# Patient Record
Sex: FEMALE | Race: WHITE | NOT HISPANIC OR LATINO | Employment: OTHER | URBAN - METROPOLITAN AREA
[De-identification: names, ages, dates, MRNs, and addresses within clinical notes are randomized per-mention and may not be internally consistent; named-entity substitution may affect disease eponyms.]

---

## 2017-02-03 PROBLEM — L65.9 HAIR LOSS: Status: ACTIVE | Noted: 2017-02-03

## 2017-02-05 PROBLEM — J32.0 CHRONIC MAXILLARY SINUSITIS: Status: ACTIVE | Noted: 2017-02-05

## 2017-05-19 ENCOUNTER — HOSPITAL ENCOUNTER (OUTPATIENT)
Dept: RADIOLOGY | Facility: HOSPITAL | Age: 77
Discharge: HOME | End: 2017-05-19
Attending: INTERNAL MEDICINE
Payer: MEDICARE

## 2017-05-19 ENCOUNTER — OFFICE VISIT (OUTPATIENT)
Dept: RHEUMATOLOGY | Facility: CLINIC | Age: 77
End: 2017-05-19
Payer: MEDICARE

## 2017-05-19 VITALS
TEMPERATURE: 98 F | HEIGHT: 62 IN | HEART RATE: 72 BPM | SYSTOLIC BLOOD PRESSURE: 123 MMHG | BODY MASS INDEX: 25.88 KG/M2 | DIASTOLIC BLOOD PRESSURE: 77 MMHG | RESPIRATION RATE: 18 BRPM | WEIGHT: 140.63 LBS

## 2017-05-19 DIAGNOSIS — R79.82 ELEVATED C-REACTIVE PROTEIN (CRP): ICD-10-CM

## 2017-05-19 DIAGNOSIS — M25.50 POLYARTHRALGIA: Primary | ICD-10-CM

## 2017-05-19 DIAGNOSIS — M79.10 MYALGIA: ICD-10-CM

## 2017-05-19 DIAGNOSIS — M25.50 POLYARTHRALGIA: ICD-10-CM

## 2017-05-19 DIAGNOSIS — R70.0 ELEVATED SED RATE: ICD-10-CM

## 2017-05-19 PROCEDURE — 73130 X-RAY EXAM OF HAND: CPT | Mod: 26,50,, | Performed by: RADIOLOGY

## 2017-05-19 PROCEDURE — 72200 X-RAY EXAM SI JOINTS: CPT | Mod: TC

## 2017-05-19 PROCEDURE — 73630 X-RAY EXAM OF FOOT: CPT | Mod: 50,TC

## 2017-05-19 PROCEDURE — 72200 X-RAY EXAM SI JOINTS: CPT | Mod: 26,,, | Performed by: RADIOLOGY

## 2017-05-19 PROCEDURE — 73130 X-RAY EXAM OF HAND: CPT | Mod: 50,TC

## 2017-05-19 PROCEDURE — 73630 X-RAY EXAM OF FOOT: CPT | Mod: 26,50,, | Performed by: RADIOLOGY

## 2017-05-19 PROCEDURE — 99999 PR PBB SHADOW E&M-EST. PATIENT-LVL III: CPT | Mod: PBBFAC,,, | Performed by: INTERNAL MEDICINE

## 2017-05-19 PROCEDURE — 99204 OFFICE O/P NEW MOD 45 MIN: CPT | Mod: S$PBB,,, | Performed by: INTERNAL MEDICINE

## 2017-05-19 ASSESSMENT — ROUTINE ASSESSMENT OF PATIENT INDEX DATA (RAPID3)
FATIGUE SCORE: 0
PATIENT GLOBAL ASSESSMENT SCORE: 3.5
PSYCHOLOGICAL DISTRESS SCORE: 0
TOTAL RAPID3 SCORE: 4.5
MDHAQ FUNCTION SCORE: 0
AM STIFFNESS SCORE: 0, NO
PAIN SCORE: 10

## 2017-05-19 NOTE — PROGRESS NOTES
"Subjective:       Patient ID: Amarilis Decker is a 76 y.o. female.    Chief Complaint: Polyarthralgia   HPI76 yo female with Hypothyroidism is seen in consultation for Fibromyalgia. Follows with Dr López for Psoriasis.MTX caused throat choking, Apremilast caused coughing and leg pain . Humira is being considered fir concern of Psoriatic arthritis. Has DDD- follow with Dr Chavez  She c/o pain in hands and feet. Pain is aching type, intermittent, worse with activity, no joint effusion. No morning stiffness.   +Psoriasis     Review of Systems   Constitutional: Positive for fatigue. Negative for fever.   HENT: Negative for ear discharge and ear pain.    Eyes: Negative for pain and redness.   Respiratory: Negative for cough and shortness of breath.    Cardiovascular: Negative for chest pain and palpitations.   Gastrointestinal: Negative for abdominal distention and abdominal pain.   Genitourinary: Negative for genital sores and hematuria.   Musculoskeletal: Positive for myalgias.   Neurological: Negative for tremors and seizures.   Psychiatric/Behavioral: Negative for agitation and hallucinations.         Objective:   /77 (BP Location: Right arm, Patient Position: Sitting)  Pulse 72  Temp 98.4 °F (36.9 °C)  Resp 18  Ht 5' 2" (1.575 m)  Wt 63.8 kg (140 lb 10.5 oz)  BMI 25.73 kg/m2     Physical Exam   Nursing note and vitals reviewed.  Constitutional: She is oriented to person, place, and time and well-developed, well-nourished, and in no distress.   HENT:   Head: Normocephalic and atraumatic.   Eyes: Conjunctivae and EOM are normal. Pupils are equal, round, and reactive to light.   Neck: Neck supple. No tracheal deviation present. No thyromegaly present.   Cardiovascular: Normal rate and regular rhythm.  Exam reveals no friction rub.    Pulmonary/Chest: Effort normal and breath sounds normal.   Abdominal: Soft. Bowel sounds are normal. She exhibits no mass.       Right Side Rheumatological Exam     Muscle " Strength (0-5 scale):  Neck Flexion:  5  Neck Extension: 5  Deltoid:  5  Biceps: 5/5   Triceps:  5  : 5/5   Iliopsoas: 5  Quadriceps:  5   Distal Lower Extremity: 5    Left Side Rheumatological Exam     Muscle Strength (0-5 scale):  Neck Flexion:  5  Neck Extension: 5  Deltoid:  5  Biceps: 5/5   Triceps:  5  :  5/5   Iliopsoas: 5  Quadriceps:  5   Distal Lower Extremity: 5      Neurological: She is alert and oriented to person, place, and time. She exhibits normal muscle tone.   Skin: Skin is warm and dry. Rash noted.     + Scaly rash on neck consistent with psoriasis   Psychiatric: Memory and affect normal.   Musculoskeletal: She exhibits no edema.   Neck with decreased range of motion in all directions   Bilateral shoulders with intact ROM   Elbows without any swelling or tenderness  Diffused Hcitra's and Heberden's nodes  Bilateral hips with external rotation 25° and  internal rotation 15°   Bilateral knee crepitus  Both ankles and feet nontender, without synovitis                   Lab Results   Component Value Date    WBC 4.77 01/02/2017    HGB 13.2 01/02/2017    HCT 41.9 01/02/2017    MCV 94 01/02/2017     01/02/2017     CMP  Sodium   Date Value Ref Range Status   05/04/2017 141 136 - 145 mmol/L Final     Potassium   Date Value Ref Range Status   05/04/2017 4.3 3.5 - 5.1 mmol/L Final     Chloride   Date Value Ref Range Status   05/04/2017 101 95 - 110 mmol/L Final     CO2   Date Value Ref Range Status   05/04/2017 31 22 - 31 mmol/L Final     Glucose   Date Value Ref Range Status   05/04/2017 107 70 - 110 mg/dL Final     Comment:     The ADA recommends the following guidelines for fasting glucose:  Normal:       less than 100 mg/dL  Prediabetes:  100 mg/dL to 125 mg/dL  Diabetes:     126 mg/dL or higher       BUN, Bld   Date Value Ref Range Status   05/04/2017 14 7 - 18 mg/dL Final     Creatinine   Date Value Ref Range Status   05/04/2017 0.80 0.50 - 1.40 mg/dL Final     Calcium   Date Value  Ref Range Status   05/04/2017 9.3 8.4 - 10.2 mg/dL Final     Total Protein   Date Value Ref Range Status   09/06/2016 7.0 6.0 - 8.4 g/dL Final     Albumin   Date Value Ref Range Status   09/06/2016 4.2 3.5 - 5.2 g/dL Final     Total Bilirubin   Date Value Ref Range Status   09/06/2016 0.6 0.1 - 1.0 mg/dL Final     Comment:     For infants and newborns, interpretation of results should be based  on gestational age, weight and in agreement with clinical  observations.  Premature Infant recommended reference ranges:  Up to 24 hours.............<8.0 mg/dL  Up to 48 hours............<12.0 mg/dL  3-5 days..................<15.0 mg/dL  6-29 days.................<15.0 mg/dL       Alkaline Phosphatase   Date Value Ref Range Status   09/06/2016 64 38 - 145 U/L Final     AST ([place])   Date Value Ref Range Status   04/15/2016 27 14 - 36 U/L Final     AST   Date Value Ref Range Status   09/06/2016 23 14 - 36 U/L Final     ALT   Date Value Ref Range Status   09/06/2016 27 10 - 44 U/L Final     Anion Gap   Date Value Ref Range Status   05/04/2017 9 8 - 16 mmol/L Final     eGFR if    Date Value Ref Range Status   05/04/2017 >60 >60 mL/min/1.73 m^2 Final     eGFR if non    Date Value Ref Range Status   05/04/2017 >60 >60 mL/min/1.73 m^2 Final     Comment:     Calculation used to obtain the estimated glomerular filtration  rate (eGFR) is the CKD-EPI equation. Since race is unknown   in our information system, the eGFR values for   -American and Non--American patients are given   for each creatinine result.       Lab Results   Component Value Date    SEDRATE 22 (H) 12/09/2016     Lab Results   Component Value Date    CRP 1.50 (H) 12/09/2016   CPK 57     Assessment:   Myalgia/left pain-?Sec to DDD- no weakness on exam, normal CPK and aldolase-doubt myositis- check EMG-  Polyarthralgia -?  Psoriatic arthritis -check x-ray of hands feet and SI joints   Current psoriasis  Elevated ESR  and CRP -we will repeat tests   Chronic DDD   Plan:   Check ESR and CRP  Check x-ray hands feet and SI joints  Check EMG test  Follow-up with Dr Chavez for chronic DDD   Patient educated about psoriatic arthritis in length.  Answered all her questions.  At this time, she is not interested in taking any medications for psoriatic arthritis including Humira.  Counseled about the disease prognosis and consequences of untreated disease.  She voiced complete understanding and refuses to take any further medications.  We will respect patients decision.  Advised to call back if any changes concerns or questions   Return to clinic when necessary

## 2017-07-10 ENCOUNTER — OFFICE VISIT (OUTPATIENT)
Dept: FAMILY MEDICINE | Facility: CLINIC | Age: 77
End: 2017-07-10
Payer: MEDICARE

## 2017-07-10 VITALS
HEART RATE: 60 BPM | RESPIRATION RATE: 12 BRPM | HEIGHT: 63 IN | BODY MASS INDEX: 23.61 KG/M2 | DIASTOLIC BLOOD PRESSURE: 60 MMHG | WEIGHT: 133.25 LBS | TEMPERATURE: 98 F | SYSTOLIC BLOOD PRESSURE: 108 MMHG

## 2017-07-10 DIAGNOSIS — Z12.31 ENCOUNTER FOR SCREENING MAMMOGRAM FOR MALIGNANT NEOPLASM OF BREAST: ICD-10-CM

## 2017-07-10 DIAGNOSIS — L40.9 PSORIASIS: ICD-10-CM

## 2017-07-10 DIAGNOSIS — R20.8 OTHER DISTURBANCES OF SKIN SENSATION: ICD-10-CM

## 2017-07-10 DIAGNOSIS — M79.604 PAIN IN BOTH LOWER EXTREMITIES: Primary | ICD-10-CM

## 2017-07-10 DIAGNOSIS — Z12.39 BREAST CANCER SCREENING: ICD-10-CM

## 2017-07-10 DIAGNOSIS — E03.9 HYPOTHYROIDISM (ACQUIRED): Chronic | ICD-10-CM

## 2017-07-10 DIAGNOSIS — Z79.899 ENCOUNTER FOR LONG-TERM CURRENT USE OF MEDICATION: ICD-10-CM

## 2017-07-10 DIAGNOSIS — R79.9 ABNORMAL FINDING OF BLOOD CHEMISTRY: ICD-10-CM

## 2017-07-10 DIAGNOSIS — M89.9 DISORDER OF BONE: ICD-10-CM

## 2017-07-10 DIAGNOSIS — E55.9 VITAMIN D DEFICIENCY: ICD-10-CM

## 2017-07-10 DIAGNOSIS — M79.605 PAIN IN BOTH LOWER EXTREMITIES: Primary | ICD-10-CM

## 2017-07-10 PROCEDURE — 1159F MED LIST DOCD IN RCRD: CPT | Mod: S$GLB,,, | Performed by: NURSE PRACTITIONER

## 2017-07-10 PROCEDURE — 99204 OFFICE O/P NEW MOD 45 MIN: CPT | Mod: S$GLB,,, | Performed by: NURSE PRACTITIONER

## 2017-07-10 PROCEDURE — 1126F AMNT PAIN NOTED NONE PRSNT: CPT | Mod: S$GLB,,, | Performed by: NURSE PRACTITIONER

## 2017-07-10 RX ORDER — LEVOTHYROXINE SODIUM 50 UG/1
TABLET ORAL
Qty: 34 TABLET | Refills: 11 | Status: SHIPPED | OUTPATIENT
Start: 2017-07-10 | End: 2017-07-25 | Stop reason: SDUPTHER

## 2017-07-10 NOTE — PROGRESS NOTES
Subjective:       Patient ID: Amarilis Decker is a 76 y.o. female.    Chief Complaint: Establish Care    The patient is here to establish care.  She tells me that her main complaint today is pain in both of her lower extremities.  She tells me that the pain is from her feet to her mid calf area.  She has had multiple epidural steroid injections with Dr. Yen with no relief.  She tells me she is at her with then because the pain is absolutely terrible.  Sometimes she wakes up and is almost paralyzed with pain for a few minutes.  She was a very active person plan competitive tennis until just a few years ago.  She is still very active despite not playing tennis secondary to pain.  She does also have some slight skin sensation disturbance to the lower extremities.  She never reports that they changed colors such as blue or white.    She has the following active problems as well.  1.  Psoriasis-she is followed by Dr. López in dermatology.  She does feel like her psoriasis is under control at this time with topical medication such as betamethasone, clobetasol and desonide.  2.  Vitamin D deficiency-she has had a deficiency of this and does take vitamin D replacement daily.  3.  Hypothyroidism-stable on Synthroid.  She states she was on Lilly Thyroid for many years with absolutely no problem.  She was then switched to Synthroid.  She does states she did not feel like the medication was working well so she switched to name brand which was too expensive now she is now on a regimen of tube tablets on Sunday and 1 tablet daily every other day of the 50 µg tablets.      Review of Systems   Constitutional: Negative for activity change and appetite change.   HENT: Negative for congestion, postnasal drip, rhinorrhea and sinus pressure.    Eyes: Negative for pain and redness.   Respiratory: Negative for choking and chest tightness.    Gastrointestinal: Negative for abdominal distention, abdominal pain, blood in stool,  "constipation, diarrhea, nausea and vomiting.   Endocrine: Negative for polydipsia and polyphagia.   Genitourinary: Negative for dysuria and hematuria.   Musculoskeletal: Positive for arthralgias and back pain. Negative for myalgias.        Severe lower ext pain   Skin: Negative for color change and rash.   Neurological: Negative for dizziness and headaches.   Psychiatric/Behavioral: Negative for agitation and behavioral problems.       Objective:       Vitals:    07/10/17 1503   BP: 108/60   Pulse: 60   Resp: 12   Temp: 98.2 °F (36.8 °C)   TempSrc: Oral   Weight: 60.5 kg (133 lb 4.3 oz)   Height: 5' 3" (1.6 m)   PainSc: 0-No pain       Physical Exam   Constitutional: She is oriented to person, place, and time. She appears well-developed and well-nourished. No distress.   HENT:   Head: Normocephalic and atraumatic.   Right Ear: Hearing, tympanic membrane, external ear and ear canal normal.   Left Ear: Hearing, tympanic membrane, external ear and ear canal normal.   Nose: Nose normal.   Mouth/Throat: Uvula is midline, oropharynx is clear and moist and mucous membranes are normal.   Eyes: Conjunctivae and EOM are normal. Pupils are equal, round, and reactive to light. Right eye exhibits no discharge. Left eye exhibits no discharge.   Neck: Trachea normal and normal range of motion. Neck supple. No JVD present. Carotid bruit is not present. No thyromegaly present.   Cardiovascular: Normal rate and regular rhythm.  Exam reveals no gallop and no friction rub.    No murmur heard.  Lower extremities without any swelling.  Dorsalis pedis and posterior tibial pulses are diminished at 1+ bilaterally.  Skin is warm to touch.   Pulmonary/Chest: Effort normal and breath sounds normal. No respiratory distress. She has no wheezes. She has no rales. She exhibits no tenderness.   Abdominal: Soft. Bowel sounds are normal. She exhibits no distension and no mass. There is no tenderness. There is no rebound and no guarding. "   Musculoskeletal: Normal range of motion.   Neurological: She is alert and oriented to person, place, and time. Coordination normal.   Skin: Skin is warm and dry. She is not diaphoretic.   Psychiatric: She has a normal mood and affect. Her behavior is normal. Judgment and thought content normal.       Assessment:       1. Pain in both lower extremities    2. Hypothyroidism (acquired)    3. Encounter for long-term current use of medication    4. Vitamin D deficiency    5. Psoriasis    6. Abnormal finding of blood chemistry     7. Breast cancer screening    8. Encounter for screening mammogram for malignant neoplasm of breast     9. Disorder of bone     10. Other disturbances of skin sensation         Plan:       Amarilis was seen today for establish care.    Diagnoses and all orders for this visit:    Pain in both lower extremities  -     US Lower Extremity Veins Bilateral Insufficiency; Future  -     US Lower Extrem Arteries Bilat with STAN (xpd); Future  -     CBC auto differential; Future  -     Comprehensive metabolic panel; Future  -     Iron and TIBC; Future  -     Ferritin; Future  -     Magnesium; Future  -     Vitamin B12; Future    Hypothyroidism (acquired)  -     levothyroxine (SYNTHROID) 50 MCG tablet; 2 tabs po on Sunday, 1 tab po all other days.  -     TSH; Future    Encounter for long-term current use of medication  -     Lipid panel; Future    Vitamin D deficiency  -     Vitamin D; Future    Psoriasis  -     Iron and TIBC; Future  -     Ferritin; Future    Abnormal finding of blood chemistry   -     Iron and TIBC; Future  -     Ferritin; Future  -     DXA Bone Density Spine And Hip; Future    Breast cancer screening  -     Mammo Digital Screening Bilat with CAD; Future    Encounter for screening mammogram for malignant neoplasm of breast   -     Mammo Digital Screening Bilat with CAD; Future    Disorder of bone   -     DXA Bone Density Spine And Hip; Future    Other disturbances of skin sensation   -      Vitamin B12; Future

## 2017-07-11 ENCOUNTER — TELEPHONE (OUTPATIENT)
Dept: FAMILY MEDICINE | Facility: CLINIC | Age: 77
End: 2017-07-11

## 2017-07-11 NOTE — TELEPHONE ENCOUNTER
----- Message from Jeanie Covington sent at 7/10/2017  4:36 PM CDT -----  Contact: self  Needs to schedule Ultrasound of veins and arteries in legs, and [facility] doesn't do STAN. Not sure where to go, I was unable to get anyone in radiology on the line at [facility] to check with them.  Please call back at

## 2017-07-13 ENCOUNTER — HOSPITAL ENCOUNTER (OUTPATIENT)
Dept: RADIOLOGY | Facility: HOSPITAL | Age: 77
Discharge: HOME | End: 2017-07-13
Attending: NURSE PRACTITIONER
Payer: MEDICARE

## 2017-07-13 DIAGNOSIS — M79.604 PAIN IN BOTH LOWER EXTREMITIES: ICD-10-CM

## 2017-07-13 DIAGNOSIS — M79.605 PAIN IN BOTH LOWER EXTREMITIES: ICD-10-CM

## 2017-07-13 PROCEDURE — 93925 LOWER EXTREMITY STUDY: CPT | Mod: TC,PO

## 2017-07-13 PROCEDURE — 93970 EXTREMITY STUDY: CPT | Mod: 26,,, | Performed by: RADIOLOGY

## 2017-07-13 PROCEDURE — 93970 EXTREMITY STUDY: CPT | Mod: TC,PO

## 2017-07-13 PROCEDURE — 93922 UPR/L XTREMITY ART 2 LEVELS: CPT | Mod: 26,,, | Performed by: RADIOLOGY

## 2017-07-13 PROCEDURE — 93925 LOWER EXTREMITY STUDY: CPT | Mod: 26,,, | Performed by: RADIOLOGY

## 2017-07-15 ENCOUNTER — PATIENT MESSAGE (OUTPATIENT)
Dept: FAMILY MEDICINE | Facility: CLINIC | Age: 77
End: 2017-07-15

## 2017-07-17 ENCOUNTER — TELEPHONE (OUTPATIENT)
Dept: FAMILY MEDICINE | Facility: CLINIC | Age: 77
End: 2017-07-17

## 2017-07-17 NOTE — TELEPHONE ENCOUNTER
Ultrasound of the lower extremities does show venous reflux.  Please book with Dr. Demarco in cardiovascular surgery.  I will discuss this with her in detail at her upcoming appointment.  The ultrasound of the arteries was fine.    Her labs were unremarkable except for one slightly elevated liver enzyme and which we can talk about in clinic.  The rest of her labs were all normal.    The DEXA scan does show osteopenia with a high fracture score.  I should recommend medication at this time.  This would be something like Boniva.  If she is okay with me I will order this, if not we will review this at our upcoming appointment.

## 2017-07-20 NOTE — TELEPHONE ENCOUNTER
----- Message from Ezequiel Chance sent at 7/19/2017  4:36 PM CDT -----  Contact: self   Placed call to pod, patient miss call from your office please call back at [phone]

## 2017-07-25 ENCOUNTER — OFFICE VISIT (OUTPATIENT)
Dept: FAMILY MEDICINE | Facility: CLINIC | Age: 77
End: 2017-07-25
Payer: MEDICARE

## 2017-07-25 ENCOUNTER — TELEPHONE (OUTPATIENT)
Dept: FAMILY MEDICINE | Facility: CLINIC | Age: 77
End: 2017-07-25

## 2017-07-25 VITALS
BODY MASS INDEX: 23.48 KG/M2 | SYSTOLIC BLOOD PRESSURE: 110 MMHG | OXYGEN SATURATION: 97 % | HEART RATE: 68 BPM | HEIGHT: 63 IN | TEMPERATURE: 98 F | WEIGHT: 132.5 LBS | DIASTOLIC BLOOD PRESSURE: 60 MMHG

## 2017-07-25 DIAGNOSIS — E03.9 HYPOTHYROIDISM (ACQUIRED): Chronic | ICD-10-CM

## 2017-07-25 DIAGNOSIS — I87.2 VENOUS INSUFFICIENCY: Primary | ICD-10-CM

## 2017-07-25 PROCEDURE — 99214 OFFICE O/P EST MOD 30 MIN: CPT | Mod: S$GLB,,, | Performed by: NURSE PRACTITIONER

## 2017-07-25 PROCEDURE — 1159F MED LIST DOCD IN RCRD: CPT | Mod: S$GLB,,, | Performed by: NURSE PRACTITIONER

## 2017-07-25 PROCEDURE — 1126F AMNT PAIN NOTED NONE PRSNT: CPT | Mod: S$GLB,,, | Performed by: NURSE PRACTITIONER

## 2017-07-25 RX ORDER — LEVOTHYROXINE SODIUM 50 UG/1
TABLET ORAL
Qty: 34 TABLET | Refills: 11 | Status: SHIPPED | OUTPATIENT
Start: 2017-07-25 | End: 2021-02-01

## 2017-07-25 NOTE — TELEPHONE ENCOUNTER
Pt in clinic to day needing to schedule an appt with Dr Macario Hernandez per Brittany Bruno NP. Attempted to schedule and also call the office . Pt cannot schedule on a Wed or the first Tuesday of the month. Please review and call pt to schedule. Thank you. CLC

## 2017-07-25 NOTE — TELEPHONE ENCOUNTER
Spoke to patient regarding message below, patient declined to schedule appt/discuss results and medication as she is scheduled for an office visit 7/25/17. Patient opted to discuss results at this time.

## 2017-07-28 NOTE — PROGRESS NOTES
"Subjective:       Patient ID: Amarilis Decker is a 76 y.o. female.    Chief Complaint: Results    The patient is here for a follow-up visit to review blood work as well as her most recent venous ultrasound.  She is still having very sharp pain in the lower extremities/feet bilaterally that lasts just a few minutes and then improves.  She states the pain is excruciating and stops her in her tracks but then goes away.  She denies any discoloration of her lower extremities.  We did a venous ultrasound 7/13/17 that shows a hemodynamically significant venous reflux within the left greater saphenous vein at the level of the mid thigh.  There was some incidentally observed venous reflux within the left and right common femoral vein.  No DVT.  Arterial ultrasound was essentially normal.  I did print out all of her blood work and go over this in the office.  The patient was displeased that I did not run a T3 or T4 she states her previous doctor "always" checked this routinely.      Review of Systems   Constitutional: Negative for activity change and appetite change.   HENT: Negative for congestion, postnasal drip, rhinorrhea and sinus pressure.    Eyes: Negative for pain and redness.   Respiratory: Negative for choking and chest tightness.    Gastrointestinal: Negative for abdominal distention, abdominal pain, blood in stool, constipation, diarrhea, nausea and vomiting.   Endocrine: Negative for polydipsia and polyphagia.   Genitourinary: Negative for dysuria and hematuria.   Musculoskeletal: Negative for arthralgias and myalgias.        Leg/feet pain   Skin: Negative for color change and rash.   Neurological: Negative for dizziness and headaches.   Psychiatric/Behavioral: Negative for agitation and behavioral problems.       Objective:      Physical Exam   Constitutional: She is oriented to person, place, and time. She appears well-developed and well-nourished.   HENT:   Head: Normocephalic and atraumatic.   Right Ear: " External ear normal.   Left Ear: External ear normal.   Nose: Nose normal.   Mouth/Throat: Oropharynx is clear and moist.   Eyes: Conjunctivae are normal. Right eye exhibits no discharge. Left eye exhibits no discharge. No scleral icterus.   Neck: Normal range of motion. Neck supple. No tracheal deviation present.   Cardiovascular: Normal rate, regular rhythm and normal heart sounds.  Exam reveals no friction rub.    No murmur heard.  Pulmonary/Chest: Effort normal and breath sounds normal. No stridor. No respiratory distress. She has no wheezes. She has no rales. She exhibits no tenderness.   Musculoskeletal: Normal range of motion.   Lymphadenopathy:     She has no cervical adenopathy.   Neurological: She is alert and oriented to person, place, and time.   Skin: Skin is warm and dry.   Psychiatric: She has a normal mood and affect.       Assessment:       1. Venous insufficiency    2. Hypothyroidism (acquired)        Plan:       Amarilis was seen today for results.    Diagnoses and all orders for this visit:    Venous insufficiency  -     Ambulatory consult to Cardiovascular Surgery    Hypothyroidism (acquired)  -     levothyroxine (SYNTHROID) 50 MCG tablet; 2 tabs po on Sunday, 1 tab po all other days.    Greater than 50% of the patient's 40 minute clinic visit was spent on reviewing labs as well as venous and arterial ultrasound.  The labs were printed and reviewed in detail.

## 2017-10-09 ENCOUNTER — OFFICE VISIT (OUTPATIENT)
Dept: PODIATRY | Facility: CLINIC | Age: 77
End: 2017-10-09
Payer: MEDICARE

## 2017-10-09 VITALS — WEIGHT: 129.63 LBS | BODY MASS INDEX: 22.97 KG/M2 | HEIGHT: 63 IN

## 2017-10-09 DIAGNOSIS — M79.671 FOOT PAIN, BILATERAL: ICD-10-CM

## 2017-10-09 DIAGNOSIS — B35.3 TINEA PEDIS OF BOTH FEET: Primary | ICD-10-CM

## 2017-10-09 DIAGNOSIS — L40.9 PSORIASIS: Chronic | ICD-10-CM

## 2017-10-09 DIAGNOSIS — M79.672 FOOT PAIN, BILATERAL: ICD-10-CM

## 2017-10-09 PROCEDURE — 99999 PR PBB SHADOW E&M-EST. PATIENT-LVL II: CPT | Mod: PBBFAC,,, | Performed by: PODIATRIST

## 2017-10-09 PROCEDURE — 99203 OFFICE O/P NEW LOW 30 MIN: CPT | Mod: S$PBB,,, | Performed by: PODIATRIST

## 2017-10-09 PROCEDURE — 99212 OFFICE O/P EST SF 10 MIN: CPT | Mod: PBBFAC,PO | Performed by: PODIATRIST

## 2017-10-09 RX ORDER — CICLOPIROX OLAMINE 7.7 MG/G
CREAM TOPICAL 2 TIMES DAILY
Qty: 1 TUBE | Refills: 1 | Status: SHIPPED | OUTPATIENT
Start: 2017-10-09 | End: 2017-10-12 | Stop reason: SDUPTHER

## 2017-10-09 RX ORDER — PRENATAL VIT 91/IRON/FOLIC/DHA 28-975-200
COMBINATION PACKAGE (EA) ORAL 2 TIMES DAILY
COMMUNITY
End: 2021-02-01 | Stop reason: CLARIF

## 2017-10-09 NOTE — PROGRESS NOTES
Subjective:      Patient ID: Amarilis Decker is a 76 y.o. female.    Chief Complaint: Foot Pain (right worse than left  ); Foot Problem (dryness and cracking of skin); and Other Misc (PCP:  Dr Bruno  7/25/17)    Amarilis is a 76 y.o. female who presents to the podiatry clinic  with complaint of  bilateral foot pain secondary to dry cracking feet. Onset of the symptoms was several months ago. Precipitating event: none known.  Patient has a known history of psoriasis on her scalp and thinks the rash on the bottom of her feet may also be psoriasis. She has tried her clobetasol cream on the feet but did not feel that it helped, may have even gotten worse. She has also tried over the counter terbinafine which she does not think helped. She has areas of cracking to the heel and some of the toes which hurts with weight bearing and ambulation. She also noted itching to the bottoms of her feet. No other pedal complaints at this time.        Review of Systems   Constitution: Negative for chills and fever.   Cardiovascular: Negative for claudication and leg swelling.   Respiratory: Negative for shortness of breath.    Skin: Positive for itching and rash. Negative for nail changes.   Musculoskeletal: Negative for muscle cramps, muscle weakness and myalgias.   Gastrointestinal: Negative for nausea and vomiting.   Neurological: Negative for focal weakness, loss of balance, numbness and paresthesias.           Objective:      Physical Exam   Constitutional: She is oriented to person, place, and time. She appears well-developed and well-nourished. No distress.   Cardiovascular:   Pulses:       Dorsalis pedis pulses are 2+ on the right side, and 2+ on the left side.        Posterior tibial pulses are 2+ on the right side, and 2+ on the left side.   < 3 sec capillary refill time to toes 1-5 bilateral. Toes and feet are warm to touch proximally with normal distal cooling b/l. There is some hair growth on the feet and toes b/l. There  is no edema b/l. No spider veins or varicosities present b/l.      Musculoskeletal:   Equinus noted b/l ankles with < 10 deg DF noted. MMT 5/5 in DF/PF/Inv/Ev resistance with no reproduction of pain in any direction. Passive range of motion of ankle and pedal joints is painless b/l.     Neurological: She is alert and oriented to person, place, and time. She has normal strength. She displays no atrophy and no tremor. No sensory deficit. She exhibits normal muscle tone.   Negative tinel sign bilateral.   Skin: Skin is warm and dry. Rash noted. No abrasion, no bruising, no burn, no ecchymosis, no laceration, no lesion and no petechiae noted. She is not diaphoretic. No cyanosis or erythema. No pallor. Nails show no clubbing.   Skin temperature, texture and turgor within normal limits.    Dry scale with superficial flakes over an erythematous base in a moccasin pattern  without ulceration, drainage, pus, tracking, fluctuance, malodor, or cardinal signs infection. There is fissuring noted to the bilateral heels and the plantar toes left 3 and 5 at the MTPJ. Fissuring is partial thickness without erythema or drainage.   Psychiatric: She has a normal mood and affect. Her behavior is normal.             Assessment:       Encounter Diagnoses   Name Primary?    Tinea pedis of both feet Yes    Psoriasis     Foot pain, bilateral          Plan:       Amarilis was seen today for foot pain, foot problem and other misc.    Diagnoses and all orders for this visit:    Tinea pedis of both feet    Psoriasis    Foot pain, bilateral    Other orders  -     ciclopirox (LOPROX) 0.77 % Crea; Apply topically 2 (two) times daily.      I counseled the patient on her conditions, their implications and medical management.    Discussed etiology and treatment for tinea pedis. There may be some aspect of the psoriasis as well, will treat as tinea with ciclopirox BID for the next two weeks. Discussed the importance of using the antifungal daily.      Return in 2 weeks for follow up care, if no improvement consider biopsy to confirm psoriasis.    Ritchie Caraballo DPM

## 2017-10-12 RX ORDER — CICLOPIROX OLAMINE 7.7 MG/G
CREAM TOPICAL 2 TIMES DAILY
Qty: 90 G | Refills: 2 | Status: SHIPPED | OUTPATIENT
Start: 2017-10-12 | End: 2021-02-01

## 2017-10-23 ENCOUNTER — OFFICE VISIT (OUTPATIENT)
Dept: PODIATRY | Facility: CLINIC | Age: 77
End: 2017-10-23
Payer: MEDICARE

## 2017-10-23 VITALS — WEIGHT: 129.19 LBS | BODY MASS INDEX: 22.89 KG/M2 | HEIGHT: 63 IN

## 2017-10-23 DIAGNOSIS — B35.3 TINEA PEDIS OF BOTH FEET: Primary | ICD-10-CM

## 2017-10-23 DIAGNOSIS — L40.9 PSORIASIS: ICD-10-CM

## 2017-10-23 PROCEDURE — 99999 PR PBB SHADOW E&M-EST. PATIENT-LVL II: CPT | Mod: PBBFAC,,, | Performed by: PODIATRIST

## 2017-10-23 PROCEDURE — 99212 OFFICE O/P EST SF 10 MIN: CPT | Mod: S$PBB,,, | Performed by: PODIATRIST

## 2017-10-23 PROCEDURE — 99212 OFFICE O/P EST SF 10 MIN: CPT | Mod: PBBFAC,PO | Performed by: PODIATRIST

## 2017-10-23 NOTE — PROGRESS NOTES
Subjective:      Patient ID: Amarilis Decker is a 76 y.o. female.    Chief Complaint: Follow-up (2 week recheck Tinea pedis both feet) and [facility] (PCP Dr. Fuentes 3/10/2017)    Amarilis is a 76 y.o. female who presents to the podiatry clinic  with complaint of  bilateral foot pain secondary to dry cracking feet. Onset of the symptoms was several months ago. Precipitating event: none known.  Patient has a known history of psoriasis on her scalp and thinks the rash on the bottom of her feet may also be psoriasis. She has tried her clobetasol cream on the feet but did not feel that it helped, may have even gotten worse. She has also tried over the counter terbinafine which she does not think helped. She has areas of cracking to the heel and some of the toes which hurts with weight bearing and ambulation. She also noted itching to the bottoms of her feet. No other pedal complaints at this time.    10/23/17: 2 week follow up bilateral tinea pedis, she feels there has been significant improvement, the cracked painful areas have healed and she feels the redness is improving. Using the ciclopirox BID. No new concerns at the present time.      Review of Systems   Constitution: Negative for chills and fever.   Cardiovascular: Negative for claudication and leg swelling.   Respiratory: Negative for shortness of breath.    Skin: Positive for itching and rash. Negative for nail changes.   Musculoskeletal: Negative for muscle cramps, muscle weakness and myalgias.   Gastrointestinal: Negative for nausea and vomiting.   Neurological: Negative for focal weakness, loss of balance, numbness and paresthesias.           Objective:      Physical Exam   Constitutional: She is oriented to person, place, and time. She appears well-developed and well-nourished. No distress.   Cardiovascular:   Pulses:       Dorsalis pedis pulses are 2+ on the right side, and 2+ on the left side.        Posterior tibial pulses are 2+ on the right side,  and 2+ on the left side.   < 3 sec capillary refill time to toes 1-5 bilateral. Toes and feet are warm to touch proximally with normal distal cooling b/l. There is some hair growth on the feet and toes b/l. There is no edema b/l. No spider veins or varicosities present b/l.      Musculoskeletal:   Equinus noted b/l ankles with < 10 deg DF noted. MMT 5/5 in DF/PF/Inv/Ev resistance with no reproduction of pain in any direction. Passive range of motion of ankle and pedal joints is painless b/l.     Neurological: She is alert and oriented to person, place, and time. She has normal strength. She displays no atrophy and no tremor. No sensory deficit. She exhibits normal muscle tone.   Negative tinel sign bilateral.   Skin: Skin is warm and dry. Rash noted. No abrasion, no bruising, no burn, no ecchymosis, no laceration, no lesion and no petechiae noted. She is not diaphoretic. No cyanosis or erythema. No pallor. Nails show no clubbing.   Skin temperature, texture and turgor within normal limits.    Dry scale with superficial flakes over an erythematous base in a moccasin pattern improving, no ulceration, drainage, pus, tracking, fluctuance, malodor, or cardinal signs infection. There previous noted fissuring to the bilateral heels and the plantar toes left 3 and 5 at the MTPJ has improved greatly with only small fissuring still present to the heels and the plantar MTPJ's fully healed.   Psychiatric: She has a normal mood and affect. Her behavior is normal.             Assessment:       Encounter Diagnoses   Name Primary?    Tinea pedis of both feet Yes    Psoriasis          Plan:       Amarilis was seen today for follow-up and other misc.    Diagnoses and all orders for this visit:    Tinea pedis of both feet    Psoriasis      I counseled the patient on her conditions, their implications and medical management.    Discussed etiology and treatment for tinea pedis. There may be some aspect of the psoriasis as well, will  continue to treat as tinea with ciclopirox BID for the next month as there is significant improvement. Discussed the importance of using the antifungal daily. There may still also be some changes secondary to the psoriasis that can be addressed once the tinea is under control.    Return in 1 month for follow up care, discussed possible PO terbinafine or fluconazole treatment as option however her last ALT was slightly elevated so I would prefer to treat topically for the time being.    Ritchie Caraballo DPM

## 2017-11-08 ENCOUNTER — OFFICE VISIT (OUTPATIENT)
Dept: PODIATRY | Facility: CLINIC | Age: 77
End: 2017-11-08
Payer: MEDICARE

## 2017-11-08 VITALS — HEIGHT: 63 IN

## 2017-11-08 DIAGNOSIS — M79.672 FOOT PAIN, BILATERAL: ICD-10-CM

## 2017-11-08 DIAGNOSIS — L40.9 PSORIASIS: ICD-10-CM

## 2017-11-08 DIAGNOSIS — B35.3 TINEA PEDIS OF BOTH FEET: Primary | ICD-10-CM

## 2017-11-08 DIAGNOSIS — M79.671 FOOT PAIN, BILATERAL: ICD-10-CM

## 2017-11-08 PROCEDURE — 99999 PR PBB SHADOW E&M-EST. PATIENT-LVL I: CPT | Mod: PBBFAC,,, | Performed by: PODIATRIST

## 2017-11-08 PROCEDURE — 99213 OFFICE O/P EST LOW 20 MIN: CPT | Mod: S$PBB,,, | Performed by: PODIATRIST

## 2017-11-08 PROCEDURE — 99211 OFF/OP EST MAY X REQ PHY/QHP: CPT | Mod: PBBFAC,PO | Performed by: PODIATRIST

## 2017-11-08 RX ORDER — AMOXICILLIN AND CLAVULANATE POTASSIUM 875; 125 MG/1; MG/1
TABLET, FILM COATED ORAL
COMMUNITY
Start: 2017-10-10 | End: 2021-02-01 | Stop reason: ALTCHOICE

## 2017-11-08 RX ORDER — ESTRADIOL 0.1 MG/G
CREAM VAGINAL
COMMUNITY
Start: 2017-10-24 | End: 2021-02-01

## 2017-11-08 RX ORDER — CLOTRIMAZOLE AND BETAMETHASONE DIPROPIONATE 10; .64 MG/G; MG/G
CREAM TOPICAL 2 TIMES DAILY
Qty: 45 G | Refills: 2 | Status: SHIPPED | OUTPATIENT
Start: 2017-11-08 | End: 2021-02-01

## 2017-11-08 RX ORDER — LEVOTHYROXINE, LIOTHYRONINE 19; 4.5 UG/1; UG/1
30 TABLET ORAL DAILY
Refills: 0 | COMMUNITY
Start: 2017-10-26 | End: 2021-02-01 | Stop reason: CLARIF

## 2017-11-08 RX ORDER — CLOBETASOL PROPIONATE 0.5 MG/G
OINTMENT TOPICAL
COMMUNITY
Start: 2017-10-10 | End: 2021-02-01

## 2017-11-08 RX ORDER — LEVOTHYROXINE, LIOTHYRONINE 9.5; 2.25 UG/1; UG/1
3 TABLET ORAL DAILY
Refills: 0 | COMMUNITY
Start: 2017-10-26 | End: 2021-02-01 | Stop reason: SDUPTHER

## 2017-11-08 RX ORDER — BETAMETHASONE DIPROPIONATE 0.5 MG/G
1 CREAM TOPICAL 2 TIMES DAILY
COMMUNITY
Start: 2017-10-03 | End: 2021-02-01

## 2017-11-08 RX ORDER — LEVOTHYROXINE SODIUM 25 UG/1
TABLET ORAL
COMMUNITY
Start: 2017-10-29 | End: 2021-02-01 | Stop reason: CLARIF

## 2017-11-08 RX ORDER — LEVOTHYROXINE SODIUM 50 UG/1
50 TABLET ORAL DAILY
COMMUNITY
Start: 2016-09-12 | End: 2021-02-01 | Stop reason: SDUPTHER

## 2017-11-08 RX ORDER — CHOLECALCIFEROL (VITAMIN D3) 25 MCG
TABLET ORAL
COMMUNITY
End: 2021-02-01 | Stop reason: CLARIF

## 2017-11-08 RX ORDER — FLUCONAZOLE 150 MG/1
150 TABLET ORAL
Qty: 2 TABLET | Refills: 0 | Status: SHIPPED | OUTPATIENT
Start: 2017-11-08 | End: 2017-11-16

## 2017-11-08 RX ORDER — VANCOMYCIN HYDROCHLORIDE 1 G/20ML
INJECTION, POWDER, LYOPHILIZED, FOR SOLUTION INTRAVENOUS
COMMUNITY
Start: 2017-10-17 | End: 2021-02-01

## 2017-11-08 RX ORDER — ACITRETIN 10 MG/1
CAPSULE ORAL
COMMUNITY
Start: 2017-10-06 | End: 2021-02-01 | Stop reason: CLARIF

## 2017-11-08 NOTE — PROGRESS NOTES
Subjective:      Patient ID: Amarilis Decker is a 76 y.o. female.    Chief Complaint: Foot Pain (c/o foot pain both feet stating that feet are getting worse) and Other Misc (PCP Dr. Fuentes ( CARLOS rBuno NP 07/25/2017))    Amarilis is a 76 y.o. female who presents to the podiatry clinic  with complaint of  bilateral foot pain secondary to dry cracking feet. Onset of the symptoms was several months ago. Precipitating event: none known.  Patient has a known history of psoriasis on her scalp and thinks the rash on the bottom of her feet may also be psoriasis. She has tried her clobetasol cream on the feet but did not feel that it helped, may have even gotten worse. She has also tried over the counter terbinafine which she does not think helped. She has areas of cracking to the heel and some of the toes which hurts with weight bearing and ambulation. She also noted itching to the bottoms of her feet. No other pedal complaints at this time.    10/23/17: 2 week follow up bilateral tinea pedis, she feels there has been significant improvement, the cracked painful areas have healed and she feels the redness is improving. Using the ciclopirox BID. No new concerns at the present time.    11/8/17: Patient returns with noted increase in pain and redness to plantar feet bilateral despite use of the ciclopirox daily. She wants to know what other treatment options there are.      Review of Systems   Constitution: Negative for chills and fever.   Cardiovascular: Negative for claudication and leg swelling.   Respiratory: Negative for shortness of breath.    Skin: Positive for itching and rash. Negative for nail changes.   Musculoskeletal: Negative for muscle cramps, muscle weakness and myalgias.   Gastrointestinal: Negative for nausea and vomiting.   Neurological: Negative for focal weakness, loss of balance, numbness and paresthesias.           Objective:      Physical Exam   Constitutional: She is oriented to person, place, and  time. She appears well-developed and well-nourished. No distress.   Cardiovascular:   Pulses:       Dorsalis pedis pulses are 2+ on the right side, and 2+ on the left side.        Posterior tibial pulses are 2+ on the right side, and 2+ on the left side.   < 3 sec capillary refill time to toes 1-5 bilateral. Toes and feet are warm to touch proximally with normal distal cooling b/l. There is some hair growth on the feet and toes b/l. There is no edema b/l. No spider veins or varicosities present b/l.      Musculoskeletal:   Equinus noted b/l ankles with < 10 deg DF noted. MMT 5/5 in DF/PF/Inv/Ev resistance with no reproduction of pain in any direction. Passive range of motion of ankle and pedal joints is painless b/l.     Neurological: She is alert and oriented to person, place, and time. She has normal strength. She displays no atrophy and no tremor. No sensory deficit. She exhibits normal muscle tone.   Negative tinel sign bilateral.   Skin: Skin is warm and dry. Rash noted. No abrasion, no bruising, no burn, no ecchymosis, no laceration, no lesion and no petechiae noted. She is not diaphoretic. No cyanosis or erythema. No pallor. Nails show no clubbing.   Skin temperature, texture and turgor within normal limits.    Dry scale with superficial flakes over an erythematous base in a moccasin pattern improving, no ulceration, drainage, pus, tracking, fluctuance, malodor, or cardinal signs infection. There previous noted fissuring to the bilateral heels and the plantar toes left 3 and 5 at the MTPJ has improved greatly with only small fissuring still present to the heels and the plantar MTPJ's fully healed.   Psychiatric: She has a normal mood and affect. Her behavior is normal.             Assessment:       Encounter Diagnoses   Name Primary?    Tinea pedis of both feet Yes    Psoriasis     Foot pain, bilateral          Plan:       Amarilis was seen today for foot pain and other misc.    Diagnoses and all orders  for this visit:    Tinea pedis of both feet    Psoriasis    Foot pain, bilateral    Other orders  -     clotrimazole-betamethasone 1-0.05% (LOTRISONE) cream; Apply topically 2 (two) times daily.  -     fluconazole (DIFLUCAN) 150 MG Tab; Take 1 tablet (150 mg total) by mouth every 7 days.      I counseled the patient on her conditions, their implications and medical management.    Discussed etiology and treatment for tinea pedis. Fluconazole 150 mg once weekly for 2 weeks for PO treatment of tinea. She will also use the lotrisone cream with the betamethasone to help with the itching and psoriasis.     Return in 2 weeks for follow up.    Ritchie Caraballo DPM

## 2017-11-29 ENCOUNTER — OFFICE VISIT (OUTPATIENT)
Dept: PODIATRY | Facility: CLINIC | Age: 77
End: 2017-11-29
Payer: MEDICARE

## 2017-11-29 VITALS — WEIGHT: 129.19 LBS | BODY MASS INDEX: 22.89 KG/M2 | HEIGHT: 63 IN

## 2017-11-29 DIAGNOSIS — B35.3 TINEA PEDIS OF BOTH FEET: ICD-10-CM

## 2017-11-29 DIAGNOSIS — L40.9 PSORIASIS: Primary | ICD-10-CM

## 2017-11-29 PROCEDURE — 99212 OFFICE O/P EST SF 10 MIN: CPT | Mod: PBBFAC,PO | Performed by: PODIATRIST

## 2017-11-29 PROCEDURE — 99212 OFFICE O/P EST SF 10 MIN: CPT | Mod: S$PBB,,, | Performed by: PODIATRIST

## 2017-11-29 PROCEDURE — 99999 PR PBB SHADOW E&M-EST. PATIENT-LVL II: CPT | Mod: PBBFAC,,, | Performed by: PODIATRIST

## 2017-11-29 NOTE — PROGRESS NOTES
Subjective:      Patient ID: Amarilis Decker is a 77 y.o. female.    Chief Complaint: Follow-up (2 week f/u for foot pain both feet (Tinea Pedis)) and Other Misc (Dr. Fuentes 7/25/2017 ( CARLOS Bruno NP))    Amarilis is a 77 y.o. female who presents to the podiatry clinic  with complaint of  bilateral foot pain secondary to dry cracking feet. Onset of the symptoms was several months ago. Precipitating event: none known.  Patient has a known history of psoriasis on her scalp and thinks the rash on the bottom of her feet may also be psoriasis. She has tried her clobetasol cream on the feet but did not feel that it helped, may have even gotten worse. She has also tried over the counter terbinafine which she does not think helped. She has areas of cracking to the heel and some of the toes which hurts with weight bearing and ambulation. She also noted itching to the bottoms of her feet. No other pedal complaints at this time.    10/23/17: 2 week follow up bilateral tinea pedis, she feels there has been significant improvement, the cracked painful areas have healed and she feels the redness is improving. Using the ciclopirox BID. No new concerns at the present time.    11/8/17: Patient returns with noted increase in pain and redness to plantar feet bilateral despite use of the ciclopirox daily. She wants to know what other treatment options there are.    11/29/17: Patient returns for follow up bilateral foot pain with associated rash and cracks to her feet, Finished the fluconazole course, also using Lotrisone daily. She relates significant improvement, no pain noted to the feet, no more cracking. She can walk without pain. There is still some dryness and peeling to the plantar feet associated with her psoriasis.      Review of Systems   Constitution: Negative for chills and fever.   Cardiovascular: Negative for claudication and leg swelling.   Respiratory: Negative for shortness of breath.    Skin: Positive for itching and  rash. Negative for nail changes.   Musculoskeletal: Negative for muscle cramps, muscle weakness and myalgias.   Gastrointestinal: Negative for nausea and vomiting.   Neurological: Negative for focal weakness, loss of balance, numbness and paresthesias.           Objective:      Physical Exam   Constitutional: She is oriented to person, place, and time. She appears well-developed and well-nourished. No distress.   Cardiovascular:   Pulses:       Dorsalis pedis pulses are 2+ on the right side, and 2+ on the left side.        Posterior tibial pulses are 2+ on the right side, and 2+ on the left side.   < 3 sec capillary refill time to toes 1-5 bilateral. Toes and feet are warm to touch proximally with normal distal cooling b/l. There is some hair growth on the feet and toes b/l. There is no edema b/l. No spider veins or varicosities present b/l.      Musculoskeletal:   Equinus noted b/l ankles with < 10 deg DF noted. MMT 5/5 in DF/PF/Inv/Ev resistance with no reproduction of pain in any direction. Passive range of motion of ankle and pedal joints is painless b/l.     Neurological: She is alert and oriented to person, place, and time. She has normal strength. She displays no atrophy and no tremor. No sensory deficit. She exhibits normal muscle tone.   Negative tinel sign bilateral.   Skin: Skin is warm and dry. Rash noted. No abrasion, no bruising, no burn, no ecchymosis, no laceration, no lesion and no petechiae noted. She is not diaphoretic. No cyanosis or erythema. No pallor. Nails show no clubbing.   Skin temperature, texture and turgor within normal limits.    No longer noted fissuring, there is some erythema persistent with hyperkeratosis and flaking of skin, much improved since last visit   Psychiatric: She has a normal mood and affect. Her behavior is normal.             Assessment:       Encounter Diagnoses   Name Primary?    Psoriasis Yes    Tinea pedis of both feet          Plan:       Amarilis was seen  today for follow-up and other misc.    Diagnoses and all orders for this visit:    Psoriasis    Tinea pedis of both feet      I counseled the patient on her conditions, their implications and medical management.    The tinea aspect of her foot problems seems to have resolved, I believe she still has problems secondary to her psoriasis. She can continue to use the lotrisone as needed. The cracking and the pain has resolved.  The psoriasis is being followed by her dermatologist, continue treatment with them.    Return PRN if the pain returns.    Ritchie Caraballo DPM

## 2020-12-27 ENCOUNTER — NURSE TRIAGE (OUTPATIENT)
Dept: ADMINISTRATIVE | Facility: CLINIC | Age: 80
End: 2020-12-27

## 2020-12-27 NOTE — TELEPHONE ENCOUNTER
Spoke with Lou (child) and patient.  Patient states she tested positive today for covid-19.  Current symptoms are muscle pain, weakness, cough, joint pain, diarrhea, fatigue, and, runny nose.  Patient states her face looks gray in color per daughter.  Advised patient to call [id number] to seek immediate medical attention.  Patient states she is not calling EMS-[phone] and Lou declined to call as well after being advised x   Lou states patient's oxygen was fine this morning and reports she wants a message sent to inquire about infusion for covid-19 patient's.     Reason for Disposition   Bluish (or gray) lips or face now    Additional Information   Negative: SEVERE difficulty breathing (e.g., struggling for each breath, speaks in single words)    Protocols used: CORONAVIRUS (COVID-19) DIAGNOSED OR [id number]

## 2021-02-01 ENCOUNTER — LAB VISIT (OUTPATIENT)
Dept: LAB | Facility: HOSPITAL | Age: 81
End: 2021-02-01
Attending: INTERNAL MEDICINE
Payer: MEDICARE

## 2021-02-01 ENCOUNTER — OFFICE VISIT (OUTPATIENT)
Dept: FAMILY MEDICINE | Facility: CLINIC | Age: 81
End: 2021-02-01
Payer: MEDICARE

## 2021-02-01 VITALS
BODY MASS INDEX: 24.54 KG/M2 | HEIGHT: 62 IN | HEART RATE: 70 BPM | SYSTOLIC BLOOD PRESSURE: 118 MMHG | TEMPERATURE: 97 F | DIASTOLIC BLOOD PRESSURE: 70 MMHG | WEIGHT: 133.38 LBS

## 2021-02-01 DIAGNOSIS — R79.89 ABNORMAL CBC: ICD-10-CM

## 2021-02-01 DIAGNOSIS — E78.5 DYSLIPIDEMIA: ICD-10-CM

## 2021-02-01 DIAGNOSIS — E03.9 HYPOTHYROIDISM (ACQUIRED): ICD-10-CM

## 2021-02-01 DIAGNOSIS — E55.9 VITAMIN D DEFICIENCY: ICD-10-CM

## 2021-02-01 DIAGNOSIS — Z00.00 MEDICARE ANNUAL WELLNESS VISIT, SUBSEQUENT: ICD-10-CM

## 2021-02-01 DIAGNOSIS — J30.89 ENVIRONMENTAL AND SEASONAL ALLERGIES: ICD-10-CM

## 2021-02-01 DIAGNOSIS — Z86.16 HISTORY OF COVID-19: Primary | ICD-10-CM

## 2021-02-01 DIAGNOSIS — M83.9 VITAMIN D DEFICIENT OSTEOMALACIA: ICD-10-CM

## 2021-02-01 LAB
BASOPHILS # BLD AUTO: 0.03 K/UL (ref 0–0.2)
BASOPHILS NFR BLD: 0.6 % (ref 0–1.9)
DIFFERENTIAL METHOD: ABNORMAL
EOSINOPHIL # BLD AUTO: 0.6 K/UL (ref 0–0.5)
EOSINOPHIL NFR BLD: 12.2 % (ref 0–8)
ERYTHROCYTE [DISTWIDTH] IN BLOOD BY AUTOMATED COUNT: 13.3 % (ref 11.5–14.5)
HCT VFR BLD AUTO: 42 % (ref 37–48.5)
HGB BLD-MCNC: 12.8 G/DL (ref 12–16)
IMM GRANULOCYTES # BLD AUTO: 0.01 K/UL (ref 0–0.04)
IMM GRANULOCYTES NFR BLD AUTO: 0.2 % (ref 0–0.5)
LYMPHOCYTES # BLD AUTO: 1.3 K/UL (ref 1–4.8)
LYMPHOCYTES NFR BLD: 24.5 % (ref 18–48)
MCH RBC QN AUTO: 29.9 PG (ref 27–31)
MCHC RBC AUTO-ENTMCNC: 30.5 G/DL (ref 32–36)
MCV RBC AUTO: 98 FL (ref 82–98)
MONOCYTES # BLD AUTO: 0.5 K/UL (ref 0.3–1)
MONOCYTES NFR BLD: 9.8 % (ref 4–15)
NEUTROPHILS # BLD AUTO: 2.8 K/UL (ref 1.8–7.7)
NEUTROPHILS NFR BLD: 52.7 % (ref 38–73)
NRBC BLD-RTO: 0 /100 WBC
PLATELET # BLD AUTO: 229 K/UL (ref 150–350)
PMV BLD AUTO: 10 FL (ref 9.2–12.9)
RBC # BLD AUTO: 4.28 M/UL (ref 4–5.4)
WBC # BLD AUTO: 5.23 K/UL (ref 3.9–12.7)

## 2021-02-01 PROCEDURE — 99214 OFFICE O/P EST MOD 30 MIN: CPT | Mod: S$PBB,,, | Performed by: INTERNAL MEDICINE

## 2021-02-01 PROCEDURE — 99213 OFFICE O/P EST LOW 20 MIN: CPT | Mod: PBBFAC,PN | Performed by: INTERNAL MEDICINE

## 2021-02-01 PROCEDURE — 82306 VITAMIN D 25 HYDROXY: CPT

## 2021-02-01 PROCEDURE — 99999 PR PBB SHADOW E&M-EST. PATIENT-LVL III: CPT | Mod: PBBFAC,,, | Performed by: INTERNAL MEDICINE

## 2021-02-01 PROCEDURE — 84439 ASSAY OF FREE THYROXINE: CPT

## 2021-02-01 PROCEDURE — 84481 FREE ASSAY (FT-3): CPT

## 2021-02-01 PROCEDURE — 84443 ASSAY THYROID STIM HORMONE: CPT

## 2021-02-01 PROCEDURE — 99214 PR OFFICE/OUTPT VISIT, EST, LEVL IV, 30-39 MIN: ICD-10-PCS | Mod: S$PBB,,, | Performed by: INTERNAL MEDICINE

## 2021-02-01 PROCEDURE — 80061 LIPID PANEL: CPT

## 2021-02-01 PROCEDURE — 85025 COMPLETE CBC W/AUTO DIFF WBC: CPT

## 2021-02-01 PROCEDURE — 36415 COLL VENOUS BLD VENIPUNCTURE: CPT | Mod: PN

## 2021-02-01 PROCEDURE — 80053 COMPREHEN METABOLIC PANEL: CPT

## 2021-02-01 PROCEDURE — 99999 PR PBB SHADOW E&M-EST. PATIENT-LVL III: ICD-10-PCS | Mod: PBBFAC,,, | Performed by: INTERNAL MEDICINE

## 2021-02-01 RX ORDER — TRAZODONE HYDROCHLORIDE 50 MG/1
50-100 TABLET ORAL NIGHTLY
Qty: 180 TABLET | Refills: 2 | Status: SHIPPED | OUTPATIENT
Start: 2021-02-01 | End: 2021-10-31

## 2021-02-01 RX ORDER — CHOLECALCIFEROL (VITAMIN D3) 50 MCG
2000 TABLET ORAL DAILY
COMMUNITY

## 2021-02-01 RX ORDER — FLUTICASONE PROPIONATE 50 MCG
1 SPRAY, SUSPENSION (ML) NASAL DAILY
Qty: 16 G | Refills: 6 | Status: SHIPPED | OUTPATIENT
Start: 2021-02-01 | End: 2022-12-08 | Stop reason: SDUPTHER

## 2021-02-01 RX ORDER — LEVOTHYROXINE SODIUM 50 UG/1
50 TABLET ORAL DAILY
Qty: 90 TABLET | Refills: 3 | Status: SHIPPED | OUTPATIENT
Start: 2021-02-01 | End: 2021-05-21 | Stop reason: SDUPTHER

## 2021-02-01 RX ORDER — LEVOCETIRIZINE DIHYDROCHLORIDE 5 MG/1
5 TABLET, FILM COATED ORAL DAILY PRN
COMMUNITY
End: 2022-11-04

## 2021-02-01 RX ORDER — ESCITALOPRAM OXALATE 10 MG/1
10 TABLET ORAL DAILY
Qty: 90 TABLET | Refills: 2 | Status: SHIPPED | OUTPATIENT
Start: 2021-02-01 | End: 2021-03-18

## 2021-02-01 RX ORDER — BETAMETHASONE DIPROPIONATE 0.5 MG/G
CREAM TOPICAL 2 TIMES DAILY
Qty: 45 G | Refills: 6 | Status: SHIPPED | OUTPATIENT
Start: 2021-02-01 | End: 2022-10-03 | Stop reason: SDUPTHER

## 2021-02-01 RX ORDER — LANOLIN ALCOHOL/MO/W.PET/CERES
400 CREAM (GRAM) TOPICAL DAILY
COMMUNITY
End: 2021-08-24

## 2021-02-01 RX ORDER — TRAZODONE HYDROCHLORIDE 50 MG/1
1-2 TABLET ORAL NIGHTLY
COMMUNITY
End: 2021-02-01 | Stop reason: SDUPTHER

## 2021-02-01 RX ORDER — ESCITALOPRAM OXALATE 5 MG/1
5 TABLET ORAL DAILY
COMMUNITY
End: 2021-04-15

## 2021-02-01 RX ORDER — LEVOTHYROXINE, LIOTHYRONINE 9.5; 2.25 UG/1; UG/1
45 TABLET ORAL DAILY
Qty: 270 TABLET | Refills: 2 | Status: SHIPPED | OUTPATIENT
Start: 2021-02-01 | End: 2021-05-21

## 2021-02-02 LAB
25(OH)D3+25(OH)D2 SERPL-MCNC: 86 NG/ML (ref 30–96)
ALBUMIN SERPL BCP-MCNC: 3.9 G/DL (ref 3.5–5.2)
ALP SERPL-CCNC: 54 U/L (ref 55–135)
ALT SERPL W/O P-5'-P-CCNC: 17 U/L (ref 10–44)
ANION GAP SERPL CALC-SCNC: 13 MMOL/L (ref 8–16)
AST SERPL-CCNC: 22 U/L (ref 10–40)
BILIRUB SERPL-MCNC: 0.5 MG/DL (ref 0.1–1)
BUN SERPL-MCNC: 22 MG/DL (ref 8–23)
CALCIUM SERPL-MCNC: 9.4 MG/DL (ref 8.7–10.5)
CHLORIDE SERPL-SCNC: 103 MMOL/L (ref 95–110)
CHOLEST SERPL-MCNC: 172 MG/DL (ref 120–199)
CHOLEST/HDLC SERPL: 2.4 {RATIO} (ref 2–5)
CO2 SERPL-SCNC: 25 MMOL/L (ref 23–29)
CREAT SERPL-MCNC: 0.8 MG/DL (ref 0.5–1.4)
EST. GFR  (AFRICAN AMERICAN): >60 ML/MIN/1.73 M^2
EST. GFR  (NON AFRICAN AMERICAN): >60 ML/MIN/1.73 M^2
GLUCOSE SERPL-MCNC: 63 MG/DL (ref 70–110)
HDLC SERPL-MCNC: 73 MG/DL (ref 40–75)
HDLC SERPL: 42.4 % (ref 20–50)
LDLC SERPL CALC-MCNC: 85.4 MG/DL (ref 63–159)
NONHDLC SERPL-MCNC: 99 MG/DL
POTASSIUM SERPL-SCNC: 4.2 MMOL/L (ref 3.5–5.1)
PROT SERPL-MCNC: 6.9 G/DL (ref 6–8.4)
SODIUM SERPL-SCNC: 141 MMOL/L (ref 136–145)
T3FREE SERPL-MCNC: 2.8 PG/ML (ref 2.3–4.2)
T4 FREE SERPL-MCNC: 1.15 NG/DL (ref 0.71–1.51)
TRIGL SERPL-MCNC: 68 MG/DL (ref 30–150)
TSH SERPL DL<=0.005 MIU/L-ACNC: 0.72 UIU/ML (ref 0.4–4)

## 2021-03-18 ENCOUNTER — OFFICE VISIT (OUTPATIENT)
Dept: FAMILY MEDICINE | Facility: CLINIC | Age: 81
End: 2021-03-18
Payer: MEDICARE

## 2021-03-18 VITALS
SYSTOLIC BLOOD PRESSURE: 118 MMHG | DIASTOLIC BLOOD PRESSURE: 68 MMHG | HEIGHT: 62 IN | WEIGHT: 129.19 LBS | BODY MASS INDEX: 23.77 KG/M2

## 2021-03-18 DIAGNOSIS — F32.A ANXIETY AND DEPRESSION: ICD-10-CM

## 2021-03-18 DIAGNOSIS — R14.0 BLOATED ABDOMEN: ICD-10-CM

## 2021-03-18 DIAGNOSIS — E03.9 HYPOTHYROIDISM (ACQUIRED): ICD-10-CM

## 2021-03-18 DIAGNOSIS — F41.9 ANXIETY AND DEPRESSION: ICD-10-CM

## 2021-03-18 DIAGNOSIS — K92.89 GAS BLOAT SYNDROME: ICD-10-CM

## 2021-03-18 DIAGNOSIS — Z86.39 HISTORY OF THYROID NODULE: Primary | ICD-10-CM

## 2021-03-18 PROCEDURE — 99214 PR OFFICE/OUTPT VISIT, EST, LEVL IV, 30-39 MIN: ICD-10-PCS | Mod: S$PBB,,, | Performed by: INTERNAL MEDICINE

## 2021-03-18 PROCEDURE — 99214 OFFICE O/P EST MOD 30 MIN: CPT | Mod: S$PBB,,, | Performed by: INTERNAL MEDICINE

## 2021-03-18 PROCEDURE — 99214 OFFICE O/P EST MOD 30 MIN: CPT | Mod: PBBFAC,PN | Performed by: INTERNAL MEDICINE

## 2021-03-18 PROCEDURE — 99999 PR PBB SHADOW E&M-EST. PATIENT-LVL IV: ICD-10-PCS | Mod: PBBFAC,,, | Performed by: INTERNAL MEDICINE

## 2021-03-18 PROCEDURE — 99999 PR PBB SHADOW E&M-EST. PATIENT-LVL IV: CPT | Mod: PBBFAC,,, | Performed by: INTERNAL MEDICINE

## 2021-03-18 RX ORDER — ESCITALOPRAM OXALATE 5 MG/1
5 TABLET ORAL DAILY
Qty: 90 TABLET | Refills: 1 | Status: SHIPPED | OUTPATIENT
Start: 2021-03-18 | End: 2021-08-24 | Stop reason: DRUGHIGH

## 2021-03-25 ENCOUNTER — TELEPHONE (OUTPATIENT)
Dept: FAMILY MEDICINE | Facility: CLINIC | Age: 81
End: 2021-03-25

## 2021-03-25 ENCOUNTER — PATIENT MESSAGE (OUTPATIENT)
Dept: FAMILY MEDICINE | Facility: CLINIC | Age: 81
End: 2021-03-25

## 2021-03-25 DIAGNOSIS — R14.0 ABDOMINAL BLOATING: Primary | ICD-10-CM

## 2021-03-25 DIAGNOSIS — R19.5 ABNORMAL STOOLS: ICD-10-CM

## 2021-03-26 ENCOUNTER — LAB VISIT (OUTPATIENT)
Dept: LAB | Facility: HOSPITAL | Age: 81
End: 2021-03-26
Attending: INTERNAL MEDICINE
Payer: MEDICARE

## 2021-03-26 DIAGNOSIS — R14.0 ABDOMINAL BLOATING: ICD-10-CM

## 2021-03-26 DIAGNOSIS — R19.5 ABNORMAL STOOLS: ICD-10-CM

## 2021-03-26 PROCEDURE — 86008 ALLG SPEC IGE RECOMB EA: CPT | Performed by: INTERNAL MEDICINE

## 2021-03-26 PROCEDURE — 83516 IMMUNOASSAY NONANTIBODY: CPT | Mod: 59 | Performed by: INTERNAL MEDICINE

## 2021-03-26 PROCEDURE — 36415 COLL VENOUS BLD VENIPUNCTURE: CPT | Mod: PN | Performed by: INTERNAL MEDICINE

## 2021-03-29 LAB
A-LACTALB IGE QN: <0.1 KU/L
B-LACTOGLOB IGE QN: <0.1 KU/L
CASEIN IGE QN: <0.1 KU/L
COW MILK IGE QN: <0.1 KU/L
DEPRECATED MISC ALLERGEN IGE RAST QL: NORMAL
GLIADIN PEPTIDE IGA SER-ACNC: 5 UNITS
GLIADIN PEPTIDE IGG SER-ACNC: 2 UNITS
IGA SERPL-MCNC: 228 MG/DL (ref 70–400)
TTG IGA SER-ACNC: 6 UNITS
TTG IGG SER-ACNC: 2 UNITS

## 2021-03-30 ENCOUNTER — PATIENT MESSAGE (OUTPATIENT)
Dept: FAMILY MEDICINE | Facility: CLINIC | Age: 81
End: 2021-03-30

## 2021-04-05 ENCOUNTER — IMMUNIZATION (OUTPATIENT)
Dept: FAMILY MEDICINE | Facility: CLINIC | Age: 81
End: 2021-04-05
Payer: MEDICARE

## 2021-04-05 DIAGNOSIS — Z23 NEED FOR VACCINATION: Primary | ICD-10-CM

## 2021-04-05 PROCEDURE — 91303 COVID-19,VECTOR-NR,RS-AD26,PF,0.5 ML DOSE VACCINE (JANSSEN): CPT | Mod: PBBFAC,PO

## 2021-04-05 PROCEDURE — 0031A COVID-19,VECTOR-NR,RS-AD26,PF,0.5 ML DOSE VACCINE (JANSSEN): CPT | Mod: PBBFAC | Performed by: FAMILY MEDICINE

## 2021-04-15 ENCOUNTER — OFFICE VISIT (OUTPATIENT)
Dept: FAMILY MEDICINE | Facility: CLINIC | Age: 81
End: 2021-04-15
Payer: MEDICARE

## 2021-04-15 VITALS
WEIGHT: 130.63 LBS | DIASTOLIC BLOOD PRESSURE: 64 MMHG | BODY MASS INDEX: 24.04 KG/M2 | HEIGHT: 62 IN | SYSTOLIC BLOOD PRESSURE: 118 MMHG

## 2021-04-15 DIAGNOSIS — L40.9 PSORIASIS: ICD-10-CM

## 2021-04-15 DIAGNOSIS — R19.7 DIARRHEA, UNSPECIFIED TYPE: Primary | ICD-10-CM

## 2021-04-15 DIAGNOSIS — M79.605 DIFFUSE PAIN IN LEFT LOWER EXTREMITY: ICD-10-CM

## 2021-04-15 DIAGNOSIS — R14.0 BLOATED ABDOMEN: ICD-10-CM

## 2021-04-15 DIAGNOSIS — J32.9 CHRONIC SINUSITIS, UNSPECIFIED LOCATION: ICD-10-CM

## 2021-04-15 PROCEDURE — 99214 PR OFFICE/OUTPT VISIT, EST, LEVL IV, 30-39 MIN: ICD-10-PCS | Mod: S$PBB,,, | Performed by: INTERNAL MEDICINE

## 2021-04-15 PROCEDURE — 99213 OFFICE O/P EST LOW 20 MIN: CPT | Mod: PBBFAC,PN | Performed by: INTERNAL MEDICINE

## 2021-04-15 PROCEDURE — 99214 OFFICE O/P EST MOD 30 MIN: CPT | Mod: S$PBB,,, | Performed by: INTERNAL MEDICINE

## 2021-04-15 PROCEDURE — 99999 PR PBB SHADOW E&M-EST. PATIENT-LVL III: ICD-10-PCS | Mod: PBBFAC,,, | Performed by: INTERNAL MEDICINE

## 2021-04-15 PROCEDURE — 99999 PR PBB SHADOW E&M-EST. PATIENT-LVL III: CPT | Mod: PBBFAC,,, | Performed by: INTERNAL MEDICINE

## 2021-04-15 RX ORDER — DIPHENOXYLATE HYDROCHLORIDE AND ATROPINE SULFATE 2.5; .025 MG/1; MG/1
1 TABLET ORAL 4 TIMES DAILY PRN
Qty: 30 TABLET | Refills: 0 | Status: SHIPPED | OUTPATIENT
Start: 2021-04-15 | End: 2021-04-25

## 2021-04-15 RX ORDER — CEFUROXIME AXETIL 500 MG/1
500 TABLET ORAL 2 TIMES DAILY
Qty: 14 TABLET | Refills: 0 | Status: SHIPPED | OUTPATIENT
Start: 2021-04-15 | End: 2021-07-14

## 2021-05-18 ENCOUNTER — PATIENT MESSAGE (OUTPATIENT)
Dept: FAMILY MEDICINE | Facility: CLINIC | Age: 81
End: 2021-05-18

## 2021-05-19 ENCOUNTER — PATIENT MESSAGE (OUTPATIENT)
Dept: FAMILY MEDICINE | Facility: CLINIC | Age: 81
End: 2021-05-19

## 2021-05-19 DIAGNOSIS — E03.9 HYPOTHYROIDISM (ACQUIRED): Primary | ICD-10-CM

## 2021-05-21 RX ORDER — LIOTHYRONINE SODIUM 5 UG/1
5 TABLET ORAL DAILY
Qty: 30 TABLET | Refills: 3 | Status: SHIPPED | OUTPATIENT
Start: 2021-05-21 | End: 2021-08-24

## 2021-05-21 RX ORDER — LEVOTHYROXINE SODIUM 75 UG/1
75 TABLET ORAL DAILY
Qty: 30 TABLET | Refills: 3 | Status: SHIPPED | OUTPATIENT
Start: 2021-05-21 | End: 2021-08-05 | Stop reason: SDUPTHER

## 2021-06-21 ENCOUNTER — PATIENT MESSAGE (OUTPATIENT)
Dept: FAMILY MEDICINE | Facility: CLINIC | Age: 81
End: 2021-06-21

## 2021-06-22 ENCOUNTER — LAB VISIT (OUTPATIENT)
Dept: LAB | Facility: HOSPITAL | Age: 81
End: 2021-06-22
Attending: INTERNAL MEDICINE
Payer: MEDICARE

## 2021-06-22 DIAGNOSIS — E03.9 HYPOTHYROIDISM (ACQUIRED): ICD-10-CM

## 2021-06-22 PROCEDURE — 84439 ASSAY OF FREE THYROXINE: CPT | Performed by: INTERNAL MEDICINE

## 2021-06-22 PROCEDURE — 36415 COLL VENOUS BLD VENIPUNCTURE: CPT | Mod: PN | Performed by: INTERNAL MEDICINE

## 2021-06-22 PROCEDURE — 84481 FREE ASSAY (FT-3): CPT | Performed by: INTERNAL MEDICINE

## 2021-06-22 PROCEDURE — 84443 ASSAY THYROID STIM HORMONE: CPT | Performed by: INTERNAL MEDICINE

## 2021-06-23 LAB
T3FREE SERPL-MCNC: 3.1 PG/ML (ref 2.3–4.2)
T4 FREE SERPL-MCNC: 1.1 NG/DL (ref 0.71–1.51)
TSH SERPL DL<=0.005 MIU/L-ACNC: 0.35 UIU/ML (ref 0.4–4)

## 2021-07-14 ENCOUNTER — OFFICE VISIT (OUTPATIENT)
Dept: FAMILY MEDICINE | Facility: CLINIC | Age: 81
End: 2021-07-14
Payer: MEDICARE

## 2021-07-14 VITALS
SYSTOLIC BLOOD PRESSURE: 116 MMHG | BODY MASS INDEX: 23.75 KG/M2 | HEART RATE: 63 BPM | HEIGHT: 62 IN | TEMPERATURE: 98 F | DIASTOLIC BLOOD PRESSURE: 66 MMHG | WEIGHT: 129.06 LBS | OXYGEN SATURATION: 97 %

## 2021-07-14 DIAGNOSIS — J02.9 PHARYNGITIS, UNSPECIFIED ETIOLOGY: ICD-10-CM

## 2021-07-14 DIAGNOSIS — R49.0 HOARSENESS: ICD-10-CM

## 2021-07-14 DIAGNOSIS — E04.2 MULTIPLE THYROID NODULES: Primary | ICD-10-CM

## 2021-07-14 PROCEDURE — 99214 PR OFFICE/OUTPT VISIT, EST, LEVL IV, 30-39 MIN: ICD-10-PCS | Mod: S$PBB,,, | Performed by: FAMILY MEDICINE

## 2021-07-14 PROCEDURE — 99214 OFFICE O/P EST MOD 30 MIN: CPT | Mod: S$PBB,,, | Performed by: FAMILY MEDICINE

## 2021-07-14 PROCEDURE — 99215 OFFICE O/P EST HI 40 MIN: CPT | Mod: PBBFAC,PN | Performed by: FAMILY MEDICINE

## 2021-07-14 PROCEDURE — 99999 PR PBB SHADOW E&M-EST. PATIENT-LVL V: ICD-10-PCS | Mod: PBBFAC,,, | Performed by: FAMILY MEDICINE

## 2021-07-14 PROCEDURE — 99999 PR PBB SHADOW E&M-EST. PATIENT-LVL V: CPT | Mod: PBBFAC,,, | Performed by: FAMILY MEDICINE

## 2021-07-14 RX ORDER — DESOXIMETASONE 2.5 MG/G
CREAM TOPICAL 2 TIMES DAILY
COMMUNITY
Start: 2021-04-26 | End: 2021-08-24 | Stop reason: DRUGHIGH

## 2021-07-14 RX ORDER — DESOXIMETASONE 2.5 MG/G
CREAM TOPICAL
COMMUNITY
End: 2021-08-24 | Stop reason: DRUGHIGH

## 2021-07-14 RX ORDER — DESOXIMETASONE 0.5 MG/G
CREAM TOPICAL
COMMUNITY
End: 2021-08-24 | Stop reason: SDUPTHER

## 2021-07-14 RX ORDER — KETOCONAZOLE 20 MG/ML
SHAMPOO, SUSPENSION TOPICAL
COMMUNITY
Start: 2021-02-24 | End: 2023-01-12 | Stop reason: SDUPTHER

## 2021-07-14 RX ORDER — CLOBETASOL PROPIONATE 0.5 MG/G
1 OINTMENT TOPICAL DAILY
COMMUNITY
Start: 2021-04-28

## 2021-07-14 RX ORDER — PANTOPRAZOLE SODIUM 40 MG/1
40 TABLET, DELAYED RELEASE ORAL DAILY
Qty: 30 TABLET | Refills: 11 | Status: SHIPPED | OUTPATIENT
Start: 2021-07-14 | End: 2021-10-07

## 2021-07-14 RX ORDER — FLUOCINONIDE TOPICAL SOLUTION USP, 0.05% 0.5 MG/ML
SOLUTION TOPICAL
COMMUNITY
Start: 2021-02-09 | End: 2021-09-14

## 2021-08-15 ENCOUNTER — PATIENT OUTREACH (OUTPATIENT)
Dept: ADMINISTRATIVE | Facility: OTHER | Age: 81
End: 2021-08-15

## 2021-08-17 ENCOUNTER — OFFICE VISIT (OUTPATIENT)
Dept: OTOLARYNGOLOGY | Facility: CLINIC | Age: 81
End: 2021-08-17
Payer: MEDICARE

## 2021-08-17 VITALS — BODY MASS INDEX: 23.95 KG/M2 | WEIGHT: 130.94 LBS

## 2021-08-17 DIAGNOSIS — R49.0 HOARSENESS: ICD-10-CM

## 2021-08-17 DIAGNOSIS — J32.0 CHRONIC MAXILLARY SINUSITIS: ICD-10-CM

## 2021-08-17 PROCEDURE — 99203 OFFICE O/P NEW LOW 30 MIN: CPT | Mod: 25,S$PBB,, | Performed by: OTOLARYNGOLOGY

## 2021-08-17 PROCEDURE — 31231 PR NASAL ENDOSCOPY, DX: ICD-10-PCS | Mod: S$PBB,,, | Performed by: OTOLARYNGOLOGY

## 2021-08-17 PROCEDURE — 99999 PR PBB SHADOW E&M-EST. PATIENT-LVL IV: CPT | Mod: PBBFAC,,, | Performed by: OTOLARYNGOLOGY

## 2021-08-17 PROCEDURE — 31231 NASAL ENDOSCOPY DX: CPT | Mod: PBBFAC,PO | Performed by: OTOLARYNGOLOGY

## 2021-08-17 PROCEDURE — 99203 PR OFFICE/OUTPT VISIT, NEW, LEVL III, 30-44 MIN: ICD-10-PCS | Mod: 25,S$PBB,, | Performed by: OTOLARYNGOLOGY

## 2021-08-17 PROCEDURE — 99214 OFFICE O/P EST MOD 30 MIN: CPT | Mod: PBBFAC,PO,25 | Performed by: OTOLARYNGOLOGY

## 2021-08-17 PROCEDURE — 31231 NASAL ENDOSCOPY DX: CPT | Mod: S$PBB,,, | Performed by: OTOLARYNGOLOGY

## 2021-08-17 PROCEDURE — 99999 PR PBB SHADOW E&M-EST. PATIENT-LVL IV: ICD-10-PCS | Mod: PBBFAC,,, | Performed by: OTOLARYNGOLOGY

## 2021-08-17 RX ORDER — OMEPRAZOLE 20 MG/1
20 CAPSULE, DELAYED RELEASE ORAL DAILY
COMMUNITY
Start: 2021-06-21 | End: 2021-09-14

## 2021-08-17 RX ORDER — POLYETHYLENE GLYCOL 3350 17 G/17G
17 POWDER, FOR SOLUTION ORAL
COMMUNITY
End: 2021-09-14

## 2021-08-17 RX ORDER — THYROID 60 MG/1
TABLET ORAL
COMMUNITY
End: 2021-08-24

## 2021-08-17 RX ORDER — OMEPRAZOLE 20 MG/1
CAPSULE, DELAYED RELEASE ORAL
COMMUNITY
End: 2021-08-17

## 2021-08-17 RX ORDER — THYROID 15 MG/1
TABLET ORAL
COMMUNITY
End: 2021-08-17

## 2021-08-17 RX ORDER — ONDANSETRON 8 MG/1
8 TABLET, ORALLY DISINTEGRATING ORAL EVERY 12 HOURS PRN
COMMUNITY
End: 2021-09-14

## 2021-08-17 RX ORDER — SODIUM FLUORIDE 0.1 MG/ML
RINSE ORAL
COMMUNITY
End: 2021-09-14

## 2021-08-24 ENCOUNTER — OFFICE VISIT (OUTPATIENT)
Dept: FAMILY MEDICINE | Facility: CLINIC | Age: 81
End: 2021-08-24
Payer: MEDICARE

## 2021-08-24 VITALS
BODY MASS INDEX: 23.9 KG/M2 | WEIGHT: 129.88 LBS | RESPIRATION RATE: 16 BRPM | SYSTOLIC BLOOD PRESSURE: 118 MMHG | HEART RATE: 70 BPM | DIASTOLIC BLOOD PRESSURE: 58 MMHG | TEMPERATURE: 98 F | OXYGEN SATURATION: 97 % | HEIGHT: 62 IN

## 2021-08-24 DIAGNOSIS — E03.9 HYPOTHYROIDISM (ACQUIRED): Primary | ICD-10-CM

## 2021-08-24 DIAGNOSIS — L40.9 PSORIASIS: ICD-10-CM

## 2021-08-24 DIAGNOSIS — E55.9 VITAMIN D DEFICIENCY: ICD-10-CM

## 2021-08-24 DIAGNOSIS — F41.9 ANXIETY AND DEPRESSION: ICD-10-CM

## 2021-08-24 DIAGNOSIS — F32.A ANXIETY AND DEPRESSION: ICD-10-CM

## 2021-08-24 DIAGNOSIS — J30.9 ALLERGIC RHINITIS, UNSPECIFIED SEASONALITY, UNSPECIFIED TRIGGER: ICD-10-CM

## 2021-08-24 DIAGNOSIS — J32.0 CHRONIC MAXILLARY SINUSITIS: ICD-10-CM

## 2021-08-24 DIAGNOSIS — G47.01 INSOMNIA DUE TO MEDICAL CONDITION: ICD-10-CM

## 2021-08-24 DIAGNOSIS — Z79.899 DRUG THERAPY: ICD-10-CM

## 2021-08-24 PROCEDURE — 99214 OFFICE O/P EST MOD 30 MIN: CPT | Mod: S$PBB,,, | Performed by: FAMILY MEDICINE

## 2021-08-24 PROCEDURE — 99214 PR OFFICE/OUTPT VISIT, EST, LEVL IV, 30-39 MIN: ICD-10-PCS | Mod: S$PBB,,, | Performed by: FAMILY MEDICINE

## 2021-08-24 PROCEDURE — 99999 PR PBB SHADOW E&M-EST. PATIENT-LVL V: ICD-10-PCS | Mod: PBBFAC,,, | Performed by: FAMILY MEDICINE

## 2021-08-24 PROCEDURE — 99999 PR PBB SHADOW E&M-EST. PATIENT-LVL V: CPT | Mod: PBBFAC,,, | Performed by: FAMILY MEDICINE

## 2021-08-24 PROCEDURE — 99215 OFFICE O/P EST HI 40 MIN: CPT | Mod: PBBFAC,PN | Performed by: FAMILY MEDICINE

## 2021-08-24 RX ORDER — DESOXIMETASONE 0.5 MG/G
CREAM TOPICAL 2 TIMES DAILY
Qty: 60 G | Status: SHIPPED | OUTPATIENT
Start: 2021-08-24 | End: 2021-08-24 | Stop reason: SDUPTHER

## 2021-08-24 RX ORDER — DESONIDE 0.5 MG/G
OINTMENT TOPICAL 2 TIMES DAILY
Qty: 60 G | Refills: 2 | Status: SHIPPED | OUTPATIENT
Start: 2021-08-24 | End: 2022-12-08 | Stop reason: SDUPTHER

## 2021-08-24 RX ORDER — ESCITALOPRAM OXALATE 5 MG/1
5 TABLET ORAL DAILY
COMMUNITY
End: 2021-08-24

## 2021-08-24 RX ORDER — LUBIPROSTONE 8 UG/1
CAPSULE ORAL
COMMUNITY
End: 2021-09-14

## 2021-08-24 RX ORDER — FLUOXETINE HYDROCHLORIDE 20 MG/1
20 CAPSULE ORAL DAILY
Qty: 30 CAPSULE | Refills: 11 | Status: SHIPPED | OUTPATIENT
Start: 2021-08-24 | End: 2021-09-14

## 2021-08-24 RX ORDER — ESCITALOPRAM OXALATE 10 MG/1
10 TABLET ORAL DAILY
COMMUNITY
End: 2021-08-24

## 2021-08-24 RX ORDER — THYROID 60 MG/1
60 TABLET ORAL
Qty: 30 TABLET | Refills: 11 | Status: SHIPPED | OUTPATIENT
Start: 2021-08-24 | End: 2021-09-22

## 2021-08-24 RX ORDER — DESOXIMETASONE 0.5 MG/G
CREAM TOPICAL 2 TIMES DAILY
Qty: 60 G | Refills: 11 | Status: SHIPPED | OUTPATIENT
Start: 2021-08-24 | End: 2022-12-08 | Stop reason: SDUPTHER

## 2021-08-30 ENCOUNTER — PATIENT MESSAGE (OUTPATIENT)
Dept: FAMILY MEDICINE | Facility: CLINIC | Age: 81
End: 2021-08-30

## 2021-09-07 ENCOUNTER — HOSPITAL ENCOUNTER (OUTPATIENT)
Dept: RADIOLOGY | Facility: HOSPITAL | Age: 81
Discharge: HOME | End: 2021-09-07
Attending: FAMILY MEDICINE
Payer: MEDICARE

## 2021-09-07 ENCOUNTER — TELEPHONE (OUTPATIENT)
Dept: FAMILY MEDICINE | Facility: CLINIC | Age: 81
End: 2021-09-07

## 2021-09-07 ENCOUNTER — OFFICE VISIT (OUTPATIENT)
Dept: FAMILY MEDICINE | Facility: CLINIC | Age: 81
End: 2021-09-07
Payer: MEDICARE

## 2021-09-07 VITALS
TEMPERATURE: 98 F | WEIGHT: 128.5 LBS | HEIGHT: 62 IN | BODY MASS INDEX: 23.65 KG/M2 | HEART RATE: 66 BPM | RESPIRATION RATE: 18 BRPM | DIASTOLIC BLOOD PRESSURE: 68 MMHG | SYSTOLIC BLOOD PRESSURE: 122 MMHG | OXYGEN SATURATION: 97 %
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
## 2021-09-07 DIAGNOSIS — R10.9 FLANK PAIN: ICD-10-CM

## 2021-09-07 DIAGNOSIS — M46.1 BILATERAL SACROILIITIS: ICD-10-CM

## 2021-09-07 DIAGNOSIS — M25.559 HIP PAIN: ICD-10-CM

## 2021-09-07 DIAGNOSIS — M25.559 HIP PAIN: Primary | ICD-10-CM

## 2021-09-07 DIAGNOSIS — J90 PLEURAL EFFUSION: ICD-10-CM

## 2021-09-07 PROCEDURE — 99999 PR PBB SHADOW E&M-EST. PATIENT-LVL V: CPT | Mod: PBBFAC,,, | Performed by: FAMILY MEDICINE

## 2021-09-07 PROCEDURE — 71100 X-RAY EXAM RIBS UNI 2 VIEWS: CPT | Mod: TC,PN,RT

## 2021-09-07 PROCEDURE — 73521 X-RAY EXAM HIPS BI 2 VIEWS: CPT | Mod: TC,PN

## 2021-09-07 PROCEDURE — 99999 PR PBB SHADOW E&M-EST. PATIENT-LVL V: ICD-10-PCS | Mod: PBBFAC,,, | Performed by: FAMILY MEDICINE

## 2021-09-07 PROCEDURE — 74019 RADEX ABDOMEN 2 VIEWS: CPT | Mod: TC,PN

## 2021-09-07 PROCEDURE — 99214 PR OFFICE/OUTPT VISIT, EST, LEVL IV, 30-39 MIN: ICD-10-PCS | Mod: S$PBB,,, | Performed by: FAMILY MEDICINE

## 2021-09-07 PROCEDURE — 74019 RADEX ABDOMEN 2 VIEWS: CPT | Mod: 26,,, | Performed by: RADIOLOGY

## 2021-09-07 PROCEDURE — 73521 XR HIPS BILATERAL 2 VIEW INCL AP PELVIS: ICD-10-PCS | Mod: 26,,, | Performed by: RADIOLOGY

## 2021-09-07 PROCEDURE — 99215 OFFICE O/P EST HI 40 MIN: CPT | Mod: PBBFAC,PN | Performed by: FAMILY MEDICINE

## 2021-09-07 PROCEDURE — 71100 XR RIBS 2 VIEW RIGHT: ICD-10-PCS | Mod: 26,RT,, | Performed by: RADIOLOGY

## 2021-09-07 PROCEDURE — 74019 XR ABDOMEN FLAT AND ERECT: ICD-10-PCS | Mod: 26,,, | Performed by: RADIOLOGY

## 2021-09-07 PROCEDURE — 73521 X-RAY EXAM HIPS BI 2 VIEWS: CPT | Mod: 26,,, | Performed by: RADIOLOGY

## 2021-09-07 PROCEDURE — 71100 X-RAY EXAM RIBS UNI 2 VIEWS: CPT | Mod: 26,RT,, | Performed by: RADIOLOGY

## 2021-09-07 PROCEDURE — 99214 OFFICE O/P EST MOD 30 MIN: CPT | Mod: S$PBB,,, | Performed by: FAMILY MEDICINE

## 2021-09-07 RX ORDER — TIZANIDINE 4 MG/1
4 TABLET ORAL EVERY 8 HOURS PRN
Qty: 20 TABLET | Refills: 0 | Status: SHIPPED | OUTPATIENT
Start: 2021-09-07 | End: 2021-09-14

## 2021-09-07 RX ORDER — HYDROCODONE BITARTRATE AND ACETAMINOPHEN 5; 325 MG/1; MG/1
1 TABLET ORAL EVERY 8 HOURS PRN
Qty: 21 TABLET | Refills: 0 | Status: SHIPPED | OUTPATIENT
Start: 2021-09-07 | End: 2021-09-14 | Stop reason: SDUPTHER

## 2021-09-09 ENCOUNTER — PATIENT MESSAGE (OUTPATIENT)
Dept: FAMILY MEDICINE | Facility: CLINIC | Age: 81
End: 2021-09-09

## 2021-09-10 ENCOUNTER — HOSPITAL ENCOUNTER (OUTPATIENT)
Dept: RADIOLOGY | Facility: HOSPITAL | Age: 81
Discharge: HOME | End: 2021-09-10
Attending: FAMILY MEDICINE
Payer: MEDICARE
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
## 2021-09-10 DIAGNOSIS — J90 PLEURAL EFFUSION: ICD-10-CM

## 2021-09-10 DIAGNOSIS — M25.559 HIP PAIN: ICD-10-CM

## 2021-09-10 DIAGNOSIS — M46.1 BILATERAL SACROILIITIS: ICD-10-CM

## 2021-09-10 PROCEDURE — 71100 X-RAY EXAM RIBS UNI 2 VIEWS: CPT | Mod: TC,FY,PO,RT

## 2021-09-10 PROCEDURE — 71100 XR RIBS 2 VIEW RIGHT: ICD-10-PCS | Mod: 26,RT,, | Performed by: RADIOLOGY

## 2021-09-10 PROCEDURE — 71100 X-RAY EXAM RIBS UNI 2 VIEWS: CPT | Mod: 26,RT,, | Performed by: RADIOLOGY

## 2021-09-10 PROCEDURE — 72192 CT PELVIS W/O DYE: CPT | Mod: TC,PO

## 2021-09-10 PROCEDURE — 72192 CT PELVIS W/O DYE: CPT | Mod: 26,,, | Performed by: RADIOLOGY

## 2021-09-10 PROCEDURE — 72192 CT PELVIS WITHOUT CONTRAST: ICD-10-PCS | Mod: 26,,, | Performed by: RADIOLOGY

## 2021-09-14 ENCOUNTER — OFFICE VISIT (OUTPATIENT)
Dept: FAMILY MEDICINE | Facility: CLINIC | Age: 81
End: 2021-09-14
Payer: MEDICARE

## 2021-09-14 VITALS
DIASTOLIC BLOOD PRESSURE: 68 MMHG | HEIGHT: 62 IN | WEIGHT: 126.13 LBS | BODY MASS INDEX: 23.21 KG/M2 | HEART RATE: 60 BPM | TEMPERATURE: 98 F | OXYGEN SATURATION: 97 % | SYSTOLIC BLOOD PRESSURE: 138 MMHG

## 2021-09-14 DIAGNOSIS — M25.559 HIP PAIN: ICD-10-CM
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
## 2021-09-14 DIAGNOSIS — L40.9 PSORIASIS: ICD-10-CM

## 2021-09-14 DIAGNOSIS — R39.9 LOWER URINARY TRACT SYMPTOMS (LUTS): Primary | ICD-10-CM

## 2021-09-14 DIAGNOSIS — R14.0 ABDOMINAL BLOATING: ICD-10-CM

## 2021-09-14 LAB
BILIRUB SERPL-MCNC: ABNORMAL MG/DL
BLOOD URINE, POC: ABNORMAL
CLARITY, POC UA: CLEAR
COLOR, POC UA: ABNORMAL
GLUCOSE UR QL STRIP: NORMAL
KETONES UR QL STRIP: ABNORMAL
LEUKOCYTE ESTERASE URINE, POC: ABNORMAL
NITRITE, POC UA: ABNORMAL
PH, POC UA: 8
PROTEIN, POC: ABNORMAL
SPECIFIC GRAVITY, POC UA: 1
UROBILINOGEN, POC UA: NORMAL

## 2021-09-14 PROCEDURE — 99215 OFFICE O/P EST HI 40 MIN: CPT | Mod: PBBFAC,PN | Performed by: FAMILY MEDICINE

## 2021-09-14 PROCEDURE — 99999 PR PBB SHADOW E&M-EST. PATIENT-LVL V: ICD-10-PCS | Mod: PBBFAC,,, | Performed by: FAMILY MEDICINE

## 2021-09-14 PROCEDURE — 99214 OFFICE O/P EST MOD 30 MIN: CPT | Mod: S$PBB,,, | Performed by: FAMILY MEDICINE

## 2021-09-14 PROCEDURE — 81002 URINALYSIS NONAUTO W/O SCOPE: CPT | Mod: PBBFAC,PN | Performed by: FAMILY MEDICINE

## 2021-09-14 PROCEDURE — 99999 PR PBB SHADOW E&M-EST. PATIENT-LVL V: CPT | Mod: PBBFAC,,, | Performed by: FAMILY MEDICINE

## 2021-09-14 PROCEDURE — 99214 PR OFFICE/OUTPT VISIT, EST, LEVL IV, 30-39 MIN: ICD-10-PCS | Mod: S$PBB,,, | Performed by: FAMILY MEDICINE

## 2021-09-14 RX ORDER — HYDROCODONE BITARTRATE AND ACETAMINOPHEN 5; 325 MG/1; MG/1
1 TABLET ORAL EVERY 8 HOURS PRN
Qty: 21 TABLET | Refills: 0 | Status: SHIPPED | OUTPATIENT
Start: 2021-09-14 | End: 2023-01-12

## 2021-09-14 RX ORDER — MELOXICAM 15 MG/1
15 TABLET ORAL DAILY
Qty: 30 TABLET | Refills: 2 | Status: SHIPPED | OUTPATIENT
Start: 2021-09-14 | End: 2021-10-07

## 2021-09-20 ENCOUNTER — HOSPITAL ENCOUNTER (OUTPATIENT)
Dept: RADIOLOGY | Facility: HOSPITAL | Age: 81
Discharge: HOME | End: 2021-09-20
Attending: FAMILY MEDICINE
Payer: MEDICARE

## 2021-09-20 DIAGNOSIS — R14.0 ABDOMINAL BLOATING: ICD-10-CM

## 2021-09-20 PROCEDURE — 74177 CT ABD & PELVIS W/CONTRAST: CPT | Mod: TC,PO

## 2021-09-20 PROCEDURE — 74177 CT ABDOMEN PELVIS WITH CONTRAST: ICD-10-PCS | Mod: 26,,, | Performed by: RADIOLOGY

## 2021-09-20 PROCEDURE — 76856 US EXAM PELVIC COMPLETE: CPT | Mod: TC,PO

## 2021-09-20 PROCEDURE — 76856 US EXAM PELVIC COMPLETE: CPT | Mod: 26,,, | Performed by: RADIOLOGY

## 2021-09-20 PROCEDURE — A9698 NON-RAD CONTRAST MATERIALNOC: HCPCS | Mod: PO | Performed by: FAMILY MEDICINE

## 2021-09-20 PROCEDURE — 74177 CT ABD & PELVIS W/CONTRAST: CPT | Mod: 26,,, | Performed by: RADIOLOGY

## 2021-09-20 PROCEDURE — 25500020 PHARM REV CODE 255: Mod: PO | Performed by: FAMILY MEDICINE

## 2021-09-20 PROCEDURE — 76856 US PELVIS COMPLETE NON OB: ICD-10-PCS | Mod: 26,,, | Performed by: RADIOLOGY

## 2021-09-20 RX ADMIN — IOHEXOL 75 ML: 350 INJECTION, SOLUTION INTRAVENOUS at 08:09

## 2021-09-20 RX ADMIN — IOHEXOL 500 ML: 9 SOLUTION ORAL at 08:09

## 2021-09-21 ENCOUNTER — TELEPHONE (OUTPATIENT)
Dept: FAMILY MEDICINE | Facility: CLINIC | Age: 81
End: 2021-09-21

## 2021-09-22 DIAGNOSIS — E03.9 HYPOTHYROIDISM (ACQUIRED): Primary | ICD-10-CM

## 2021-09-22 RX ORDER — LEVOTHYROXINE SODIUM 75 UG/1
75 TABLET ORAL
Qty: 30 TABLET | Refills: 11 | Status: SHIPPED | OUTPATIENT
Start: 2021-09-22 | End: 2022-01-25

## 2021-09-23 ENCOUNTER — HOSPITAL ENCOUNTER (OUTPATIENT)
Dept: RADIOLOGY | Facility: HOSPITAL | Age: 81
Discharge: HOME | End: 2021-09-23
Attending: FAMILY MEDICINE
Payer: MEDICARE

## 2021-09-23 DIAGNOSIS — E04.2 MULTIPLE THYROID NODULES: ICD-10-CM

## 2021-09-23 PROCEDURE — 76536 US EXAM OF HEAD AND NECK: CPT | Mod: TC,PO

## 2021-09-23 PROCEDURE — 76536 US EXAM OF HEAD AND NECK: CPT | Mod: 26,,, | Performed by: RADIOLOGY

## 2021-09-23 PROCEDURE — 76536 US SOFT TISSUE HEAD NECK THYROID: ICD-10-PCS | Mod: 26,,, | Performed by: RADIOLOGY

## 2021-10-07 ENCOUNTER — OFFICE VISIT (OUTPATIENT)
Dept: FAMILY MEDICINE | Facility: CLINIC | Age: 81
End: 2021-10-07
Payer: MEDICARE

## 2021-10-07 VITALS
WEIGHT: 126.44 LBS | HEART RATE: 61 BPM | TEMPERATURE: 98 F | DIASTOLIC BLOOD PRESSURE: 70 MMHG | HEIGHT: 62 IN | BODY MASS INDEX: 23.27 KG/M2 | SYSTOLIC BLOOD PRESSURE: 114 MMHG | OXYGEN SATURATION: 96 %

## 2021-10-07 DIAGNOSIS — E04.1 THYROID NODULE: Primary | ICD-10-CM

## 2021-10-07 DIAGNOSIS — R05.3 CHRONIC COUGH: ICD-10-CM

## 2021-10-07 DIAGNOSIS — J84.10 PULMONARY FIBROSIS: ICD-10-CM

## 2021-10-07 PROCEDURE — 99214 PR OFFICE/OUTPT VISIT, EST, LEVL IV, 30-39 MIN: ICD-10-PCS | Mod: S$PBB,,, | Performed by: FAMILY MEDICINE

## 2021-10-07 PROCEDURE — 99999 PR PBB SHADOW E&M-EST. PATIENT-LVL V: ICD-10-PCS | Mod: PBBFAC,,, | Performed by: FAMILY MEDICINE

## 2021-10-07 PROCEDURE — 99214 OFFICE O/P EST MOD 30 MIN: CPT | Mod: S$PBB,,, | Performed by: FAMILY MEDICINE

## 2021-10-07 PROCEDURE — 99999 PR PBB SHADOW E&M-EST. PATIENT-LVL V: CPT | Mod: PBBFAC,,, | Performed by: FAMILY MEDICINE

## 2021-10-07 PROCEDURE — 99215 OFFICE O/P EST HI 40 MIN: CPT | Mod: PBBFAC,PN | Performed by: FAMILY MEDICINE

## 2021-10-07 RX ORDER — ESCITALOPRAM OXALATE 5 MG/1
5 TABLET ORAL DAILY
COMMUNITY
End: 2022-05-05 | Stop reason: SDUPTHER

## 2021-10-07 RX ORDER — MELOXICAM 15 MG/1
15 TABLET ORAL DAILY
COMMUNITY
End: 2023-01-12

## 2021-10-15 ENCOUNTER — HOSPITAL ENCOUNTER (OUTPATIENT)
Dept: RADIOLOGY | Facility: HOSPITAL | Age: 81
Discharge: HOME | End: 2021-10-15
Attending: FAMILY MEDICINE
Payer: MEDICARE

## 2021-10-15 DIAGNOSIS — R05.3 CHRONIC COUGH: ICD-10-CM

## 2021-10-15 DIAGNOSIS — J84.10 PULMONARY FIBROSIS: ICD-10-CM

## 2021-10-15 PROCEDURE — 71250 CT CHEST WITHOUT CONTRAST: ICD-10-PCS | Mod: 26,,, | Performed by: RADIOLOGY

## 2021-10-15 PROCEDURE — 71250 CT THORAX DX C-: CPT | Mod: TC,PO

## 2021-10-15 PROCEDURE — 71250 CT THORAX DX C-: CPT | Mod: 26,,, | Performed by: RADIOLOGY

## 2021-10-18 ENCOUNTER — PATIENT MESSAGE (OUTPATIENT)
Dept: FAMILY MEDICINE | Facility: CLINIC | Age: 81
End: 2021-10-18

## 2021-10-18 ENCOUNTER — PATIENT MESSAGE (OUTPATIENT)
Dept: FAMILY MEDICINE | Facility: CLINIC | Age: 81
End: 2021-10-18
Payer: MEDICARE

## 2021-10-18 DIAGNOSIS — R91.8 PULMONARY NODULES: Primary | ICD-10-CM

## 2021-10-25 ENCOUNTER — PATIENT MESSAGE (OUTPATIENT)
Dept: FAMILY MEDICINE | Facility: CLINIC | Age: 81
End: 2021-10-25
Payer: MEDICARE

## 2021-10-25 DIAGNOSIS — R53.83 FATIGUE, UNSPECIFIED TYPE: Primary | ICD-10-CM

## 2021-10-25 DIAGNOSIS — Z79.899 DRUG THERAPY: ICD-10-CM

## 2021-10-26 ENCOUNTER — PATIENT MESSAGE (OUTPATIENT)
Dept: FAMILY MEDICINE | Facility: CLINIC | Age: 81
End: 2021-10-26
Payer: MEDICARE

## 2021-12-18 ENCOUNTER — PATIENT OUTREACH (OUTPATIENT)
Dept: ADMINISTRATIVE | Facility: OTHER | Age: 81
End: 2021-12-18
Payer: MEDICARE

## 2021-12-21 ENCOUNTER — OFFICE VISIT (OUTPATIENT)
Dept: OTOLARYNGOLOGY | Facility: CLINIC | Age: 81
End: 2021-12-21
Payer: MEDICARE

## 2021-12-21 VITALS — BODY MASS INDEX: 23.24 KG/M2 | WEIGHT: 126.31 LBS | HEIGHT: 62 IN

## 2021-12-21 DIAGNOSIS — J32.0 CHRONIC MAXILLARY SINUSITIS: Primary | ICD-10-CM

## 2021-12-21 DIAGNOSIS — J47.9 BRONCHIECTASIS WITHOUT COMPLICATION: ICD-10-CM

## 2021-12-21 DIAGNOSIS — Z98.890 HISTORY OF SINUS SURGERY: ICD-10-CM

## 2021-12-21 PROCEDURE — 99999 PR PBB SHADOW E&M-EST. PATIENT-LVL III: ICD-10-PCS | Mod: PBBFAC,,, | Performed by: OTOLARYNGOLOGY

## 2021-12-21 PROCEDURE — 99214 OFFICE O/P EST MOD 30 MIN: CPT | Mod: S$PBB,,, | Performed by: OTOLARYNGOLOGY

## 2021-12-21 PROCEDURE — 99214 PR OFFICE/OUTPT VISIT, EST, LEVL IV, 30-39 MIN: ICD-10-PCS | Mod: S$PBB,,, | Performed by: OTOLARYNGOLOGY

## 2021-12-21 PROCEDURE — 99213 OFFICE O/P EST LOW 20 MIN: CPT | Mod: PBBFAC,PO | Performed by: OTOLARYNGOLOGY

## 2021-12-21 PROCEDURE — 99999 PR PBB SHADOW E&M-EST. PATIENT-LVL III: CPT | Mod: PBBFAC,,, | Performed by: OTOLARYNGOLOGY

## 2021-12-27 PROBLEM — Z72.821 POOR SLEEP HYGIENE: Status: ACTIVE | Noted: 2021-12-27

## 2021-12-27 PROBLEM — R91.1 LUNG NODULE: Status: ACTIVE | Noted: 2021-12-27

## 2021-12-27 PROBLEM — J47.9 BRONCHIECTASIS WITHOUT COMPLICATION: Status: ACTIVE | Noted: 2021-12-27

## 2021-12-27 PROBLEM — R05.3 CHRONIC COUGH: Status: ACTIVE | Noted: 2021-12-27

## 2022-01-04 ENCOUNTER — OFFICE VISIT (OUTPATIENT)
Dept: FAMILY MEDICINE | Facility: CLINIC | Age: 82
End: 2022-01-04
Payer: MEDICARE

## 2022-01-04 VITALS
HEIGHT: 62 IN | WEIGHT: 129.31 LBS | SYSTOLIC BLOOD PRESSURE: 138 MMHG | OXYGEN SATURATION: 99 % | DIASTOLIC BLOOD PRESSURE: 70 MMHG | BODY MASS INDEX: 23.79 KG/M2 | HEART RATE: 65 BPM

## 2022-01-04 DIAGNOSIS — Z78.0 POSTMENOPAUSAL: ICD-10-CM

## 2022-01-04 DIAGNOSIS — J32.9 CHRONIC RECURRENT SINUSITIS: ICD-10-CM

## 2022-01-04 DIAGNOSIS — F51.01 PRIMARY INSOMNIA: ICD-10-CM

## 2022-01-04 DIAGNOSIS — R91.8 PULMONARY NODULES: Primary | ICD-10-CM

## 2022-01-04 DIAGNOSIS — B36.9 FUNGAL RASH OF TORSO: ICD-10-CM

## 2022-01-04 DIAGNOSIS — R00.2 HEART PALPITATIONS: ICD-10-CM

## 2022-01-04 PROCEDURE — 93010 EKG 12-LEAD: ICD-10-PCS | Mod: S$PBB,,, | Performed by: INTERNAL MEDICINE

## 2022-01-04 PROCEDURE — 93010 ELECTROCARDIOGRAM REPORT: CPT | Mod: S$PBB,,, | Performed by: INTERNAL MEDICINE

## 2022-01-04 PROCEDURE — 99214 PR OFFICE/OUTPT VISIT, EST, LEVL IV, 30-39 MIN: ICD-10-PCS | Mod: S$PBB,,, | Performed by: FAMILY MEDICINE

## 2022-01-04 PROCEDURE — 99214 OFFICE O/P EST MOD 30 MIN: CPT | Mod: PBBFAC,PN | Performed by: FAMILY MEDICINE

## 2022-01-04 PROCEDURE — 99999 PR PBB SHADOW E&M-EST. PATIENT-LVL IV: ICD-10-PCS | Mod: PBBFAC,,, | Performed by: FAMILY MEDICINE

## 2022-01-04 PROCEDURE — 99999 PR PBB SHADOW E&M-EST. PATIENT-LVL IV: CPT | Mod: PBBFAC,,, | Performed by: FAMILY MEDICINE

## 2022-01-04 PROCEDURE — 99214 OFFICE O/P EST MOD 30 MIN: CPT | Mod: S$PBB,,, | Performed by: FAMILY MEDICINE

## 2022-01-04 PROCEDURE — 93005 ELECTROCARDIOGRAM TRACING: CPT | Mod: PBBFAC,PN | Performed by: INTERNAL MEDICINE

## 2022-01-04 RX ORDER — MIRTAZAPINE 30 MG/1
30 TABLET, FILM COATED ORAL NIGHTLY PRN
Qty: 30 TABLET | Refills: 11 | Status: SHIPPED | OUTPATIENT
Start: 2022-01-04 | End: 2022-02-21

## 2022-01-04 RX ORDER — NYSTATIN 100000 U/G
CREAM TOPICAL 2 TIMES DAILY
Qty: 30 G | Refills: 0 | Status: SHIPPED | OUTPATIENT
Start: 2022-01-04 | End: 2023-01-12

## 2022-01-04 NOTE — PROGRESS NOTES
THIS DOCUMENT WAS MADE IN PART WITH VOICE RECOGNITION SOFTWARE.  OCCASIONALLY THIS SOFTWARE WILL MISINTERPRET WORDS OR PHRASES.    Assessment and Plan:    1. Pulmonary nodules  Monitored via pulmonology  Asymptomatic    2. Chronic recurrent sinusitis  Continue nasal saline rinses  Report to ENT with subsequent infection    3. Postmenopausal  Recheck DEXA bone scan in February  - DXA Bone Density Spine And Hip; Future    4. Primary insomnia  Failed trazodone, new medication Remeron  - mirtazapine (REMERON) 30 MG tablet; Take 1 tablet (30 mg total) by mouth nightly as needed (insomnia).  Dispense: 30 tablet; Refill: 11    5. Heart palpitations  EKG unremarkable today, refer back to Holter monitoring if recurs  - EKG 12-lead    6. Fungal rash of torso  - nystatin (MYCOSTATIN) cream; Apply topically 2 (two) times daily.  Dispense: 30 g; Refill: 0        ______________________________________________________________________  Subjective:    Chief Complaint:  Chief Complaint   Patient presents with    Follow-up     Discuss high BP        HPI:  Amarilis is a 81 y.o. year old     Elevated blood pressure readings  As high as the mid 140s, low 150s  Denies any chest discomfort or significant shortness of breath  Blood pressure well controlled today  No prior history of elevated blood pressures  Reports episode of heart palpitation adjacent to the elevated blood pressure reading as well as some hot flashes, denies fever or respiratory infection symptoms otherwise      Pulmonary nodules noted on CT scan  Seen by pulmonology on 12/27/2021, Dr. Jovel  Order chest CT, lab work, sodium chloride nebulizer  Per pulmonology-chronic cough may be due to colonization for mycobacterium avium  Also plans on following up right middle lobe nodule, low suspicion for malignancy    Chronic sinusitis  ENT-Dr. Delgado  Plan is to continue irrigations and see patient as needed  Recommended follow-up if sinusitis recurs    Truncal  rash  Location under left breast  Duration 1 day  Denies any itch            Past Medical History:  Past Medical History:   Diagnosis Date    Allergy     Asthma     GERD (gastroesophageal reflux disease)     Globus syndrome     Headache(784.0)     Hypothyroidism     Psoriasis     Rash        Past Surgical History:  Past Surgical History:   Procedure Laterality Date    ADENOIDECTOMY      CHOLECYSTECTOMY      COLONOSCOPY      DEXA      WNL    SINUS SURGERY      Dr Cordova    SINUS SURGERY  01/2017    xs 5    THYROID SURGERY      nodules removed //Dr Amato     TONSILLECTOMY      TYMPANOSTOMY TUBE PLACEMENT         Family History:  Family History   Problem Relation Age of Onset    Hypertension Mother     Obesity Father     Diabetes Sister     Obesity Sister     No Known Problems Maternal Grandmother     No Known Problems Maternal Grandfather     No Known Problems Paternal Grandmother     No Known Problems Paternal Grandfather     Breast cancer Paternal Aunt 43    Thyroid cancer Daughter 61    Ovarian cancer Neg Hx        Social History:  Social History     Socioeconomic History    Marital status:    Tobacco Use    Smoking status: Never Smoker    Smokeless tobacco: Never Used   Substance and Sexual Activity    Alcohol use: Yes     Comment: twice a month    Drug use: No    Sexual activity: Never       Medications:  Current Outpatient Medications on File Prior to Visit   Medication Sig Dispense Refill    betamethasone dipropionate 0.05 % cream Apply topically 2 (two) times daily. 45 g 6    cholecalciferol, vitamin D3, (VITAMIN D3) 50 mcg (2,000 unit) Tab Take 2,000 Units by mouth once daily.      clobetasol 0.05% (TEMOVATE) 0.05 % Oint 1 application once daily. Apply to affected area      desonide 0.05% (DESOWEN) 0.05 % Oint Apply topically 2 (two) times daily. 60 g 2    desoximetasone (TOPICORT) 0.05 % cream Apply topically 2 (two) times daily. 60 g 11    EScitalopram  oxalate (LEXAPRO) 5 MG Tab Take 5 mg by mouth once daily.      fluticasone propionate (FLONASE) 50 mcg/actuation nasal spray 1 spray (50 mcg total) by Each Nostril route once daily. 16 g 6    HYDROcodone-acetaminophen (NORCO) 5-325 mg per tablet Take 1 tablet by mouth every 8 (eight) hours as needed for Pain. 21 tablet 0    ketoconazole (NIZORAL) 2 % shampoo APPLY EXTERNALLY AT BEDTIME AS NEEDED      levocetirizine (XYZAL) 5 MG tablet Take 5 mg by mouth daily as needed for Allergies.      levothyroxine (SYNTHROID) 75 MCG tablet Take 1 tablet (75 mcg total) by mouth before breakfast. 30 tablet 11    MAGNESIUM ORAL Take by mouth once. Pt takes 400mg once daily      meloxicam (MOBIC) 15 MG tablet Take 15 mg by mouth once daily.      sodium chloride 3% 3 % nebulizer solution Take 4 mLs by nebulization once daily. 120 mL 4    traZODone (DESYREL) 50 MG tablet TAKE 1 TO 2 TABLETS(50  MG) BY MOUTH EVERY EVENING (Patient not taking: Reported on 12/27/2021) 180 tablet 2     No current facility-administered medications on file prior to visit.       Allergies:  Avelox [moxifloxacin], Bactrim [sulfamethoxazole-trimethoprim], Ciprofloxacin, Isothiazolinones, Neomycin sulfate, Apremilast, and Bacitracin    Immunizations:  Immunization History   Administered Date(s) Administered    COVID-19 Vaccine 03/31/2021    COVID-19, MRNA, LN-S, PF (MODERNA FULL 0.5 ML DOSE) 12/16/2021    COVID-19, vector-nr, [id number], PF (Johanna) 04/05/2021    Hepatitis A, Adult 08/09/2007    IPV 08/09/2007    Influenza 11/18/2011, 10/21/2013    Influenza (FLUAD) - Quadrivalent - Adjuvanted - PF *Preferred* (65+) 10/07/2021    Influenza - High Dose - PF (65 years and older) 10/14/2014, 10/08/2015, 09/27/2016    Influenza - Quadrivalent - MDCK - PF 10/10/2017    Influenza - Trivalent (ADULT) 11/18/2011    Influenza - Trivalent - PF (ADULT) 10/21/2013    Pneumococcal Conjugate - 13 Valent 04/12/2016    Pneumococcal Polysaccharide -  "23 Valent 08/01/2011    Tdap 08/09/2007    Zoster 07/11/2007       Review of Systems:  Review of Systems   All other systems reviewed and are negative.      Objective:    Vitals:  Vitals:    01/04/22 0758   Pulse: 65   SpO2: 99%   Weight: 58.7 kg (129 lb 4.8 oz)   Height: 5' 2" (1.575 m)   PainSc: 0-No pain       Physical Exam  Vitals reviewed.   Constitutional:       General: She is not in acute distress.     Appearance: She is well-developed.   HENT:      Head: Normocephalic and atraumatic.   Eyes:      Extraocular Movements: EOM normal.   Pulmonary:      Effort: Pulmonary effort is normal. No respiratory distress.   Musculoskeletal:      Cervical back: Normal range of motion.   Skin:         Psychiatric:         Mood and Affect: Mood and affect normal.         Behavior: Behavior normal.         Thought Content: Thought content normal.         Judgment: Judgment normal.             Luc Teixeira MD  Family Medicine      "

## 2022-01-13 ENCOUNTER — HOSPITAL ENCOUNTER (OUTPATIENT)
Dept: RADIOLOGY | Facility: HOSPITAL | Age: 82
Discharge: HOME | End: 2022-01-13
Attending: FAMILY MEDICINE
Payer: MEDICARE

## 2022-01-13 DIAGNOSIS — Z78.0 POSTMENOPAUSAL: ICD-10-CM

## 2022-01-13 PROCEDURE — 77080 DEXA BONE DENSITY SPINE HIP: ICD-10-PCS | Mod: 26,,, | Performed by: RADIOLOGY

## 2022-01-13 PROCEDURE — 77080 DXA BONE DENSITY AXIAL: CPT | Mod: 26,,, | Performed by: RADIOLOGY

## 2022-01-13 PROCEDURE — 77080 DXA BONE DENSITY AXIAL: CPT | Mod: TC,PO

## 2022-01-14 DIAGNOSIS — M85.80 SENILE OSTEOPENIA: Primary | ICD-10-CM

## 2022-01-14 RX ORDER — IBANDRONATE SODIUM 150 MG/1
150 TABLET, FILM COATED ORAL
Qty: 3 TABLET | Refills: 3 | Status: SHIPPED | OUTPATIENT
Start: 2022-01-14 | End: 2023-01-12

## 2022-01-25 DIAGNOSIS — E03.9 HYPOTHYROIDISM (ACQUIRED): Primary | ICD-10-CM

## 2022-01-25 RX ORDER — THYROID 60 MG/1
60 TABLET ORAL
Qty: 30 TABLET | Refills: 11 | Status: SHIPPED | OUTPATIENT
Start: 2022-01-25 | End: 2022-10-14 | Stop reason: SDUPTHER

## 2022-01-25 RX ORDER — THYROID 15 MG/1
45 TABLET ORAL
Qty: 90 TABLET | Refills: 11 | Status: SHIPPED | OUTPATIENT
Start: 2022-01-25 | End: 2022-05-13

## 2022-02-21 ENCOUNTER — OFFICE VISIT (OUTPATIENT)
Dept: FAMILY MEDICINE | Facility: CLINIC | Age: 82
End: 2022-02-21
Payer: MEDICARE

## 2022-02-21 ENCOUNTER — LAB VISIT (OUTPATIENT)
Dept: LAB | Facility: HOSPITAL | Age: 82
End: 2022-02-21
Attending: FAMILY MEDICINE
Payer: MEDICARE

## 2022-02-21 VITALS
WEIGHT: 129 LBS | OXYGEN SATURATION: 97 % | SYSTOLIC BLOOD PRESSURE: 120 MMHG | HEART RATE: 67 BPM | BODY MASS INDEX: 23.74 KG/M2 | HEIGHT: 62 IN | DIASTOLIC BLOOD PRESSURE: 66 MMHG | RESPIRATION RATE: 18 BRPM

## 2022-02-21 DIAGNOSIS — Z79.899 DRUG THERAPY: ICD-10-CM

## 2022-02-21 DIAGNOSIS — Z12.31 ENCOUNTER FOR SCREENING MAMMOGRAM FOR MALIGNANT NEOPLASM OF BREAST: ICD-10-CM

## 2022-02-21 DIAGNOSIS — E03.9 HYPOTHYROIDISM (ACQUIRED): ICD-10-CM

## 2022-02-21 DIAGNOSIS — E03.9 HYPOTHYROIDISM (ACQUIRED): Primary | ICD-10-CM

## 2022-02-21 DIAGNOSIS — F51.01 PRIMARY INSOMNIA: ICD-10-CM

## 2022-02-21 DIAGNOSIS — J47.9 BRONCHIECTASIS WITHOUT COMPLICATION: ICD-10-CM

## 2022-02-21 LAB
ALBUMIN SERPL BCP-MCNC: 3.7 G/DL (ref 3.5–5.2)
ALP SERPL-CCNC: 55 U/L (ref 55–135)
ALT SERPL W/O P-5'-P-CCNC: 18 U/L (ref 10–44)
ANION GAP SERPL CALC-SCNC: 9 MMOL/L (ref 8–16)
AST SERPL-CCNC: 21 U/L (ref 10–40)
BILIRUB SERPL-MCNC: 0.6 MG/DL (ref 0.1–1)
BUN SERPL-MCNC: 15 MG/DL (ref 8–23)
CALCIUM SERPL-MCNC: 9.3 MG/DL (ref 8.7–10.5)
CHLORIDE SERPL-SCNC: 104 MMOL/L (ref 95–110)
CO2 SERPL-SCNC: 28 MMOL/L (ref 23–29)
CREAT SERPL-MCNC: 0.8 MG/DL (ref 0.5–1.4)
EST. GFR  (AFRICAN AMERICAN): >60 ML/MIN/1.73 M^2
EST. GFR  (NON AFRICAN AMERICAN): >60 ML/MIN/1.73 M^2
GLUCOSE SERPL-MCNC: 98 MG/DL (ref 70–110)
MAGNESIUM SERPL-MCNC: 1.9 MG/DL (ref 1.6–2.6)
POTASSIUM SERPL-SCNC: 4 MMOL/L (ref 3.5–5.1)
PROT SERPL-MCNC: 6.9 G/DL (ref 6–8.4)
SODIUM SERPL-SCNC: 141 MMOL/L (ref 136–145)
T4 FREE SERPL-MCNC: 1.04 NG/DL (ref 0.71–1.51)
TSH SERPL DL<=0.005 MIU/L-ACNC: 0.07 UIU/ML (ref 0.4–4)

## 2022-02-21 PROCEDURE — 99999 PR PBB SHADOW E&M-EST. PATIENT-LVL V: ICD-10-PCS | Mod: PBBFAC,,, | Performed by: FAMILY MEDICINE

## 2022-02-21 PROCEDURE — 84443 ASSAY THYROID STIM HORMONE: CPT | Performed by: FAMILY MEDICINE

## 2022-02-21 PROCEDURE — 99214 PR OFFICE/OUTPT VISIT, EST, LEVL IV, 30-39 MIN: ICD-10-PCS | Mod: S$PBB,,, | Performed by: FAMILY MEDICINE

## 2022-02-21 PROCEDURE — 84439 ASSAY OF FREE THYROXINE: CPT | Performed by: FAMILY MEDICINE

## 2022-02-21 PROCEDURE — 99999 PR PBB SHADOW E&M-EST. PATIENT-LVL V: CPT | Mod: PBBFAC,,, | Performed by: FAMILY MEDICINE

## 2022-02-21 PROCEDURE — 99215 OFFICE O/P EST HI 40 MIN: CPT | Mod: PBBFAC,PN | Performed by: FAMILY MEDICINE

## 2022-02-21 PROCEDURE — 80053 COMPREHEN METABOLIC PANEL: CPT | Performed by: FAMILY MEDICINE

## 2022-02-21 PROCEDURE — 36415 COLL VENOUS BLD VENIPUNCTURE: CPT | Mod: PN | Performed by: FAMILY MEDICINE

## 2022-02-21 PROCEDURE — 99214 OFFICE O/P EST MOD 30 MIN: CPT | Mod: S$PBB,,, | Performed by: FAMILY MEDICINE

## 2022-02-21 PROCEDURE — 83735 ASSAY OF MAGNESIUM: CPT | Performed by: FAMILY MEDICINE

## 2022-02-21 RX ORDER — DOXEPIN HYDROCHLORIDE 10 MG/1
10 CAPSULE ORAL NIGHTLY
Qty: 30 CAPSULE | Refills: 11 | Status: SHIPPED | OUTPATIENT
Start: 2022-02-21 | End: 2022-05-05

## 2022-02-21 NOTE — PROGRESS NOTES
" THIS DOCUMENT WAS MADE IN PART WITH VOICE RECOGNITION SOFTWARE.  OCCASIONALLY THIS SOFTWARE WILL MISINTERPRET WORDS OR PHRASES.    Assessment and Plan:    Failed trazodone and Remeron for insomnia.  Will try doxepin   Schedule for mammogram  Patient wishing to have magnesium and electrolytes checked  Follow-up with pulmonology, anticipate repeat CT scan to monitor pulmonary nodules in July 2022  Symptoms much improved after starting pork thyroid    1. Hypothyroidism (acquired)  - TSH; Future  - T4, Free; Future    2. Bronchiectasis without complication    3. Primary insomnia  - doxepin (SINEQUAN) 10 MG capsule; Take 1 capsule (10 mg total) by mouth every evening.  Dispense: 30 capsule; Refill: 11    4. Drug therapy  - Magnesium; Future  - Comprehensive Metabolic Panel; Future    5. Encounter for screening mammogram for malignant neoplasm of breast  - Mammo Digital Screening Bilat; Future        ______________________________________________________________________  Subjective:    Chief Complaint:  Chief Complaint   Patient presents with    Follow-up     Thyroid tests needed        HPI:  Amarilis is a 81 y.o. year old     Hypothyroidism  Previously on levothyroxine 75 mcg, we did restart thyroid pork since her previous visit and she was having some fatigue and hair loss and believe that the levothyroxine was not effectively improving her symptoms  Reports feeling "much better"       Primary insomnia  Failed trazodone, prescribed Remeron at prior visit  Gave patient hangover symptoms next morning       Bronchiectasis, history of pulmonary nodules  Seen by pulmonology on 12/27/2021, Dr. Jovel  Order chest CT, lab work, sodium chloride nebulizer  Per pulmonology-chronic cough may be due to colonization for mycobacterium avium  Also plans on following up right middle lobe nodule, low suspicion for malignancy        Past Medical History:  Past Medical History:   Diagnosis Date    Allergy     Asthma     GERD " (gastroesophageal reflux disease)     Globus syndrome     Headache(784.0)     Hypothyroidism     Psoriasis     Rash        Past Surgical History:  Past Surgical History:   Procedure Laterality Date    ADENOIDECTOMY      CHOLECYSTECTOMY      COLONOSCOPY      DEXA      WNL    SINUS SURGERY      Dr Cordova    SINUS SURGERY  01/2017    xs 5    THYROID SURGERY      nodules removed //Dr Amato     TONSILLECTOMY      TYMPANOSTOMY TUBE PLACEMENT         Family History:  Family History   Problem Relation Age of Onset    Hypertension Mother     Obesity Father     Diabetes Sister     Obesity Sister     No Known Problems Maternal Grandmother     No Known Problems Maternal Grandfather     No Known Problems Paternal Grandmother     No Known Problems Paternal Grandfather     Breast cancer Paternal Aunt 43    Thyroid cancer Daughter 61    Ovarian cancer Neg Hx        Social History:  Social History     Socioeconomic History    Marital status:    Tobacco Use    Smoking status: Never Smoker    Smokeless tobacco: Never Used   Substance and Sexual Activity    Alcohol use: Yes     Comment: twice a month    Drug use: No    Sexual activity: Never       Medications:  Current Outpatient Medications on File Prior to Visit   Medication Sig Dispense Refill    betamethasone dipropionate 0.05 % cream Apply topically 2 (two) times daily. 45 g 6    cholecalciferol, vitamin D3, (VITAMIN D3) 50 mcg (2,000 unit) Tab Take 2,000 Units by mouth once daily.      clobetasol 0.05% (TEMOVATE) 0.05 % Oint 1 application once daily. Apply to affected area      desonide 0.05% (DESOWEN) 0.05 % Oint Apply topically 2 (two) times daily. 60 g 2    desoximetasone (TOPICORT) 0.05 % cream Apply topically 2 (two) times daily. 60 g 11    EScitalopram oxalate (LEXAPRO) 5 MG Tab Take 5 mg by mouth once daily.      fluticasone propionate (FLONASE) 50 mcg/actuation nasal spray 1 spray (50 mcg total) by Each Nostril route once  daily. 16 g 6    HYDROcodone-acetaminophen (NORCO) 5-325 mg per tablet Take 1 tablet by mouth every 8 (eight) hours as needed for Pain. 21 tablet 0    ibandronate (BONIVA) 150 mg tablet Take 1 tablet (150 mg total) by mouth every 30 days. 3 tablet 3    ketoconazole (NIZORAL) 2 % shampoo APPLY EXTERNALLY AT BEDTIME AS NEEDED      levocetirizine (XYZAL) 5 MG tablet Take 5 mg by mouth daily as needed for Allergies.      MAGNESIUM ORAL Take by mouth once. Pt takes 400mg once daily      meloxicam (MOBIC) 15 MG tablet Take 15 mg by mouth once daily.      mirtazapine (REMERON) 30 MG tablet Take 1 tablet (30 mg total) by mouth nightly as needed (insomnia). 30 tablet 11    nystatin (MYCOSTATIN) cream Apply topically 2 (two) times daily. 30 g 0    sodium chloride 3% 3 % nebulizer solution Take 4 mLs by nebulization once daily. 120 mL 4    thyroid, pork, (ARMOUR THYROID) 60 mg Tab Take 1 tablet (60 mg total) by mouth before breakfast. 30 tablet 11    thyroid, pork, (NP THYROID) 15 mg Tab Take 3 tablets (45 mg total) by mouth before breakfast. 90 tablet 11     No current facility-administered medications on file prior to visit.       Allergies:  Avelox [moxifloxacin], Bactrim [sulfamethoxazole-trimethoprim], Ciprofloxacin, Isothiazolinones, Neomycin sulfate, Apremilast, and Bacitracin    Immunizations:  Immunization History   Administered Date(s) Administered    COVID-19 Vaccine 03/31/2021    COVID-19, MRNA, LN-S, PF (MODERNA FULL 0.5 ML DOSE) 12/16/2021    COVID-19, vector-nr, rS-Ad26, PF (Johanna) 04/05/2021    Hepatitis A, Adult 08/09/2007    IPV 08/09/2007    Influenza 11/18/2011, 10/21/2013    Influenza (FLUAD) - Quadrivalent - Adjuvanted - PF *Preferred* (65+) 10/07/2021    Influenza - High Dose - PF (65 years and older) 10/14/2014, 10/08/2015, 09/27/2016    Influenza - Quadrivalent - MDCK - PF 10/10/2017    Influenza - Trivalent (ADULT) 11/18/2011    Influenza - Trivalent - PF (ADULT) 10/21/2013     "Pneumococcal Conjugate - 13 Valent 04/12/2016    Pneumococcal Polysaccharide - 23 Valent 08/01/2011    Tdap 08/09/2007    Zoster 07/11/2007       Review of Systems:  Review of Systems   All other systems reviewed and are negative.      Objective:    Vitals:  Vitals:    02/21/22 0800   BP: 120/66   Pulse: 67   Resp: 18   SpO2: 97%   Weight: 58.5 kg (128 lb 15.5 oz)   Height: 5' 2" (1.575 m)   PainSc: 0-No pain       Physical Exam  Vitals reviewed.   Constitutional:       General: She is not in acute distress.  HENT:      Head: Normocephalic and atraumatic.   Eyes:      Pupils: Pupils are equal, round, and reactive to light.   Cardiovascular:      Rate and Rhythm: Normal rate and regular rhythm.      Heart sounds: No murmur heard.    No friction rub.   Pulmonary:      Effort: Pulmonary effort is normal.      Breath sounds: Normal breath sounds.   Abdominal:      General: Bowel sounds are normal. There is no distension.      Palpations: Abdomen is soft.      Tenderness: There is no abdominal tenderness.   Musculoskeletal:      Cervical back: Neck supple.   Skin:     General: Skin is warm and dry.      Findings: No rash.   Psychiatric:         Behavior: Behavior normal.             Luc Teixeira MD  Family Medicine      "

## 2022-02-22 ENCOUNTER — PATIENT MESSAGE (OUTPATIENT)
Dept: FAMILY MEDICINE | Facility: CLINIC | Age: 82
End: 2022-02-22
Payer: MEDICARE

## 2022-02-24 ENCOUNTER — PES CALL (OUTPATIENT)
Dept: ADMINISTRATIVE | Facility: CLINIC | Age: 82
End: 2022-02-24
Payer: MEDICARE

## 2022-03-29 ENCOUNTER — HOSPITAL ENCOUNTER (OUTPATIENT)
Dept: RADIOLOGY | Facility: HOSPITAL | Age: 82
Discharge: HOME | End: 2022-03-29
Attending: FAMILY MEDICINE
Payer: MEDICARE

## 2022-03-29 DIAGNOSIS — E04.1 THYROID NODULE: ICD-10-CM

## 2022-03-29 PROCEDURE — 76536 US SOFT TISSUE HEAD NECK THYROID: ICD-10-PCS | Mod: 26,,, | Performed by: RADIOLOGY

## 2022-03-29 PROCEDURE — 76536 US EXAM OF HEAD AND NECK: CPT | Mod: 26,,, | Performed by: RADIOLOGY

## 2022-03-29 PROCEDURE — 76536 US EXAM OF HEAD AND NECK: CPT | Mod: TC,PO

## 2022-05-05 ENCOUNTER — OFFICE VISIT (OUTPATIENT)
Dept: FAMILY MEDICINE | Facility: CLINIC | Age: 82
End: 2022-05-05
Payer: MEDICARE

## 2022-05-05 VITALS
WEIGHT: 125.88 LBS | BODY MASS INDEX: 23.17 KG/M2 | HEIGHT: 62 IN | HEART RATE: 60 BPM | DIASTOLIC BLOOD PRESSURE: 70 MMHG | SYSTOLIC BLOOD PRESSURE: 112 MMHG

## 2022-05-05 DIAGNOSIS — E03.9 HYPOTHYROIDISM (ACQUIRED): Primary | ICD-10-CM

## 2022-05-05 DIAGNOSIS — J30.9 ALLERGIC RHINITIS, UNSPECIFIED SEASONALITY, UNSPECIFIED TRIGGER: ICD-10-CM

## 2022-05-05 DIAGNOSIS — F33.1 MODERATE EPISODE OF RECURRENT MAJOR DEPRESSIVE DISORDER: ICD-10-CM

## 2022-05-05 DIAGNOSIS — R53.82 CHRONIC FATIGUE: ICD-10-CM

## 2022-05-05 DIAGNOSIS — F51.01 PRIMARY INSOMNIA: ICD-10-CM

## 2022-05-05 DIAGNOSIS — L40.9 PSORIASIS: ICD-10-CM

## 2022-05-05 DIAGNOSIS — M85.80 SENILE OSTEOPENIA: ICD-10-CM

## 2022-05-05 PROCEDURE — 99999 PR PBB SHADOW E&M-EST. PATIENT-LVL IV: CPT | Mod: PBBFAC,,, | Performed by: FAMILY MEDICINE

## 2022-05-05 PROCEDURE — 99214 PR OFFICE/OUTPT VISIT, EST, LEVL IV, 30-39 MIN: ICD-10-PCS | Mod: S$PBB,,, | Performed by: FAMILY MEDICINE

## 2022-05-05 PROCEDURE — 99214 OFFICE O/P EST MOD 30 MIN: CPT | Mod: S$PBB,,, | Performed by: FAMILY MEDICINE

## 2022-05-05 PROCEDURE — 99214 OFFICE O/P EST MOD 30 MIN: CPT | Mod: PBBFAC,PN | Performed by: FAMILY MEDICINE

## 2022-05-05 PROCEDURE — 99999 PR PBB SHADOW E&M-EST. PATIENT-LVL IV: ICD-10-PCS | Mod: PBBFAC,,, | Performed by: FAMILY MEDICINE

## 2022-05-05 RX ORDER — TRAZODONE HYDROCHLORIDE 150 MG/1
150 TABLET ORAL NIGHTLY
Qty: 30 TABLET | Refills: 11 | Status: SHIPPED | OUTPATIENT
Start: 2022-05-05 | End: 2022-10-03 | Stop reason: SDUPTHER

## 2022-05-05 RX ORDER — ESCITALOPRAM OXALATE 5 MG/1
5 TABLET ORAL DAILY
Qty: 90 TABLET | Refills: 3 | Status: SHIPPED | OUTPATIENT
Start: 2022-05-05 | End: 2022-06-06

## 2022-05-05 NOTE — PROGRESS NOTES
THIS DOCUMENT WAS MADE IN PART WITH VOICE RECOGNITION SOFTWARE.  OCCASIONALLY THIS SOFTWARE WILL MISINTERPRET WORDS OR PHRASES.    Assessment and Plan:    1. Hypothyroidism (acquired)  TSH    T4, Free   2. Primary insomnia  traZODone (DESYREL) 150 MG tablet   3. Chronic fatigue     4. Senile osteopenia     5. Allergic rhinitis, unspecified seasonality, unspecified trigger     6. Psoriasis     7. Moderate episode of recurrent major depressive disorder  EScitalopram oxalate (LEXAPRO) 5 MG Tab       PLAN    Recheck thyroid studies, titrate medication as needed    New medication trazodone 150 mg, previously failed at lower doses, failed Remeron doxepin as well    Restart Lexapro 5 mg for depressed mood  Follow-up in 4 weeks for re-evaluation    Restart Boniva for osteopenia    Otherwise chronic conditions appear stable      ______________________________________________________________________  Subjective:    Chief Complaint:  Chief Complaint   Patient presents with    Follow-up     4 months---        HPI:  Amarilis is a 81 y.o. year old     Primary insomnia  Previous Rx-trazodone, Remeron  Prescribed doxepin at prior visit --> not effective  Pt restarted trazodone, reports medicine works sometimes at the 50 and 100 mg doses    Depressed mood  Stressor-fall out with daughter  Has not been taking Lexapro, request new prescription to restart  Tearful when discussing depressed mood    Hypothyroidism  Previously on levothyroxine.  We did start armor thyroid at prior visit, patient did report feeling much better on the new regimen.  She is due for repeat TSH  Current Rx-armor Thyroid 60 mg, NP Thyroid 45 mg    Bronchiectasis, history of pulmonary nodules  Seen by pulmonology on 12/27/2021, Dr. Jovel  Order chest CT, lab work, sodium chloride nebulizer  Per pulmonology-chronic cough may be due to colonization for mycobacterium avium  Also plans on following up right middle lobe nodule, low suspicion for  malignancy     Senile osteoporosis  Rx-Boniva 150 mg monthly, vitamin-D supplement  No recent fractures    Seasonal allergies  Rx-Flonase, Xyzal  Does have recurrent sinusitis, seen by ENT    Psoriasis  Rx-betamethasone, clobetasol, desonide, Topicort  Followed by dermatology          Past Medical History:  Past Medical History:   Diagnosis Date    Allergy     Asthma     GERD (gastroesophageal reflux disease)     Globus syndrome     Headache(784.0)     Hypothyroidism     Psoriasis     Rash        Past Surgical History:  Past Surgical History:   Procedure Laterality Date    ADENOIDECTOMY      CHOLECYSTECTOMY      COLONOSCOPY      DEXA      WNL    SINUS SURGERY      Dr Cordova    SINUS SURGERY  01/2017    xs 5    THYROID SURGERY      nodules removed //Dr Amato     TONSILLECTOMY      TYMPANOSTOMY TUBE PLACEMENT         Family History:  Family History   Problem Relation Age of Onset    Hypertension Mother     Obesity Father     Diabetes Sister     Obesity Sister     No Known Problems Maternal Grandmother     No Known Problems Maternal Grandfather     No Known Problems Paternal Grandmother     No Known Problems Paternal Grandfather     Breast cancer Paternal Aunt 43    Thyroid cancer Daughter 61    Ovarian cancer Neg Hx        Social History:  Social History     Socioeconomic History    Marital status:    Tobacco Use    Smoking status: Never Smoker    Smokeless tobacco: Never Used   Substance and Sexual Activity    Alcohol use: Yes     Comment: twice a month    Drug use: No    Sexual activity: Never       Medications:  Current Outpatient Medications on File Prior to Visit   Medication Sig Dispense Refill    betamethasone dipropionate 0.05 % cream Apply topically 2 (two) times daily. 45 g 6    cholecalciferol, vitamin D3, (VITAMIN D3) 50 mcg (2,000 unit) Tab Take 2,000 Units by mouth once daily.      clobetasol 0.05% (TEMOVATE) 0.05 % Oint 1 application once daily. Apply to  affected area      desonide 0.05% (DESOWEN) 0.05 % Oint Apply topically 2 (two) times daily. 60 g 2    desoximetasone (TOPICORT) 0.05 % cream Apply topically 2 (two) times daily. 60 g 11    fluticasone propionate (FLONASE) 50 mcg/actuation nasal spray 1 spray (50 mcg total) by Each Nostril route once daily. 16 g 6    HYDROcodone-acetaminophen (NORCO) 5-325 mg per tablet Take 1 tablet by mouth every 8 (eight) hours as needed for Pain. 21 tablet 0    ketoconazole (NIZORAL) 2 % shampoo APPLY EXTERNALLY AT BEDTIME AS NEEDED      levocetirizine (XYZAL) 5 MG tablet Take 5 mg by mouth daily as needed for Allergies.      MAGNESIUM ORAL Take by mouth once. Pt takes 400mg once daily      meloxicam (MOBIC) 15 MG tablet Take 15 mg by mouth once daily.      nystatin (MYCOSTATIN) cream Apply topically 2 (two) times daily. 30 g 0    sodium chloride 3% 3 % nebulizer solution Take 4 mLs by nebulization once daily. 120 mL 4    thyroid, pork, (ARMOUR THYROID) 60 mg Tab Take 1 tablet (60 mg total) by mouth before breakfast. 30 tablet 11    thyroid, pork, (NP THYROID) 15 mg Tab Take 3 tablets (45 mg total) by mouth before breakfast. 90 tablet 11    doxepin (SINEQUAN) 10 MG capsule Take 1 capsule (10 mg total) by mouth every evening. (Patient not taking: Reported on 5/5/2022) 30 capsule 11    EScitalopram oxalate (LEXAPRO) 5 MG Tab Take 5 mg by mouth once daily.      ibandronate (BONIVA) 150 mg tablet Take 1 tablet (150 mg total) by mouth every 30 days. (Patient not taking: Reported on 5/5/2022) 3 tablet 3     No current facility-administered medications on file prior to visit.       Allergies:  Avelox [moxifloxacin], Bactrim [sulfamethoxazole-trimethoprim], Ciprofloxacin, Isothiazolinones, Neomycin sulfate, Apremilast, and Bacitracin    Immunizations:  Immunization History   Administered Date(s) Administered    COVID-19 Vaccine 03/31/2021    COVID-19, MRNA, LN-S, PF (MODERNA FULL 0.5 ML DOSE) 12/16/2021    COVID-19,  "vector-nr, rS-Ad26, PF (Johanna) 04/05/2021    Hepatitis A, Adult 08/09/2007    IPV 08/09/2007    Influenza 11/18/2011, 10/21/2013    Influenza (FLUAD) - Quadrivalent - Adjuvanted - PF *Preferred* (65+) 10/07/2021    Influenza - High Dose - PF (65 years and older) 10/14/2014, 10/08/2015, 09/27/2016    Influenza - Quadrivalent - MDCK - PF 10/10/2017    Influenza - Trivalent (ADULT) 11/18/2011    Influenza - Trivalent - PF (ADULT) 10/21/2013    Pneumococcal Conjugate - 13 Valent 04/12/2016    Pneumococcal Polysaccharide - 23 Valent 08/01/2011    Tdap 08/09/2007, 08/01/2018    Zoster 07/11/2007       Review of Systems:  Review of Systems   All other systems reviewed and are negative.      Objective:    Vitals:  Vitals:    05/05/22 0800   BP: 112/70   Pulse: 60   Weight: 57.1 kg (125 lb 14.1 oz)   Height: 5' 2" (1.575 m)   PainSc:   3   PainLoc: Back       Physical Exam  Vitals reviewed.   Constitutional:       General: She is not in acute distress.  HENT:      Head: Normocephalic and atraumatic.   Eyes:      Pupils: Pupils are equal, round, and reactive to light.   Cardiovascular:      Rate and Rhythm: Normal rate and regular rhythm.      Heart sounds: No murmur heard.    No friction rub.   Pulmonary:      Effort: Pulmonary effort is normal.      Breath sounds: Normal breath sounds.   Abdominal:      General: Bowel sounds are normal. There is no distension.      Palpations: Abdomen is soft.      Tenderness: There is no abdominal tenderness.   Musculoskeletal:      Cervical back: Neck supple.   Skin:     General: Skin is warm and dry.      Findings: No rash.   Psychiatric:         Behavior: Behavior normal.             Luc Teixeira MD  Family Medicine      "

## 2022-05-11 ENCOUNTER — LAB VISIT (OUTPATIENT)
Dept: LAB | Facility: HOSPITAL | Age: 82
End: 2022-05-11
Attending: FAMILY MEDICINE
Payer: MEDICARE

## 2022-05-11 DIAGNOSIS — E03.9 HYPOTHYROIDISM (ACQUIRED): ICD-10-CM

## 2022-05-11 PROCEDURE — 84443 ASSAY THYROID STIM HORMONE: CPT | Performed by: FAMILY MEDICINE

## 2022-05-11 PROCEDURE — 36415 COLL VENOUS BLD VENIPUNCTURE: CPT | Mod: PN | Performed by: FAMILY MEDICINE

## 2022-05-11 PROCEDURE — 84439 ASSAY OF FREE THYROXINE: CPT | Performed by: FAMILY MEDICINE

## 2022-05-12 LAB
T4 FREE SERPL-MCNC: 0.88 NG/DL (ref 0.71–1.51)
TSH SERPL DL<=0.005 MIU/L-ACNC: 0.05 UIU/ML (ref 0.4–4)

## 2022-05-13 ENCOUNTER — PATIENT MESSAGE (OUTPATIENT)
Dept: FAMILY MEDICINE | Facility: CLINIC | Age: 82
End: 2022-05-13
Payer: MEDICARE

## 2022-05-13 RX ORDER — THYROID 15 MG/1
30 TABLET ORAL
COMMUNITY
End: 2022-09-23 | Stop reason: SDUPTHER

## 2022-05-16 ENCOUNTER — PATIENT MESSAGE (OUTPATIENT)
Dept: FAMILY MEDICINE | Facility: CLINIC | Age: 82
End: 2022-05-16
Payer: MEDICARE

## 2022-05-19 DIAGNOSIS — E03.9 HYPOTHYROIDISM (ACQUIRED): Primary | ICD-10-CM

## 2022-05-24 ENCOUNTER — HOSPITAL ENCOUNTER (OUTPATIENT)
Dept: RADIOLOGY | Facility: HOSPITAL | Age: 82
Discharge: HOME | End: 2022-05-24
Attending: PHYSICIAN ASSISTANT
Payer: MEDICARE

## 2022-05-24 DIAGNOSIS — M46.1 SACROILIITIS, NOT ELSEWHERE CLASSIFIED: ICD-10-CM

## 2022-05-24 DIAGNOSIS — M47.896 OTHER SPONDYLOSIS, LUMBAR REGION: ICD-10-CM

## 2022-05-24 DIAGNOSIS — M47.896 OTHER SPONDYLOSIS, LUMBAR REGION: Primary | ICD-10-CM

## 2022-05-24 PROCEDURE — 73522 XR HIP 3 OR 4 VIEWS BILATERAL: ICD-10-PCS | Mod: 26,,, | Performed by: RADIOLOGY

## 2022-05-24 PROCEDURE — 73522 X-RAY EXAM HIPS BI 3-4 VIEWS: CPT | Mod: TC,FY,PO

## 2022-05-24 PROCEDURE — 73522 X-RAY EXAM HIPS BI 3-4 VIEWS: CPT | Mod: 26,,, | Performed by: RADIOLOGY

## 2022-05-24 PROCEDURE — 72114 X-RAY EXAM L-S SPINE BENDING: CPT | Mod: TC,FY,PO

## 2022-05-24 PROCEDURE — 72114 X-RAY EXAM L-S SPINE BENDING: CPT | Mod: 26,,, | Performed by: RADIOLOGY

## 2022-05-24 PROCEDURE — 72148 MRI LUMBAR SPINE W/O DYE: CPT | Mod: 26,,, | Performed by: RADIOLOGY

## 2022-05-24 PROCEDURE — 72114 XR LUMBAR SPINE 5 VIEW WITH FLEX AND EXT: ICD-10-PCS | Mod: 26,,, | Performed by: RADIOLOGY

## 2022-05-24 PROCEDURE — 72148 MRI LUMBAR SPINE WITHOUT CONTRAST: ICD-10-PCS | Mod: 26,,, | Performed by: RADIOLOGY

## 2022-05-24 PROCEDURE — 72148 MRI LUMBAR SPINE W/O DYE: CPT | Mod: TC,PO

## 2022-06-02 ENCOUNTER — LAB VISIT (OUTPATIENT)
Dept: LAB | Facility: HOSPITAL | Age: 82
End: 2022-06-02
Attending: FAMILY MEDICINE
Payer: MEDICARE

## 2022-06-02 DIAGNOSIS — E03.9 HYPOTHYROIDISM (ACQUIRED): ICD-10-CM

## 2022-06-02 LAB
T4 FREE SERPL-MCNC: 0.89 NG/DL (ref 0.71–1.51)
TSH SERPL DL<=0.005 MIU/L-ACNC: 0.03 UIU/ML (ref 0.4–4)

## 2022-06-02 PROCEDURE — 84443 ASSAY THYROID STIM HORMONE: CPT | Performed by: FAMILY MEDICINE

## 2022-06-02 PROCEDURE — 36415 COLL VENOUS BLD VENIPUNCTURE: CPT | Mod: PN | Performed by: FAMILY MEDICINE

## 2022-06-02 PROCEDURE — 84439 ASSAY OF FREE THYROXINE: CPT | Performed by: FAMILY MEDICINE

## 2022-06-06 ENCOUNTER — OFFICE VISIT (OUTPATIENT)
Dept: FAMILY MEDICINE | Facility: CLINIC | Age: 82
End: 2022-06-06
Payer: MEDICARE

## 2022-06-06 ENCOUNTER — LAB VISIT (OUTPATIENT)
Dept: LAB | Facility: HOSPITAL | Age: 82
End: 2022-06-06
Attending: FAMILY MEDICINE
Payer: MEDICARE

## 2022-06-06 VITALS
WEIGHT: 126.88 LBS | HEART RATE: 64 BPM | BODY MASS INDEX: 23.35 KG/M2 | HEIGHT: 62 IN | DIASTOLIC BLOOD PRESSURE: 70 MMHG | SYSTOLIC BLOOD PRESSURE: 116 MMHG

## 2022-06-06 DIAGNOSIS — M54.2 NECK PAIN: ICD-10-CM

## 2022-06-06 DIAGNOSIS — R53.82 CHRONIC FATIGUE: ICD-10-CM

## 2022-06-06 DIAGNOSIS — Z23 IMMUNIZATION DUE: ICD-10-CM

## 2022-06-06 DIAGNOSIS — G47.10 HYPERSOMNIA: ICD-10-CM

## 2022-06-06 DIAGNOSIS — E03.9 HYPOTHYROIDISM (ACQUIRED): Primary | ICD-10-CM

## 2022-06-06 DIAGNOSIS — M54.9 BACK PAIN, UNSPECIFIED BACK LOCATION, UNSPECIFIED BACK PAIN LATERALITY, UNSPECIFIED CHRONICITY: ICD-10-CM

## 2022-06-06 LAB
CRP SERPL-MCNC: 0.8 MG/L (ref 0–8.2)
ERYTHROCYTE [SEDIMENTATION RATE] IN BLOOD BY WESTERGREN METHOD: 13 MM/HR (ref 0–36)
VIT B12 SERPL-MCNC: 341 PG/ML (ref 210–950)

## 2022-06-06 PROCEDURE — 85652 RBC SED RATE AUTOMATED: CPT | Performed by: FAMILY MEDICINE

## 2022-06-06 PROCEDURE — 36415 COLL VENOUS BLD VENIPUNCTURE: CPT | Mod: PN | Performed by: FAMILY MEDICINE

## 2022-06-06 PROCEDURE — 86140 C-REACTIVE PROTEIN: CPT | Performed by: FAMILY MEDICINE

## 2022-06-06 PROCEDURE — 82607 VITAMIN B-12: CPT | Performed by: FAMILY MEDICINE

## 2022-06-06 PROCEDURE — 99214 OFFICE O/P EST MOD 30 MIN: CPT | Mod: S$PBB,,, | Performed by: FAMILY MEDICINE

## 2022-06-06 PROCEDURE — 99999 PR PBB SHADOW E&M-EST. PATIENT-LVL IV: CPT | Mod: PBBFAC,,, | Performed by: FAMILY MEDICINE

## 2022-06-06 PROCEDURE — 96372 THER/PROPH/DIAG INJ SC/IM: CPT | Mod: PBBFAC,PN

## 2022-06-06 PROCEDURE — 99214 PR OFFICE/OUTPT VISIT, EST, LEVL IV, 30-39 MIN: ICD-10-PCS | Mod: S$PBB,,, | Performed by: FAMILY MEDICINE

## 2022-06-06 PROCEDURE — 99214 OFFICE O/P EST MOD 30 MIN: CPT | Mod: PBBFAC,PN,25 | Performed by: FAMILY MEDICINE

## 2022-06-06 PROCEDURE — 99999 PR PBB SHADOW E&M-EST. PATIENT-LVL IV: ICD-10-PCS | Mod: PBBFAC,,, | Performed by: FAMILY MEDICINE

## 2022-06-06 PROCEDURE — 86038 ANTINUCLEAR ANTIBODIES: CPT | Performed by: FAMILY MEDICINE

## 2022-06-06 PROCEDURE — 83921 ORGANIC ACID SINGLE QUANT: CPT | Performed by: FAMILY MEDICINE

## 2022-06-06 RX ORDER — CYANOCOBALAMIN 1000 UG/ML
1000 INJECTION, SOLUTION INTRAMUSCULAR; SUBCUTANEOUS
Status: COMPLETED | OUTPATIENT
Start: 2022-06-06 | End: 2022-06-06

## 2022-06-06 RX ADMIN — CYANOCOBALAMIN 1000 MCG: 1000 INJECTION, SOLUTION INTRAMUSCULAR at 08:06

## 2022-06-06 NOTE — PROGRESS NOTES
THIS DOCUMENT WAS MADE IN PART WITH VOICE RECOGNITION SOFTWARE.  OCCASIONALLY THIS SOFTWARE WILL MISINTERPRET WORDS OR PHRASES.    Assessment and Plan:    1. Hypothyroidism (acquired)     2. Immunization due     3. Chronic fatigue  HARLEY Screen w/Reflex    C-Reactive Protein    Sedimentation rate    Vitamin B12    Methylmalonic Acid, Serum    Home Sleep Studies   4. Hypersomnia  Home Sleep Studies   5. Neck pain  Ambulatory referral/consult to Physical Medicine Rehab   6. Back pain, unspecified back location, unspecified back pain laterality, unspecified chronicity  Ambulatory referral/consult to Physical Medicine Rehab       PLAN    Sleep study to investigate fatigue, check labs as above as well    Depression doing much better    Gave p.r.n. referral to Physical Medicine and rehab, patient will use if needed.  In the meantime continue Mobic    Follow-up in 1 month      Trial of B12 injection today    ______________________________________________________________________  Subjective:    Chief Complaint:  Chief Complaint   Patient presents with    Follow-up     Hypothyroidism-feeling sluggish        HPI:  Amarilis is a 81 y.o. year old     Patient due for COVID booster 1.    Recent lab work on 06/02/2022 shows TSH remains low, T4 in normal range.  We did switch patient back from levothyroxine to her [place]one Thyroid with NP Thyroid combination secondary to complaints of fatigue.  We do allow for some mild subclinical hyperthyroidism as it does give her symptom relief in does improve her energy level without any negative side effects or symptoms    Today, she still reports having some chronic fatigue.    Continues to take Lexapro 5 mg for depressed mood, trazodone for insomnia  Pt reports she is doing better, but had to stop Lexapro 2/2 diarrhea     Also reports back pain out of control. Has seen her Pain MD recently. Mostly lower back and neck pain.        Chronic problems below==============    Primary  insomnia  Rx-trazodone  Previous Rx- Remeron (ineffective), Doxepin (ineffective)     Depressed mood  Prev Rx-Lexapro 5 mg (diarrhea)   Stressor-fall out with daughter     Hypothyroidism  Current Rx-armor Thyroid 60 mg, NP Thyroid 30 mg  Previous Rx-levothyroxine (ineffective)  Previous TSH slightly low, patient does have symptom relief when TSH runs a little low, when TSH is in normal range she reports fatigue, hair loss     Bronchiectasis, history of pulmonary nodules  Seen by pulmonology on 12/27/2021, Dr. Jovel - Order chest CT, lab work, sodium chloride nebulizer  Per pulmonology-chronic cough may be due to colonization for mycobacterium avium  Also plans on following up right middle lobe nodule, low suspicion for malignancy     Senile osteoporosis  Rx-Boniva 150 mg monthly, vitamin-D supplement  No recent fractures     Seasonal allergies  Rx-Flonase, Xyzal  Does have recurrent sinusitis, seen by ENT     Psoriasis  Rx-betamethasone, clobetasol, desonide, Topicort  Followed by dermatology    Chronic Low back pain   Pain Mgt : Hubbel   Rx : Mobic 15 mg     Past Medical History:  Past Medical History:   Diagnosis Date    Allergy     Asthma     GERD (gastroesophageal reflux disease)     Globus syndrome     Headache(784.0)     Hypothyroidism     Psoriasis     Rash        Past Surgical History:  Past Surgical History:   Procedure Laterality Date    ADENOIDECTOMY      CHOLECYSTECTOMY      COLONOSCOPY      DEXA      WNL    SINUS SURGERY      Dr Cordova    SINUS SURGERY  01/2017    xs 5    THYROID SURGERY      nodules removed //Dr Amato     TONSILLECTOMY      TYMPANOSTOMY TUBE PLACEMENT         Family History:  Family History   Problem Relation Age of Onset    Hypertension Mother     Obesity Father     Diabetes Sister     Obesity Sister     No Known Problems Maternal Grandmother     No Known Problems Maternal Grandfather     No Known Problems Paternal Grandmother     No Known Problems  Paternal Grandfather     Breast cancer Paternal Aunt 43    Thyroid cancer Daughter 61    Ovarian cancer Neg Hx        Social History:  Social History     Socioeconomic History    Marital status:    Tobacco Use    Smoking status: Never Smoker    Smokeless tobacco: Never Used   Substance and Sexual Activity    Alcohol use: Yes     Comment: twice a month    Drug use: No    Sexual activity: Never       Medications:  Current Outpatient Medications on File Prior to Visit   Medication Sig Dispense Refill    betamethasone dipropionate 0.05 % cream Apply topically 2 (two) times daily. 45 g 6    cholecalciferol, vitamin D3, (VITAMIN D3) 50 mcg (2,000 unit) Tab Take 2,000 Units by mouth once daily.      clobetasol 0.05% (TEMOVATE) 0.05 % Oint 1 application once daily. Apply to affected area      desonide 0.05% (DESOWEN) 0.05 % Oint Apply topically 2 (two) times daily. 60 g 2    desoximetasone (TOPICORT) 0.05 % cream Apply topically 2 (two) times daily. 60 g 11    fluticasone propionate (FLONASE) 50 mcg/actuation nasal spray 1 spray (50 mcg total) by Each Nostril route once daily. 16 g 6    HYDROcodone-acetaminophen (NORCO) 5-325 mg per tablet Take 1 tablet by mouth every 8 (eight) hours as needed for Pain. 21 tablet 0    ketoconazole (NIZORAL) 2 % shampoo APPLY EXTERNALLY AT BEDTIME AS NEEDED      levocetirizine (XYZAL) 5 MG tablet Take 5 mg by mouth daily as needed for Allergies.      MAGNESIUM ORAL Take by mouth once. Pt takes 400mg once daily      meloxicam (MOBIC) 15 MG tablet Take 15 mg by mouth once daily.      nystatin (MYCOSTATIN) cream Apply topically 2 (two) times daily. 30 g 0    sodium chloride 3% 3 % nebulizer solution Take 4 mLs by nebulization once daily. 120 mL 4    thyroid, pork, (ARMOUR THYROID) 15 mg Tab Take 30 mg by mouth before breakfast.      thyroid, pork, (ARMOUR THYROID) 60 mg Tab Take 1 tablet (60 mg total) by mouth before breakfast. 30 tablet 11    traZODone (DESYREL)  "150 MG tablet Take 1 tablet (150 mg total) by mouth every evening. 30 tablet 11    EScitalopram oxalate (LEXAPRO) 5 MG Tab Take 1 tablet (5 mg total) by mouth once daily. (Patient not taking: Reported on 6/6/2022) 90 tablet 3    ibandronate (BONIVA) 150 mg tablet Take 1 tablet (150 mg total) by mouth every 30 days. (Patient not taking: No sig reported) 3 tablet 3     No current facility-administered medications on file prior to visit.       Allergies:  Avelox [moxifloxacin], Bactrim [sulfamethoxazole-trimethoprim], Ciprofloxacin, Isothiazolinones, Neomycin sulfate, Apremilast, and Bacitracin    Immunizations:  Immunization History   Administered Date(s) Administered    COVID-19 Vaccine 03/31/2021    COVID-19, MRNA, LN-S, PF (MODERNA FULL 0.5 ML DOSE) 12/16/2021    COVID-19, vector-nr, rS-Ad26, PF (Johanna) 04/05/2021    Hepatitis A, Adult 08/09/2007    IPV 08/09/2007    Influenza 11/18/2011, 10/21/2013    Influenza (FLUAD) - Quadrivalent - Adjuvanted - PF *Preferred* (65+) 10/07/2021    Influenza - High Dose - PF (65 years and older) 10/14/2014, 10/08/2015, 09/27/2016    Influenza - Quadrivalent - MDCK - PF 10/10/2017    Influenza - Trivalent (ADULT) 11/18/2011    Influenza - Trivalent - PF (ADULT) 10/21/2013    Pneumococcal Conjugate - 13 Valent 04/12/2016    Pneumococcal Polysaccharide - 23 Valent 08/01/2011    Tdap 08/09/2007, 08/01/2018    Zoster 07/11/2007       Review of Systems:  Review of Systems   All other systems reviewed and are negative.      Objective:    Vitals:  Vitals:    06/06/22 0736   Pulse: 64   Weight: 57.6 kg (126 lb 14 oz)   Height: 5' 2" (1.575 m)   PainSc:   2   PainLoc: Back       Physical Exam  Vitals reviewed.   Constitutional:       General: She is not in acute distress.     Appearance: She is well-developed.   HENT:      Head: Normocephalic and atraumatic.   Pulmonary:      Effort: Pulmonary effort is normal. No respiratory distress.   Musculoskeletal:      Cervical " back: Normal range of motion.   Psychiatric:         Behavior: Behavior normal.         Thought Content: Thought content normal.         Judgment: Judgment normal.             Luc Teixeira MD  Family Medicine

## 2022-06-07 ENCOUNTER — TELEPHONE (OUTPATIENT)
Dept: ENDOCRINOLOGY | Facility: CLINIC | Age: 82
End: 2022-06-07
Payer: MEDICARE

## 2022-06-07 ENCOUNTER — PATIENT MESSAGE (OUTPATIENT)
Dept: FAMILY MEDICINE | Facility: CLINIC | Age: 82
End: 2022-06-07
Payer: MEDICARE

## 2022-06-07 DIAGNOSIS — E03.9 HYPOTHYROIDISM (ACQUIRED): Primary | ICD-10-CM

## 2022-06-07 DIAGNOSIS — R53.82 CHRONIC FATIGUE: ICD-10-CM

## 2022-06-07 DIAGNOSIS — Z22.322 METHICILLIN RESISTANT STAPH AUREUS CULTURE POSITIVE: Primary | ICD-10-CM

## 2022-06-07 LAB — ANA SER QL IF: NORMAL

## 2022-06-07 NOTE — TELEPHONE ENCOUNTER
S/w pt. Scheduled 1st available new patient appt w/ Dr. Fritz. Pt verbalized understanding of date/time/location. Added pt to [place] [facility] wait lists.

## 2022-06-07 NOTE — TELEPHONE ENCOUNTER
----- Message from Chana Greene sent at 6/7/2022  4:12 PM CDT -----  Contact: patient  Type:  Sooner Appointment Request    Caller is requesting a sooner appointment.  Caller declined first available appointment listed below.  Caller will not accept being placed on the waitlist and is requesting a message be sent to doctor.    Name of Caller:  patient  When is the first available appointment?  11/2  Symptoms:  Hypothyroidism, chronic fatigue  Best Call Back Number:  [phone] (home)   Additional Information:

## 2022-06-10 ENCOUNTER — TELEPHONE (OUTPATIENT)
Dept: FAMILY MEDICINE | Facility: CLINIC | Age: 82
End: 2022-06-10
Payer: MEDICARE

## 2022-06-10 ENCOUNTER — PATIENT MESSAGE (OUTPATIENT)
Dept: OTOLARYNGOLOGY | Facility: CLINIC | Age: 82
End: 2022-06-10
Payer: MEDICARE

## 2022-06-10 ENCOUNTER — PATIENT MESSAGE (OUTPATIENT)
Dept: FAMILY MEDICINE | Facility: CLINIC | Age: 82
End: 2022-06-10
Payer: MEDICARE

## 2022-06-10 LAB — METHYLMALONATE SERPL-SCNC: 0.45 UMOL/L

## 2022-06-10 RX ORDER — MUPIROCIN 20 MG/G
OINTMENT TOPICAL 3 TIMES DAILY
Qty: 30 G | Refills: 0 | Status: SHIPPED | OUTPATIENT
Start: 2022-06-10 | End: 2022-06-20

## 2022-06-10 NOTE — TELEPHONE ENCOUNTER
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 Please advise       Can the clinic reply by [facility]: NO      What Number to Call Back if not in [facility]: [phone]

## 2022-06-13 ENCOUNTER — PATIENT MESSAGE (OUTPATIENT)
Dept: FAMILY MEDICINE | Facility: CLINIC | Age: 82
End: 2022-06-13
Payer: MEDICARE

## 2022-06-13 DIAGNOSIS — E53.8 B12 DEFICIENCY: Primary | ICD-10-CM

## 2022-06-13 RX ORDER — CYANOCOBALAMIN 1000 UG/ML
1000 INJECTION, SOLUTION INTRAMUSCULAR; SUBCUTANEOUS
Status: DISCONTINUED | OUTPATIENT
Start: 2022-06-13 | End: 2022-07-07

## 2022-06-15 ENCOUNTER — PATIENT MESSAGE (OUTPATIENT)
Dept: FAMILY MEDICINE | Facility: CLINIC | Age: 82
End: 2022-06-15
Payer: MEDICARE

## 2022-06-15 DIAGNOSIS — L03.90 CELLULITIS, UNSPECIFIED CELLULITIS SITE: Primary | ICD-10-CM

## 2022-06-23 RX ORDER — DOXYCYCLINE 100 MG/1
100 CAPSULE ORAL 2 TIMES DAILY
Qty: 10 CAPSULE | Refills: 0 | Status: SHIPPED | OUTPATIENT
Start: 2022-06-23 | End: 2022-07-07

## 2022-07-04 ENCOUNTER — PATIENT MESSAGE (OUTPATIENT)
Dept: OTOLARYNGOLOGY | Facility: CLINIC | Age: 82
End: 2022-07-04
Payer: MEDICARE

## 2022-07-06 ENCOUNTER — OFFICE VISIT (OUTPATIENT)
Dept: OTOLARYNGOLOGY | Facility: CLINIC | Age: 82
End: 2022-07-06
Payer: MEDICARE

## 2022-07-06 VITALS — WEIGHT: 127 LBS | HEIGHT: 62 IN | BODY MASS INDEX: 23.37 KG/M2

## 2022-07-06 DIAGNOSIS — J32.0 CHRONIC MAXILLARY SINUSITIS: Primary | ICD-10-CM

## 2022-07-06 PROCEDURE — 99213 OFFICE O/P EST LOW 20 MIN: CPT | Mod: PBBFAC,PO | Performed by: OTOLARYNGOLOGY

## 2022-07-06 PROCEDURE — 99213 PR OFFICE/OUTPT VISIT, EST, LEVL III, 20-29 MIN: ICD-10-PCS | Mod: 25,S$PBB,, | Performed by: OTOLARYNGOLOGY

## 2022-07-06 PROCEDURE — 87186 SC STD MICRODIL/AGAR DIL: CPT | Performed by: OTOLARYNGOLOGY

## 2022-07-06 PROCEDURE — 99999 PR PBB SHADOW E&M-EST. PATIENT-LVL III: ICD-10-PCS | Mod: PBBFAC,,, | Performed by: OTOLARYNGOLOGY

## 2022-07-06 PROCEDURE — 31231 NASAL ENDOSCOPY DX: CPT | Mod: PBBFAC,PO | Performed by: OTOLARYNGOLOGY

## 2022-07-06 PROCEDURE — 99213 OFFICE O/P EST LOW 20 MIN: CPT | Mod: 25,S$PBB,, | Performed by: OTOLARYNGOLOGY

## 2022-07-06 PROCEDURE — 31231 PR NASAL ENDOSCOPY, DX: ICD-10-PCS | Mod: S$PBB,,, | Performed by: OTOLARYNGOLOGY

## 2022-07-06 PROCEDURE — 99999 PR PBB SHADOW E&M-EST. PATIENT-LVL III: CPT | Mod: PBBFAC,,, | Performed by: OTOLARYNGOLOGY

## 2022-07-06 PROCEDURE — 87077 CULTURE AEROBIC IDENTIFY: CPT | Performed by: OTOLARYNGOLOGY

## 2022-07-06 PROCEDURE — 31231 NASAL ENDOSCOPY DX: CPT | Mod: S$PBB,,, | Performed by: OTOLARYNGOLOGY

## 2022-07-06 PROCEDURE — 87070 CULTURE OTHR SPECIMN AEROBIC: CPT | Performed by: OTOLARYNGOLOGY

## 2022-07-06 NOTE — PROGRESS NOTES
Subjective:       Patient ID: Amarilis Decker is a 81 y.o. female.    Chief Complaint: Sinus Problem (Recurring sinus infection)    Amarilis is here for follow-up of chronic sinusitis. She has concerns for a recent Staph nasal infection because she began expelling nasal crusts from the left. She has not had nasal congestion otherwise. She has been on Doxy recently. She has concerns because of the poss of Staph.   She uses regular nasal irrigations.     She has concerns because     Patient validated questionnaires (if applicable):      %       No flowsheet data found.  No flowsheet data found.  No flowsheet data found.         Review of Systems   Constitutional: Negative for activity change and appetite change.   Respiratory: Negative for difficulty breathing and wheezing   Cardiovascular: Negative for chest pain.      Objective:        Constitutional:   She is oriented to person, place, and time. Vital signs are normal. She appears well-developed and well-nourished. She appears alert. She is active.     Nose:    Nasal endoscopy indicated due to degree of symptoms.     Mouth/Throat  Lips, teeth, and gums normal.     Pulmonary/Chest:   Effort normal. No accessory muscle usage. No respiratory distress.     Psychiatric:   She has a normal mood and affect.     Neurological:   She is alert and oriented to person, place, and time.         Tests / Results:  Name: Amarilis Decker     Pre-procedure diagnosis: Chronic maxillary sinusitis [J32.0]  Post-procedure diagnosis: Same    Procedure: Bilateral nasal endoscopy  Anesthesia:  4% Lidocaine and 0.25% Phenylephrine Topical    Indication: This procedure is indicated as anterior rhinoscopy is not sufficient to account for all of the patients symptoms.     Procedure: Risks, benefits, and alternatives of the procedure were discussed with the patient, and consent was obtained to perform a nasal endoscopy.  The nasal cavity was sprayed with a topical decongestant and topical  anesthetic. After adequate anesthesia was obtained, the scope was passed into each nostril independently.  The nasal cavities (including inferior turbinates, middle turbinates, inferior meatus, middle meatus, superior meatus) nasopharynx, choana, eustachian tube, fossa of Rosenmüller, and adenoids were examined. All findings were normal with exception of description below. At the end of the examination, the scope was removed. The patient tolerated the procedure well with no complications.     LEFT: mucopus in maxillary cleaned. Cultured. Patent sinuses otherwise and no edema.  RIGHT: NA    Assessment:       1. Chronic maxillary sinusitis          Plan:         Sinus suctioned. Culture taken but no abx expected. Discussed colonization of staph and no worries about this.  May need periodic cleaning. Will message if so.

## 2022-07-07 ENCOUNTER — OFFICE VISIT (OUTPATIENT)
Dept: FAMILY MEDICINE | Facility: CLINIC | Age: 82
End: 2022-07-07
Payer: MEDICARE

## 2022-07-07 VITALS
HEIGHT: 62 IN | DIASTOLIC BLOOD PRESSURE: 76 MMHG | BODY MASS INDEX: 22.92 KG/M2 | SYSTOLIC BLOOD PRESSURE: 124 MMHG | HEART RATE: 60 BPM | RESPIRATION RATE: 17 BRPM | WEIGHT: 124.56 LBS

## 2022-07-07 DIAGNOSIS — E53.8 B12 DEFICIENCY: Primary | ICD-10-CM

## 2022-07-07 PROCEDURE — 99999 PR PBB SHADOW E&M-EST. PATIENT-LVL IV: CPT | Mod: PBBFAC,,, | Performed by: FAMILY MEDICINE

## 2022-07-07 PROCEDURE — 99214 PR OFFICE/OUTPT VISIT, EST, LEVL IV, 30-39 MIN: ICD-10-PCS | Mod: S$PBB,,, | Performed by: FAMILY MEDICINE

## 2022-07-07 PROCEDURE — 99999 PR PBB SHADOW E&M-EST. PATIENT-LVL IV: ICD-10-PCS | Mod: PBBFAC,,, | Performed by: FAMILY MEDICINE

## 2022-07-07 PROCEDURE — 99214 OFFICE O/P EST MOD 30 MIN: CPT | Mod: S$PBB,,, | Performed by: FAMILY MEDICINE

## 2022-07-07 PROCEDURE — 99214 OFFICE O/P EST MOD 30 MIN: CPT | Mod: PBBFAC,PN | Performed by: FAMILY MEDICINE

## 2022-07-07 RX ORDER — CYANOCOBALAMIN 1000 UG/ML
500 INJECTION, SOLUTION INTRAMUSCULAR; SUBCUTANEOUS
Status: SHIPPED | OUTPATIENT
Start: 2022-07-07 | End: 2022-12-22

## 2022-07-07 NOTE — PROGRESS NOTES
THIS DOCUMENT WAS MADE IN PART WITH VOICE RECOGNITION SOFTWARE.  OCCASIONALLY THIS SOFTWARE WILL MISINTERPRET WORDS OR PHRASES.    Assessment and Plan:    1. B12 deficiency  cyanocobalamin injection 500 mcg       Change B12 injections to 500 mcg every 2 weeks to avoid the headache she experiences secondary to the higher dose.  Patient having good symptom relief with B12 replacement    ______________________________________________________________________  Subjective:    Chief Complaint:  Chief Complaint   Patient presents with    Follow-up    Thyroid Problem    B12 Injection        HPI:  Amarilis is a 81 y.o. year old    Interval history    07/06/2022-ENT visit for recurrent sinusitis, MRSA concerns.  Sinuses were suctioned, culture was taken.  Patient was counseled on colonization instructed patient to return as needed for periodically sinus cleanings    Today    Reports since getting the B12 injection at prior visit her injury level did improve but did report having a headache following the injection for about 3 days.  She had similar reactions in the past when having B12 replace.      Chronic problems below==============     Primary insomnia  Rx-trazodone  Previous Rx- Remeron (ineffective), Doxepin (ineffective)     Depressed mood  Prev Rx-Lexapro 5 mg (diarrhea)   Stressor-fall out with daughter      Hypothyroidism  Current Rx-armor Thyroid 60 mg, NP Thyroid 30 mg  Previous Rx-levothyroxine (ineffective)  Previous TSH slightly low, patient does have symptom relief when TSH runs a little low, when TSH is in normal range she reports fatigue, hair loss     Bronchiectasis, history of pulmonary nodules  Seen by pulmonology on 12/27/2021, Dr. Jovle - Order chest CT, lab work, sodium chloride nebulizer  Per pulmonology-chronic cough may be due to colonization for mycobacterium avium  Also plans on following up right middle lobe nodule, low suspicion for malignancy     Senile osteoporosis  Rx-Boniva 150 mg  monthly, vitamin-D supplement  No recent fractures     Seasonal allergies  Rx-Flonase, Xyzal  Does have recurrent sinusitis, seen by ENT     Psoriasis  Rx-betamethasone, clobetasol, desonide, Topicort  Followed by dermatology    B12 deficiency  Rx-B12 injections 500 mcg every 2 weeks     Chronic Low back pain   Pain Mgt : Hubbel   Rx : Mobic 15 mg     Past Medical History:  Past Medical History:   Diagnosis Date    Allergy     Asthma     GERD (gastroesophageal reflux disease)     Globus syndrome     Headache(784.0)     Hypothyroidism     Psoriasis     Rash        Past Surgical History:  Past Surgical History:   Procedure Laterality Date    ADENOIDECTOMY      CHOLECYSTECTOMY      COLONOSCOPY      DEXA      WNL    SINUS SURGERY      Dr Cordova    SINUS SURGERY  01/2017    xs 5    THYROID SURGERY      nodules removed //Dr Amato     TONSILLECTOMY      TYMPANOSTOMY TUBE PLACEMENT         Family History:  Family History   Problem Relation Age of Onset    Hypertension Mother     Obesity Father     Diabetes Sister     Obesity Sister     No Known Problems Maternal Grandmother     No Known Problems Maternal Grandfather     No Known Problems Paternal Grandmother     No Known Problems Paternal Grandfather     Breast cancer Paternal Aunt 43    Thyroid cancer Daughter 61    Ovarian cancer Neg Hx        Social History:  Social History     Socioeconomic History    Marital status:    Tobacco Use    Smoking status: Never Smoker    Smokeless tobacco: Never Used   Substance and Sexual Activity    Alcohol use: Yes     Comment: twice a month    Drug use: No    Sexual activity: Never       Medications:  Current Outpatient Medications on File Prior to Visit   Medication Sig Dispense Refill    betamethasone dipropionate 0.05 % cream Apply topically 2 (two) times daily. 45 g 6    cholecalciferol, vitamin D3, (VITAMIN D3) 50 mcg (2,000 unit) Tab Take 2,000 Units by mouth once daily.       clobetasol 0.05% (TEMOVATE) 0.05 % Oint 1 application once daily. Apply to affected area      desonide 0.05% (DESOWEN) 0.05 % Oint Apply topically 2 (two) times daily. 60 g 2    desoximetasone (TOPICORT) 0.05 % cream Apply topically 2 (two) times daily. 60 g 11    fluticasone propionate (FLONASE) 50 mcg/actuation nasal spray 1 spray (50 mcg total) by Each Nostril route once daily. 16 g 6    HYDROcodone-acetaminophen (NORCO) 5-325 mg per tablet Take 1 tablet by mouth every 8 (eight) hours as needed for Pain. 21 tablet 0    ibandronate (BONIVA) 150 mg tablet Take 1 tablet (150 mg total) by mouth every 30 days. 3 tablet 3    ketoconazole (NIZORAL) 2 % shampoo APPLY EXTERNALLY AT BEDTIME AS NEEDED      levocetirizine (XYZAL) 5 MG tablet Take 5 mg by mouth daily as needed for Allergies.      MAGNESIUM ORAL Take by mouth once. Pt takes 400mg once daily      meloxicam (MOBIC) 15 MG tablet Take 15 mg by mouth once daily.      nystatin (MYCOSTATIN) cream Apply topically 2 (two) times daily. 30 g 0    sodium chloride 3% 3 % nebulizer solution Take 4 mLs by nebulization once daily. 120 mL 4    thyroid, pork, (ARMOUR THYROID) 15 mg Tab Take 30 mg by mouth before breakfast.      thyroid, pork, (ARMOUR THYROID) 60 mg Tab Take 1 tablet (60 mg total) by mouth before breakfast. 30 tablet 11    traZODone (DESYREL) 150 MG tablet Take 1 tablet (150 mg total) by mouth every evening. 30 tablet 11    [DISCONTINUED] doxycycline (MONODOX) 100 MG capsule Take 1 capsule (100 mg total) by mouth 2 (two) times daily. (Patient not taking: Reported on 7/7/2022) 10 capsule 0     Current Facility-Administered Medications on File Prior to Visit   Medication Dose Route Frequency Provider Last Rate Last Admin    cyanocobalamin injection 1,000 mcg  1,000 mcg Intramuscular Q30 Days Luc Teixeira MD           Allergies:  Avelox [moxifloxacin], Bactrim [sulfamethoxazole-trimethoprim], Ciprofloxacin, Isothiazolinones, Neomycin sulfate,  "Apremilast, and Bacitracin    Immunizations:  Immunization History   Administered Date(s) Administered    COVID-19 Vaccine 03/31/2021    COVID-19, MRNA, LN-S, PF (MODERNA FULL 0.5 ML DOSE) 12/16/2021    COVID-19, vector-nr, rS-Ad26, PF (Johanna) 04/05/2021    Hepatitis A, Adult 08/09/2007    IPV 08/09/2007    Influenza 11/18/2011, 10/21/2013    Influenza (FLUAD) - Quadrivalent - Adjuvanted - PF *Preferred* (65+) 10/07/2021    Influenza - High Dose - PF (65 years and older) 10/14/2014, 10/08/2015, 09/27/2016    Influenza - Quadrivalent - MDCK - PF 10/10/2017    Influenza - Trivalent (ADULT) 11/18/2011    Influenza - Trivalent - PF (ADULT) 10/21/2013    Pneumococcal Conjugate - 13 Valent 04/12/2016    Pneumococcal Polysaccharide - 23 Valent 08/01/2011    Tdap 08/09/2007, 08/01/2018    Zoster 07/11/2007       Review of Systems:  Review of Systems   All other systems reviewed and are negative.      Objective:    Vitals:  Vitals:    07/07/22 1256   BP: 124/76   Pulse: 60   Resp: 17   Weight: 56.5 kg (124 lb 9 oz)   Height: 5' 2.25" (1.581 m)   PainSc: 0-No pain       Physical Exam  Vitals reviewed.   Constitutional:       General: She is not in acute distress.  HENT:      Head: Normocephalic and atraumatic.   Eyes:      Pupils: Pupils are equal, round, and reactive to light.   Cardiovascular:      Rate and Rhythm: Normal rate and regular rhythm.      Heart sounds: No murmur heard.    No friction rub.   Pulmonary:      Effort: Pulmonary effort is normal.      Breath sounds: Normal breath sounds.   Abdominal:      General: Bowel sounds are normal. There is no distension.      Palpations: Abdomen is soft.      Tenderness: There is no abdominal tenderness.   Musculoskeletal:      Cervical back: Neck supple.   Skin:     General: Skin is warm and dry.      Findings: No rash.   Psychiatric:         Behavior: Behavior normal.             Luc Teixeira MD  Family Medicine      "

## 2022-07-09 ENCOUNTER — PATIENT MESSAGE (OUTPATIENT)
Dept: OTOLARYNGOLOGY | Facility: CLINIC | Age: 82
End: 2022-07-09
Payer: MEDICARE

## 2022-07-09 LAB — BACTERIA SPEC AEROBE CULT: ABNORMAL

## 2022-07-11 ENCOUNTER — PATIENT MESSAGE (OUTPATIENT)
Dept: OTOLARYNGOLOGY | Facility: CLINIC | Age: 82
End: 2022-07-11
Payer: MEDICARE

## 2022-07-11 DIAGNOSIS — J32.0 CHRONIC MAXILLARY SINUSITIS: Primary | ICD-10-CM

## 2022-07-11 RX ORDER — MINOCYCLINE HYDROCHLORIDE 100 MG/1
100 CAPSULE ORAL 2 TIMES DAILY
Qty: 20 CAPSULE | Refills: 0 | Status: SHIPPED | OUTPATIENT
Start: 2022-07-11 | End: 2022-07-21

## 2022-07-21 ENCOUNTER — CLINICAL SUPPORT (OUTPATIENT)
Dept: FAMILY MEDICINE | Facility: CLINIC | Age: 82
End: 2022-07-21
Payer: MEDICARE

## 2022-07-21 DIAGNOSIS — E53.8 VITAMIN B 12 DEFICIENCY: ICD-10-CM

## 2022-07-21 PROCEDURE — 96372 THER/PROPH/DIAG INJ SC/IM: CPT | Mod: PBBFAC,PN

## 2022-07-21 RX ADMIN — CYANOCOBALAMIN 500 MCG: 1000 INJECTION, SOLUTION INTRAMUSCULAR at 09:07

## 2022-07-27 ENCOUNTER — PATIENT MESSAGE (OUTPATIENT)
Dept: FAMILY MEDICINE | Facility: CLINIC | Age: 82
End: 2022-07-27
Payer: MEDICARE

## 2022-07-27 RX ORDER — ESCITALOPRAM OXALATE 5 MG/1
5 TABLET ORAL DAILY
Qty: 90 TABLET | Refills: 0 | Status: SHIPPED | OUTPATIENT
Start: 2022-07-27 | End: 2022-11-04

## 2022-07-27 NOTE — TELEPHONE ENCOUNTER
No new care gaps identified.  [facility] Embedded Care Gaps. Reference number: [id number]. 7/27/2022   2:50:33 PM CDT

## 2022-08-05 ENCOUNTER — CLINICAL SUPPORT (OUTPATIENT)
Dept: FAMILY MEDICINE | Facility: CLINIC | Age: 82
End: 2022-08-05
Payer: MEDICARE

## 2022-08-05 PROCEDURE — 96372 THER/PROPH/DIAG INJ SC/IM: CPT | Mod: PBBFAC,PN

## 2022-08-05 RX ADMIN — CYANOCOBALAMIN 500 MCG: 1000 INJECTION, SOLUTION INTRAMUSCULAR at 09:08

## 2022-08-05 NOTE — PROGRESS NOTES
2 pt identifiers used, pt presented to clinic for b12.  Verified by Sana Han LPN  Site cleansed with alcohol prep pad and 2x2 gauze.   Inj administered, band-aid applied.   Pt tolerated well.

## 2022-08-19 ENCOUNTER — CLINICAL SUPPORT (OUTPATIENT)
Dept: FAMILY MEDICINE | Facility: CLINIC | Age: 82
End: 2022-08-19
Payer: MEDICARE

## 2022-08-19 PROCEDURE — 96372 THER/PROPH/DIAG INJ SC/IM: CPT | Mod: PBBFAC,PN

## 2022-08-19 RX ADMIN — CYANOCOBALAMIN 500 MCG: 1000 INJECTION, SOLUTION INTRAMUSCULAR at 09:08

## 2022-08-23 ENCOUNTER — OFFICE VISIT (OUTPATIENT)
Dept: OTOLARYNGOLOGY | Facility: CLINIC | Age: 82
End: 2022-08-23
Payer: MEDICARE

## 2022-08-23 VITALS — HEIGHT: 63 IN | BODY MASS INDEX: 22.77 KG/M2 | WEIGHT: 128.5 LBS

## 2022-08-23 DIAGNOSIS — J47.9 BRONCHIECTASIS WITHOUT COMPLICATION: ICD-10-CM

## 2022-08-23 DIAGNOSIS — Z98.890 HISTORY OF SINUS SURGERY: ICD-10-CM

## 2022-08-23 DIAGNOSIS — J32.0 CHRONIC MAXILLARY SINUSITIS: Primary | ICD-10-CM

## 2022-08-23 PROCEDURE — 99213 PR OFFICE/OUTPT VISIT, EST, LEVL III, 20-29 MIN: ICD-10-PCS | Mod: 25,S$PBB,, | Performed by: OTOLARYNGOLOGY

## 2022-08-23 PROCEDURE — 99999 PR PBB SHADOW E&M-EST. PATIENT-LVL III: ICD-10-PCS | Mod: PBBFAC,,, | Performed by: OTOLARYNGOLOGY

## 2022-08-23 PROCEDURE — 99999 PR PBB SHADOW E&M-EST. PATIENT-LVL III: CPT | Mod: PBBFAC,,, | Performed by: OTOLARYNGOLOGY

## 2022-08-23 PROCEDURE — 31231 NASAL ENDOSCOPY DX: CPT | Mod: S$PBB,,, | Performed by: OTOLARYNGOLOGY

## 2022-08-23 PROCEDURE — 31231 PR NASAL ENDOSCOPY, DX: ICD-10-PCS | Mod: S$PBB,,, | Performed by: OTOLARYNGOLOGY

## 2022-08-23 PROCEDURE — 99213 OFFICE O/P EST LOW 20 MIN: CPT | Mod: 25,S$PBB,, | Performed by: OTOLARYNGOLOGY

## 2022-08-23 PROCEDURE — 31231 NASAL ENDOSCOPY DX: CPT | Mod: PBBFAC,PO | Performed by: OTOLARYNGOLOGY

## 2022-08-23 PROCEDURE — 99213 OFFICE O/P EST LOW 20 MIN: CPT | Mod: PBBFAC,PO | Performed by: OTOLARYNGOLOGY

## 2022-08-23 RX ORDER — BETAMETHASONE DIPROPIONATE 0.5 MG/G
CREAM TOPICAL DAILY
Qty: 45 G | Refills: 5 | Status: SHIPPED | OUTPATIENT
Start: 2022-08-23 | End: 2022-12-08 | Stop reason: SDUPTHER

## 2022-08-23 NOTE — PROGRESS NOTES
Subjective:       Patient ID: Amarilis Decker is a 81 y.o. female.    Chief Complaint: Sinus Problem (Pt thinks she has another infection / pt thinks its staph)    Amarilis is here for follow-up of chronic sinusitis.  Seen 1 mo ago for persistent sinusitis. Was on Doxy and I cleaned out sinus.  Put her on Minocycline for her concerns of Stph.  She is concerned about colonization and continued infection.     Patient validated questionnaires (if applicable):      %       No flowsheet data found.  No flowsheet data found.  No flowsheet data found.         Review of Systems   Constitutional: Negative for activity change and appetite change.   Respiratory: Negative for difficulty breathing and wheezing   Cardiovascular: Negative for chest pain.      Objective:        Constitutional:   Vital signs are normal. She appears well-developed and well-nourished.     Head:  Normocephalic and atraumatic.     Ears:  Hearing normal to normal and whispered voice; external ear normal without scars, lesions, or masses; ear canal, tympanic membrane, and middle ear normal..     Nose:  Nose normal including turbinates, nasal mucosa, sinuses and nasal septum.   Nasal endoscopy indicated due to degree of symptoms and previous sinus surgery with chronic sinusitis    Mouth/Throat  Oropharynx clear and moist without lesions or asymmetry.     Neck:  Neck normal without thyromegaly masses, asymmetry, normal tracheal structure, crepitus, and tenderness.       Tests / Results:  Name: Amarilis Decker     Pre-procedure diagnosis: Chronic maxillary sinusitis [J32.0]  Post-procedure diagnosis: Same    Procedure: Bilateral nasal endoscopy  Anesthesia:  4% Lidocaine and 0.25% Phenylephrine Topical    Indication: This procedure is indicated as anterior rhinoscopy is not sufficient to account for all of the patients symptoms.     Procedure: Risks, benefits, and alternatives of the procedure were discussed with the patient, and consent was obtained to  perform a nasal endoscopy.  The nasal cavity was sprayed with a topical decongestant and topical anesthetic. After adequate anesthesia was obtained, the scope was passed into each nostril independently.  The nasal cavities (including inferior turbinates, middle turbinates, inferior meatus, middle meatus, superior meatus) nasopharynx, choana, eustachian tube, fossa of Rosenmüller, and adenoids were examined. All findings were normal with exception of description below. At the end of the examination, the scope was removed. The patient tolerated the procedure well with no complications.     LEFT: clean and patent nasal cavity, clean maxillary and ethmoid cavity  RIGHT: clean and patent nasal cavity, clean maxillary and ethmoid cavity      Assessment:       1. Chronic maxillary sinusitis    2. Bronchiectasis without complication    3. History of sinus surgery          Plan:         Can do Mupirocin irrigations and continue betamethasone cream per pt preference.  No infection seen today.  Reassurance provided. Discussed colonization with Staph not indicative of active infection

## 2022-08-27 ENCOUNTER — NURSE TRIAGE (OUTPATIENT)
Dept: ADMINISTRATIVE | Facility: CLINIC | Age: 82
End: 2022-08-27
Payer: MEDICARE

## 2022-08-27 NOTE — TELEPHONE ENCOUNTER
Woke up this am with Left side of mouth in pain. This is the opposite side of where she had the crown done. She feels she needs abx, due to her history. Denies any S/S of dental injury to inside of cheek. No fever.   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 States Dr. Delgado said she had staff at last visit. She is using the ointment as directed but feels she needs something more. Triage done- dispo see provider in 2 weeks, however understand patient's concern and will reach out provider on call. Advised I would call patient back.     No ENT on call for [place], per , Dr. Delgado is on call for his patient's. Secure chat sent re mouth pain and request for ABX as she is a staph carrier. Per Dr. Delgado patient can send message via portal and they will address. Mouth pain is not symptom of staph.    Patient called back and advised of Dr. Delgado's advice re mouth pain not S/S of staph. Advised to message via portal and they would address. Verb understanding. Instructed to call back for any further questions or concerns or worsening S/S.   Reason for Disposition   All other mouth symptoms (Exceptions: dry mouth from not drinking enough liquids, chapped lips)    Additional Information   Negative: [1] Drooling or spitting out saliva (because can't swallow) AND [2] new onset   Negative: [1] Bleeding from mouth AND [2] won't stop after 10 minutes of direct pressure   Negative: Electrical burn of mouth   Negative: Chemical burn of mouth   Negative: [1] Difficulty breathing AND [2] not severe   Negative: Patient sounds very sick or weak to the triager   Negative: Receiving chemotherapy or radiation therapy   Negative: Dentures rub and cause pain   Negative: Painless lump in mouth   Negative: Red patch in mouth   Negative: White patch in mouth   Negative: White patches that stick to tongue or inner cheek   Negative: [1] Dry mouth AND [2] new onset AND [3] unexplained(Exceptions:  recurrent ongoing problem or dry mouth from mild dehydration)   Negative: Weak immune system (e.g., HIV positive, cancer chemo, splenectomy, organ transplant, chronic steroids)    Protocols used: Mouth Symptoms-A-AH

## 2022-09-02 ENCOUNTER — CLINICAL SUPPORT (OUTPATIENT)
Dept: FAMILY MEDICINE | Facility: CLINIC | Age: 82
End: 2022-09-02
Payer: MEDICARE

## 2022-09-02 PROCEDURE — 96372 THER/PROPH/DIAG INJ SC/IM: CPT | Mod: PBBFAC,PN

## 2022-09-02 RX ADMIN — CYANOCOBALAMIN 500 MCG: 1000 INJECTION, SOLUTION INTRAMUSCULAR at 09:09

## 2022-09-16 ENCOUNTER — CLINICAL SUPPORT (OUTPATIENT)
Dept: FAMILY MEDICINE | Facility: CLINIC | Age: 82
End: 2022-09-16
Payer: MEDICARE

## 2022-09-16 DIAGNOSIS — E53.8 VITAMIN B 12 DEFICIENCY: Primary | ICD-10-CM

## 2022-09-16 PROCEDURE — 96372 THER/PROPH/DIAG INJ SC/IM: CPT | Mod: PBBFAC,PN

## 2022-09-16 RX ADMIN — CYANOCOBALAMIN 500 MCG: 1000 INJECTION, SOLUTION INTRAMUSCULAR at 11:09

## 2022-09-23 ENCOUNTER — OFFICE VISIT (OUTPATIENT)
Dept: ENDOCRINOLOGY | Facility: CLINIC | Age: 82
End: 2022-09-23
Payer: MEDICARE

## 2022-09-23 VITALS
SYSTOLIC BLOOD PRESSURE: 118 MMHG | BODY MASS INDEX: 23.45 KG/M2 | OXYGEN SATURATION: 98 % | WEIGHT: 127.44 LBS | HEIGHT: 62 IN | HEART RATE: 68 BPM | DIASTOLIC BLOOD PRESSURE: 60 MMHG

## 2022-09-23 DIAGNOSIS — E03.9 HYPOTHYROIDISM (ACQUIRED): ICD-10-CM

## 2022-09-23 DIAGNOSIS — M85.80 OSTEOPENIA, UNSPECIFIED LOCATION: Primary | ICD-10-CM

## 2022-09-23 DIAGNOSIS — G47.00 INSOMNIA, UNSPECIFIED TYPE: ICD-10-CM

## 2022-09-23 PROCEDURE — 99215 OFFICE O/P EST HI 40 MIN: CPT | Mod: PBBFAC,PN | Performed by: INTERNAL MEDICINE

## 2022-09-23 PROCEDURE — 99999 PR PBB SHADOW E&M-EST. PATIENT-LVL V: CPT | Mod: PBBFAC,,, | Performed by: INTERNAL MEDICINE

## 2022-09-23 PROCEDURE — 99999 PR PBB SHADOW E&M-EST. PATIENT-LVL V: ICD-10-PCS | Mod: PBBFAC,,, | Performed by: INTERNAL MEDICINE

## 2022-09-23 PROCEDURE — 99204 PR OFFICE/OUTPT VISIT, NEW, LEVL IV, 45-59 MIN: ICD-10-PCS | Mod: S$PBB,,, | Performed by: INTERNAL MEDICINE

## 2022-09-23 PROCEDURE — 99204 OFFICE O/P NEW MOD 45 MIN: CPT | Mod: S$PBB,,, | Performed by: INTERNAL MEDICINE

## 2022-09-23 RX ORDER — THYROID 15 MG/1
TABLET ORAL
Qty: 30 TABLET | Refills: 6 | Status: SHIPPED | OUTPATIENT
Start: 2022-09-23 | End: 2022-10-14 | Stop reason: SDUPTHER

## 2022-09-23 NOTE — PROGRESS NOTES
CHIEF COMPLAINT: Hypothyroid  81 y.o. old being seen as a new patient.  Had a right lobectomy for thyroid nodules in 2011. No thyroid cancer. Started thyroid medication since age 25. Currently on 90 mg of NP thyroid.  Takes two 15 mg tablets and one 60 mg tablet. States has tried synthroid in the past. States became very fatigued. States stays very active. Has had some anxiety. States may be situational. No Palpitations. No tremors. Has insomnia and takes sleeping medication.       PAST MEDICAL HISTORY/PAST SURGICAL HISTORY:  Reviewed in [facility]    SOCIAL HISTORY: Reviewed in [facility]    FAMILY HISTORY:  Daughter had thyroid cancer- unsure type. No DM.     MEDICATIONS/ALLERGIES: The patient's MedCard has been updated and reviewed.            PE:    GENERAL: Well developed, well nourished.  NECK: Supple, trachea midline, no palpable thyroid nodules.  CHEST: Resp even and unlabored, CTA bilateral.  CARDIAC: RRR, S1, S2 heard, no murmurs, rubs, S3, or S4  SKIN: no acanthosis nigracans.      LABS/Radiology     Latest Reference Range & Units 02/21/22 08:46 05/11/22 16:21 06/02/22 15:51   TSH 0.400 - 4.000 uIU/mL 0.066 (L) 0.049 (L) 0.033 (L)   Free T4 0.71 - 1.51 ng/dL 1.04 0.88 0.89   (L): Data is abnormally low    US SOFT TISSUE HEAD NECK THYROID     CLINICAL HISTORY:  Nontoxic single thyroid nodule     TECHNIQUE:  Ultrasound of the thyroid and cervical lymph nodes was performed.     COMPARISON:  09/23/2021     FINDINGS:  Residual thyroid tissue is suspected in a bed of the right lobe of the thyroid gland of 13 mm x 12 mm x 7 mm, this is favored to be unchanged when measured in a similar manner on the prior, it was measured in a different manner on the prior.     The left lobe the thyroid gland measures 3.3 x 0.9 x 1.3 cm with several small nodules the largest of 5 mm.  No nodules are noted meeting criteria for fine-needle aspiration or biopsy.  No worrisome detrimental changes are noted when comparison is made with the  prior.     Impression:     1. No worrisome changes when comparison was made with 09/23/2021    ASSESSMENT/PLAN:  Hypothyroidism- currently on a total of 90 mg of desiccated thyroid hormone.  Discussed my concerns with desiccated thyroid hormone, especially as 1 gets older.  Also discussed that TSH normally rises 1 gets older.  She has tried Synthroid in the past and had difficulty tolerating and is hesitant to change.  Discussed risks including bone loss and heart arrhythmia.  For now will decrease desiccated thyroid hormone to a total of 75 mg.  Once at a dose where her TSH is normalized, we will try to switch to levothyroxine.    2. Osteopenia- states unable to take OTC calcium.  Discuss calcium in diet.  Discussed worsening of bone density with a suppressed TSH.    3. Insomnia- discuss a suppressed TSH can worsen insomnia.      FOLLOWUP  Check TSH, Ft4, T3 in 6 weeks  F/U 6 months- with TSH, Ft4, T3

## 2022-09-26 ENCOUNTER — PATIENT MESSAGE (OUTPATIENT)
Dept: FAMILY MEDICINE | Facility: CLINIC | Age: 82
End: 2022-09-26
Payer: MEDICARE

## 2022-09-27 ENCOUNTER — LAB VISIT (OUTPATIENT)
Dept: LAB | Facility: HOSPITAL | Age: 82
End: 2022-09-27
Attending: FAMILY MEDICINE
Payer: MEDICARE

## 2022-09-27 ENCOUNTER — PATIENT MESSAGE (OUTPATIENT)
Dept: FAMILY MEDICINE | Facility: CLINIC | Age: 82
End: 2022-09-27
Payer: MEDICARE

## 2022-09-27 DIAGNOSIS — R53.83 FATIGUE, UNSPECIFIED TYPE: ICD-10-CM

## 2022-09-27 DIAGNOSIS — Z79.899 DRUG THERAPY: ICD-10-CM

## 2022-09-27 LAB
T3FREE SERPL-MCNC: 4.8 PG/ML (ref 2.3–4.2)
T4 FREE SERPL-MCNC: 0.89 NG/DL (ref 0.71–1.51)
TSH SERPL DL<=0.005 MIU/L-ACNC: 0.06 UIU/ML (ref 0.4–4)
VIT B12 SERPL-MCNC: 851 PG/ML (ref 210–950)

## 2022-09-27 PROCEDURE — 84439 ASSAY OF FREE THYROXINE: CPT | Performed by: FAMILY MEDICINE

## 2022-09-27 PROCEDURE — 84443 ASSAY THYROID STIM HORMONE: CPT | Performed by: FAMILY MEDICINE

## 2022-09-27 PROCEDURE — 84481 FREE ASSAY (FT-3): CPT | Performed by: FAMILY MEDICINE

## 2022-09-27 PROCEDURE — 36415 COLL VENOUS BLD VENIPUNCTURE: CPT | Mod: PN | Performed by: FAMILY MEDICINE

## 2022-09-27 PROCEDURE — 82607 VITAMIN B-12: CPT | Performed by: FAMILY MEDICINE

## 2022-09-27 PROCEDURE — 85025 COMPLETE CBC W/AUTO DIFF WBC: CPT | Performed by: FAMILY MEDICINE

## 2022-09-28 ENCOUNTER — TELEPHONE (OUTPATIENT)
Dept: OTOLARYNGOLOGY | Facility: CLINIC | Age: 82
End: 2022-09-28
Payer: MEDICARE

## 2022-09-28 LAB
BASOPHILS # BLD AUTO: 0.04 K/UL (ref 0–0.2)
BASOPHILS NFR BLD: 0.8 % (ref 0–1.9)
DIFFERENTIAL METHOD: ABNORMAL
EOSINOPHIL # BLD AUTO: 0.6 K/UL (ref 0–0.5)
EOSINOPHIL NFR BLD: 11.4 % (ref 0–8)
ERYTHROCYTE [DISTWIDTH] IN BLOOD BY AUTOMATED COUNT: 12.7 % (ref 11.5–14.5)
HCT VFR BLD AUTO: 40.7 % (ref 37–48.5)
HGB BLD-MCNC: 12.9 G/DL (ref 12–16)
IMM GRANULOCYTES # BLD AUTO: 0.01 K/UL (ref 0–0.04)
IMM GRANULOCYTES NFR BLD AUTO: 0.2 % (ref 0–0.5)
LYMPHOCYTES # BLD AUTO: 1.2 K/UL (ref 1–4.8)
LYMPHOCYTES NFR BLD: 23.1 % (ref 18–48)
MCH RBC QN AUTO: 29.5 PG (ref 27–31)
MCHC RBC AUTO-ENTMCNC: 31.7 G/DL (ref 32–36)
MCV RBC AUTO: 93 FL (ref 82–98)
MONOCYTES # BLD AUTO: 0.5 K/UL (ref 0.3–1)
MONOCYTES NFR BLD: 9.7 % (ref 4–15)
NEUTROPHILS # BLD AUTO: 2.9 K/UL (ref 1.8–7.7)
NEUTROPHILS NFR BLD: 54.8 % (ref 38–73)
NRBC BLD-RTO: 0 /100 WBC
PLATELET # BLD AUTO: 235 K/UL (ref 150–450)
PMV BLD AUTO: 9.6 FL (ref 9.2–12.9)
RBC # BLD AUTO: 4.37 M/UL (ref 4–5.4)
WBC # BLD AUTO: 5.27 K/UL (ref 3.9–12.7)

## 2022-09-28 NOTE — TELEPHONE ENCOUNTER
----- Message from Bryan Friedman sent at 9/28/2022 12:23 PM CDT -----  Type: Needs Medical Advice  Who Called:  pt  Symptoms (please be specific):  pt said she need the nurse to call--said she have sinus infection and she having trouble with her mouth--please call and advise  Best Call Back Number: [phone] (home)     Additional Information: thank you

## 2022-09-29 ENCOUNTER — OFFICE VISIT (OUTPATIENT)
Dept: OTOLARYNGOLOGY | Facility: CLINIC | Age: 82
End: 2022-09-29
Payer: MEDICARE

## 2022-09-29 VITALS — WEIGHT: 128.75 LBS | HEIGHT: 62 IN | BODY MASS INDEX: 23.69 KG/M2

## 2022-09-29 DIAGNOSIS — J32.4 CHRONIC PANSINUSITIS: ICD-10-CM

## 2022-09-29 DIAGNOSIS — J01.01 ACUTE RECURRENT MAXILLARY SINUSITIS: Primary | ICD-10-CM

## 2022-09-29 PROCEDURE — 99214 OFFICE O/P EST MOD 30 MIN: CPT | Mod: 25,S$PBB,, | Performed by: STUDENT IN AN ORGANIZED HEALTH CARE EDUCATION/TRAINING PROGRAM

## 2022-09-29 PROCEDURE — 99213 OFFICE O/P EST LOW 20 MIN: CPT | Mod: PBBFAC,PO,25 | Performed by: STUDENT IN AN ORGANIZED HEALTH CARE EDUCATION/TRAINING PROGRAM

## 2022-09-29 PROCEDURE — 87186 SC STD MICRODIL/AGAR DIL: CPT | Performed by: STUDENT IN AN ORGANIZED HEALTH CARE EDUCATION/TRAINING PROGRAM

## 2022-09-29 PROCEDURE — 87075 CULTR BACTERIA EXCEPT BLOOD: CPT | Performed by: STUDENT IN AN ORGANIZED HEALTH CARE EDUCATION/TRAINING PROGRAM

## 2022-09-29 PROCEDURE — 87077 CULTURE AEROBIC IDENTIFY: CPT | Performed by: STUDENT IN AN ORGANIZED HEALTH CARE EDUCATION/TRAINING PROGRAM

## 2022-09-29 PROCEDURE — 31231 NASAL ENDOSCOPY DX: CPT | Mod: PBBFAC,PO | Performed by: STUDENT IN AN ORGANIZED HEALTH CARE EDUCATION/TRAINING PROGRAM

## 2022-09-29 PROCEDURE — 99999 PR PBB SHADOW E&M-EST. PATIENT-LVL III: CPT | Mod: PBBFAC,,, | Performed by: STUDENT IN AN ORGANIZED HEALTH CARE EDUCATION/TRAINING PROGRAM

## 2022-09-29 PROCEDURE — 31231 NASAL ENDOSCOPY DX: CPT | Mod: S$PBB,,, | Performed by: STUDENT IN AN ORGANIZED HEALTH CARE EDUCATION/TRAINING PROGRAM

## 2022-09-29 PROCEDURE — 99214 PR OFFICE/OUTPT VISIT, EST, LEVL IV, 30-39 MIN: ICD-10-PCS | Mod: 25,S$PBB,, | Performed by: STUDENT IN AN ORGANIZED HEALTH CARE EDUCATION/TRAINING PROGRAM

## 2022-09-29 PROCEDURE — 31231 PR NASAL ENDOSCOPY, DX: ICD-10-PCS | Mod: S$PBB,,, | Performed by: STUDENT IN AN ORGANIZED HEALTH CARE EDUCATION/TRAINING PROGRAM

## 2022-09-29 PROCEDURE — 87070 CULTURE OTHR SPECIMN AEROBIC: CPT | Performed by: STUDENT IN AN ORGANIZED HEALTH CARE EDUCATION/TRAINING PROGRAM

## 2022-09-29 PROCEDURE — 99999 PR PBB SHADOW E&M-EST. PATIENT-LVL III: ICD-10-PCS | Mod: PBBFAC,,, | Performed by: STUDENT IN AN ORGANIZED HEALTH CARE EDUCATION/TRAINING PROGRAM

## 2022-09-29 RX ORDER — AMOXICILLIN AND CLAVULANATE POTASSIUM 875; 125 MG/1; MG/1
1 TABLET, FILM COATED ORAL 2 TIMES DAILY
Qty: 14 TABLET | Refills: 0 | Status: SHIPPED | OUTPATIENT
Start: 2022-09-29 | End: 2022-10-03

## 2022-09-29 RX ORDER — PREDNISONE 10 MG/1
10 TABLET ORAL DAILY
Qty: 3 TABLET | Refills: 0 | Status: SHIPPED | OUTPATIENT
Start: 2022-09-29 | End: 2022-10-02

## 2022-09-29 NOTE — PATIENT INSTRUCTIONS
Add 1 small drop of mupirocin to your sinus rinses for 2 weeks.   Continue all other medications.   We will follow up on the culture.

## 2022-09-29 NOTE — PROGRESS NOTES
"Otolaryngology Clinic Note    Subjective:       Patient ID: Amarilis Decker is a 81 y.o. female.    Chief Complaint: Sinusitis      History of Present Illness: Amarilis Decker is a 81 y.o. female presenting with sinus concerns. She reports she has had 5 sinus surgeries, got staph infection and eye damage from one.   Has been to the dentist 5 times for oral pain. Pain moves, has been lower jaw on left. Has also had headaches, moves from cheeks to forehead. Has been similar since August 27th, which was after her visit by Dr. Delgado.   Has had a crown placed on right maxilla on August 25 but pain has been on the left.   4 days ago had black discharge from her nose. No other discharge from nose, no bad smell but poor sense of smell. No fevers, no chills.   Does sinus rinses every night, no output. Uses flonase PRN with afrin. Not often.      She saw Dr. Delgado 8/23: "Amarilis is here for follow-up of chronic sinusitis.  Seen 1 mo ago for persistent sinusitis. Was on Doxy and I cleaned out sinus.  Put her on Minocycline for her concerns"   He scoped her at that time with no purulence or polyps. I personally reviewed this scope today.   It looks like he gave her mupirocin and betamethasone spray.     Past Surgical History:   Procedure Laterality Date    ADENOIDECTOMY      CHOLECYSTECTOMY      COLONOSCOPY      DEXA      WNL    SINUS SURGERY      Dr Cordova    SINUS SURGERY  01/2017    xs 5    THYROID SURGERY      nodules removed //Dr Amato     TONSILLECTOMY      TYMPANOSTOMY TUBE PLACEMENT       Past Medical History:   Diagnosis Date    Allergy     Asthma     GERD (gastroesophageal reflux disease)     Globus syndrome     Headache(784.0)     Hypothyroidism     Psoriasis     Rash      Social Determinants of Health     Tobacco Use: Low Risk     Smoking Tobacco Use: Never    Smokeless Tobacco Use: Never    Passive Exposure: Not on file   Alcohol Use: Not on file   Financial Resource Strain: Not on file   Food " Insecurity: Not on file   Transportation Needs: Not on file   Physical Activity: Not on file   Stress: Not on file   Social Connections: Not on file   Housing Stability: Not on file   Depression PHQ-4: Low Risk     Last PHQ Score: 0     Review of patient's allergies indicates:   Allergen Reactions    Avelox [moxifloxacin] Swelling    Bactrim [sulfamethoxazole-trimethoprim] Swelling    Ciprofloxacin Swelling and Hives    Isothiazolinones Dermatitis     Strong contact dermatitis    Neomycin sulfate Dermatitis     Has contact allergy to neomycin sulfate and to Kathon CG, or isothiazolinone    Apremilast Other (See Comments)     Muscle cramps, cough    Bacitracin      Other reaction(s): unknown     Current Outpatient Medications   Medication Instructions    betamethasone dipropionate 0.05 % cream Topical (Top), 2 times daily    betamethasone dipropionate 0.05 % cream Topical (Top), Daily    cholecalciferol (vitamin D3) (VITAMIN D3) 2,000 Units, Oral, Daily    clobetasol 0.05% (TEMOVATE) 0.05 % Oint 1 application, Daily, Apply to affected area    desonide 0.05% (DESOWEN) 0.05 % Oint Topical (Top), 2 times daily    desoximetasone (TOPICORT) 0.05 % cream Topical (Top), 2 times daily    EScitalopram oxalate (LEXAPRO) 5 mg, Oral, Daily    fluticasone propionate (FLONASE) 50 mcg, Each Nostril, Daily    HYDROcodone-acetaminophen (NORCO) 5-325 mg per tablet 1 tablet, Oral, Every 8 hours PRN    ibandronate (BONIVA) 150 mg, Oral, Every 30 days    ketoconazole (NIZORAL) 2 % shampoo APPLY EXTERNALLY AT BEDTIME AS NEEDED    levocetirizine (XYZAL) 5 mg, Oral, Daily PRN    MAGNESIUM ORAL Oral, Once, Pt takes 400mg once daily    meloxicam (MOBIC) 15 mg, Oral, Daily    nystatin (MYCOSTATIN) cream Topical (Top), 2 times daily    sodium chloride 3% 3 % nebulizer solution 4 mLs, Nebulization, Daily    thyroid (pork) (ARMOUR THYROID) 60 mg, Oral, Before breakfast    thyroid, pork, (ARMOUR THYROID) 15 mg Tab 60 mg + 15 mg daily    traZODone  (DESYREL) 150 mg, Oral, Nightly         14 point ROS below  Patient answers are not available for this visit.            Objective:      There were no vitals filed for this visit.    General: NAD, well appearing  Eyes: Normal conjunctiva and lids  Face: symmetric, nerve intact  Nose: The nose is without any evidence of any deformity. The nasal mucosa is moist. The septum is midline. There is no evidence of septal hematoma. The turbinates are without abnormality.   Ears: The ears are with normal-appearing pinna. Examination of the canals is normal appearing bilaterally. There is no drainage or erythema noted. The tympanic membranes are normal appearing with pearly color, normal-appearing landmarks and normal light reflex. Hearing is grossly intact.  Mouth: No obvious abnormalities to the lips. The teeth are unremarkable. The gingivae are without any obvious evidence of infection or lesion. The oral mucosa is moist and pink. There are no obvious masses to the hard or soft palate.   Oropharynx: The uvula is midline.  The tongue is midline. The posterior pharynx is without erythema or exudate. The tonsils are normal appearing.  Salivary glands: The salivary glands are symmetric and not enlarged, no masses  Neck: No lymphadenopathy, trachea midline, thryoid not enlarged.  Psych: Normal mood and affect.   Neuro: Grossly intact  Speech: fluent    Procedure: Nasal Endoscopy  Indications: acute on chronic sinusitis  Verbal consent obtained  Anesthesia: topical lidocaine and phenylephrine spray    Procedure: Rigid 0 degree endoscope was passed into each nare to nasopharynx showing findings below.     Septum is midline. Nasal mucosa moist. All sinuses still open. Turbinates are normal size and respond to decongestant appropriately. Right maxillary sinus and sphenoethmoidal recess without purulence or polyps. Left maxillary sinus with scant green mucopurulence. and sphenoethmoidal recess without purulence or polyps. No adenoid  hypertrophy.        Assessment and Plan:       1. Acute recurrent maxillary sinusitis    2. Chronic pansinusitis        She would like to try abx now, can change if culture comes back not sensitive to Augmentin. She also wants to try steroids.   Will also add mupirocin ointment- one drop to each sinus rinse for 2 weeks.     RTC: 3 months    Plan of care was discussed in detail with the patient, who agreed with the plan as above. All questions were answered in detail.     Eboni Briggs MD  Otolaryngology;

## 2022-10-03 ENCOUNTER — PATIENT MESSAGE (OUTPATIENT)
Dept: OTOLARYNGOLOGY | Facility: CLINIC | Age: 82
End: 2022-10-03
Payer: MEDICARE

## 2022-10-03 ENCOUNTER — OFFICE VISIT (OUTPATIENT)
Dept: FAMILY MEDICINE | Facility: CLINIC | Age: 82
End: 2022-10-03
Payer: MEDICARE

## 2022-10-03 VITALS
RESPIRATION RATE: 16 BRPM | SYSTOLIC BLOOD PRESSURE: 122 MMHG | TEMPERATURE: 98 F | BODY MASS INDEX: 23.45 KG/M2 | DIASTOLIC BLOOD PRESSURE: 76 MMHG | OXYGEN SATURATION: 97 % | HEART RATE: 60 BPM | HEIGHT: 62 IN | WEIGHT: 127.44 LBS

## 2022-10-03 DIAGNOSIS — Z23 IMMUNIZATION DUE: ICD-10-CM

## 2022-10-03 DIAGNOSIS — J32.9 BACTERIAL SINUSITIS: ICD-10-CM

## 2022-10-03 DIAGNOSIS — B96.89 BACTERIAL SINUSITIS: ICD-10-CM

## 2022-10-03 DIAGNOSIS — M79.604 PAIN OF RIGHT LOWER EXTREMITY: Primary | ICD-10-CM

## 2022-10-03 DIAGNOSIS — E03.9 HYPOTHYROIDISM, UNSPECIFIED TYPE: ICD-10-CM

## 2022-10-03 DIAGNOSIS — F51.01 PRIMARY INSOMNIA: ICD-10-CM

## 2022-10-03 LAB — BACTERIA SPEC AEROBE CULT: ABNORMAL

## 2022-10-03 PROCEDURE — 99999 PR PBB SHADOW E&M-EST. PATIENT-LVL V: ICD-10-PCS | Mod: PBBFAC,,, | Performed by: FAMILY MEDICINE

## 2022-10-03 PROCEDURE — 99999 PR PBB SHADOW E&M-EST. PATIENT-LVL V: CPT | Mod: PBBFAC,,, | Performed by: FAMILY MEDICINE

## 2022-10-03 PROCEDURE — G0008 ADMIN INFLUENZA VIRUS VAC: HCPCS | Mod: PBBFAC,59,PN

## 2022-10-03 PROCEDURE — 99214 PR OFFICE/OUTPT VISIT, EST, LEVL IV, 30-39 MIN: ICD-10-PCS | Mod: S$PBB,,, | Performed by: FAMILY MEDICINE

## 2022-10-03 PROCEDURE — 96372 THER/PROPH/DIAG INJ SC/IM: CPT | Mod: PBBFAC,PN

## 2022-10-03 PROCEDURE — 99214 OFFICE O/P EST MOD 30 MIN: CPT | Mod: S$PBB,,, | Performed by: FAMILY MEDICINE

## 2022-10-03 PROCEDURE — 99215 OFFICE O/P EST HI 40 MIN: CPT | Mod: PBBFAC,PN,25 | Performed by: FAMILY MEDICINE

## 2022-10-03 RX ORDER — AMOXICILLIN AND CLAVULANATE POTASSIUM 875; 125 MG/1; MG/1
1 TABLET, FILM COATED ORAL 2 TIMES DAILY
Qty: 10 TABLET | Refills: 0 | Status: SHIPPED | OUTPATIENT
Start: 2022-10-03 | End: 2022-10-03

## 2022-10-03 RX ORDER — TRAZODONE HYDROCHLORIDE 150 MG/1
150 TABLET ORAL NIGHTLY
Qty: 90 TABLET | Refills: 3 | Status: SHIPPED | OUTPATIENT
Start: 2022-10-03 | End: 2022-12-08 | Stop reason: SDUPTHER

## 2022-10-03 RX ORDER — CLINDAMYCIN HYDROCHLORIDE 300 MG/1
300 CAPSULE ORAL 3 TIMES DAILY
Qty: 30 CAPSULE | Refills: 0 | Status: SHIPPED | OUTPATIENT
Start: 2022-10-03 | End: 2022-10-13

## 2022-10-03 RX ORDER — MUPIROCIN 20 MG/G
OINTMENT TOPICAL 3 TIMES DAILY
Qty: 30 G | Refills: 2 | Status: SHIPPED | OUTPATIENT
Start: 2022-10-03 | End: 2022-10-13

## 2022-10-03 RX ADMIN — CYANOCOBALAMIN 500 MCG: 1000 INJECTION, SOLUTION INTRAMUSCULAR at 12:10

## 2022-10-03 NOTE — PROGRESS NOTES
THIS DOCUMENT WAS MADE IN PART WITH VOICE RECOGNITION SOFTWARE.  OCCASIONALLY THIS SOFTWARE WILL MISINTERPRET WORDS OR PHRASES.    Assessment and Plan:    1. Pain of right lower extremity        2. Bacterial sinusitis  DISCONTINUED: amoxicillin-clavulanate 875-125mg (AUGMENTIN) 875-125 mg per tablet      3. Hypothyroidism, unspecified type        4. Immunization due        5. Primary insomnia  traZODone (DESYREL) 150 MG tablet          PLAN    Extend course of antibiotic     Refilled trazodone     Follow-up labs with Endocrinology for hypothyroidism    Recommended topical Voltaren gel for right lower extremity pain, suspect mild phlebitis and also osteoarthritis of hands    Flu vaccine today      ______________________________________________________________________  Subjective:    Chief Complaint:  Chief Complaint   Patient presents with    Follow-up        HPI:  Amarilis is a 81 y.o. year old     Originally scheduled appt for rt lower ext welling   A/w local swelling to lower lateral leg       Recently treated by Dr Briggs (ent) on 9/29 for Sinusitis   Treated with augmentin / steroid   Still has some residual left-sided maxillary discomfort.  Culture positive for MRSA   Denies any fever    Had thyroid med dose change   Followed by Endocrinology    Right hand pain   Mostly at PIP joints  Denies any swelling or redness    Primary insomnia  Rx-trazodone  Previous Rx- Remeron (ineffective), Doxepin (ineffective)     Depressed mood  Prev Rx-Lexapro 5 mg (diarrhea)   Stressor-fall out with daughter      Hypothyroidism  Current Rx-armor Thyroid 60 mg, NP Thyroid 15 mg  Previous Rx-levothyroxine (ineffective)  Previous TSH slightly low, patient does have symptom relief when TSH runs a little low, when TSH is in normal range she reports fatigue, hair loss     Bronchiectasis, history of pulmonary nodules  Seen by pulmonology on 12/27/2021, Dr. Jovel - Order chest CT, lab work, sodium chloride nebulizer  Per  pulmonology-chronic cough may be due to colonization for mycobacterium avium  Also plans on following up right middle lobe nodule, low suspicion for malignancy     Senile osteoporosis  Rx-Boniva 150 mg monthly, vitamin-D supplement  No recent fractures     Seasonal allergies  Rx-Flonase, Xyzal  Does have recurrent sinusitis, seen by ENT     Psoriasis  Rx-betamethasone, clobetasol, desonide, Topicort  Followed by dermatology     B12 deficiency  Rx-B12 injections 500 mcg every 2 weeks     Chronic Low back pain   Pain Mgt : Hubbel   Rx : Mobic 15 mg     Past Medical History:  Past Medical History:   Diagnosis Date    Allergy     Asthma     GERD (gastroesophageal reflux disease)     Globus syndrome     Headache(784.0)     Hypothyroidism     Psoriasis     Rash        Past Surgical History:  Past Surgical History:   Procedure Laterality Date    ADENOIDECTOMY      CHOLECYSTECTOMY      COLONOSCOPY      DEXA      WNL    SINUS SURGERY      Dr Cordova    SINUS SURGERY  01/2017    xs 5    THYROID SURGERY      nodules removed //Dr Amato     TONSILLECTOMY      TYMPANOSTOMY TUBE PLACEMENT         Family History:  Family History   Problem Relation Age of Onset    Hypertension Mother     Obesity Father     Diabetes Sister     Obesity Sister     No Known Problems Maternal Grandmother     No Known Problems Maternal Grandfather     No Known Problems Paternal Grandmother     No Known Problems Paternal Grandfather     Breast cancer Paternal Aunt 43    Thyroid cancer Daughter 61    Ovarian cancer Neg Hx        Social History:  Social History     Socioeconomic History    Marital status:    Tobacco Use    Smoking status: Never    Smokeless tobacco: Never   Substance and Sexual Activity    Alcohol use: Yes     Comment: twice a month    Drug use: No    Sexual activity: Never       Medications:  Current Outpatient Medications on File Prior to Visit   Medication Sig Dispense Refill    amoxicillin-clavulanate 875-125mg (AUGMENTIN)  875-125 mg per tablet Take 1 tablet by mouth 2 (two) times daily. for 7 days 14 tablet 0    betamethasone dipropionate 0.05 % cream Apply topically once daily. 45 g 5    cholecalciferol, vitamin D3, (VITAMIN D3) 50 mcg (2,000 unit) Tab Take 2,000 Units by mouth once daily.      clobetasol 0.05% (TEMOVATE) 0.05 % Oint 1 application once daily. Apply to affected area      desonide 0.05% (DESOWEN) 0.05 % Oint Apply topically 2 (two) times daily. 60 g 2    desoximetasone (TOPICORT) 0.05 % cream Apply topically 2 (two) times daily. 60 g 11    EScitalopram oxalate (LEXAPRO) 5 MG Tab Take 1 tablet (5 mg total) by mouth once daily. 90 tablet 0    fluticasone propionate (FLONASE) 50 mcg/actuation nasal spray 1 spray (50 mcg total) by Each Nostril route once daily. 16 g 6    HYDROcodone-acetaminophen (NORCO) 5-325 mg per tablet Take 1 tablet by mouth every 8 (eight) hours as needed for Pain. 21 tablet 0    ibandronate (BONIVA) 150 mg tablet Take 1 tablet (150 mg total) by mouth every 30 days. 3 tablet 3    ketoconazole (NIZORAL) 2 % shampoo APPLY EXTERNALLY AT BEDTIME AS NEEDED      levocetirizine (XYZAL) 5 MG tablet Take 5 mg by mouth daily as needed for Allergies.      MAGNESIUM ORAL Take by mouth once. Pt takes 400mg once daily      meloxicam (MOBIC) 15 MG tablet Take 15 mg by mouth once daily.      nystatin (MYCOSTATIN) cream Apply topically 2 (two) times daily. 30 g 0    sodium chloride 3% 3 % nebulizer solution Take 4 mLs by nebulization once daily. 120 mL 4    thyroid, pork, (ARMOUR THYROID) 15 mg Tab 60 mg + 15 mg daily 30 tablet 6    thyroid, pork, (ARMOUR THYROID) 60 mg Tab Take 1 tablet (60 mg total) by mouth before breakfast. 30 tablet 11    traZODone (DESYREL) 150 MG tablet Take 1 tablet (150 mg total) by mouth every evening. 30 tablet 11    [] predniSONE (DELTASONE) 10 MG tablet Take 1 tablet (10 mg total) by mouth once daily. for 3 days 3 tablet 0    [DISCONTINUED] betamethasone dipropionate 0.05 % cream  "Apply topically 2 (two) times daily. (Patient not taking: Reported on 10/3/2022) 45 g 6     Current Facility-Administered Medications on File Prior to Visit   Medication Dose Route Frequency Provider Last Rate Last Admin    cyanocobalamin injection 500 mcg  500 mcg Intramuscular Q14 Days Luc Teixeira MD   500 mcg at 09/16/22 1123       Allergies:  Avelox [moxifloxacin], Bactrim [sulfamethoxazole-trimethoprim], Ciprofloxacin, Isothiazolinones, Neomycin sulfate, Apremilast, and Bacitracin    Immunizations:  Immunization History   Administered Date(s) Administered    COVID-19 Vaccine 03/31/2021    COVID-19, MRNA, LN-S, PF (MODERNA FULL 0.5 ML DOSE) 12/16/2021    COVID-19, vector-nr, [id number], PF (Johanna) 04/05/2021    Hepatitis A, Adult 08/09/2007    IPV 08/09/2007    Influenza 11/18/2011, 10/21/2013    Influenza (FLUAD) - Quadrivalent - Adjuvanted - PF *Preferred* (65+) 10/07/2021    Influenza - High Dose - PF (65 years and older) 10/14/2014, 10/08/2015, 09/27/2016    Influenza - Quadrivalent - MDCK - PF 10/10/2017    Influenza - Trivalent (ADULT) 11/18/2011    Influenza - Trivalent - PF (ADULT) 10/21/2013    Pneumococcal Conjugate - 13 Valent 04/12/2016    Pneumococcal Polysaccharide - 23 Valent 08/01/2011    Tdap 08/09/2007, 08/01/2018    Zoster 07/11/2007       Review of Systems:  Review of Systems   All other systems reviewed and are negative.    Objective:    Vitals:  Vitals:    10/03/22 1128   BP: 122/76   Pulse: 60   Resp: 16   Temp: 98.2 °F (36.8 °C)   TempSrc: Oral   SpO2: 97%   Weight: 57.8 kg (127 lb 6.8 oz)   Height: 5' 2" (1.575 m)   PainSc:   2   PainLoc: Mouth       Physical Exam  Vitals reviewed.   Constitutional:       General: She is not in acute distress.  HENT:      Head: Normocephalic and atraumatic.   Eyes:      Pupils: Pupils are equal, round, and reactive to light.   Cardiovascular:      Rate and Rhythm: Normal rate and regular rhythm.      Heart sounds: No murmur heard.    No friction rub. "   Pulmonary:      Effort: Pulmonary effort is normal.      Breath sounds: Normal breath sounds.   Abdominal:      General: Bowel sounds are normal. There is no distension.      Palpations: Abdomen is soft.      Tenderness: There is no abdominal tenderness.   Musculoskeletal:      Cervical back: Neck supple.   Skin:     General: Skin is warm and dry.      Findings: No rash.   Psychiatric:         Behavior: Behavior normal.           Luc Teixeira MD  Family Medicine

## 2022-10-03 NOTE — TELEPHONE ENCOUNTER
Culture came back MRSA, need  to switch abx. Will switch to clindamycin. Continue mupirocin in sinus rinses. Refill on this given as well. Left voicemail.     Eboni Briggs MD

## 2022-10-04 LAB — BACTERIA SPEC ANAEROBE CULT: NORMAL

## 2022-10-12 ENCOUNTER — PATIENT MESSAGE (OUTPATIENT)
Dept: OTOLARYNGOLOGY | Facility: CLINIC | Age: 82
End: 2022-10-12
Payer: MEDICARE

## 2022-10-14 ENCOUNTER — PATIENT MESSAGE (OUTPATIENT)
Dept: FAMILY MEDICINE | Facility: CLINIC | Age: 82
End: 2022-10-14
Payer: MEDICARE

## 2022-10-14 ENCOUNTER — OFFICE VISIT (OUTPATIENT)
Dept: OTOLARYNGOLOGY | Facility: CLINIC | Age: 82
End: 2022-10-14
Payer: MEDICARE

## 2022-10-14 VITALS — WEIGHT: 127.44 LBS | HEIGHT: 62 IN | BODY MASS INDEX: 23.45 KG/M2

## 2022-10-14 DIAGNOSIS — R51.9 FACIAL PAIN: Primary | ICD-10-CM

## 2022-10-14 DIAGNOSIS — E03.9 HYPOTHYROIDISM (ACQUIRED): ICD-10-CM

## 2022-10-14 DIAGNOSIS — Z22.322 MRSA CARRIER: ICD-10-CM

## 2022-10-14 PROCEDURE — 99213 OFFICE O/P EST LOW 20 MIN: CPT | Mod: PBBFAC,PO | Performed by: STUDENT IN AN ORGANIZED HEALTH CARE EDUCATION/TRAINING PROGRAM

## 2022-10-14 PROCEDURE — 99213 OFFICE O/P EST LOW 20 MIN: CPT | Mod: S$PBB,,, | Performed by: STUDENT IN AN ORGANIZED HEALTH CARE EDUCATION/TRAINING PROGRAM

## 2022-10-14 PROCEDURE — 99999 PR PBB SHADOW E&M-EST. PATIENT-LVL III: CPT | Mod: PBBFAC,,, | Performed by: STUDENT IN AN ORGANIZED HEALTH CARE EDUCATION/TRAINING PROGRAM

## 2022-10-14 PROCEDURE — 99213 PR OFFICE/OUTPT VISIT, EST, LEVL III, 20-29 MIN: ICD-10-PCS | Mod: S$PBB,,, | Performed by: STUDENT IN AN ORGANIZED HEALTH CARE EDUCATION/TRAINING PROGRAM

## 2022-10-14 PROCEDURE — 99999 PR PBB SHADOW E&M-EST. PATIENT-LVL III: ICD-10-PCS | Mod: PBBFAC,,, | Performed by: STUDENT IN AN ORGANIZED HEALTH CARE EDUCATION/TRAINING PROGRAM

## 2022-10-14 RX ORDER — THYROID 60 MG/1
60 TABLET ORAL
Qty: 90 TABLET | Refills: 1 | Status: SHIPPED | OUTPATIENT
Start: 2022-10-14 | End: 2022-12-11

## 2022-10-14 RX ORDER — THYROID 15 MG/1
TABLET ORAL
Qty: 90 TABLET | Refills: 1 | Status: SHIPPED | OUTPATIENT
Start: 2022-10-14 | End: 2022-12-11

## 2022-10-14 RX ORDER — THYROID 60 MG/1
60 TABLET ORAL
Qty: 30 TABLET | Refills: 11 | Status: SHIPPED | OUTPATIENT
Start: 2022-10-14 | End: 2022-10-14 | Stop reason: SDUPTHER

## 2022-10-14 RX ORDER — THYROID 15 MG/1
TABLET ORAL
Qty: 30 TABLET | Refills: 6 | Status: SHIPPED | OUTPATIENT
Start: 2022-10-14 | End: 2022-10-14 | Stop reason: SDUPTHER

## 2022-10-14 RX ORDER — AZELASTINE 1 MG/ML
1 SPRAY, METERED NASAL 2 TIMES DAILY
Qty: 30 ML | Refills: 11 | Status: SHIPPED | OUTPATIENT
Start: 2022-10-14 | End: 2022-12-08 | Stop reason: SDUPTHER

## 2022-10-14 NOTE — PROGRESS NOTES
"Otolaryngology Clinic Note    Subjective:       Patient ID: Amarilis Decker is a 81 y.o. female.    Chief Complaint: Follow-up (Sinus )      History of Present Illness: Amarilis Decker is a 81 y.o. female presenting with sinus concerns. She reports she has had 5 sinus surgeries, got staph infection and eye damage from one.   Has been to the dentist 5 times for oral pain. Pain moves, has been lower jaw on left. Has also had headaches, moves from cheeks to forehead. Has been similar since August 27th, which was after her visit by Dr. Delgado.   Has had a crown placed on right maxilla on August 25 but pain has been on the left.   4 days ago had black discharge from her nose. No other discharge from nose, no bad smell but poor sense of smell. No fevers, no chills.   Does sinus rinses every night, no output. Uses flonase PRN with afrin. Not often.      She saw Dr. Delgado 8/23: "Amarilis is here for follow-up of chronic sinusitis.  Seen 1 mo ago for persistent sinusitis. Was on Doxy and I cleaned out sinus.  Put her on Minocycline for her concerns"   He scoped her at that time with no purulence or polyps. I personally reviewed this scope today.   It looks like he gave her mupirocin and betamethasone spray.     10/14/22: Gave her steroids and augmentin at last visit. She did not do the steroids. Culture came back sensitive to bactrim. Sent clindamycin 10/3/22. She reports she was at [place] on Friday, came back Tuesday. Pain in left cheek down to left neck for 2 days this weekend, now it is gone. Did have some neck pain in back of neck last night, has lots of tension- does have degenerative disease. Still using ointment in rinses. Never had drainage congestion. Does take zyrtec daily. She has done allergy shots in past. Uses nasal sprays PRN. Pain rotates from cheek to jaw.         Past Surgical History:   Procedure Laterality Date    ADENOIDECTOMY      CHOLECYSTECTOMY      COLONOSCOPY      DEXA      WNL    " SINUS SURGERY      Dr Cordova    SINUS SURGERY  01/2017    xs 5    THYROID SURGERY      nodules removed //Dr Amato     TONSILLECTOMY      TYMPANOSTOMY TUBE PLACEMENT       Past Medical History:   Diagnosis Date    Allergy     Asthma     GERD (gastroesophageal reflux disease)     Globus syndrome     Headache(784.0)     Hypothyroidism     Psoriasis     Rash      Social Determinants of Health     Tobacco Use: Low Risk     Smoking Tobacco Use: Never    Smokeless Tobacco Use: Never    Passive Exposure: Not on file   Alcohol Use: Not on file   Financial Resource Strain: Not on file   Food Insecurity: Not on file   Transportation Needs: Not on file   Physical Activity: Not on file   Stress: Not on file   Social Connections: Not on file   Housing Stability: Not on file   Depression PHQ-4: Low Risk     Last PHQ Score: 0     Review of patient's allergies indicates:   Allergen Reactions    Avelox [moxifloxacin] Swelling    Bactrim [sulfamethoxazole-trimethoprim] Swelling    Ciprofloxacin Swelling and Hives    Isothiazolinones Dermatitis     Strong contact dermatitis    Neomycin sulfate Dermatitis     Has contact allergy to neomycin sulfate and to Kathon CG, or isothiazolinone    Apremilast Other (See Comments)     Muscle cramps, cough    Bacitracin      Other reaction(s): unknown     Current Outpatient Medications   Medication Instructions    betamethasone dipropionate 0.05 % cream Topical (Top), Daily    cholecalciferol (vitamin D3) (VITAMIN D3) 2,000 Units, Oral, Daily    clobetasol 0.05% (TEMOVATE) 0.05 % Oint 1 application, Daily, Apply to affected area    desonide 0.05% (DESOWEN) 0.05 % Oint Topical (Top), 2 times daily    desoximetasone (TOPICORT) 0.05 % cream Topical (Top), 2 times daily    EScitalopram oxalate (LEXAPRO) 5 mg, Oral, Daily    fluticasone propionate (FLONASE) 50 mcg, Each Nostril, Daily    HYDROcodone-acetaminophen (NORCO) 5-325 mg per tablet 1 tablet, Oral, Every 8 hours PRN    ibandronate (BONIVA)  150 mg, Oral, Every 30 days    ketoconazole (NIZORAL) 2 % shampoo APPLY EXTERNALLY AT BEDTIME AS NEEDED    levocetirizine (XYZAL) 5 mg, Oral, Daily PRN    MAGNESIUM ORAL Oral, Once, Pt takes 400mg once daily    meloxicam (MOBIC) 15 mg, Oral, Daily    nystatin (MYCOSTATIN) cream Topical (Top), 2 times daily    sodium chloride 3% 3 % nebulizer solution 4 mLs, Nebulization, Daily    thyroid (pork) (ARMOUR THYROID) 60 mg, Oral, Before breakfast    thyroid, pork, (ARMOUR THYROID) 15 mg Tab 60 mg + 15 mg daily    traZODone (DESYREL) 150 mg, Oral, Nightly         ENT ROS negative except as stated above.             Objective:      There were no vitals filed for this visit.    General: NAD, well appearing  Eyes: Normal conjunctiva and lids  Face: symmetric, nerve intact  Nose: The nose is without any evidence of any deformity. The nasal mucosa is moist. The septum is midline. There is no evidence of septal hematoma. The turbinates are without abnormality.   Ears: The ears are with normal-appearing pinna. Examination of the canals is normal appearing bilaterally. There is no drainage or erythema noted. The tympanic membranes are normal appearing with pearly color, normal-appearing landmarks and normal light reflex. Hearing is grossly intact.  Mouth: No obvious abnormalities to the lips. The teeth are unremarkable. The gingivae are without any obvious evidence of infection or lesion. The oral mucosa is moist and pink. There are no obvious masses to the hard or soft palate.   Oropharynx: The uvula is midline.  The tongue is midline. The posterior pharynx is without erythema or exudate. The tonsils are normal appearing.  Salivary glands: The salivary glands are symmetric and not enlarged, no masses  Neck: No lymphadenopathy, trachea midline, thryoid not enlarged.  Psych: Normal mood and affect.   Neuro: Grossly intact  Speech: fluent       Assessment and Plan:       1. Facial pain    2. MRSA carrier            Continue  mupirocin ointment to sinus rinses- one drop to each sinus rinse   Start flonase and astelin BID    Discussed CRS, possible dental, possibly TMJ, possibly nerve pain. Can try oragel if needed.    RTC: 3 months    Plan of care was discussed in detail with the patient, who agreed with the plan as above. All questions were answered in detail.     Eboni Briggs MD  Otolaryngology;

## 2022-10-17 ENCOUNTER — CLINICAL SUPPORT (OUTPATIENT)
Dept: FAMILY MEDICINE | Facility: CLINIC | Age: 82
End: 2022-10-17
Payer: MEDICARE

## 2022-10-17 DIAGNOSIS — E53.8 VITAMIN B 12 DEFICIENCY: Primary | ICD-10-CM

## 2022-10-17 PROCEDURE — 99499 NO LOS: ICD-10-PCS | Mod: S$PBB,,, | Performed by: FAMILY MEDICINE

## 2022-10-17 PROCEDURE — 96372 THER/PROPH/DIAG INJ SC/IM: CPT | Mod: PBBFAC,PN

## 2022-10-17 PROCEDURE — 99499 UNLISTED E&M SERVICE: CPT | Mod: S$PBB,,, | Performed by: FAMILY MEDICINE

## 2022-10-18 RX ADMIN — CYANOCOBALAMIN 500 MCG: 1000 INJECTION, SOLUTION INTRAMUSCULAR at 09:10

## 2022-10-18 NOTE — PROGRESS NOTES
Pt here for cyanocabalamin injection(see MAR). Confirmed name and . Cyanocabalamin 500mcg given  glut. Pt tolerated well. Confirmed next dose and appt.

## 2022-10-24 ENCOUNTER — PATIENT MESSAGE (OUTPATIENT)
Dept: ENDOCRINOLOGY | Facility: CLINIC | Age: 82
End: 2022-10-24
Payer: MEDICARE

## 2022-10-24 DIAGNOSIS — E03.9 HYPOTHYROIDISM (ACQUIRED): Primary | ICD-10-CM

## 2022-10-24 NOTE — TELEPHONE ENCOUNTER
Would be best if only one of us is managing. At my last visit we had her on a total of 75 mg daily.     Can you confirm how much she is taking currently and how long she has been taking it    If hair loss is due to thyroid, can take 6 months or longer for hair loss to improve once thyroid normalized.

## 2022-10-25 NOTE — TELEPHONE ENCOUNTER
For now just take Gambell 60 mg + Gambell 15 mg dialy for a total of 75 mg daily    Check TSH, Ft4, T3 in 6 weeks

## 2022-10-31 ENCOUNTER — CLINICAL SUPPORT (OUTPATIENT)
Dept: FAMILY MEDICINE | Facility: CLINIC | Age: 82
End: 2022-10-31
Payer: MEDICARE

## 2022-10-31 DIAGNOSIS — E53.8 VITAMIN B 12 DEFICIENCY: Primary | ICD-10-CM

## 2022-10-31 PROCEDURE — 96372 THER/PROPH/DIAG INJ SC/IM: CPT | Mod: PBBFAC,PN

## 2022-10-31 RX ADMIN — CYANOCOBALAMIN 500 MCG: 1000 INJECTION, SOLUTION INTRAMUSCULAR at 03:10

## 2022-10-31 NOTE — PROGRESS NOTES
Confirmed pt name and . Verified medication with Kiesha KRUGER LPN. Administered Cyanocobalamin 500mcg IM per MD orders to R glut. Pt tolerated well.

## 2022-11-13 ENCOUNTER — PATIENT MESSAGE (OUTPATIENT)
Dept: ENDOCRINOLOGY | Facility: CLINIC | Age: 82
End: 2022-11-13
Payer: MEDICARE

## 2022-11-14 ENCOUNTER — PATIENT MESSAGE (OUTPATIENT)
Dept: ENDOCRINOLOGY | Facility: CLINIC | Age: 82
End: 2022-11-14
Payer: MEDICARE

## 2022-11-14 ENCOUNTER — CLINICAL SUPPORT (OUTPATIENT)
Dept: FAMILY MEDICINE | Facility: CLINIC | Age: 82
End: 2022-11-14
Payer: MEDICARE

## 2022-11-14 PROCEDURE — 96372 THER/PROPH/DIAG INJ SC/IM: CPT | Mod: PBBFAC,PN

## 2022-11-14 RX ADMIN — CYANOCOBALAMIN 500 MCG: 1000 INJECTION, SOLUTION INTRAMUSCULAR at 09:11

## 2022-11-17 RX ORDER — BETAMETHASONE DIPROPIONATE 0.5 MG/G
CREAM TOPICAL
Refills: 0 | OUTPATIENT
Start: 2022-11-17

## 2022-11-17 NOTE — TELEPHONE ENCOUNTER
Refill Decision Note   Amarilis Decker  is requesting a refill authorization.  Brief Assessment and Rationale for Refill:  Quick Discontinue     Medication Therapy Plan: Pharmacy is requesting new scripts for the following medications without required information, (sig/ frequency/qty/etc)     Medication Reconciliation Completed: No   Comments:     No Care Gaps recommended.     Note composed:12:17 PM 11/17/2022

## 2022-11-29 ENCOUNTER — CLINICAL SUPPORT (OUTPATIENT)
Dept: FAMILY MEDICINE | Facility: CLINIC | Age: 82
End: 2022-11-29
Payer: MEDICARE

## 2022-11-29 PROCEDURE — 96372 THER/PROPH/DIAG INJ SC/IM: CPT | Mod: PBBFAC,PN

## 2022-11-29 RX ADMIN — CYANOCOBALAMIN 500 MCG: 1000 INJECTION, SOLUTION INTRAMUSCULAR at 09:11

## 2022-12-06 ENCOUNTER — LAB VISIT (OUTPATIENT)
Dept: LAB | Facility: HOSPITAL | Age: 82
End: 2022-12-06
Attending: INTERNAL MEDICINE
Payer: MEDICARE

## 2022-12-06 DIAGNOSIS — E03.9 HYPOTHYROIDISM (ACQUIRED): ICD-10-CM

## 2022-12-06 LAB
T3 SERPL-MCNC: 188 NG/DL (ref 60–180)
T4 FREE SERPL-MCNC: 0.76 NG/DL (ref 0.71–1.51)
TSH SERPL DL<=0.005 MIU/L-ACNC: 0.86 UIU/ML (ref 0.4–4)

## 2022-12-06 PROCEDURE — 84443 ASSAY THYROID STIM HORMONE: CPT | Performed by: INTERNAL MEDICINE

## 2022-12-06 PROCEDURE — 84480 ASSAY TRIIODOTHYRONINE (T3): CPT | Performed by: INTERNAL MEDICINE

## 2022-12-06 PROCEDURE — 84439 ASSAY OF FREE THYROXINE: CPT | Performed by: INTERNAL MEDICINE

## 2022-12-06 PROCEDURE — 36415 COLL VENOUS BLD VENIPUNCTURE: CPT | Mod: PN | Performed by: INTERNAL MEDICINE

## 2022-12-08 ENCOUNTER — OFFICE VISIT (OUTPATIENT)
Dept: FAMILY MEDICINE | Facility: CLINIC | Age: 82
End: 2022-12-08
Payer: MEDICARE

## 2022-12-08 VITALS
DIASTOLIC BLOOD PRESSURE: 70 MMHG | OXYGEN SATURATION: 98 % | BODY MASS INDEX: 23.43 KG/M2 | WEIGHT: 128.06 LBS | HEART RATE: 65 BPM | RESPIRATION RATE: 12 BRPM | SYSTOLIC BLOOD PRESSURE: 122 MMHG

## 2022-12-08 DIAGNOSIS — F41.9 ANXIETY: ICD-10-CM

## 2022-12-08 DIAGNOSIS — F51.01 PRIMARY INSOMNIA: ICD-10-CM

## 2022-12-08 DIAGNOSIS — B96.89 BACTERIAL SINUSITIS: ICD-10-CM

## 2022-12-08 DIAGNOSIS — Z23 IMMUNIZATION DUE: Primary | ICD-10-CM

## 2022-12-08 DIAGNOSIS — L40.9 PSORIASIS: ICD-10-CM

## 2022-12-08 DIAGNOSIS — R07.89 CHEST DISCOMFORT: ICD-10-CM

## 2022-12-08 DIAGNOSIS — J32.9 BACTERIAL SINUSITIS: ICD-10-CM

## 2022-12-08 PROCEDURE — 99999 PR PBB SHADOW E&M-EST. PATIENT-LVL IV: CPT | Mod: PBBFAC,,, | Performed by: FAMILY MEDICINE

## 2022-12-08 PROCEDURE — 99214 OFFICE O/P EST MOD 30 MIN: CPT | Mod: PBBFAC,PN | Performed by: FAMILY MEDICINE

## 2022-12-08 PROCEDURE — 99214 OFFICE O/P EST MOD 30 MIN: CPT | Mod: S$PBB,,, | Performed by: FAMILY MEDICINE

## 2022-12-08 PROCEDURE — 99999 PR PBB SHADOW E&M-EST. PATIENT-LVL IV: ICD-10-PCS | Mod: PBBFAC,,, | Performed by: FAMILY MEDICINE

## 2022-12-08 PROCEDURE — 99214 PR OFFICE/OUTPT VISIT, EST, LEVL IV, 30-39 MIN: ICD-10-PCS | Mod: S$PBB,,, | Performed by: FAMILY MEDICINE

## 2022-12-08 RX ORDER — MUPIROCIN 20 MG/G
OINTMENT TOPICAL 3 TIMES DAILY
Qty: 30 G | Refills: 2 | Status: SHIPPED | OUTPATIENT
Start: 2022-12-08 | End: 2023-08-10

## 2022-12-08 RX ORDER — FLUTICASONE PROPIONATE 50 MCG
1 SPRAY, SUSPENSION (ML) NASAL DAILY
Qty: 54 G | Refills: 3 | Status: SHIPPED | OUTPATIENT
Start: 2022-12-08

## 2022-12-08 RX ORDER — AZELASTINE 1 MG/ML
1 SPRAY, METERED NASAL 2 TIMES DAILY
Qty: 90 ML | Refills: 3 | Status: SHIPPED | OUTPATIENT
Start: 2022-12-08 | End: 2023-01-12

## 2022-12-08 RX ORDER — DESOXIMETASONE 0.5 MG/G
CREAM TOPICAL 2 TIMES DAILY
Qty: 60 G | Refills: 11 | Status: SHIPPED | OUTPATIENT
Start: 2022-12-08

## 2022-12-08 RX ORDER — TRAZODONE HYDROCHLORIDE 150 MG/1
150 TABLET ORAL NIGHTLY
Qty: 90 TABLET | Refills: 3 | Status: SHIPPED | OUTPATIENT
Start: 2022-12-08 | End: 2023-01-12

## 2022-12-08 RX ORDER — DESONIDE 0.5 MG/G
OINTMENT TOPICAL 2 TIMES DAILY
Qty: 60 G | Refills: 2 | Status: SHIPPED | OUTPATIENT
Start: 2022-12-08

## 2022-12-08 RX ORDER — BETAMETHASONE DIPROPIONATE 0.5 MG/G
CREAM TOPICAL DAILY
Qty: 45 G | Refills: 5 | Status: SHIPPED | OUTPATIENT
Start: 2022-12-08 | End: 2024-01-25 | Stop reason: SDUPTHER

## 2022-12-08 RX ORDER — ESCITALOPRAM OXALATE 10 MG/1
10 TABLET ORAL DAILY
Qty: 90 TABLET | Refills: 3 | Status: SHIPPED | OUTPATIENT
Start: 2022-12-08 | End: 2023-05-12

## 2022-12-08 NOTE — PROGRESS NOTES
" THIS DOCUMENT WAS MADE IN PART WITH VOICE RECOGNITION SOFTWARE.  OCCASIONALLY THIS SOFTWARE WILL MISINTERPRET WORDS OR PHRASES.    Assessment and Plan:    1. Immunization due        2. Psoriasis  betamethasone dipropionate 0.05 % cream    desonide 0.05% (DESOWEN) 0.05 % Oint    desoximetasone (TOPICORT) 0.05 % cream      3. Bacterial sinusitis  mupirocin (BACTROBAN) 2 % ointment    azelastine (ASTELIN) 137 mcg (0.1 %) nasal spray    fluticasone propionate (FLONASE) 50 mcg/actuation nasal spray      4. Primary insomnia  traZODone (DESYREL) 150 MG tablet      5. Anxiety  EScitalopram oxalate (LEXAPRO) 10 MG tablet      6. Chest discomfort  Ambulatory referral/consult to Cardiology    Nuclear Stress - Cardiology Interpreted          PLAN    Refilled medicines as above     Patient with risk factors of advanced age with exertional chest symptoms.  We did order nuclear stress test to be done sometime in January     Follow-up in 3-4 months      ______________________________________________________________________  Subjective:    Chief Complaint:  Chief Complaint   Patient presents with    Anxiety          HPI:  Amarilis is a 82 y.o. year old       Pt concerns : anxiety   Increased lexapro to 10 mg since last visit for increased anxiety   Reports anxiety has since improved     Nightly HA  Mostly maxillary   Followed by ENT     Chest pressure   "Tightness"   Intermit.   Worse in moving around  Duration : a few week   One episode with SOB     Primary insomnia  Rx-trazodone  Previous Rx- Remeron (ineffective), Doxepin (ineffective)     Depressed mood / Anxiety   Rx-Lexapro 10 mg (diarrhea)   Stressor-fall out with daughter      Hypothyroidism  Current Rx-armor Thyroid 60 mg + 15 mg daily   Previous Rx-levothyroxine (ineffective)  Previous TSH in normal range   Endocrinology- Dr Fritz      Bronchiectasis, history of pulmonary nodules  Seen by pulmonology - Dr. Jovel   pulmonology-chronic cough may be due to colonization for " mycobacterium avium\     Senile osteoporosis  Rx-Boniva 150 mg monthly, vitamin-D supplement  No recent fractures     Seasonal allergies  Rx-Flonase, Xyzal  Does have recurrent sinusitis, seen by ENT     Psoriasis  Rx-betamethasone, clobetasol, desonide, Topicort  Followed by dermatology     B12 deficiency  Rx-B12 injections 500 mcg every 2 weeks     Chronic Low back pain   Pain Mgt : Hubbel   Rx : Mobic 15 mg        Past Medical History:  Past Medical History:   Diagnosis Date    Allergy     Asthma     GERD (gastroesophageal reflux disease)     Globus syndrome     Headache(784.0)     Hypothyroidism     Psoriasis     Rash        Past Surgical History:  Past Surgical History:   Procedure Laterality Date    ADENOIDECTOMY      CHOLECYSTECTOMY      COLONOSCOPY      DEXA      WNL    SINUS SURGERY      Dr Cordova    SINUS SURGERY  01/2017    xs 5    THYROID SURGERY      nodules removed //Dr Amato     TONSILLECTOMY      TYMPANOSTOMY TUBE PLACEMENT         Family History:  Family History   Problem Relation Age of Onset    Hypertension Mother     Obesity Father     Diabetes Sister     Obesity Sister     No Known Problems Maternal Grandmother     No Known Problems Maternal Grandfather     No Known Problems Paternal Grandmother     No Known Problems Paternal Grandfather     Breast cancer Paternal Aunt 43    Thyroid cancer Daughter 61    Ovarian cancer Neg Hx        Social History:  Social History     Socioeconomic History    Marital status:    Tobacco Use    Smoking status: Never    Smokeless tobacco: Never   Substance and Sexual Activity    Alcohol use: Yes     Comment: twice a month    Drug use: No    Sexual activity: Never       Medications:  Current Outpatient Medications on File Prior to Visit   Medication Sig Dispense Refill    cholecalciferol, vitamin D3, (VITAMIN D3) 50 mcg (2,000 unit) Tab Take 2,000 Units by mouth once daily.      clobetasol 0.05% (TEMOVATE) 0.05 % Oint 1 application once daily. Apply to  affected area      HYDROcodone-acetaminophen (NORCO) 5-325 mg per tablet Take 1 tablet by mouth every 8 (eight) hours as needed for Pain. 21 tablet 0    ibandronate (BONIVA) 150 mg tablet Take 1 tablet (150 mg total) by mouth every 30 days. 3 tablet 3    ketoconazole (NIZORAL) 2 % shampoo APPLY EXTERNALLY AT BEDTIME AS NEEDED      levocetirizine (XYZAL) 5 MG tablet Take one capsule by mouth daily 90 tablet 0    MAGNESIUM ORAL Take by mouth once. Pt takes 400mg once daily      meloxicam (MOBIC) 15 MG tablet Take 15 mg by mouth once daily.      nystatin (MYCOSTATIN) cream Apply topically 2 (two) times daily. 30 g 0    sodium chloride 3% 3 % nebulizer solution Take 4 mLs by nebulization once daily. 120 mL 4    thyroid, pork, (ARMOUR THYROID) 15 mg Tab 60 mg + 15 mg daily 90 tablet 1    thyroid, pork, (ARMOUR THYROID) 60 mg Tab Take 1 tablet (60 mg total) by mouth before breakfast. 90 tablet 1    [DISCONTINUED] azelastine (ASTELIN) 137 mcg (0.1 %) nasal spray 1 spray (137 mcg total) by Nasal route 2 (two) times daily. 30 mL 11    [DISCONTINUED] betamethasone dipropionate 0.05 % cream Apply topically once daily. 45 g 5    [DISCONTINUED] desonide 0.05% (DESOWEN) 0.05 % Oint Apply topically 2 (two) times daily. 60 g 2    [DISCONTINUED] desoximetasone (TOPICORT) 0.05 % cream Apply topically 2 (two) times daily. 60 g 11    [DISCONTINUED] EScitalopram oxalate (LEXAPRO) 5 MG Tab TAKE 1 TABLET(5 MG) BY MOUTH EVERY DAY (Patient taking differently: 10 mg once daily.) 90 tablet 3    [DISCONTINUED] fluticasone propionate (FLONASE) 50 mcg/actuation nasal spray 1 spray (50 mcg total) by Each Nostril route once daily. 16 g 6    [DISCONTINUED] traZODone (DESYREL) 150 MG tablet Take 1 tablet (150 mg total) by mouth every evening. 90 tablet 3     Current Facility-Administered Medications on File Prior to Visit   Medication Dose Route Frequency Provider Last Rate Last Admin    cyanocobalamin injection 500 mcg  500 mcg Intramuscular Q14  Days Luc Teixeira MD   500 mcg at 11/29/22 0928       Allergies:  Avelox [moxifloxacin], Bactrim [sulfamethoxazole-trimethoprim], Ciprofloxacin, Isothiazolinones, Neomycin sulfate, Apremilast, and Bacitracin    Immunizations:  Immunization History   Administered Date(s) Administered    COVID-19 Vaccine 03/31/2021    COVID-19, MRNA, LN-S, PF (MODERNA FULL 0.5 ML DOSE) 12/16/2021    COVID-19, vector-nr, [id number], PF (Johanna) 04/05/2021    Hepatitis A, Adult 08/09/2007    IPV 08/09/2007    Influenza 11/18/2011, 10/21/2013    Influenza (FLUAD) - Quadrivalent - Adjuvanted - PF *Preferred* (65+) 10/07/2021, 10/03/2022    Influenza - High Dose - PF (65 years and older) 10/14/2014, 10/08/2015, 09/27/2016    Influenza - Quadrivalent - MDCK - PF 10/10/2017    Influenza - Trivalent (ADULT) 11/18/2011    Influenza - Trivalent - PF (ADULT) 10/21/2013    Pneumococcal Conjugate - 13 Valent 04/12/2016    Pneumococcal Polysaccharide - 23 Valent 08/01/2011    Tdap 08/09/2007, 08/01/2018    Zoster 07/11/2007       Review of Systems:  Review of Systems   All other systems reviewed and are negative.    Objective:    Vitals:  Vitals:    12/08/22 1122   BP: 122/70   Pulse: 65   Resp: 12   SpO2: 98%   Weight: 58.1 kg (128 lb 1.4 oz)   PainSc: 0-No pain       Physical Exam  Vitals reviewed.   Constitutional:       General: She is not in acute distress.  HENT:      Head: Normocephalic and atraumatic.   Eyes:      Pupils: Pupils are equal, round, and reactive to light.   Cardiovascular:      Rate and Rhythm: Normal rate and regular rhythm.      Heart sounds: No murmur heard.    No friction rub.   Pulmonary:      Effort: Pulmonary effort is normal.      Breath sounds: Normal breath sounds.   Abdominal:      General: Bowel sounds are normal. There is no distension.      Palpations: Abdomen is soft.      Tenderness: There is no abdominal tenderness.   Musculoskeletal:      Cervical back: Neck supple.   Skin:     General: Skin is warm and  dry.      Findings: No rash.   Psychiatric:         Behavior: Behavior normal.           Luc Teixeira MD  Family Medicine

## 2022-12-09 ENCOUNTER — TELEPHONE (OUTPATIENT)
Dept: FAMILY MEDICINE | Facility: CLINIC | Age: 82
End: 2022-12-09
Payer: MEDICARE

## 2022-12-11 ENCOUNTER — TELEPHONE (OUTPATIENT)
Dept: ENDOCRINOLOGY | Facility: CLINIC | Age: 82
End: 2022-12-11
Payer: MEDICARE

## 2022-12-11 DIAGNOSIS — E03.9 HYPOTHYROIDISM (ACQUIRED): Primary | ICD-10-CM

## 2022-12-11 RX ORDER — THYROID 60 MG/1
60 TABLET ORAL
Qty: 30 TABLET | Refills: 11 | Status: SHIPPED | OUTPATIENT
Start: 2022-12-11 | End: 2023-04-21 | Stop reason: SDUPTHER

## 2022-12-13 ENCOUNTER — CLINICAL SUPPORT (OUTPATIENT)
Dept: FAMILY MEDICINE | Facility: CLINIC | Age: 82
End: 2022-12-13
Payer: MEDICARE

## 2022-12-13 PROCEDURE — 96372 THER/PROPH/DIAG INJ SC/IM: CPT | Mod: PBBFAC,PN

## 2022-12-13 RX ADMIN — CYANOCOBALAMIN 500 MCG: 1000 INJECTION, SOLUTION INTRAMUSCULAR at 09:12

## 2022-12-20 ENCOUNTER — PES CALL (OUTPATIENT)
Dept: ADMINISTRATIVE | Facility: CLINIC | Age: 82
End: 2022-12-20
Payer: MEDICARE

## 2022-12-27 ENCOUNTER — TELEPHONE (OUTPATIENT)
Dept: FAMILY MEDICINE | Facility: CLINIC | Age: 82
End: 2022-12-27

## 2022-12-27 ENCOUNTER — CLINICAL SUPPORT (OUTPATIENT)
Dept: FAMILY MEDICINE | Facility: CLINIC | Age: 82
End: 2022-12-27
Payer: MEDICARE

## 2022-12-27 DIAGNOSIS — E53.8 VITAMIN B 12 DEFICIENCY: Primary | ICD-10-CM

## 2022-12-27 PROCEDURE — 96372 THER/PROPH/DIAG INJ SC/IM: CPT | Mod: PBBFAC,PN

## 2022-12-27 RX ORDER — CYANOCOBALAMIN 1000 UG/ML
500 INJECTION, SOLUTION INTRAMUSCULAR; SUBCUTANEOUS ONCE
Status: COMPLETED | OUTPATIENT
Start: 2022-12-27 | End: 2022-12-27

## 2022-12-27 RX ADMIN — CYANOCOBALAMIN 500 MCG: 1000 INJECTION, SOLUTION INTRAMUSCULAR at 09:12

## 2022-12-27 NOTE — TELEPHONE ENCOUNTER
Spoke with pt. Notified her of repeat labs orders. Pt scheduled for bloodwork on 1/24/23. Linked B12 orders to lab draw. Pt verbalized understanding.

## 2022-12-27 NOTE — TELEPHONE ENCOUNTER
Pt in clinic for b12 injection. Last dose of 12 from previous order.   Please advise if pt needs labs to check level and when next injection should be- if needed.

## 2022-12-29 DIAGNOSIS — A08.4 VIRAL GASTROENTERITIS: Primary | ICD-10-CM

## 2022-12-29 RX ORDER — ONDANSETRON 8 MG/1
8 TABLET, ORALLY DISINTEGRATING ORAL EVERY 6 HOURS PRN
Qty: 30 TABLET | Refills: 0 | Status: SHIPPED | OUTPATIENT
Start: 2022-12-29 | End: 2023-01-12

## 2022-12-29 RX ORDER — PROMETHAZINE HYDROCHLORIDE 25 MG/1
25 TABLET ORAL EVERY 8 HOURS PRN
Qty: 30 TABLET | Refills: 1 | Status: SHIPPED | OUTPATIENT
Start: 2022-12-29 | End: 2023-01-12

## 2022-12-29 RX ORDER — FAMOTIDINE 40 MG/1
40 TABLET, FILM COATED ORAL 2 TIMES DAILY
Qty: 60 TABLET | Refills: 0 | Status: SHIPPED | OUTPATIENT
Start: 2022-12-29 | End: 2023-01-12

## 2023-01-03 ENCOUNTER — TELEPHONE (OUTPATIENT)
Dept: OTOLARYNGOLOGY | Facility: CLINIC | Age: 83
End: 2023-01-03
Payer: MEDICARE

## 2023-01-03 NOTE — TELEPHONE ENCOUNTER
S/w pt and she states that she had an appt previously scheduled with Dr. Briggs on 1/5/23. Someone cancelled that appt and the pt is very upset. She would like that appt back. Scheduled her an appt for 1/5/23 at 245, pt confirmed.

## 2023-01-03 NOTE — TELEPHONE ENCOUNTER
----- Message from Abilio Flannery sent at 1/3/2023  9:01 AM CST -----      Name of Who is Calling:PT          What is the request in detail:PT is requesting a call back to discuss why her appointment was canceled, she stated that she did not cancel that appointment, she also stated she has severe sinus issues and she really need to be seen so therefore she would not cancel her appointment, someone from the office must have canceled without her knowledge PT states.           Can the clinic reply by [facility]:no          What Number to Call Back if not in [phone]

## 2023-01-05 ENCOUNTER — OFFICE VISIT (OUTPATIENT)
Dept: OTOLARYNGOLOGY | Facility: CLINIC | Age: 83
End: 2023-01-05
Payer: MEDICARE

## 2023-01-05 VITALS — HEIGHT: 62 IN | WEIGHT: 129.44 LBS | BODY MASS INDEX: 23.82 KG/M2

## 2023-01-05 DIAGNOSIS — R51.9 FACIAL PAIN: Primary | ICD-10-CM

## 2023-01-05 DIAGNOSIS — Z98.890 HISTORY OF SINUS SURGERY: ICD-10-CM

## 2023-01-05 DIAGNOSIS — Z22.322 MRSA CARRIER: ICD-10-CM

## 2023-01-05 DIAGNOSIS — J32.4 CHRONIC PANSINUSITIS: ICD-10-CM

## 2023-01-05 PROCEDURE — 99214 PR OFFICE/OUTPT VISIT, EST, LEVL IV, 30-39 MIN: ICD-10-PCS | Mod: 25,S$PBB,, | Performed by: STUDENT IN AN ORGANIZED HEALTH CARE EDUCATION/TRAINING PROGRAM

## 2023-01-05 PROCEDURE — 99214 OFFICE O/P EST MOD 30 MIN: CPT | Mod: 25,S$PBB,, | Performed by: STUDENT IN AN ORGANIZED HEALTH CARE EDUCATION/TRAINING PROGRAM

## 2023-01-05 PROCEDURE — 99999 PR PBB SHADOW E&M-EST. PATIENT-LVL III: ICD-10-PCS | Mod: PBBFAC,,, | Performed by: STUDENT IN AN ORGANIZED HEALTH CARE EDUCATION/TRAINING PROGRAM

## 2023-01-05 PROCEDURE — 99213 OFFICE O/P EST LOW 20 MIN: CPT | Mod: PBBFAC,PO | Performed by: STUDENT IN AN ORGANIZED HEALTH CARE EDUCATION/TRAINING PROGRAM

## 2023-01-05 PROCEDURE — 99999 PR PBB SHADOW E&M-EST. PATIENT-LVL III: CPT | Mod: PBBFAC,,, | Performed by: STUDENT IN AN ORGANIZED HEALTH CARE EDUCATION/TRAINING PROGRAM

## 2023-01-05 RX ORDER — MONTELUKAST SODIUM 10 MG/1
10 TABLET ORAL NIGHTLY
Qty: 30 TABLET | Refills: 2 | Status: SHIPPED | OUTPATIENT
Start: 2023-01-05 | End: 2023-04-05

## 2023-01-05 RX ORDER — AZELASTINE 1 MG/ML
1 SPRAY, METERED NASAL 2 TIMES DAILY
Qty: 30 ML | Refills: 3 | Status: SHIPPED | OUTPATIENT
Start: 2023-01-05 | End: 2024-03-13

## 2023-01-05 NOTE — PROGRESS NOTES
"Otolaryngology Clinic Note    Subjective:       Patient ID: Amarilis Decker is a 82 y.o. female.    Chief Complaint: Follow-up, Sinusitis, and Headache        History of Present Illness: Amarilis Decker is a 82 y.o. female presenting with sinus concerns. She reports she has had 5 sinus surgeries, got staph infection and eye damage from one.   Has been to the dentist 5 times for oral pain. Pain moves, has been lower jaw on left. Has also had headaches, moves from cheeks to forehead. Has been similar since August 27th, which was after her visit by Dr. Delgado.   Has had a crown placed on right maxilla on August 25 but pain has been on the left.   4 days ago had black discharge from her nose. No other discharge from nose, no bad smell but poor sense of smell. No fevers, no chills.   Does sinus rinses every night, no output. Uses flonase PRN with afrin. Not often.      She saw Dr. Delgado 8/23: "Amarilis is here for follow-up of chronic sinusitis.  Seen 1 mo ago for persistent sinusitis. Was on Doxy and I cleaned out sinus.  Put her on Minocycline for her concerns"   He scoped her at that time with no purulence or polyps. I personally reviewed this scope today.   It looks like he gave her mupirocin and betamethasone spray.     10/14/22: Gave her steroids and augmentin at last visit. She did not do the steroids. Culture came back sensitive to bactrim. Sent clindamycin 10/3/22. She reports she was at [place] on Friday, came back Tuesday. Pain in left cheek down to left neck for 2 days this weekend, now it is gone. Did have some neck pain in back of neck last night, has lots of tension- does have degenerative disease. Still using ointment in rinses. Never had drainage congestion. Does take zyrtec daily. She has done allergy shots in past. Uses nasal sprays PRN. Pain rotates from cheek to jaw.     1/5/23: RTC. Reports over holidays- 2 months or so, started getting nightly headaches. Stopped mupirocin, no change. " Sometimes so bad, cannot sleep. Face, head and teeth are painful, some pressure in cheeks and forehead. Has gotten yellow stuff from bottle in last month. Never had headaches before. Happening every night, aspirin helps. Still doing rinses BID. Nerve pain in cheek and jaw is better, this is different. Taking xyzal nightly.         Past Surgical History:   Procedure Laterality Date    ADENOIDECTOMY      CHOLECYSTECTOMY      COLONOSCOPY      DEXA      WNL    SINUS SURGERY      Dr Cordova    SINUS SURGERY  01/2017    xs 5    THYROID SURGERY      nodules removed //Dr Amato     TONSILLECTOMY      TYMPANOSTOMY TUBE PLACEMENT       Past Medical History:   Diagnosis Date    Allergy     Asthma     GERD (gastroesophageal reflux disease)     Globus syndrome     Headache(784.0)     Hypothyroidism     Psoriasis     Rash      Social Determinants of Health     Tobacco Use: Low Risk     Smoking Tobacco Use: Never    Smokeless Tobacco Use: Never    Passive Exposure: Not on file   Alcohol Use: Not on file   Financial Resource Strain: Not on file   Food Insecurity: Not on file   Transportation Needs: Not on file   Physical Activity: Not on file   Stress: Not on file   Social Connections: Not on file   Housing Stability: Not on file   Depression: Low Risk     Last PHQ Score: 0     Review of patient's allergies indicates:   Allergen Reactions    Avelox [moxifloxacin] Swelling    Bactrim [sulfamethoxazole-trimethoprim] Swelling    Ciprofloxacin Swelling and Hives    Isothiazolinones Dermatitis     Strong contact dermatitis    Neomycin sulfate Dermatitis     Has contact allergy to neomycin sulfate and to Kathon CG, or isothiazolinone    Apremilast Other (See Comments)     Muscle cramps, cough    Bacitracin      Other reaction(s): unknown     Current Outpatient Medications   Medication Instructions    azelastine (ASTELIN) 137 mcg, Nasal, 2 times daily    betamethasone dipropionate 0.05 % cream Topical (Top), Daily    cholecalciferol  (vitamin D3) (VITAMIN D3) 2,000 Units, Oral, Daily    clobetasol 0.05% (TEMOVATE) 0.05 % Oint 1 application, Daily, Apply to affected area    desonide 0.05% (DESOWEN) 0.05 % Oint Topical (Top), 2 times daily    desoximetasone (TOPICORT) 0.05 % cream Topical (Top), 2 times daily    EScitalopram oxalate (LEXAPRO) 10 mg, Oral, Daily    famotidine (PEPCID) 40 mg, Oral, 2 times daily    fluticasone propionate (FLONASE) 50 mcg, Each Nostril, Daily    HYDROcodone-acetaminophen (NORCO) 5-325 mg per tablet 1 tablet, Oral, Every 8 hours PRN    ibandronate (BONIVA) 150 mg, Oral, Every 30 days    ketoconazole (NIZORAL) 2 % shampoo APPLY EXTERNALLY AT BEDTIME AS NEEDED    levocetirizine (XYZAL) 5 MG tablet Take one capsule by mouth daily    MAGNESIUM ORAL Oral, Once, Pt takes 400mg once daily    meloxicam (MOBIC) 15 mg, Oral, Daily    mupirocin (BACTROBAN) 2 % ointment Topical (Top), 3 times daily    nystatin (MYCOSTATIN) cream Topical (Top), 2 times daily    ondansetron (ZOFRAN-ODT) 8 mg, Oral, Every 6 hours PRN    promethazine (PHENERGAN) 25 mg, Oral, Every 8 hours PRN    sodium chloride 3% 3 % nebulizer solution 4 mLs, Nebulization, Daily    thyroid (pork) (ARMOUR THYROID) 60 mg, Oral, Before breakfast    traZODone (DESYREL) 150 mg, Oral, Nightly         ENT ROS negative except as stated above.             Objective:      There were no vitals filed for this visit.    General: NAD, well appearing  Eyes: Normal conjunctiva and lids  Face: symmetric, nerve intact  Nose: The nose is without any evidence of any deformity. The nasal mucosa is moist. The septum is midline. There is no evidence of septal hematoma. The turbinates are without abnormality.   Ears: The ears are with normal-appearing pinna. Examination of the canals is normal appearing bilaterally. There is no drainage or erythema noted. The tympanic membranes are normal appearing with pearly color, normal-appearing landmarks and normal light reflex. Hearing is grossly  intact.  Mouth: No obvious abnormalities to the lips. The teeth are unremarkable. The gingivae are without any obvious evidence of infection or lesion. The oral mucosa is moist and pink. There are no obvious masses to the hard or soft palate.   Oropharynx: The uvula is midline.  The tongue is midline. The posterior pharynx is without erythema or exudate. The tonsils are normal appearing.  Salivary glands: The salivary glands are symmetric and not enlarged, no masses  Neck: No lymphadenopathy, trachea midline, thryoid not enlarged.  Psych: Normal mood and affect.   Neuro: Grossly intact  Speech: fluent    Procedure: Nasal Endoscopy  Indications: chronic sinusitis   Verbal consent obtained  Anesthesia: topical lidocaine and phenylephrine spray    Procedure: Rigid 30 degree endoscope was passed into each nare to nasopharynx showing findings below.     Septum is midline. Nasal mucosa moist. Turbinates are normal size and respond to decongestant appropriately. Right and left max and ethmoids widely patent, no purulence or polyps. No adenoid hypertrophy.          Assessment and Plan:       1. Facial pain    2. MRSA carrier    3. Chronic pansinusitis    4. History of sinus surgery            Continue rinses with betamethasone. Can try to only use salt for a couple of weeks and see what is does.   Trial singulair one month. Continue xyzal.   Can refill Astelin. Sent both to pharmacy  CT sinus to check for sphenoid or frontal blockage.   Headaches may not be sinuses.     RTC: 2 months    Plan of care was discussed in detail with the patient, who agreed with the plan as above. All questions were answered in detail.     Eboni Briggs MD  Otolaryngology;

## 2023-01-06 ENCOUNTER — HOSPITAL ENCOUNTER (OUTPATIENT)
Dept: RADIOLOGY | Facility: HOSPITAL | Age: 83
Discharge: HOME | End: 2023-01-06
Attending: FAMILY MEDICINE
Payer: MEDICARE

## 2023-01-06 ENCOUNTER — CLINICAL SUPPORT (OUTPATIENT)
Dept: CARDIOLOGY | Facility: HOSPITAL | Age: 83
End: 2023-01-06
Attending: FAMILY MEDICINE
Payer: MEDICARE

## 2023-01-06 VITALS — BODY MASS INDEX: 23.55 KG/M2 | HEIGHT: 62 IN | WEIGHT: 128 LBS

## 2023-01-06 DIAGNOSIS — R07.89 CHEST DISCOMFORT: ICD-10-CM

## 2023-01-06 LAB
CV PHARM DOSE: 0.4 MG
CV STRESS BASE HR: 56 BPM
DIASTOLIC BLOOD PRESSURE: 54 MMHG
OHS CV CPX 1 MINUTE RECOVERY HEART RATE: 90 BPM
OHS CV CPX 85 PERCENT MAX PREDICTED HEART RATE MALE: 114
OHS CV CPX MAX PREDICTED HEART RATE: 134
OHS CV CPX PATIENT IS FEMALE: 1
OHS CV CPX PATIENT IS MALE: 0
OHS CV CPX PEAK DIASTOLIC BLOOD PRESSURE: 50 MMHG
OHS CV CPX PEAK HEAR RATE: 92 BPM
OHS CV CPX PEAK RATE PRESSURE PRODUCT: NORMAL
OHS CV CPX PEAK SYSTOLIC BLOOD PRESSURE: 130 MMHG
OHS CV CPX PERCENT MAX PREDICTED HEART RATE ACHIEVED: 69
OHS CV CPX RATE PRESSURE PRODUCT PRESENTING: 7112
OHS CV PHARM TIME: 854 MIN
SYSTOLIC BLOOD PRESSURE: 127 MMHG

## 2023-01-06 PROCEDURE — 93017 CV STRESS TEST TRACING ONLY: CPT | Mod: PO

## 2023-01-06 PROCEDURE — 93018 CV STRESS TEST I&R ONLY: CPT | Mod: ,,, | Performed by: INTERNAL MEDICINE

## 2023-01-06 PROCEDURE — A9502 TC99M TETROFOSMIN: HCPCS | Mod: PO

## 2023-01-06 PROCEDURE — 93016 NUCLEAR STRESS - CARDIOLOGY INTERPRETED (CUPID ONLY): ICD-10-PCS | Mod: ,,, | Performed by: INTERNAL MEDICINE

## 2023-01-06 PROCEDURE — 93016 CV STRESS TEST SUPVJ ONLY: CPT | Mod: ,,, | Performed by: INTERNAL MEDICINE

## 2023-01-06 PROCEDURE — 78452 HT MUSCLE IMAGE SPECT MULT: CPT | Mod: PO

## 2023-01-06 PROCEDURE — 78452 NUCLEAR STRESS - CARDIOLOGY INTERPRETED (CUPID ONLY): ICD-10-PCS | Mod: 26,,, | Performed by: INTERNAL MEDICINE

## 2023-01-06 PROCEDURE — 63600175 PHARM REV CODE 636 W HCPCS: Mod: PO | Performed by: FAMILY MEDICINE

## 2023-01-06 PROCEDURE — 78452 HT MUSCLE IMAGE SPECT MULT: CPT | Mod: 26,,, | Performed by: INTERNAL MEDICINE

## 2023-01-06 PROCEDURE — 93018 PR CARDIAC STRESS TST,INTERP/REPT ONLY: ICD-10-PCS | Mod: ,,, | Performed by: INTERNAL MEDICINE

## 2023-01-06 RX ORDER — REGADENOSON 0.08 MG/ML
0.4 INJECTION, SOLUTION INTRAVENOUS
Status: COMPLETED | OUTPATIENT
Start: 2023-01-06 | End: 2023-01-06

## 2023-01-06 RX ADMIN — REGADENOSON 0.4 MG: 0.08 INJECTION, SOLUTION INTRAVENOUS at 08:01

## 2023-01-10 ENCOUNTER — CLINICAL SUPPORT (OUTPATIENT)
Dept: FAMILY MEDICINE | Facility: CLINIC | Age: 83
End: 2023-01-10
Payer: MEDICARE

## 2023-01-10 RX ORDER — CYANOCOBALAMIN 1000 UG/ML
1000 INJECTION, SOLUTION INTRAMUSCULAR; SUBCUTANEOUS
Status: CANCELLED | OUTPATIENT
Start: 2023-01-10 | End: 2023-01-10

## 2023-01-10 RX ORDER — CYANOCOBALAMIN 1000 UG/ML
500 INJECTION, SOLUTION INTRAMUSCULAR; SUBCUTANEOUS
Status: CANCELLED | OUTPATIENT
Start: 2023-01-10 | End: 2023-07-09

## 2023-01-12 ENCOUNTER — HOSPITAL ENCOUNTER (OUTPATIENT)
Dept: RADIOLOGY | Facility: HOSPITAL | Age: 83
Discharge: HOME | End: 2023-01-12
Attending: STUDENT IN AN ORGANIZED HEALTH CARE EDUCATION/TRAINING PROGRAM
Payer: MEDICARE

## 2023-01-12 ENCOUNTER — OFFICE VISIT (OUTPATIENT)
Dept: FAMILY MEDICINE | Facility: CLINIC | Age: 83
End: 2023-01-12
Payer: MEDICARE

## 2023-01-12 VITALS
SYSTOLIC BLOOD PRESSURE: 102 MMHG | DIASTOLIC BLOOD PRESSURE: 62 MMHG | HEART RATE: 66 BPM | WEIGHT: 128.5 LBS | BODY MASS INDEX: 23.65 KG/M2 | HEIGHT: 62 IN | OXYGEN SATURATION: 95 %

## 2023-01-12 DIAGNOSIS — J84.10 PULMONARY FIBROSIS: ICD-10-CM

## 2023-01-12 DIAGNOSIS — M46.1 SACROILIITIS, NOT ELSEWHERE CLASSIFIED: Primary | ICD-10-CM

## 2023-01-12 DIAGNOSIS — J32.4 CHRONIC PANSINUSITIS: ICD-10-CM

## 2023-01-12 DIAGNOSIS — F33.1 MODERATE EPISODE OF RECURRENT MAJOR DEPRESSIVE DISORDER: ICD-10-CM

## 2023-01-12 DIAGNOSIS — L21.9 SEBORRHEIC DERMATITIS OF SCALP: ICD-10-CM

## 2023-01-12 DIAGNOSIS — Z79.899 DRUG THERAPY: ICD-10-CM

## 2023-01-12 DIAGNOSIS — R51.9 FACIAL PAIN: ICD-10-CM

## 2023-01-12 DIAGNOSIS — I70.0 AORTIC ATHEROSCLEROSIS: ICD-10-CM

## 2023-01-12 PROCEDURE — 99999 PR PBB SHADOW E&M-EST. PATIENT-LVL IV: CPT | Mod: PBBFAC,,, | Performed by: FAMILY MEDICINE

## 2023-01-12 PROCEDURE — 70486 CT MEDTRONIC SINUSES WITHOUT: ICD-10-PCS | Mod: 26,,, | Performed by: RADIOLOGY

## 2023-01-12 PROCEDURE — 70486 CT MAXILLOFACIAL W/O DYE: CPT | Mod: TC,PO

## 2023-01-12 PROCEDURE — 99999 PR PBB SHADOW E&M-EST. PATIENT-LVL IV: ICD-10-PCS | Mod: PBBFAC,,, | Performed by: FAMILY MEDICINE

## 2023-01-12 PROCEDURE — 99214 OFFICE O/P EST MOD 30 MIN: CPT | Mod: S$PBB,,, | Performed by: FAMILY MEDICINE

## 2023-01-12 PROCEDURE — 99214 PR OFFICE/OUTPT VISIT, EST, LEVL IV, 30-39 MIN: ICD-10-PCS | Mod: S$PBB,,, | Performed by: FAMILY MEDICINE

## 2023-01-12 PROCEDURE — 70486 CT MAXILLOFACIAL W/O DYE: CPT | Mod: 26,,, | Performed by: RADIOLOGY

## 2023-01-12 PROCEDURE — 99214 OFFICE O/P EST MOD 30 MIN: CPT | Mod: PBBFAC,PN | Performed by: FAMILY MEDICINE

## 2023-01-12 RX ORDER — KETOCONAZOLE 20 MG/ML
SHAMPOO, SUSPENSION TOPICAL
Qty: 120 ML | Refills: 3 | Status: SHIPPED | OUTPATIENT
Start: 2023-01-12

## 2023-01-12 NOTE — PROGRESS NOTES
THIS DOCUMENT WAS MADE IN PART WITH VOICE RECOGNITION SOFTWARE.  OCCASIONALLY THIS SOFTWARE WILL MISINTERPRET WORDS OR PHRASES.    Assessment and Plan:      1. Sacroiliitis, not elsewhere classified        2. Moderate episode of recurrent major depressive disorder        3. Aortic atherosclerosis        4. Pulmonary fibrosis        5. Drug therapy  Comprehensive Metabolic Panel    CBC Auto Differential    Urinalysis, Reflex to Urine Culture Urine, Clean Catch    Urinalysis Microscopic      6. Seborrheic dermatitis of scalp  ketoconazole (NIZORAL) 2 % shampoo          PLAN    Chronic conditions reviewed, appear to be well controlled     Updated medication list     Refilled ketoconazole shampoo per patient request     Check labs as above     Counseled on COVID vaccine, patient defers         ______________________________________________________________________  Subjective:    Chief Complaint:  Chief Complaint   Patient presents with    Annual Exam     Has a list of things to go over with you         HPI:  Amarilis is a 82 y.o. year old       Interval history-had negative nuclear stress test in response to atypical chest discomfort         Primary insomnia  Rx-   Previous Rx- Remeron (ineffective), Doxepin (ineffective), trazodone (hangover)      Depressed mood / Anxiety   Rx-Lexapro 10 mg   Stressor-fall out with daughter      Hypothyroidism  Current Rx-armor Thyroid 60 mg + 15 mg daily   Previous Rx-levothyroxine (ineffective)  Previous TSH in normal range   Endocrinology- Dr Fritz      Bronchiectasis, history of pulmonary nodules  Seen by pulmonology - Dr. 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\     Senile osteoporosis  Prev Rx-Boniva 150 (pt choice to quit)  Taking vitamin-D supplement  No recent fractures     Seasonal allergies / chronic sinusitis   Rx-Flonase, Xyzal, montelukast, astelin  Does have recurrent sinusitis, seen by ENT ([place])       Psoriasis  Rx-betamethasone, clobetasol, desonide, Topicort  Followed by dermatology     B12 deficiency  Rx-B12 injections 500 mcg every 2 weeks     Chronic Low back pain , sacroiliitis  Pain Mgt : Bestbel   Rx : Mobic 15 mg     ALONZO  Sleep MD : Catarina Morfin  Awaiting machine    Past Medical History:  Past Medical History:   Diagnosis Date    Allergy     Asthma     GERD (gastroesophageal reflux disease)     Globus syndrome     Headache(784.0)     Hypothyroidism     Psoriasis     Rash        Past Surgical History:  Past Surgical History:   Procedure Laterality Date    ADENOIDECTOMY      CHOLECYSTECTOMY      COLONOSCOPY      DEXA      WNL    SINUS SURGERY      Dr Cordova    SINUS SURGERY  01/2017    xs 5    THYROID SURGERY      nodules removed //Dr Amato     TONSILLECTOMY      TYMPANOSTOMY TUBE PLACEMENT         Family History:  Family History   Problem Relation Age of Onset    Hypertension Mother     Obesity Father     Diabetes Sister     Obesity Sister     No Known Problems Maternal Grandmother     No Known Problems Maternal Grandfather     No Known Problems Paternal Grandmother     No Known Problems Paternal Grandfather     Breast cancer Paternal Aunt 43    Thyroid cancer Daughter 61    Ovarian cancer Neg Hx        Social History:  Social History     Socioeconomic History    Marital status:    Tobacco Use    Smoking status: Never    Smokeless tobacco: Never   Substance and Sexual Activity    Alcohol use: Yes     Comment: twice a month    Drug use: No    Sexual activity: Never       Medications:  Current Outpatient Medications on File Prior to Visit   Medication Sig Dispense Refill    azelastine (ASTELIN) 137 mcg (0.1 %) nasal spray 1 spray (137 mcg total) by Nasal route 2 (two) times daily. 90 mL 3    azelastine (ASTELIN) 137 mcg (0.1 %) nasal spray 1 spray (137 mcg total) by Nasal route 2 (two) times daily. 30 mL 3    betamethasone dipropionate 0.05 % cream Apply topically once daily. 45 g 5     cholecalciferol, vitamin D3, (VITAMIN D3) 50 mcg (2,000 unit) Tab Take 2,000 Units by mouth once daily.      clobetasol 0.05% (TEMOVATE) 0.05 % Oint 1 application once daily. Apply to affected area      desonide 0.05% (DESOWEN) 0.05 % Oint Apply topically 2 (two) times daily. 60 g 2    desoximetasone (TOPICORT) 0.05 % cream Apply topically 2 (two) times daily. 60 g 11    EScitalopram oxalate (LEXAPRO) 10 MG tablet Take 1 tablet (10 mg total) by mouth once daily. 90 tablet 3    famotidine (PEPCID) 40 MG tablet Take 1 tablet (40 mg total) by mouth 2 (two) times daily. 60 tablet 0    fluticasone propionate (FLONASE) 50 mcg/actuation nasal spray 1 spray (50 mcg total) by Each Nostril route once daily. 54 g 3    HYDROcodone-acetaminophen (NORCO) 5-325 mg per tablet Take 1 tablet by mouth every 8 (eight) hours as needed for Pain. 21 tablet 0    ibandronate (BONIVA) 150 mg tablet Take 1 tablet (150 mg total) by mouth every 30 days. 3 tablet 3    ketoconazole (NIZORAL) 2 % shampoo APPLY EXTERNALLY AT BEDTIME AS NEEDED      levocetirizine (XYZAL) 5 MG tablet Take one capsule by mouth daily 90 tablet 0    MAGNESIUM ORAL Take by mouth once. Pt takes 400mg once daily      meloxicam (MOBIC) 15 MG tablet Take 15 mg by mouth once daily.      montelukast (SINGULAIR) 10 mg tablet Take 1 tablet (10 mg total) by mouth every evening. 30 tablet 2    mupirocin (BACTROBAN) 2 % ointment Apply topically 3 (three) times daily. 30 g 2    nystatin (MYCOSTATIN) cream Apply topically 2 (two) times daily. 30 g 0    ondansetron (ZOFRAN-ODT) 8 MG TbDL Take 1 tablet (8 mg total) by mouth every 6 (six) hours as needed (nausea). 30 tablet 0    promethazine (PHENERGAN) 25 MG tablet Take 1 tablet (25 mg total) by mouth every 8 (eight) hours as needed for Nausea. 30 tablet 1    sodium chloride 3% 3 % nebulizer solution Take 4 mLs by nebulization once daily. 120 mL 4    thyroid, pork, (ARMOUR THYROID) 60 mg Tab Take 1 tablet (60 mg total) by mouth before  "breakfast. 30 tablet 11    traZODone (DESYREL) 150 MG tablet Take 1 tablet (150 mg total) by mouth every evening. 90 tablet 3     No current facility-administered medications on file prior to visit.       Allergies:  Avelox [moxifloxacin], Bactrim [sulfamethoxazole-trimethoprim], Ciprofloxacin, Isothiazolinones, Neomycin sulfate, Apremilast, and Bacitracin    Immunizations:  Immunization History   Administered Date(s) Administered    COVID-19 Vaccine 03/31/2021    COVID-19, MRNA, LN-S, PF (MODERNA FULL 0.5 ML DOSE) 12/16/2021    COVID-19, vector-nr, rS-Ad26, PF ([facility]) 04/05/2021    Hepatitis A, Adult 08/09/2007    IPV 08/09/2007    Influenza 11/18/2011, 10/21/2013    Influenza (FLUAD) - Quadrivalent - Adjuvanted - PF *Preferred* (65+) 10/07/2021, 10/03/2022    Influenza - High Dose - PF (65 years and older) 10/14/2014, 10/08/2015, 09/27/2016    Influenza - Quadrivalent - MDCK - PF 10/10/2017    Influenza - Quadrivalent - PF *Preferred* (6 months and older) 10/29/2019, 09/25/2020    Influenza - Trivalent (ADULT) 11/18/2011    Influenza - Trivalent - PF (ADULT) 10/21/2013    Pneumococcal Conjugate - 13 Valent 04/12/2016    Pneumococcal Polysaccharide - 23 Valent 08/01/2011    Td (Adult), Unspecified Formulation 08/02/2018    Tdap 08/09/2007, 08/01/2018    Zoster 07/11/2007       Review of Systems:  Review of Systems   All other systems reviewed and are negative.    Objective:    Vitals:  Vitals:    01/12/23 0944   BP: 102/62   Pulse: 66   SpO2: 95%   Weight: 58.3 kg (128 lb 8.5 oz)   Height: 5' 2" (1.575 m)   PainSc: 0-No pain       Physical Exam  Vitals reviewed.   Constitutional:       General: She is not in acute distress.     Appearance: She is well-developed.   HENT:      Head: Normocephalic and atraumatic.   Pulmonary:      Effort: Pulmonary effort is normal. No respiratory distress.   Musculoskeletal:      Cervical back: Normal range of motion.   Psychiatric:         Behavior: Behavior normal.         " Thought Content: Thought content normal.         Judgment: Judgment normal.           Luc Teixeira MD  Family Medicine

## 2023-01-23 ENCOUNTER — TELEPHONE (OUTPATIENT)
Dept: ENDOCRINOLOGY | Facility: CLINIC | Age: 83
End: 2023-01-23
Payer: MEDICARE

## 2023-01-23 NOTE — TELEPHONE ENCOUNTER
----- Message from Paula Vazquez sent at 1/23/2023 12:31 PM CST -----  Contact: self  Type:  Appointment Request    Caller is requesting a sooner appointment.  Caller is requesting a message be sent to doctor.  Name of Caller:self  When is the first available appointment?n/a    Would the patient rather a call back or a response via [facility]? Call  Best Call Back Number:[phone] (home)       Additional Information: pt would like to be seen before 3/31 or after. Pt has to cancel but doesn't want to wait to be seen too late. Please advise and thank you.

## 2023-01-23 NOTE — TELEPHONE ENCOUNTER
When advised she would have to call back Feb 1st to r/s March appt to Aug, pt decided to keep appt for 3/31 for now  If needed will call back to r/s, but will keepp appt as is for now

## 2023-01-24 ENCOUNTER — TELEPHONE (OUTPATIENT)
Dept: FAMILY MEDICINE | Facility: CLINIC | Age: 83
End: 2023-01-24

## 2023-01-24 ENCOUNTER — CLINICAL SUPPORT (OUTPATIENT)
Dept: FAMILY MEDICINE | Facility: CLINIC | Age: 83
End: 2023-01-24
Payer: MEDICARE

## 2023-01-24 ENCOUNTER — LAB VISIT (OUTPATIENT)
Dept: LAB | Facility: HOSPITAL | Age: 83
End: 2023-01-24
Attending: INTERNAL MEDICINE
Payer: MEDICARE

## 2023-01-24 DIAGNOSIS — E53.8 VITAMIN B 12 DEFICIENCY: Primary | ICD-10-CM

## 2023-01-24 DIAGNOSIS — E03.9 HYPOTHYROIDISM (ACQUIRED): ICD-10-CM

## 2023-01-24 DIAGNOSIS — Z79.899 DRUG THERAPY: ICD-10-CM

## 2023-01-24 DIAGNOSIS — E53.8 VITAMIN B 12 DEFICIENCY: ICD-10-CM

## 2023-01-24 LAB
ALBUMIN SERPL BCP-MCNC: 3.9 G/DL (ref 3.5–5.2)
ALP SERPL-CCNC: 59 U/L (ref 55–135)
ALT SERPL W/O P-5'-P-CCNC: 17 U/L (ref 10–44)
ANION GAP SERPL CALC-SCNC: 8 MMOL/L (ref 8–16)
AST SERPL-CCNC: 20 U/L (ref 10–40)
BASOPHILS # BLD AUTO: 0.05 K/UL (ref 0–0.2)
BASOPHILS NFR BLD: 1 % (ref 0–1.9)
BILIRUB SERPL-MCNC: 0.4 MG/DL (ref 0.1–1)
BUN SERPL-MCNC: 18 MG/DL (ref 8–23)
CALCIUM SERPL-MCNC: 9.6 MG/DL (ref 8.7–10.5)
CHLORIDE SERPL-SCNC: 103 MMOL/L (ref 95–110)
CO2 SERPL-SCNC: 28 MMOL/L (ref 23–29)
CREAT SERPL-MCNC: 1 MG/DL (ref 0.5–1.4)
DIFFERENTIAL METHOD: ABNORMAL
EOSINOPHIL # BLD AUTO: 0.6 K/UL (ref 0–0.5)
EOSINOPHIL NFR BLD: 10.9 % (ref 0–8)
ERYTHROCYTE [DISTWIDTH] IN BLOOD BY AUTOMATED COUNT: 12.9 % (ref 11.5–14.5)
EST. GFR  (NO RACE VARIABLE): 56.2 ML/MIN/1.73 M^2
GLUCOSE SERPL-MCNC: 86 MG/DL (ref 70–110)
HCT VFR BLD AUTO: 42.3 % (ref 37–48.5)
HGB BLD-MCNC: 13.8 G/DL (ref 12–16)
IMM GRANULOCYTES # BLD AUTO: 0 K/UL (ref 0–0.04)
IMM GRANULOCYTES NFR BLD AUTO: 0 % (ref 0–0.5)
LYMPHOCYTES # BLD AUTO: 1.1 K/UL (ref 1–4.8)
LYMPHOCYTES NFR BLD: 20.8 % (ref 18–48)
MCH RBC QN AUTO: 30.2 PG (ref 27–31)
MCHC RBC AUTO-ENTMCNC: 32.6 G/DL (ref 32–36)
MCV RBC AUTO: 93 FL (ref 82–98)
MONOCYTES # BLD AUTO: 0.5 K/UL (ref 0.3–1)
MONOCYTES NFR BLD: 10.3 % (ref 4–15)
NEUTROPHILS # BLD AUTO: 2.9 K/UL (ref 1.8–7.7)
NEUTROPHILS NFR BLD: 57 % (ref 38–73)
NRBC BLD-RTO: 0 /100 WBC
PLATELET # BLD AUTO: 232 K/UL (ref 150–450)
PMV BLD AUTO: 9.6 FL (ref 9.2–12.9)
POTASSIUM SERPL-SCNC: 4.5 MMOL/L (ref 3.5–5.1)
PROT SERPL-MCNC: 7.2 G/DL (ref 6–8.4)
RBC # BLD AUTO: 4.57 M/UL (ref 4–5.4)
SODIUM SERPL-SCNC: 139 MMOL/L (ref 136–145)
T3 SERPL-MCNC: 139 NG/DL (ref 60–180)
T4 FREE SERPL-MCNC: 0.78 NG/DL (ref 0.71–1.51)
TSH SERPL DL<=0.005 MIU/L-ACNC: 1.09 UIU/ML (ref 0.4–4)
VIT B12 SERPL-MCNC: 739 PG/ML (ref 210–950)
WBC # BLD AUTO: 5.15 K/UL (ref 3.9–12.7)

## 2023-01-24 PROCEDURE — 36415 COLL VENOUS BLD VENIPUNCTURE: CPT | Mod: PN | Performed by: FAMILY MEDICINE

## 2023-01-24 PROCEDURE — 84439 ASSAY OF FREE THYROXINE: CPT | Performed by: INTERNAL MEDICINE

## 2023-01-24 PROCEDURE — 96372 THER/PROPH/DIAG INJ SC/IM: CPT | Mod: PBBFAC,PN

## 2023-01-24 PROCEDURE — 82607 VITAMIN B-12: CPT | Performed by: FAMILY MEDICINE

## 2023-01-24 PROCEDURE — 85025 COMPLETE CBC W/AUTO DIFF WBC: CPT | Performed by: FAMILY MEDICINE

## 2023-01-24 PROCEDURE — 80053 COMPREHEN METABOLIC PANEL: CPT | Performed by: FAMILY MEDICINE

## 2023-01-24 PROCEDURE — 99499 NO LOS: ICD-10-PCS | Mod: S$PBB,,, | Performed by: INTERNAL MEDICINE

## 2023-01-24 PROCEDURE — 99499 UNLISTED E&M SERVICE: CPT | Mod: S$PBB,,, | Performed by: INTERNAL MEDICINE

## 2023-01-24 PROCEDURE — 84443 ASSAY THYROID STIM HORMONE: CPT | Performed by: INTERNAL MEDICINE

## 2023-01-24 PROCEDURE — 84480 ASSAY TRIIODOTHYRONINE (T3): CPT | Performed by: INTERNAL MEDICINE

## 2023-01-24 RX ORDER — CYANOCOBALAMIN 1000 UG/ML
500 INJECTION, SOLUTION INTRAMUSCULAR; SUBCUTANEOUS ONCE
Status: COMPLETED | OUTPATIENT
Start: 2023-01-24 | End: 2023-01-24

## 2023-01-24 RX ADMIN — CYANOCOBALAMIN 500 MCG: 1000 INJECTION, SOLUTION INTRAMUSCULAR at 12:01

## 2023-01-24 NOTE — PROGRESS NOTES
Subjective:      Patient ID: Amarilis Decker is a 82 y.o. female    No chief complaint on file.    HPI  82 y.o. female with a PMHx as documented below presents to clinic today for the following:    Past Medical History:   Diagnosis Date    Allergy     Asthma     GERD (gastroesophageal reflux disease)     Globus syndrome     Headache(784.0)     Hypothyroidism     Psoriasis     Rash     Sacroiliitis, not elsewhere classified 1/12/2023      ROS    Objective:      There were no vitals filed for this visit.    Physical Exam   Assessment:       No diagnosis found.     Plan:       There are no diagnoses linked to this encounter.    No follow-ups on file.    Elaine Conrad MD  [facility]  Office: [phone]   Fax: [phone]  01/24/2023      Disclaimer: This note was partly generated using dictation software which may occasionally result in transcription errors.

## 2023-01-24 NOTE — PROGRESS NOTES
Pt here for cyanocabalamin injection(see MAR). Confirmed name and . Cyanocabalamin 1000mcg given R glut. Pt tolerated well. Confirmed next dose and appt.

## 2023-01-24 NOTE — TELEPHONE ENCOUNTER
Pt came in and got her B12 inj(500mcg q14d). She states she had her labs checked this morning and would like to wait until the results are in to sched next b12 inj in case Dr Teixeira wants to change the order. Please review labs tomorrow and place B12 orders. She is aware we will call her to sched/advise.

## 2023-01-25 RX ORDER — CYANOCOBALAMIN 1000 UG/ML
1000 INJECTION, SOLUTION INTRAMUSCULAR; SUBCUTANEOUS
Status: SHIPPED | OUTPATIENT
Start: 2023-01-25 | End: 2023-12-27

## 2023-02-02 ENCOUNTER — PATIENT MESSAGE (OUTPATIENT)
Dept: FAMILY MEDICINE | Facility: CLINIC | Age: 83
End: 2023-02-02
Payer: MEDICARE

## 2023-02-03 DIAGNOSIS — A08.4 VIRAL GASTROENTERITIS: ICD-10-CM

## 2023-02-03 RX ORDER — FAMOTIDINE 40 MG/1
TABLET, FILM COATED ORAL
Qty: 180 TABLET | OUTPATIENT
Start: 2023-02-03

## 2023-02-03 NOTE — TELEPHONE ENCOUNTER
No new care gaps identified.  [facility] Embedded Care Gaps. Reference number: [id number]. 2/03/2023   10:34:31 AM CST

## 2023-02-03 NOTE — TELEPHONE ENCOUNTER
Refill Decision Note   Amarilis Decker  is requesting a refill authorization.  Brief Assessment and Rationale for Refill:  Quick Discontinue     Medication Therapy Plan:  Famotidine dc'd (1/12/23); Quick dc    Medication Reconciliation Completed: No   Comments:     No Care Gaps recommended.     Note composed:1:21 PM 02/03/2023

## 2023-02-24 ENCOUNTER — CLINICAL SUPPORT (OUTPATIENT)
Dept: FAMILY MEDICINE | Facility: CLINIC | Age: 83
End: 2023-02-24
Payer: MEDICARE

## 2023-02-24 PROCEDURE — 96372 THER/PROPH/DIAG INJ SC/IM: CPT | Mod: PBBFAC,PN

## 2023-02-24 RX ADMIN — CYANOCOBALAMIN 1000 MCG: 1000 INJECTION, SOLUTION INTRAMUSCULAR at 11:02

## 2023-03-24 ENCOUNTER — LAB VISIT (OUTPATIENT)
Dept: LAB | Facility: HOSPITAL | Age: 83
End: 2023-03-24
Attending: INTERNAL MEDICINE
Payer: MEDICARE

## 2023-03-24 ENCOUNTER — CLINICAL SUPPORT (OUTPATIENT)
Dept: FAMILY MEDICINE | Facility: CLINIC | Age: 83
End: 2023-03-24
Payer: MEDICARE

## 2023-03-24 DIAGNOSIS — E03.9 HYPOTHYROIDISM (ACQUIRED): ICD-10-CM

## 2023-03-24 LAB
T4 FREE SERPL-MCNC: 0.75 NG/DL (ref 0.71–1.51)
TSH SERPL DL<=0.005 MIU/L-ACNC: 0.81 UIU/ML (ref 0.4–4)

## 2023-03-24 PROCEDURE — 36415 COLL VENOUS BLD VENIPUNCTURE: CPT | Mod: PN | Performed by: INTERNAL MEDICINE

## 2023-03-24 PROCEDURE — 84480 ASSAY TRIIODOTHYRONINE (T3): CPT | Performed by: INTERNAL MEDICINE

## 2023-03-24 PROCEDURE — 84443 ASSAY THYROID STIM HORMONE: CPT | Performed by: INTERNAL MEDICINE

## 2023-03-24 PROCEDURE — 84439 ASSAY OF FREE THYROXINE: CPT | Performed by: INTERNAL MEDICINE

## 2023-03-24 PROCEDURE — 96372 THER/PROPH/DIAG INJ SC/IM: CPT | Mod: PBBFAC,PN

## 2023-03-24 RX ADMIN — CYANOCOBALAMIN 1000 MCG: 1000 INJECTION, SOLUTION INTRAMUSCULAR at 10:03

## 2023-03-25 LAB — T3 SERPL-MCNC: 150 NG/DL (ref 60–180)

## 2023-04-21 ENCOUNTER — CLINICAL SUPPORT (OUTPATIENT)
Dept: FAMILY MEDICINE | Facility: CLINIC | Age: 83
End: 2023-04-21
Payer: MEDICARE

## 2023-04-21 ENCOUNTER — OFFICE VISIT (OUTPATIENT)
Dept: ENDOCRINOLOGY | Facility: CLINIC | Age: 83
End: 2023-04-21
Payer: MEDICARE

## 2023-04-21 VITALS
OXYGEN SATURATION: 98 % | BODY MASS INDEX: 23.24 KG/M2 | HEIGHT: 62 IN | HEART RATE: 62 BPM | SYSTOLIC BLOOD PRESSURE: 118 MMHG | DIASTOLIC BLOOD PRESSURE: 60 MMHG | WEIGHT: 126.31 LBS

## 2023-04-21 DIAGNOSIS — E53.8 VITAMIN B 12 DEFICIENCY: Primary | ICD-10-CM

## 2023-04-21 DIAGNOSIS — G47.33 OSA (OBSTRUCTIVE SLEEP APNEA): ICD-10-CM

## 2023-04-21 DIAGNOSIS — E03.9 HYPOTHYROIDISM (ACQUIRED): Primary | ICD-10-CM

## 2023-04-21 PROCEDURE — 96372 THER/PROPH/DIAG INJ SC/IM: CPT | Mod: PBBFAC,PN

## 2023-04-21 PROCEDURE — 99214 PR OFFICE/OUTPT VISIT, EST, LEVL IV, 30-39 MIN: ICD-10-PCS | Mod: S$PBB,,, | Performed by: INTERNAL MEDICINE

## 2023-04-21 PROCEDURE — 99999 PR PBB SHADOW E&M-EST. PATIENT-LVL IV: CPT | Mod: PBBFAC,,, | Performed by: INTERNAL MEDICINE

## 2023-04-21 PROCEDURE — 99214 OFFICE O/P EST MOD 30 MIN: CPT | Mod: PBBFAC,25,PN | Performed by: INTERNAL MEDICINE

## 2023-04-21 PROCEDURE — 99999 PR PBB SHADOW E&M-EST. PATIENT-LVL IV: ICD-10-PCS | Mod: PBBFAC,,, | Performed by: INTERNAL MEDICINE

## 2023-04-21 PROCEDURE — 99214 OFFICE O/P EST MOD 30 MIN: CPT | Mod: S$PBB,,, | Performed by: INTERNAL MEDICINE

## 2023-04-21 RX ORDER — THYROID 60 MG/1
60 TABLET ORAL
Qty: 90 TABLET | Refills: 3 | Status: SHIPPED | OUTPATIENT
Start: 2023-04-21 | End: 2024-01-05

## 2023-04-21 RX ADMIN — CYANOCOBALAMIN 1000 MCG: 1000 INJECTION, SOLUTION INTRAMUSCULAR at 01:04

## 2023-04-21 NOTE — PROGRESS NOTES
CHIEF COMPLAINT: Hypothyroid  82 y.o. old being seen as a  f/u.  Had a right lobectomy for thyroid nodules in 2011. No thyroid cancer. Started thyroid medication since age 25. Had difficulty tolerating synthroid. Currently on 60 mg of armour thyroid.  NNo Palpitations. No tremors. No diarrhea or constipation. Insomnia better since getting CPAP.       PAST MEDICAL HISTORY/PAST SURGICAL HISTORY:  Reviewed in [facility]    SOCIAL HISTORY: Reviewed in [facility]    FAMILY HISTORY:  Daughter had thyroid cancer- unsure type. No DM.     MEDICATIONS/ALLERGIES: The patient's MedCard has been updated and reviewed.            PE:    GENERAL: Well developed, well nourished.  NECK: Supple, trachea midline, no palpable thyroid nodules.  CHEST: Resp even and unlabored, CTA bilateral.  CARDIAC: RRR, S1, S2 heard, no murmurs, rubs, S3, or S4        LABS/Radiology     Latest Reference Range & Units 03/24/23 10:31   TSH 0.400 - 4.000 uIU/mL 0.812   T3, Total 60 - 180 ng/dL 150   Free T4 0.71 - 1.51 ng/dL 0.75           ASSESSMENT/PLAN:  Hypothyroidism- Currently on armour 60 mg daily. Had been on Lt4 in the past. Thyroid Function test WNL. No symptoms of hyper or hypothyroidism. 90 day refill sent to pharmacy.     2. ALONZO- now on CPAP/       FOLLOWUP  F/U 1 year- with TSH, Ft4, T3

## 2023-04-21 NOTE — PROGRESS NOTES
Patient presents to the clinic for Vitamin B12 injection. Tolerated vaccine well with no complaints.   Injection was administered into the right glute per patients request.      Injection was verified by Bhumika LAU LPN prior to the injection being administered

## 2023-05-12 ENCOUNTER — OFFICE VISIT (OUTPATIENT)
Dept: FAMILY MEDICINE | Facility: CLINIC | Age: 83
End: 2023-05-12
Payer: MEDICARE

## 2023-05-12 VITALS
DIASTOLIC BLOOD PRESSURE: 70 MMHG | HEIGHT: 62 IN | OXYGEN SATURATION: 98 % | SYSTOLIC BLOOD PRESSURE: 120 MMHG | BODY MASS INDEX: 24.2 KG/M2 | HEART RATE: 55 BPM | WEIGHT: 131.5 LBS

## 2023-05-12 DIAGNOSIS — F41.9 ANXIETY: Primary | ICD-10-CM

## 2023-05-12 PROCEDURE — 99214 PR OFFICE/OUTPT VISIT, EST, LEVL IV, 30-39 MIN: ICD-10-PCS | Mod: S$PBB,,, | Performed by: FAMILY MEDICINE

## 2023-05-12 PROCEDURE — 99213 OFFICE O/P EST LOW 20 MIN: CPT | Mod: PBBFAC,PN | Performed by: FAMILY MEDICINE

## 2023-05-12 PROCEDURE — 99999 PR PBB SHADOW E&M-EST. PATIENT-LVL III: CPT | Mod: PBBFAC,,, | Performed by: FAMILY MEDICINE

## 2023-05-12 PROCEDURE — 99214 OFFICE O/P EST MOD 30 MIN: CPT | Mod: S$PBB,,, | Performed by: FAMILY MEDICINE

## 2023-05-12 PROCEDURE — 99999 PR PBB SHADOW E&M-EST. PATIENT-LVL III: ICD-10-PCS | Mod: PBBFAC,,, | Performed by: FAMILY MEDICINE

## 2023-05-12 RX ORDER — BUSPIRONE HYDROCHLORIDE 5 MG/1
5 TABLET ORAL 2 TIMES DAILY
Qty: 60 TABLET | Refills: 11 | Status: SHIPPED | OUTPATIENT
Start: 2023-05-12 | End: 2023-06-02

## 2023-05-12 RX ORDER — ESCITALOPRAM OXALATE 20 MG/1
20 TABLET ORAL DAILY
Qty: 30 TABLET | Refills: 11 | Status: SHIPPED | OUTPATIENT
Start: 2023-05-12 | End: 2023-06-02

## 2023-05-12 NOTE — PROGRESS NOTES
THIS DOCUMENT WAS MADE IN PART WITH VOICE RECOGNITION SOFTWARE.  OCCASIONALLY THIS SOFTWARE WILL MISINTERPRET WORDS OR PHRASES.    Assessment and Plan:    1. Anxiety  EScitalopram oxalate (LEXAPRO) 20 MG tablet    busPIRone (BUSPAR) 5 MG Tab          PLAN    Increase Lexapro from 10-20 mg   Add buspirone 5 mg twice daily  Follow-up in 4 weeks      ______________________________________________________________________  Subjective:    Chief Complaint:  Chief Complaint   Patient presents with    Annual Exam     Check up, would like to discuss lexapro not working // room 30        HPI:  Amarilis is a 82 y.o. year old     Patient here today to complain of anxiety   Reports stressors-relationships with family members   Currently compliant with Lexapro 10 mg which initially worked but has since started to fail  Reports fixating on her problems    Primary insomnia  Rx-   Previous Rx- Remeron (ineffective), Doxepin (ineffective), trazodone (hangover)      Depressed mood / Anxiety   Rx-Lexapro 10 mg   Stressor-fall out with daughter      Hypothyroidism  Current Rx-armor Thyroid 60 mg + 15 mg daily   Previous Rx-levothyroxine (ineffective)  Previous TSH in normal range   Endocrinology- Dr Fritz      Bronchiectasis, history of pulmonary nodules  Seen by pulmonology - Dr. 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\     Senile osteoporosis  Prev Rx-Boniva 150 (pt choice to quit)  Taking vitamin-D supplement  No recent fractures     Seasonal allergies / chronic sinusitis   Rx-Flonase, Xyzal, montelukast, astelin  Does have recurrent sinusitis, seen by ENT ([place])      Psoriasis  Rx-betamethasone, clobetasol, desonide, Topicort  Followed by dermatology     B12 deficiency  Rx-B12 injections 500 mcg every 2 weeks     Chronic Low back pain , sacroiliitis  Pain Mgt : Juan F   Rx : Mobic 15 mg      ALONZO  Sleep MD : Catarina Morfin  Awaiting machine      Past Medical History:  Past Medical  History:   Diagnosis Date    Allergy     Asthma     GERD (gastroesophageal reflux disease)     Globus syndrome     Headache(784.0)     Hypothyroidism     Psoriasis     Rash     Sacroiliitis, not elsewhere classified 1/12/2023       Past Surgical History:  Past Surgical History:   Procedure Laterality Date    ADENOIDECTOMY      CHOLECYSTECTOMY      COLONOSCOPY      DEXA      WNL    SINUS SURGERY      Dr Cordova    SINUS SURGERY  01/2017    xs 5    THYROID SURGERY      nodules removed //Dr Amato     TONSILLECTOMY      TYMPANOSTOMY TUBE PLACEMENT         Family History:  Family History   Problem Relation Age of Onset    Hypertension Mother     Obesity Father     Diabetes Sister     Obesity Sister     No Known Problems Maternal Grandmother     No Known Problems Maternal Grandfather     No Known Problems Paternal Grandmother     No Known Problems Paternal Grandfather     Breast cancer Paternal Aunt 43    Thyroid cancer Daughter 61    Ovarian cancer Neg Hx        Social History:  Social History     Socioeconomic History    Marital status:    Tobacco Use    Smoking status: Never    Smokeless tobacco: Never   Substance and Sexual Activity    Alcohol use: Yes     Comment: twice a month    Drug use: No    Sexual activity: Never       Medications:  Current Outpatient Medications on File Prior to Visit   Medication Sig Dispense Refill    azelastine (ASTELIN) 137 mcg (0.1 %) nasal spray 1 spray (137 mcg total) by Nasal route 2 (two) times daily. 30 mL 3    betamethasone dipropionate 0.05 % cream Apply topically once daily. 45 g 5    cholecalciferol, vitamin D3, (VITAMIN D3) 50 mcg (2,000 unit) Tab Take 2,000 Units by mouth once daily.      clobetasol 0.05% (TEMOVATE) 0.05 % Oint 1 application once daily. Apply to affected area      desonide 0.05% (DESOWEN) 0.05 % Oint Apply topically 2 (two) times daily. 60 g 2    desoximetasone (TOPICORT) 0.05 % cream Apply topically 2 (two) times daily. 60 g 11    EScitalopram  oxalate (LEXAPRO) 10 MG tablet Take 1 tablet (10 mg total) by mouth once daily. 90 tablet 3    fluticasone propionate (FLONASE) 50 mcg/actuation nasal spray 1 spray (50 mcg total) by Each Nostril route once daily. 54 g 3    ketoconazole (NIZORAL) 2 % shampoo Apply topically twice a week. 120 mL 3    levocetirizine (XYZAL) 5 MG tablet Take one capsule by mouth daily 90 tablet 0    MAGNESIUM ORAL Take by mouth once. Pt takes 400mg once daily      mupirocin (BACTROBAN) 2 % ointment Apply topically 3 (three) times daily. 30 g 2    thyroid, pork, (ARMOUR THYROID) 60 mg Tab Take 1 tablet (60 mg total) by mouth before breakfast. 90 tablet 3     Current Facility-Administered Medications on File Prior to Visit   Medication Dose Route Frequency Provider Last Rate Last Admin    cyanocobalamin injection 1,000 mcg  1,000 mcg Intramuscular Q28 Days Luc Teixeira MD   1,000 mcg at 04/21/23 1338       Allergies:  Avelox [moxifloxacin], Bactrim [sulfamethoxazole-trimethoprim], Ciprofloxacin, Isothiazolinones, Neomycin sulfate, Apremilast, and Bacitracin    Immunizations:  Immunization History   Administered Date(s) Administered    COVID-19 Vaccine 03/31/2021    COVID-19, MRNA, LN-S, PF (MODERNA FULL 0.5 ML DOSE) 12/16/2021    COVID-19, vector-nr, rS-Ad26, PF ([facility]) 04/05/2021    Hepatitis A, Adult 08/09/2007    IPV 08/09/2007    Influenza 11/18/2011, 10/21/2013    Influenza (FLUAD) - Quadrivalent - Adjuvanted - PF *Preferred* (65+) 10/07/2021, 10/03/2022    Influenza - High Dose - PF (65 years and older) 10/14/2014, 10/08/2015, 09/27/2016    Influenza - Quadrivalent - MDCK - PF 10/10/2017    Influenza - Quadrivalent - PF *Preferred* (6 months and older) 10/29/2019, 09/25/2020    Influenza - Trivalent (ADULT) 11/18/2011    Influenza - Trivalent - PF (ADULT) 10/21/2013    Pneumococcal Conjugate - 13 Valent 04/12/2016    Pneumococcal Polysaccharide - 23 Valent 08/01/2011    Td (Adult), Unspecified Formulation 08/02/2018    Tdap  "08/09/2007, 08/01/2018    Zoster 07/11/2007       Review of Systems:  Review of Systems   All other systems reviewed and are negative.    Objective:    Vitals:  Vitals:    05/12/23 0836   BP: 120/70   Pulse: (!) 55   SpO2: 98%   Weight: 59.6 kg (131 lb 8.1 oz)   Height: 5' 2" (1.575 m)   PainSc: 0-No pain       Physical Exam  Vitals reviewed.   Constitutional:       General: She is not in acute distress.     Appearance: She is well-developed.   HENT:      Head: Normocephalic and atraumatic.   Pulmonary:      Effort: Pulmonary effort is normal. No respiratory distress.   Musculoskeletal:      Cervical back: Normal range of motion.   Psychiatric:         Behavior: Behavior normal.         Thought Content: Thought content normal.         Judgment: Judgment normal.           Luc Teixeira MD  Family Medicine      "

## 2023-06-02 ENCOUNTER — OFFICE VISIT (OUTPATIENT)
Dept: FAMILY MEDICINE | Facility: CLINIC | Age: 83
End: 2023-06-02
Payer: MEDICARE

## 2023-06-02 ENCOUNTER — PATIENT MESSAGE (OUTPATIENT)
Dept: PSYCHIATRY | Facility: CLINIC | Age: 83
End: 2023-06-02
Payer: MEDICARE

## 2023-06-02 VITALS
OXYGEN SATURATION: 98 % | BODY MASS INDEX: 24.06 KG/M2 | HEIGHT: 62 IN | HEART RATE: 59 BPM | SYSTOLIC BLOOD PRESSURE: 122 MMHG | DIASTOLIC BLOOD PRESSURE: 62 MMHG | WEIGHT: 130.75 LBS

## 2023-06-02 DIAGNOSIS — F41.1 GENERALIZED ANXIETY DISORDER: Primary | ICD-10-CM

## 2023-06-02 DIAGNOSIS — F33.9 MAJOR DEPRESSIVE DISORDER, RECURRENT EPISODE WITH ANXIOUS DISTRESS: ICD-10-CM

## 2023-06-02 DIAGNOSIS — R10.2 PELVIC PAIN: ICD-10-CM

## 2023-06-02 PROCEDURE — 99214 PR OFFICE/OUTPT VISIT, EST, LEVL IV, 30-39 MIN: ICD-10-PCS | Mod: S$PBB,,, | Performed by: FAMILY MEDICINE

## 2023-06-02 PROCEDURE — 99999 PR PBB SHADOW E&M-EST. PATIENT-LVL V: ICD-10-PCS | Mod: PBBFAC,,, | Performed by: FAMILY MEDICINE

## 2023-06-02 PROCEDURE — 99215 OFFICE O/P EST HI 40 MIN: CPT | Mod: PBBFAC,PN | Performed by: FAMILY MEDICINE

## 2023-06-02 PROCEDURE — 96372 THER/PROPH/DIAG INJ SC/IM: CPT | Mod: PBBFAC,PN

## 2023-06-02 PROCEDURE — 99999 PR PBB SHADOW E&M-EST. PATIENT-LVL V: CPT | Mod: PBBFAC,,, | Performed by: FAMILY MEDICINE

## 2023-06-02 PROCEDURE — 99214 OFFICE O/P EST MOD 30 MIN: CPT | Mod: S$PBB,,, | Performed by: FAMILY MEDICINE

## 2023-06-02 RX ORDER — ESCITALOPRAM OXALATE 10 MG/1
10 TABLET ORAL DAILY
Qty: 90 TABLET | Refills: 3 | Status: SHIPPED | OUTPATIENT
Start: 2023-06-02 | End: 2023-08-10

## 2023-06-02 RX ORDER — BUPROPION HYDROCHLORIDE 100 MG/1
100 TABLET, EXTENDED RELEASE ORAL 2 TIMES DAILY
Qty: 180 TABLET | Refills: 3 | Status: SHIPPED | OUTPATIENT
Start: 2023-06-02 | End: 2023-08-10

## 2023-06-02 RX ADMIN — CYANOCOBALAMIN 1000 MCG: 1000 INJECTION, SOLUTION INTRAMUSCULAR at 09:06

## 2023-06-02 NOTE — PROGRESS NOTES
THIS DOCUMENT WAS MADE IN PART WITH VOICE RECOGNITION SOFTWARE.  OCCASIONALLY THIS SOFTWARE WILL MISINTERPRET WORDS OR PHRASES.    Assessment and Plan:    1. Generalized anxiety disorder  Ambulatory referral/consult to Psychology    buPROPion (WELLBUTRIN SR) 100 MG TBSR 12 hr tablet    EScitalopram oxalate (LEXAPRO) 10 MG tablet      2. Major depressive disorder, recurrent episode with anxious distress  Ambulatory referral/consult to Psychology    buPROPion (WELLBUTRIN SR) 100 MG TBSR 12 hr tablet    EScitalopram oxalate (LEXAPRO) 10 MG tablet      3. Pelvic pain  Ambulatory referral/consult to Urogynecology          Lower dose of Lexapro 10 mg.  Discontinue buspirone, medication ineffective.  New medication Wellbutrin  Follow-up in 2 weeks     Refer to urogynecology for evaluate pelvic pain    ______________________________________________________________________  Subjective:    Chief Complaint:  Chief Complaint   Patient presents with    Follow-up     3 week f/u from last visit         HPI:  Amarilis is a 82 y.o. year old       Follow-up anxiety   At previous visit we did add buspirone 5 mg b.i.d., increased dose of Lexapro from 10-20 mg  Reports weight gain with 20 still having uncontrolled anxiety.  Has had more difficulties with relationship with family.  Interested in counseling.      Complains of pelvic discomfort.  Requesting referral to Urogynecology.        Primary insomnia  Rx- nothing  Previous Rx- Remeron (ineffective), Doxepin (ineffective), trazodone (hangover)      Depressed mood / Anxiety   Rx-Lexapro 20 mg  plus buspirone 5 mg b.i.d.  Stressor-fall out with daughter      Hypothyroidism  Current Rx-armor Thyroid 60 mg + 15 mg daily   Previous Rx-levothyroxine (ineffective)  Previous TSH in normal range   Endocrinology- Dr Fritz      Bronchiectasis, history of pulmonary nodules  Seen by pulmonology - Dr. 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\      Senile osteoporosis  Prev Rx-Boniva 150 (pt choice to quit)  Taking vitamin-D supplement  No recent fractures     Seasonal allergies / chronic sinusitis   Rx-Flonase, Xyzal, montelukast, astelin  Does have recurrent sinusitis, seen by ENT ([place])      Psoriasis  Rx-betamethasone, clobetasol, desonide, Topicort  Followed by dermatology     B12 deficiency  Rx-B12 injections 500 mcg every 2 weeks     Chronic Low back pain , sacroiliitis  Pain Mgt : Hubbel   Rx : Mobic 15 mg      ALONZO  Sleep MD : Catarina Morfin  Awaiting machine           Past Medical History:  Past Medical History:   Diagnosis Date    Allergy     Asthma     GERD (gastroesophageal reflux disease)     Globus syndrome     Headache(784.0)     Hypothyroidism     Psoriasis     Rash     Sacroiliitis, not elsewhere classified 1/12/2023       Past Surgical History:  Past Surgical History:   Procedure Laterality Date    ADENOIDECTOMY      CHOLECYSTECTOMY      COLONOSCOPY      DEXA      WNL    SINUS SURGERY      Dr Cordova    SINUS SURGERY  01/2017    xs 5    THYROID SURGERY      nodules removed //Dr Amato     TONSILLECTOMY      TYMPANOSTOMY TUBE PLACEMENT         Family History:  Family History   Problem Relation Age of Onset    Hypertension Mother     Obesity Father     Diabetes Sister     Obesity Sister     No Known Problems Maternal Grandmother     No Known Problems Maternal Grandfather     No Known Problems Paternal Grandmother     No Known Problems Paternal Grandfather     Breast cancer Paternal Aunt 43    Thyroid cancer Daughter 61    Ovarian cancer Neg Hx        Social History:  Social History     Socioeconomic History    Marital status:    Tobacco Use    Smoking status: Never    Smokeless tobacco: Never   Substance and Sexual Activity    Alcohol use: Yes     Comment: twice a month    Drug use: No    Sexual activity: Never       Medications:  Current Outpatient Medications on File Prior to Visit   Medication Sig Dispense Refill    azelastine  (ASTELIN) 137 mcg (0.1 %) nasal spray 1 spray (137 mcg total) by Nasal route 2 (two) times daily. 30 mL 3    betamethasone dipropionate 0.05 % cream Apply topically once daily. 45 g 5    busPIRone (BUSPAR) 5 MG Tab Take 1 tablet (5 mg total) by mouth 2 (two) times daily. 60 tablet 11    cholecalciferol, vitamin D3, (VITAMIN D3) 50 mcg (2,000 unit) Tab Take 2,000 Units by mouth once daily.      clobetasol 0.05% (TEMOVATE) 0.05 % Oint 1 application once daily. Apply to affected area      desonide 0.05% (DESOWEN) 0.05 % Oint Apply topically 2 (two) times daily. 60 g 2    desoximetasone (TOPICORT) 0.05 % cream Apply topically 2 (two) times daily. 60 g 11    EScitalopram oxalate (LEXAPRO) 20 MG tablet Take 1 tablet (20 mg total) by mouth once daily. 30 tablet 11    fluticasone propionate (FLONASE) 50 mcg/actuation nasal spray 1 spray (50 mcg total) by Each Nostril route once daily. 54 g 3    ketoconazole (NIZORAL) 2 % shampoo Apply topically twice a week. 120 mL 3    levocetirizine (XYZAL) 5 MG tablet Take one capsule by mouth daily 90 tablet 0    MAGNESIUM ORAL Take by mouth once. Pt takes 400mg once daily      mupirocin (BACTROBAN) 2 % ointment Apply topically 3 (three) times daily. 30 g 2    thyroid, pork, (ARMOUR THYROID) 60 mg Tab Take 1 tablet (60 mg total) by mouth before breakfast. 90 tablet 3     Current Facility-Administered Medications on File Prior to Visit   Medication Dose Route Frequency Provider Last Rate Last Admin    cyanocobalamin injection 1,000 mcg  1,000 mcg Intramuscular Q28 Days Luc Teixeira MD   1,000 mcg at 04/21/23 1338       Allergies:  Avelox [moxifloxacin], Bactrim [sulfamethoxazole-trimethoprim], Ciprofloxacin, Isothiazolinones, Neomycin sulfate, Apremilast, and Bacitracin    Immunizations:  Immunization History   Administered Date(s) Administered    COVID-19 Vaccine 03/31/2021    COVID-19, MRNA, LN-S, PF (MODERNA FULL 0.5 ML DOSE) 12/16/2021    COVID-19, vector-nr, rS-Ad26, PF  "(Johanna) 04/05/2021    Hepatitis A, Adult 08/09/2007    IPV 08/09/2007    Influenza 11/18/2011, 10/21/2013    Influenza (FLUAD) - Quadrivalent - Adjuvanted - PF *Preferred* (65+) 10/07/2021, 10/03/2022    Influenza - High Dose - PF (65 years and older) 10/14/2014, 10/08/2015, 09/27/2016    Influenza - Quadrivalent - MDCK - PF 10/10/2017    Influenza - Quadrivalent - PF *Preferred* (6 months and older) 10/29/2019, 09/25/2020    Influenza - Trivalent (ADULT) 11/18/2011    Influenza - Trivalent - PF (ADULT) 10/21/2013    Pneumococcal Conjugate - 13 Valent 04/12/2016    Pneumococcal Polysaccharide - 23 Valent 08/01/2011    Td (Adult), Unspecified Formulation 08/02/2018    Tdap 08/09/2007, 08/01/2018    Zoster 07/11/2007       Review of Systems:  Review of Systems   All other systems reviewed and are negative.    Objective:    Vitals:  Vitals:    06/02/23 0835   BP: 122/62   Pulse: (!) 59   SpO2: 98%   Weight: 59.3 kg (130 lb 11.7 oz)   Height: 5' 2" (1.575 m)   PainSc: 0-No pain       Physical Exam  Vitals reviewed.   Constitutional:       General: She is not in acute distress.     Appearance: She is well-developed.   HENT:      Head: Normocephalic and atraumatic.   Pulmonary:      Effort: Pulmonary effort is normal. No respiratory distress.   Musculoskeletal:      Cervical back: Normal range of motion.   Psychiatric:         Behavior: Behavior normal.         Thought Content: Thought content normal.         Judgment: Judgment normal.           Luc Teixeira MD  Family Medicine      "

## 2023-06-13 ENCOUNTER — TELEPHONE (OUTPATIENT)
Dept: OTOLARYNGOLOGY | Facility: CLINIC | Age: 83
End: 2023-06-13
Payer: MEDICARE

## 2023-06-13 NOTE — TELEPHONE ENCOUNTER
----- Message from Freddy Harrison sent at 6/13/2023 10:52 AM CDT -----  Contact: self  Type:  Sooner Appointment Request    Caller is requesting a sooner appointment.  Caller declined first available appointment listed below.  Caller will not accept being placed on the waitlist and is requesting a message be sent to doctor.    Name of Caller:  pt  When is the first available appointment?  7/11  Symptoms:  congestion  Best Call Back Number:  [phone]    Additional Information:  Thank you

## 2023-06-15 ENCOUNTER — OFFICE VISIT (OUTPATIENT)
Dept: UROGYNECOLOGY | Facility: CLINIC | Age: 83
End: 2023-06-15
Payer: MEDICARE

## 2023-06-15 VITALS
WEIGHT: 131 LBS | BODY MASS INDEX: 24.11 KG/M2 | HEIGHT: 62 IN | SYSTOLIC BLOOD PRESSURE: 123 MMHG | DIASTOLIC BLOOD PRESSURE: 69 MMHG | HEART RATE: 88 BPM

## 2023-06-15 DIAGNOSIS — N39.46 MIXED INCONTINENCE URGE AND STRESS: ICD-10-CM

## 2023-06-15 DIAGNOSIS — R10.2 PELVIC PAIN: ICD-10-CM

## 2023-06-15 DIAGNOSIS — N95.2 ATROPHIC VAGINITIS: ICD-10-CM

## 2023-06-15 DIAGNOSIS — R35.0 URINARY FREQUENCY: Primary | ICD-10-CM

## 2023-06-15 LAB
BILIRUBIN, UA POC OHS: NEGATIVE
BLOOD, UA POC OHS: NEGATIVE
CLARITY, UA POC OHS: CLEAR
COLOR, UA POC OHS: YELLOW
GLUCOSE, UA POC OHS: NEGATIVE
KETONES, UA POC OHS: NEGATIVE
LEUKOCYTES, UA POC OHS: ABNORMAL
NITRITE, UA POC OHS: NEGATIVE
PH, UA POC OHS: 7
PROTEIN, UA POC OHS: NEGATIVE
SPECIFIC GRAVITY, UA POC OHS: 1.01
UROBILINOGEN, UA POC OHS: 0.2

## 2023-06-15 PROCEDURE — 99214 OFFICE O/P EST MOD 30 MIN: CPT | Mod: S$PBB,,, | Performed by: NURSE PRACTITIONER

## 2023-06-15 PROCEDURE — 99999 PR PBB SHADOW E&M-EST. PATIENT-LVL IV: CPT | Mod: PBBFAC,,, | Performed by: NURSE PRACTITIONER

## 2023-06-15 PROCEDURE — 99214 OFFICE O/P EST MOD 30 MIN: CPT | Mod: PBBFAC,PO | Performed by: NURSE PRACTITIONER

## 2023-06-15 PROCEDURE — 87086 URINE CULTURE/COLONY COUNT: CPT | Performed by: NURSE PRACTITIONER

## 2023-06-15 PROCEDURE — 81003 URINALYSIS AUTO W/O SCOPE: CPT | Mod: PBBFAC,PO | Performed by: NURSE PRACTITIONER

## 2023-06-15 PROCEDURE — 99214 PR OFFICE/OUTPT VISIT, EST, LEVL IV, 30-39 MIN: ICD-10-PCS | Mod: S$PBB,,, | Performed by: NURSE PRACTITIONER

## 2023-06-15 PROCEDURE — 99999 PR PBB SHADOW E&M-EST. PATIENT-LVL IV: ICD-10-PCS | Mod: PBBFAC,,, | Performed by: NURSE PRACTITIONER

## 2023-06-15 NOTE — PROGRESS NOTES
Subjective:       Patient ID: Amarilis Decker is a 82 y.o. female.    Chief Complaint: Pelvic Pain      Amarilis Decker is a 82 y.o. female.  Who is new to me, presents today for consult in regards to pelvic pain.  She had the same symptoms around 40 years ago and at that time was diagnosed with fibromyalgia.  She was placed on Elavil for 2 years and this seemed to help with her pain.  She stopped taking the Elavil as she felt like she was having side effects from it.  She feels that she has been doing well until the 1st part of June.  At this time she started having these stabbing/knife-like pains in the vagina that would come and go.  She recorded some of them on a note book and they follow as below:  June 3rd in recliner June 4th in recliner June 5th June 6th, pressure June 9th pain June 10th light pain.  Patient also does make note of the fact that she started on Wellbutrin on June 5th.  She denies any vaginal discharge or bleeding.  She has retained her uterus and ovaries.  She denies any history of breast cancer.  She was not sexually active.  She has frequency every hour during the day.  She has nocturia times 1-2.  She does have some PVF at times.  She states that she was unsure if she has any leakage, but upon further questioning does feel that she has some UUI and to a lesser extent some LEI.  Her urinary symptoms are not overly bothersome to her and she does not wish to do anything about these.  She drinks decaffeinated tea water and flavored water primarily.  Patient feels strongly that her symptoms are related to the fibromyalgia.  She would like to have an exam to make sure nothing else is going on, but she was not overly anxious to have any daily medication or drastic treatment.    Review of Systems   Constitutional:  Negative for activity change, fever and unexpected weight change.   HENT:  Negative for hearing loss.    Eyes:  Negative for visual disturbance.   Respiratory:  Negative for  shortness of breath and wheezing.    Cardiovascular:  Negative for chest pain, palpitations and leg swelling.   Gastrointestinal:  Negative for abdominal pain, constipation and diarrhea.   Genitourinary:  Positive for frequency, pelvic pain and urgency. Negative for dyspareunia, dysuria, vaginal bleeding and vaginal discharge.   Musculoskeletal:  Negative for gait problem and neck pain.   Skin:  Negative for rash and wound.   Allergic/Immunologic: Negative for immunocompromised state.   Neurological:  Negative for tremors, speech difficulty and weakness.   Hematological:  Does not bruise/bleed easily.   Psychiatric/Behavioral:  Negative for agitation and confusion.      Objective:      Physical Exam  Vitals reviewed. Exam conducted with a chaperone present.   Constitutional:       General: She is not in acute distress.     Appearance: She is well-developed.   HENT:      Head: Normocephalic and atraumatic.   Neck:      Thyroid: No thyromegaly.   Pulmonary:      Effort: Pulmonary effort is normal. No respiratory distress.   Abdominal:      Palpations: Abdomen is soft.      Tenderness: There is no abdominal tenderness.      Hernia: No hernia is present.   Musculoskeletal:         General: Normal range of motion.      Cervical back: Normal range of motion.   Skin:     General: Skin is warm and dry.      Findings: No rash.   Neurological:      Mental Status: She is alert and oriented to person, place, and time.   Psychiatric:         Mood and Affect: Mood normal.         Behavior: Behavior normal.         Thought Content: Thought content normal.     Pelvic Exam:  V: No lesions. No palpable nodes.   Va:  No discharge or bleeding.  Patient denies any pain today.  Good length and support.  Vaginal tissue is very atrophic  Meatus:No caruncle or stenosis  Urethra: Non tender. No suburethral masses.  Cx/Cuff: Normal   Uterus:  Small nontender  Ad: No mass or tenderness.  Levators :Symmetrical. Normal tone. Non tender.  BL: Non  tender  RV: No hemorrhoids.      Assessment:       1. Urinary frequency    2. Mixed incontinence urge and stress    3. Pelvic pain    4. Atrophic vaginitis        Plan:       Urinary frequency patient is in agreement with sending urine for culture as noted below.  -     POCT Urinalysis(Instrument)  -     CULTURE, URINE    Mixed incontinence urge and stress patient feels that this is minor and does not wish to have any treatment at this time    Pelvic pain discomfort/pain tends to be transient.  Patient strongly feels that this is related to her fibromyalgia.  NP did discuss with patient that the atrophic vaginitis may be a contributing factor.  NP also discussed IC as a POSSIBLE contributing factor.  Patient would like to wait and see if the Wellbutrin controls her symptoms  -     Ambulatory referral/consult to Urogynecology    Atrophic vaginitis NP discussed atrophic vaginitis with patient.  NP offered Estrace cream to see if this would help with any of her pelvic pain.  Patient does not wish to begin the Estrace cream at this time.    RTC p.r.n.

## 2023-06-16 LAB
BACTERIA UR CULT: NORMAL
BACTERIA UR CULT: NORMAL

## 2023-06-20 ENCOUNTER — OFFICE VISIT (OUTPATIENT)
Dept: OTOLARYNGOLOGY | Facility: CLINIC | Age: 83
End: 2023-06-20
Payer: MEDICARE

## 2023-06-20 VITALS — BODY MASS INDEX: 24.59 KG/M2 | HEIGHT: 62 IN | WEIGHT: 133.63 LBS

## 2023-06-20 DIAGNOSIS — J32.4 CHRONIC PANSINUSITIS: ICD-10-CM

## 2023-06-20 DIAGNOSIS — Z98.890 HISTORY OF SINUS SURGERY: ICD-10-CM

## 2023-06-20 DIAGNOSIS — Z22.322 MRSA CARRIER: Primary | ICD-10-CM

## 2023-06-20 DIAGNOSIS — J01.01 ACUTE RECURRENT MAXILLARY SINUSITIS: ICD-10-CM

## 2023-06-20 PROCEDURE — 99999 PR PBB SHADOW E&M-EST. PATIENT-LVL III: CPT | Mod: PBBFAC,,, | Performed by: STUDENT IN AN ORGANIZED HEALTH CARE EDUCATION/TRAINING PROGRAM

## 2023-06-20 PROCEDURE — 99214 PR OFFICE/OUTPT VISIT, EST, LEVL IV, 30-39 MIN: ICD-10-PCS | Mod: 25,S$PBB,, | Performed by: STUDENT IN AN ORGANIZED HEALTH CARE EDUCATION/TRAINING PROGRAM

## 2023-06-20 PROCEDURE — 31231 NASAL ENDOSCOPY DX: CPT | Mod: PBBFAC,PO | Performed by: STUDENT IN AN ORGANIZED HEALTH CARE EDUCATION/TRAINING PROGRAM

## 2023-06-20 PROCEDURE — 87075 CULTR BACTERIA EXCEPT BLOOD: CPT | Performed by: STUDENT IN AN ORGANIZED HEALTH CARE EDUCATION/TRAINING PROGRAM

## 2023-06-20 PROCEDURE — 99214 OFFICE O/P EST MOD 30 MIN: CPT | Mod: 25,S$PBB,, | Performed by: STUDENT IN AN ORGANIZED HEALTH CARE EDUCATION/TRAINING PROGRAM

## 2023-06-20 PROCEDURE — 99213 OFFICE O/P EST LOW 20 MIN: CPT | Mod: PBBFAC,PO,25 | Performed by: STUDENT IN AN ORGANIZED HEALTH CARE EDUCATION/TRAINING PROGRAM

## 2023-06-20 PROCEDURE — 87070 CULTURE OTHR SPECIMN AEROBIC: CPT | Performed by: STUDENT IN AN ORGANIZED HEALTH CARE EDUCATION/TRAINING PROGRAM

## 2023-06-20 PROCEDURE — 99999 PR PBB SHADOW E&M-EST. PATIENT-LVL III: ICD-10-PCS | Mod: PBBFAC,,, | Performed by: STUDENT IN AN ORGANIZED HEALTH CARE EDUCATION/TRAINING PROGRAM

## 2023-06-20 PROCEDURE — 87076 CULTURE ANAEROBE IDENT EACH: CPT | Performed by: STUDENT IN AN ORGANIZED HEALTH CARE EDUCATION/TRAINING PROGRAM

## 2023-06-20 PROCEDURE — 31231 PR NASAL ENDOSCOPY, DX: ICD-10-PCS | Mod: S$PBB,,, | Performed by: STUDENT IN AN ORGANIZED HEALTH CARE EDUCATION/TRAINING PROGRAM

## 2023-06-20 PROCEDURE — 31231 NASAL ENDOSCOPY DX: CPT | Mod: S$PBB,,, | Performed by: STUDENT IN AN ORGANIZED HEALTH CARE EDUCATION/TRAINING PROGRAM

## 2023-06-20 RX ORDER — MUPIROCIN 20 MG/G
OINTMENT TOPICAL 2 TIMES DAILY
Qty: 30 G | Refills: 1 | Status: SHIPPED | OUTPATIENT
Start: 2023-06-20 | End: 2023-07-04

## 2023-06-20 RX ORDER — AZELASTINE 1 MG/ML
1 SPRAY, METERED NASAL 2 TIMES DAILY
Qty: 30 ML | Refills: 3 | Status: SHIPPED | OUTPATIENT
Start: 2023-06-20 | End: 2023-07-07

## 2023-06-20 RX ORDER — CLINDAMYCIN HYDROCHLORIDE 300 MG/1
300 CAPSULE ORAL 3 TIMES DAILY
Qty: 30 CAPSULE | Refills: 0 | Status: SHIPPED | OUTPATIENT
Start: 2023-06-20 | End: 2023-06-30

## 2023-06-20 NOTE — PROGRESS NOTES
"Otolaryngology Clinic Note    Subjective:       Patient ID: Amarilis Decker is a 82 y.o. female.    Chief Complaint: Sinus Problem        History of Present Illness: Amarilis Decker is a 82 y.o. female presenting with sinus concerns. She reports she has had 5 sinus surgeries, got staph infection and eye damage from one.   Has been to the dentist 5 times for oral pain. Pain moves, has been lower jaw on left. Has also had headaches, moves from cheeks to forehead. Has been similar since August 27th, which was after her visit by Dr. Delgado.   Has had a crown placed on right maxilla on August 25 but pain has been on the left.   4 days ago had black discharge from her nose. No other discharge from nose, no bad smell but poor sense of smell. No fevers, no chills.   Does sinus rinses every night, no output. Uses flonase PRN with afrin. Not often.      She saw Dr. Delgado 8/23: "Amarilis is here for follow-up of chronic sinusitis.  Seen 1 mo ago for persistent sinusitis. Was on Doxy and I cleaned out sinus.  Put her on Minocycline for her concerns"   He scoped her at that time with no purulence or polyps. I personally reviewed this scope today.   It looks like he gave her mupirocin and betamethasone spray.     10/14/22: Gave her steroids and augmentin at last visit. She did not do the steroids. Culture came back sensitive to bactrim. Sent clindamycin 10/3/22. She reports she was at [place] on Friday, came back Tuesday. Pain in left cheek down to left neck for 2 days this weekend, now it is gone. Did have some neck pain in back of neck last night, has lots of tension- does have degenerative disease. Still using ointment in rinses. Never had drainage congestion. Does take zyrtec daily. She has done allergy shots in past. Uses nasal sprays PRN. Pain rotates from cheek to jaw.     1/5/23: RTC. Reports over holidays- 2 months or so, started getting nightly headaches. Stopped mupirocin, no change. Sometimes so bad, " cannot sleep. Face, head and teeth are painful, some pressure in cheeks and forehead. Has gotten yellow stuff from bottle in last month. Never had headaches before. Happening every night, aspirin helps. Still doing rinses BID. Nerve pain in cheek and jaw is better, this is different. Taking xyzal nightly.     6/20/23: RTC. Started with nasal congestion 3 weeks ago, felt like allergies. Started with colored drainage 5 days ago or so. Getting chunks out with rinses and with blowing her nose. Worse on left. Doing rinses in AM. Has been using flonase as well with illness. Has been using astelin sometimes. No fevers. Has cheek pressure. Using steroid in rinses.         Past Surgical History:   Procedure Laterality Date    ADENOIDECTOMY      CHOLECYSTECTOMY      COLONOSCOPY      DEXA      WNL    SINUS SURGERY      Dr Cordova    SINUS SURGERY  01/2017    xs 5    THYROID SURGERY      nodules removed //Dr Amato     TONSILLECTOMY      TYMPANOSTOMY TUBE PLACEMENT       Past Medical History:   Diagnosis Date    Allergy     Asthma     GERD (gastroesophageal reflux disease)     Globus syndrome     Headache(784.0)     Hypothyroidism     Psoriasis     Rash     Sacroiliitis, not elsewhere classified 1/12/2023     Social Determinants of Health     Tobacco Use: Low Risk     Smoking Tobacco Use: Never    Smokeless Tobacco Use: Never    Passive Exposure: Not on file   Alcohol Use: Not on file   Financial Resource Strain: Not on file   Food Insecurity: Not on file   Transportation Needs: Not on file   Physical Activity: Not on file   Stress: Not on file   Social Connections: Not on file   Housing Stability: Not on file   Depression: Low Risk     Last PHQ Score: 0     Review of patient's allergies indicates:   Allergen Reactions    Avelox [moxifloxacin] Swelling    Bactrim [sulfamethoxazole-trimethoprim] Swelling    Ciprofloxacin Swelling and Hives    Isothiazolinones Dermatitis     Strong contact dermatitis    Neomycin sulfate  Dermatitis     Has contact allergy to neomycin sulfate and to Kathon CG, or isothiazolinone    Apremilast Other (See Comments)     Muscle cramps, cough    Bacitracin      Other reaction(s): unknown     Current Outpatient Medications   Medication Instructions    azelastine (ASTELIN) 137 mcg, Nasal, 2 times daily    betamethasone dipropionate 0.05 % cream Topical (Top), Daily    buPROPion (WELLBUTRIN SR) 100 mg, Oral, 2 times daily    cholecalciferol (vitamin D3) (VITAMIN D3) 2,000 Units, Oral, Daily    clobetasol 0.05% (TEMOVATE) 0.05 % Oint 1 application, Daily, Apply to affected area    desonide 0.05% (DESOWEN) 0.05 % Oint Topical (Top), 2 times daily    desoximetasone (TOPICORT) 0.05 % cream Topical (Top), 2 times daily    EScitalopram oxalate (LEXAPRO) 10 mg, Oral, Daily    fluticasone propionate (FLONASE) 50 mcg, Each Nostril, Daily    ketoconazole (NIZORAL) 2 % shampoo Topical (Top), Twice weekly    levocetirizine (XYZAL) 5 MG tablet Take one capsule by mouth daily    MAGNESIUM ORAL Oral, Once, Pt takes 400mg once daily    mupirocin (BACTROBAN) 2 % ointment Topical (Top), 3 times daily    thyroid (pork) (ARMOUR THYROID) 60 mg, Oral, Before breakfast         ENT ROS negative except as stated above.             Objective:      There were no vitals filed for this visit.    General: NAD, well appearing  Eyes: Normal conjunctiva and lids  Face: symmetric, nerve intact  Nose: The nose is without any evidence of any deformity. The nasal mucosa is moist. The septum is midline. There is no evidence of septal hematoma. The turbinates are without abnormality.   Ears: The ears are with normal-appearing pinna. Examination of the canals is normal appearing bilaterally. There is no drainage or erythema noted. The tympanic membranes are normal appearing with pearly color, normal-appearing landmarks and normal light reflex. Hearing is grossly intact.  Mouth: No obvious abnormalities to the lips. The teeth are unremarkable. The  gingivae are without any obvious evidence of infection or lesion. The oral mucosa is moist and pink. There are no obvious masses to the hard or soft palate.   Oropharynx: The uvula is midline.  The tongue is midline. The posterior pharynx is without erythema or exudate. The tonsils are normal appearing.  Salivary glands: The salivary glands are symmetric and not enlarged, no masses  Neck: No lymphadenopathy, trachea midline, thryoid not enlarged.  Psych: Normal mood and affect.   Neuro: Grossly intact  Speech: fluent    Procedure: Nasal Endoscopy  Indications: chronic sinusitis   Verbal consent obtained  Anesthesia: topical lidocaine and phenylephrine spray    Procedure: Rigid 30 degree endoscope was passed into each nare to nasopharynx showing findings below.     Septum is midline. Nasal mucosa moist. Turbinates are normal size and respond to decongestant appropriately. Right and left max and ethmoids widely patent. Thick mucopurulence coming from floor on left maxillary, sinus culture obtained, suctioned out. No adenoid hypertrophy.          Assessment and Plan:       1. MRSA carrier    2. Acute recurrent maxillary sinusitis    3. History of sinus surgery    4. Chronic pansinusitis        Acute left maxillary sinusitis today.   - clindamycin three times daily x 10 days  - mupirocin 1 tsp in rinses twice a day.   - follow culture to confirm, has been MRSA prior.       Continue rinses with betamethasone.   Continue astelin as needed.   CT sinus 1/12/2023 without significant sinus disease, all sinuses opened surgically and patent.     RTC: PRN, message with drainage and I can do phone call or virtual if needed as she knows her symptoms well and is usually correct about MRSA sinusitis.      Plan of care was discussed in detail with the patient, who agreed with the plan as above. All questions were answered in detail.     Eboni Briggs MD  Otolaryngology;

## 2023-06-20 NOTE — PATIENT INSTRUCTIONS
Acute left maxillary sinusitis today.   - clindamycin three times daily x 10 days  - mupirocin 1 tsp in rinses twice a day for 14 days       Continue rinses with betamethasone.   Continue astelin as needed.

## 2023-06-23 LAB — BACTERIA SPEC AEROBE CULT: NORMAL

## 2023-06-27 LAB — BACTERIA SPEC ANAEROBE CULT: ABNORMAL

## 2023-06-30 NOTE — PROGRESS NOTES
Pt in clinic for B12 injection. Medication administered. Pt tolerated well and left exam room.

## 2023-07-07 ENCOUNTER — OFFICE VISIT (OUTPATIENT)
Dept: FAMILY MEDICINE | Facility: CLINIC | Age: 83
End: 2023-07-07
Payer: MEDICARE

## 2023-07-07 VITALS
HEART RATE: 63 BPM | WEIGHT: 130.63 LBS | HEIGHT: 62 IN | SYSTOLIC BLOOD PRESSURE: 128 MMHG | RESPIRATION RATE: 16 BRPM | DIASTOLIC BLOOD PRESSURE: 78 MMHG | BODY MASS INDEX: 24.04 KG/M2 | OXYGEN SATURATION: 98 %

## 2023-07-07 DIAGNOSIS — F41.9 ANXIETY: ICD-10-CM

## 2023-07-07 DIAGNOSIS — G47.33 OBSTRUCTIVE SLEEP APNEA: ICD-10-CM

## 2023-07-07 DIAGNOSIS — R10.2 PELVIC PAIN: Primary | ICD-10-CM

## 2023-07-07 DIAGNOSIS — F51.5 NIGHTMARES: ICD-10-CM

## 2023-07-07 PROCEDURE — 99999 PR PBB SHADOW E&M-EST. PATIENT-LVL III: CPT | Mod: PBBFAC,,, | Performed by: FAMILY MEDICINE

## 2023-07-07 PROCEDURE — 99213 OFFICE O/P EST LOW 20 MIN: CPT | Mod: PBBFAC,25,PN | Performed by: FAMILY MEDICINE

## 2023-07-07 PROCEDURE — 96372 THER/PROPH/DIAG INJ SC/IM: CPT | Mod: PBBFAC,PN

## 2023-07-07 PROCEDURE — 99999 PR PBB SHADOW E&M-EST. PATIENT-LVL III: ICD-10-PCS | Mod: PBBFAC,,, | Performed by: FAMILY MEDICINE

## 2023-07-07 PROCEDURE — 99213 OFFICE O/P EST LOW 20 MIN: CPT | Mod: S$PBB,,, | Performed by: FAMILY MEDICINE

## 2023-07-07 PROCEDURE — 99213 PR OFFICE/OUTPT VISIT, EST, LEVL III, 20-29 MIN: ICD-10-PCS | Mod: S$PBB,,, | Performed by: FAMILY MEDICINE

## 2023-07-07 RX ADMIN — CYANOCOBALAMIN 1000 MCG: 1000 INJECTION, SOLUTION INTRAMUSCULAR at 09:07

## 2023-07-07 NOTE — PROGRESS NOTES
THIS DOCUMENT WAS MADE IN PART WITH VOICE RECOGNITION SOFTWARE.  OCCASIONALLY THIS SOFTWARE WILL MISINTERPRET WORDS OR PHRASES.    Assessment and Plan:    1. Pelvic pain        2. Anxiety        3. Obstructive sleep apnea        4. Nightmares            PLAN    Pelvic pain resolved     Anxiety stable     Obstructive sleep apnea stable with CPAP device     Counseled patient on nightmares, consider prazosin in the future    Follow-up p.r.n.      ______________________________________________________________________  Subjective:    Chief Complaint:  Chief Complaint   Patient presents with    Follow-up        HPI:  Amarilis is a 82 y.o. year old       Follow-up anxiety   At previous visit we lower dose of Lexapro, discontinued buspirone secondary to lack of affect and added new medicine Wellbutrin   D/c'ed lexapro (nightmares)     Follow-up pelvic discomfort-referred to Urogynecology, patient reports not feeling comfortable with the assessment, prefers not to follow-up.  Provider did suspect that pelvic discomfort related to fibromyalgia.  Offered Estrace cream for atrophic vaginitis   Reports wellbutrin has improved pelvic pain    Obstructive sleep apnea follow-up  Using new machine  Helps     Still having nightmares         Primary insomnia  Rx- Melatonin  Previous Rx- Remeron (ineffective), Doxepin (ineffective), trazodone (hangover)      Depressed mood / Anxiety   Rx : wellbutrin   Prev Rx-Lexapro (nightmares), buspirone (ineffective)  Stressor-fall out with daughter      Hypothyroidism  Current Rx-armor Thyroid 60 mg + 15 mg daily   Previous Rx-levothyroxine (ineffective)  Previous TSH in normal range   Endocrinology- Dr Fritz      Bronchiectasis, history of pulmonary nodules  Seen by pulmonology - Dr. 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\     Senile osteoporosis  Prev Rx-Boniva 150 (pt choice to quit)  Taking vitamin-D supplement  No recent fractures     Seasonal  allergies / chronic sinusitis   Rx-Flonase, Xyzal, montelukast, astelin  Does have recurrent sinusitis, seen by ENT ([place])      Psoriasis  Rx-betamethasone, clobetasol, desonide, Topicort  Followed by dermatology     B12 deficiency  Rx-B12 injections 500 mcg every 2 weeks     Chronic Low back pain , sacroiliitis  Pain Mgt : Bestbel   Rx : Mobic 15 mg      ALONZO  Sleep MD : Catarina Morfin  Awaiting machine    Past Medical History:  Past Medical History:   Diagnosis Date    Allergy     Asthma     GERD (gastroesophageal reflux disease)     Globus syndrome     Headache(784.0)     Hypothyroidism     Psoriasis     Rash     Sacroiliitis, not elsewhere classified 1/12/2023       Past Surgical History:  Past Surgical History:   Procedure Laterality Date    ADENOIDECTOMY      CHOLECYSTECTOMY      COLONOSCOPY      DEXA      WNL    SINUS SURGERY      Dr Cordvoa    SINUS SURGERY  01/2017    xs 5    THYROID SURGERY      nodules removed //Dr Amato     TONSILLECTOMY      TYMPANOSTOMY TUBE PLACEMENT         Family History:  Family History   Problem Relation Age of Onset    Hypertension Mother     Obesity Father     Diabetes Sister     Obesity Sister     No Known Problems Maternal Grandmother     No Known Problems Maternal Grandfather     No Known Problems Paternal Grandmother     No Known Problems Paternal Grandfather     Breast cancer Paternal Aunt 43    Thyroid cancer Daughter 61    Ovarian cancer Neg Hx        Social History:  Social History     Socioeconomic History    Marital status:    Tobacco Use    Smoking status: Never    Smokeless tobacco: Never   Substance and Sexual Activity    Alcohol use: Yes     Comment: twice a month    Drug use: No    Sexual activity: Never       Medications:  Current Outpatient Medications on File Prior to Visit   Medication Sig Dispense Refill    azelastine (ASTELIN) 137 mcg (0.1 %) nasal spray 1 spray (137 mcg total) by Nasal route 2 (two) times daily. 30 mL 3    betamethasone  dipropionate 0.05 % cream Apply topically once daily. 45 g 5    buPROPion (WELLBUTRIN SR) 100 MG TBSR 12 hr tablet Take 1 tablet (100 mg total) by mouth 2 (two) times daily. 180 tablet 3    cholecalciferol, vitamin D3, (VITAMIN D3) 50 mcg (2,000 unit) Tab Take 2,000 Units by mouth once daily.      clobetasol 0.05% (TEMOVATE) 0.05 % Oint 1 application once daily. Apply to affected area      desonide 0.05% (DESOWEN) 0.05 % Oint Apply topically 2 (two) times daily. 60 g 2    desoximetasone (TOPICORT) 0.05 % cream Apply topically 2 (two) times daily. 60 g 11    EScitalopram oxalate (LEXAPRO) 10 MG tablet Take 1 tablet (10 mg total) by mouth once daily. 90 tablet 3    fluticasone propionate (FLONASE) 50 mcg/actuation nasal spray 1 spray (50 mcg total) by Each Nostril route once daily. 54 g 3    ketoconazole (NIZORAL) 2 % shampoo Apply topically twice a week. 120 mL 3    levocetirizine (XYZAL) 5 MG tablet Take one capsule by mouth daily 90 tablet 0    MAGNESIUM ORAL Take by mouth once. Pt takes 400mg once daily      mupirocin (BACTROBAN) 2 % ointment Apply topically 3 (three) times daily. 30 g 2    thyroid, pork, (ARMOUR THYROID) 60 mg Tab Take 1 tablet (60 mg total) by mouth before breakfast. 90 tablet 3    [DISCONTINUED] azelastine (ASTELIN) 137 mcg (0.1 %) nasal spray 1 spray (137 mcg total) by Nasal route 2 (two) times daily. 30 mL 3     Current Facility-Administered Medications on File Prior to Visit   Medication Dose Route Frequency Provider Last Rate Last Admin    cyanocobalamin injection 1,000 mcg  1,000 mcg Intramuscular Q28 Days Luc Teixeira MD   1,000 mcg at 06/02/23 0943       Allergies:  Avelox [moxifloxacin], Bactrim [sulfamethoxazole-trimethoprim], Ciprofloxacin, Isothiazolinones, Neomycin sulfate, Apremilast, and Bacitracin    Immunizations:  Immunization History   Administered Date(s) Administered    COVID-19 Vaccine 03/31/2021    COVID-19, MRNA, LN-S, PF (MODERNA FULL 0.5 ML DOSE) 12/16/2021     "COVID-19, vector-nr, rS-Ad26, PF (Johanna) 04/05/2021    Hepatitis A, Adult 08/09/2007    IPV 08/09/2007    Influenza 11/18/2011, 10/21/2013    Influenza (FLUAD) - Quadrivalent - Adjuvanted - PF *Preferred* (65+) 10/07/2021, 10/03/2022    Influenza - High Dose - PF (65 years and older) 10/14/2014, 10/08/2015, 09/27/2016    Influenza - Quadrivalent - MDCK - PF 10/10/2017    Influenza - Quadrivalent - PF *Preferred* (6 months and older) 10/29/2019, 09/25/2020    Influenza - Trivalent (ADULT) 11/18/2011    Influenza - Trivalent - PF (ADULT) 10/21/2013    Pneumococcal Conjugate - 13 Valent 04/12/2016    Pneumococcal Polysaccharide - 23 Valent 08/01/2011    Td (Adult), Unspecified Formulation 08/02/2018    Tdap 08/09/2007, 08/01/2018    Zoster 07/11/2007       Review of Systems:  Review of Systems   All other systems reviewed and are negative.    Objective:    Vitals:  Vitals:    07/07/23 0815   BP: 128/78   Pulse: 63   Resp: 16   SpO2: 98%   Weight: 59.2 kg (130 lb 10 oz)   Height: 5' 2" (1.575 m)       Physical Exam  Vitals reviewed.   Constitutional:       General: She is not in acute distress.  HENT:      Head: Normocephalic and atraumatic.   Eyes:      Pupils: Pupils are equal, round, and reactive to light.   Cardiovascular:      Rate and Rhythm: Normal rate and regular rhythm.      Heart sounds: No murmur heard.    No friction rub.   Pulmonary:      Effort: Pulmonary effort is normal.      Breath sounds: Normal breath sounds.   Abdominal:      General: Bowel sounds are normal. There is no distension.      Palpations: Abdomen is soft.      Tenderness: There is no abdominal tenderness.   Musculoskeletal:      Cervical back: Neck supple.   Skin:     General: Skin is warm and dry.      Findings: No rash.   Psychiatric:         Behavior: Behavior normal.           Luc Teixeira MD  Family Medicine      "

## 2023-07-18 ENCOUNTER — TELEPHONE (OUTPATIENT)
Dept: OTOLARYNGOLOGY | Facility: CLINIC | Age: 83
End: 2023-07-18
Payer: MEDICARE

## 2023-07-18 RX ORDER — DOXYCYCLINE HYCLATE 100 MG
100 TABLET ORAL 2 TIMES DAILY
Qty: 20 TABLET | Refills: 0 | Status: SHIPPED | OUTPATIENT
Start: 2023-07-18 | End: 2023-07-28

## 2023-07-18 NOTE — TELEPHONE ENCOUNTER
I spoke with patient she is blowing out green again and feels the infection is back.  Please advise

## 2023-07-18 NOTE — TELEPHONE ENCOUNTER
----- Message from Avelina Galloway, Patient Care Assistant sent at 7/18/2023  8:42 AM CDT -----  Contact: self  Pt believes she is infected again and needs to be seen [phone]  thanks

## 2023-07-18 NOTE — TELEPHONE ENCOUNTER
I'm sending doxycyline abx to her pharmacy, continue mupirocin in rinses 2 more weeks. If not better after this round, will add her on to clinic then. Last culture did not grow staph, so may benefit from a different antibiotic.

## 2023-08-10 ENCOUNTER — OFFICE VISIT (OUTPATIENT)
Dept: FAMILY MEDICINE | Facility: CLINIC | Age: 83
End: 2023-08-10
Payer: MEDICARE

## 2023-08-10 VITALS
HEART RATE: 64 BPM | SYSTOLIC BLOOD PRESSURE: 124 MMHG | DIASTOLIC BLOOD PRESSURE: 84 MMHG | HEIGHT: 62 IN | OXYGEN SATURATION: 98 % | RESPIRATION RATE: 16 BRPM | BODY MASS INDEX: 24.18 KG/M2 | WEIGHT: 131.38 LBS

## 2023-08-10 DIAGNOSIS — E53.8 VITAMIN B 12 DEFICIENCY: Primary | ICD-10-CM

## 2023-08-10 DIAGNOSIS — F41.1 GENERALIZED ANXIETY DISORDER: ICD-10-CM

## 2023-08-10 DIAGNOSIS — K59.00 CONSTIPATION, UNSPECIFIED CONSTIPATION TYPE: ICD-10-CM

## 2023-08-10 DIAGNOSIS — R26.89 IMBALANCE: ICD-10-CM

## 2023-08-10 DIAGNOSIS — L98.9 SKIN LESION: ICD-10-CM

## 2023-08-10 PROCEDURE — 99999 PR PBB SHADOW E&M-EST. PATIENT-LVL III: ICD-10-PCS | Mod: PBBFAC,,, | Performed by: FAMILY MEDICINE

## 2023-08-10 PROCEDURE — 99999PBSHW PR PBB SHADOW TECHNICAL ONLY FILED TO HB: Mod: PBBFAC,,,

## 2023-08-10 PROCEDURE — 99999 PR PBB SHADOW E&M-EST. PATIENT-LVL III: CPT | Mod: PBBFAC,,, | Performed by: FAMILY MEDICINE

## 2023-08-10 PROCEDURE — 99999PBSHW PR PBB SHADOW TECHNICAL ONLY FILED TO HB: ICD-10-PCS | Mod: PBBFAC,,,

## 2023-08-10 PROCEDURE — 99213 OFFICE O/P EST LOW 20 MIN: CPT | Mod: PBBFAC,PN | Performed by: FAMILY MEDICINE

## 2023-08-10 PROCEDURE — 99214 PR OFFICE/OUTPT VISIT, EST, LEVL IV, 30-39 MIN: ICD-10-PCS | Mod: S$PBB,,, | Performed by: FAMILY MEDICINE

## 2023-08-10 PROCEDURE — 96372 THER/PROPH/DIAG INJ SC/IM: CPT | Mod: PBBFAC,PN

## 2023-08-10 PROCEDURE — 99214 OFFICE O/P EST MOD 30 MIN: CPT | Mod: S$PBB,,, | Performed by: FAMILY MEDICINE

## 2023-08-10 RX ORDER — CYANOCOBALAMIN 1000 UG/ML
1000 INJECTION, SOLUTION INTRAMUSCULAR; SUBCUTANEOUS
Status: CANCELLED | OUTPATIENT
Start: 2023-08-10 | End: 2023-08-10

## 2023-08-10 RX ORDER — FLUOXETINE 10 MG/1
10 CAPSULE ORAL DAILY
Qty: 30 CAPSULE | Refills: 0 | Status: SHIPPED | OUTPATIENT
Start: 2023-08-10 | End: 2023-09-08

## 2023-08-10 RX ORDER — MUPIROCIN 20 MG/G
OINTMENT TOPICAL 3 TIMES DAILY
Qty: 30 G | Refills: 11 | Status: SHIPPED | OUTPATIENT
Start: 2023-08-10 | End: 2023-08-10

## 2023-08-10 RX ADMIN — CYANOCOBALAMIN 1000 MCG: 1000 INJECTION, SOLUTION INTRAMUSCULAR at 07:08

## 2023-08-10 NOTE — PROGRESS NOTES
THIS DOCUMENT WAS MADE IN PART WITH VOICE RECOGNITION SOFTWARE.  OCCASIONALLY THIS SOFTWARE WILL MISINTERPRET WORDS OR PHRASES.    Assessment and Plan:    1. Vitamin B 12 deficiency        2. Constipation, unspecified constipation type  linaCLOtide (LINZESS) 72 mcg Cap capsule    DISCONTINUED: linaCLOtide (LINZESS) 72 mcg Cap capsule      3. Imbalance        4. Generalized anxiety disorder  FLUoxetine 10 MG capsule      5. Skin lesion  DISCONTINUED: mupirocin (BACTROBAN) 2 % ointment            New medicine fluoxetine in place of Wellbutrin for uncontrolled anxiety   Anticipate imbalance, constipation will improve after discontinuing Wellbutrin  Follow-up in 4 weeks for recheck     Recommended patient use mupirocin for skin lesion/skin wound to left lower extremity to prevent infection.      ______________________________________________________________________  Subjective:    Chief Complaint:  Chief Complaint   Patient presents with    Constipation     Complains of Constipation once starting Welbutrin     Balance Issues      Complains off loss of balance since starting Welbutrin     Anxiety     Would like to change wellbutrin due to not working -- Taking Wellburtin since 06/02/2023        HPI:  Amarilis is a 82 y.o. year old       Constipation  Patient concerned Wellbutrin causing constipation   Taking MiraLax, half cap daily    Imbalance   Strong suspicion Wellbutrin is the cause.  When we initiated Wellbutrin twice daily, had severe imbalance, improved when she cut back to once daily.    Anxiety   Rx-Wellbutrin, initiated June 2, 2023   Reports anxiety uncontrolled, interested in switching to different medication    Noted to have skin lesion/wound to left lower extremity that she sustained in the yd.  No evidence of superinfection.      Primary insomnia  Rx- Melatonin  Previous Rx- Remeron (ineffective), Doxepin (ineffective), trazodone (hangover)      Depressed mood / Anxiety   Rx : wellbutrin (constipation,  imbalance, ineffective)  Prev Rx-Lexapro (nightmares), buspirone (ineffective)  Stressor-fall out with daughter      Hypothyroidism  Current Rx-armor Thyroid 60 mg + 15 mg daily   Previous Rx-levothyroxine (ineffective)  Previous TSH in normal range   Endocrinology- Dr Firtz      Bronchiectasis, history of pulmonary nodules  Seen by pulmonology - Dr. 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\     Senile osteoporosis  Prev Rx-Boniva 150 (pt choice to quit)  Taking vitamin-D supplement  No recent fractures     Seasonal allergies / chronic sinusitis   Rx-Flonase, Xyzal, montelukast, astelin  Does have recurrent sinusitis, seen by ENT ([place])      Psoriasis  Rx-betamethasone, clobetasol, desonide, Topicort  Followed by dermatology     B12 deficiency  Rx-B12 injections 500 mcg every 2 weeks     Chronic Low back pain , sacroiliitis  Pain Mgt : Hubbel   Rx : Mobic 15 mg      ALONZO  Sleep MD : Catarina Morfin  Awaiting machine    Past Medical History:  Past Medical History:   Diagnosis Date    Allergy     Asthma     GERD (gastroesophageal reflux disease)     Globus syndrome     Headache(784.0)     Hypothyroidism     Psoriasis     Rash     Sacroiliitis, not elsewhere classified 1/12/2023       Past Surgical History:  Past Surgical History:   Procedure Laterality Date    ADENOIDECTOMY      CHOLECYSTECTOMY      COLONOSCOPY      DEXA      WNL    SINUS SURGERY      Dr Cordova    SINUS SURGERY  01/2017    xs 5    THYROID SURGERY      nodules removed //Dr Amato     TONSILLECTOMY      TYMPANOSTOMY TUBE PLACEMENT         Family History:  Family History   Problem Relation Age of Onset    Hypertension Mother     Obesity Father     Diabetes Sister     Obesity Sister     No Known Problems Maternal Grandmother     No Known Problems Maternal Grandfather     No Known Problems Paternal Grandmother     No Known Problems Paternal Grandfather     Breast cancer Paternal Aunt  43    Thyroid cancer Daughter 61    Ovarian cancer Neg Hx        Social History:  Social History     Socioeconomic History    Marital status:    Tobacco Use    Smoking status: Never    Smokeless tobacco: Never   Substance and Sexual Activity    Alcohol use: Yes     Comment: twice a month    Drug use: No    Sexual activity: Never       Medications:  Current Outpatient Medications on File Prior to Visit   Medication Sig Dispense Refill    azelastine (ASTELIN) 137 mcg (0.1 %) nasal spray 1 spray (137 mcg total) by Nasal route 2 (two) times daily. 30 mL 3    betamethasone dipropionate 0.05 % cream Apply topically once daily. 45 g 5    cholecalciferol, vitamin D3, (VITAMIN D3) 50 mcg (2,000 unit) Tab Take 2,000 Units by mouth once daily.      clobetasol 0.05% (TEMOVATE) 0.05 % Oint 1 application once daily. Apply to affected area      desonide 0.05% (DESOWEN) 0.05 % Oint Apply topically 2 (two) times daily. 60 g 2    desoximetasone (TOPICORT) 0.05 % cream Apply topically 2 (two) times daily. 60 g 11    fluticasone propionate (FLONASE) 50 mcg/actuation nasal spray 1 spray (50 mcg total) by Each Nostril route once daily. 54 g 3    ketoconazole (NIZORAL) 2 % shampoo Apply topically twice a week. 120 mL 3    levocetirizine (XYZAL) 5 MG tablet Take one capsule by mouth daily 90 tablet 0    MAGNESIUM ORAL Take by mouth once. Pt takes 400mg once daily      mupirocin (BACTROBAN) 2 % ointment Apply topically 3 (three) times daily. 30 g 2    thyroid, pork, (ARMOUR THYROID) 60 mg Tab Take 1 tablet (60 mg total) by mouth before breakfast. 90 tablet 3    [DISCONTINUED] buPROPion (WELLBUTRIN SR) 100 MG TBSR 12 hr tablet Take 1 tablet (100 mg total) by mouth 2 (two) times daily. 180 tablet 3    [DISCONTINUED] EScitalopram oxalate (LEXAPRO) 10 MG tablet Take 1 tablet (10 mg total) by mouth once daily. 90 tablet 3     Current Facility-Administered Medications on File Prior to Visit   Medication Dose Route  "Frequency Provider Last Rate Last Admin    cyanocobalamin injection 1,000 mcg  1,000 mcg Intramuscular Q28 Days Luc Teixeira MD   1,000 mcg at 07/07/23 0906       Allergies:  Avelox [moxifloxacin], Bactrim [sulfamethoxazole-trimethoprim], Ciprofloxacin, Isothiazolinones, Neomycin sulfate, Apremilast, and Bacitracin    Immunizations:  Immunization History   Administered Date(s) Administered    COVID-19 Vaccine 03/31/2021    COVID-19, MRNA, LN-S, PF (MODERNA FULL 0.5 ML DOSE) 12/16/2021    COVID-19, vector-nr, rS-Ad26, PF (Johanna) 04/05/2021    Hepatitis A, Adult 08/09/2007    IPV 08/09/2007    Influenza 11/18/2011, 10/21/2013    Influenza (FLUAD) - Quadrivalent - Adjuvanted - PF *Preferred* (65+) 10/07/2021, 10/03/2022    Influenza - High Dose - PF (65 years and older) 10/14/2014, 10/08/2015, 09/27/2016    Influenza - Quadrivalent - MDCK - PF 10/10/2017    Influenza - Quadrivalent - PF *Preferred* (6 months and older) 10/29/2019, 09/25/2020    Influenza - Trivalent (ADULT) 11/18/2011    Influenza - Trivalent - PF (ADULT) 10/21/2013    Pneumococcal Conjugate - 13 Valent 04/12/2016    Pneumococcal Polysaccharide - 23 Valent 08/01/2011    Td (Adult), Unspecified Formulation 08/02/2018    Tdap 08/09/2007, 08/01/2018    Zoster 07/11/2007       Review of Systems:  Review of Systems   All other systems reviewed and are negative.      Objective:    Vitals:  Vitals:    08/10/23 0733   BP: 124/84   Pulse: 64   Resp: 16   SpO2: 98%   Weight: 59.6 kg (131 lb 6.3 oz)   Height: 5' 2" (1.575 m)       Physical Exam  Vitals reviewed.   Constitutional:       General: She is not in acute distress.  HENT:      Head: Normocephalic and atraumatic.   Eyes:      Pupils: Pupils are equal, round, and reactive to light.   Cardiovascular:      Rate and Rhythm: Normal rate and regular rhythm.      Heart sounds: No murmur heard.     No friction rub.   Pulmonary:      Effort: Pulmonary effort is normal.      Breath sounds: " Normal breath sounds.   Abdominal:      General: Bowel sounds are normal. There is no distension.      Palpations: Abdomen is soft.      Tenderness: There is no abdominal tenderness.   Musculoskeletal:      Cervical back: Neck supple.   Skin:     General: Skin is warm and dry.      Findings: No rash.          Neurological:      Sensory: Sensation is intact.      Motor: Motor function is intact.      Coordination: Coordination is intact.      Gait: Gait is intact.      Deep Tendon Reflexes:      Reflex Scores:       Bicep reflexes are 2+ on the right side and 2+ on the left side.       Brachioradialis reflexes are 2+ on the right side and 2+ on the left side.       Patellar reflexes are 2+ on the right side and 2+ on the left side.       Achilles reflexes are 2+ on the right side and 2+ on the left side.  Psychiatric:         Behavior: Behavior normal.           Luc Teixeira MD  Family Medicine

## 2023-08-14 RX ORDER — DOXYCYCLINE 100 MG/1
100 CAPSULE ORAL 2 TIMES DAILY
Qty: 10 CAPSULE | Refills: 0 | Status: SHIPPED | OUTPATIENT
Start: 2023-08-14 | End: 2023-09-08

## 2023-08-18 ENCOUNTER — OFFICE VISIT (OUTPATIENT)
Dept: OTOLARYNGOLOGY | Facility: CLINIC | Age: 83
End: 2023-08-18
Payer: MEDICARE

## 2023-08-18 VITALS — WEIGHT: 130.94 LBS | HEIGHT: 62 IN | BODY MASS INDEX: 24.09 KG/M2

## 2023-08-18 DIAGNOSIS — J01.01 ACUTE RECURRENT MAXILLARY SINUSITIS: Primary | ICD-10-CM

## 2023-08-18 PROCEDURE — 99213 PR OFFICE/OUTPT VISIT, EST, LEVL III, 20-29 MIN: ICD-10-PCS | Mod: S$PBB,,, | Performed by: STUDENT IN AN ORGANIZED HEALTH CARE EDUCATION/TRAINING PROGRAM

## 2023-08-18 PROCEDURE — 99213 OFFICE O/P EST LOW 20 MIN: CPT | Mod: PBBFAC,PO | Performed by: STUDENT IN AN ORGANIZED HEALTH CARE EDUCATION/TRAINING PROGRAM

## 2023-08-18 PROCEDURE — 99999 PR PBB SHADOW E&M-EST. PATIENT-LVL III: CPT | Mod: PBBFAC,,, | Performed by: STUDENT IN AN ORGANIZED HEALTH CARE EDUCATION/TRAINING PROGRAM

## 2023-08-18 PROCEDURE — 99999 PR PBB SHADOW E&M-EST. PATIENT-LVL III: ICD-10-PCS | Mod: PBBFAC,,, | Performed by: STUDENT IN AN ORGANIZED HEALTH CARE EDUCATION/TRAINING PROGRAM

## 2023-08-18 PROCEDURE — 99213 OFFICE O/P EST LOW 20 MIN: CPT | Mod: S$PBB,,, | Performed by: STUDENT IN AN ORGANIZED HEALTH CARE EDUCATION/TRAINING PROGRAM

## 2023-08-18 RX ORDER — MUPIROCIN 20 MG/G
OINTMENT TOPICAL DAILY
Qty: 30 G | Refills: 6 | Status: SHIPPED | OUTPATIENT
Start: 2023-08-18 | End: 2023-09-08

## 2023-08-18 NOTE — PROGRESS NOTES
"Otolaryngology Clinic Note    Subjective:       Patient ID: Amarilis Decker is a 82 y.o. female.    Chief Complaint: Sinus Problem (Check to see if infected )        History of Present Illness: Amarilis Decker is a 82 y.o. female presenting with sinus concerns. She reports she has had 5 sinus surgeries, got staph infection and eye damage from one.   Has been to the dentist 5 times for oral pain. Pain moves, has been lower jaw on left. Has also had headaches, moves from cheeks to forehead. Has been similar since August 27th, which was after her visit by Dr. Delgado.   Has had a crown placed on right maxilla on August 25 but pain has been on the left.   4 days ago had black discharge from her nose. No other discharge from nose, no bad smell but poor sense of smell. No fevers, no chills.   Does sinus rinses every night, no output. Uses flonase PRN with afrin. Not often.      She saw Dr. Delgado 8/23: "Amarilis is here for follow-up of chronic sinusitis.  Seen 1 mo ago for persistent sinusitis. Was on Doxy and I cleaned out sinus.  Put her on Minocycline for her concerns"   He scoped her at that time with no purulence or polyps. I personally reviewed this scope today.   It looks like he gave her mupirocin and betamethasone spray.     10/14/22: Gave her steroids and augmentin at last visit. She did not do the steroids. Culture came back sensitive to bactrim. Sent clindamycin 10/3/22. She reports she was at [place] on Friday, came back Tuesday. Pain in left cheek down to left neck for 2 days this weekend, now it is gone. Did have some neck pain in back of neck last night, has lots of tension- does have degenerative disease. Still using ointment in rinses. Never had drainage congestion. Does take zyrtec daily. She has done allergy shots in past. Uses nasal sprays PRN. Pain rotates from cheek to jaw.     1/5/23: RTC. Reports over holidays- 2 months or so, started getting nightly headaches. Stopped mupirocin, no " change. Sometimes so bad, cannot sleep. Face, head and teeth are painful, some pressure in cheeks and forehead. Has gotten yellow stuff from bottle in last month. Never had headaches before. Happening every night, aspirin helps. Still doing rinses BID. Nerve pain in cheek and jaw is better, this is different. Taking xyzal nightly.     6/20/23: RTC. Started with nasal congestion 3 weeks ago, felt like allergies. Started with colored drainage 5 days ago or so. Getting chunks out with rinses and with blowing her nose. Worse on left. Doing rinses in AM. Has been using flonase as well with illness. Has been using astelin sometimes. No fevers. Has cheek pressure. Using steroid in rinses.     8/18/23: RTC. Was still sick after clinda/doxy with dark stuff in sinus rinses. Got another round of doxy for leg infection. No more sinus concerns, still doing rinses but thinks she cannot tell when she has infections. No fevers, no green snot lately. Doing steroid in rinses. Did mupirocin for a couple of weeks.         Past Surgical History:   Procedure Laterality Date    ADENOIDECTOMY      CHOLECYSTECTOMY      COLONOSCOPY      DEXA      WNL    SINUS SURGERY      Dr Cordova    SINUS SURGERY  01/2017    xs 5    THYROID SURGERY      nodules removed //Dr Amato     TONSILLECTOMY      TYMPANOSTOMY TUBE PLACEMENT       Past Medical History:   Diagnosis Date    Allergy     Asthma     GERD (gastroesophageal reflux disease)     Globus syndrome     Headache(784.0)     Hypothyroidism     Psoriasis     Rash     Sacroiliitis, not elsewhere classified 1/12/2023     Social Determinants of Health     Tobacco Use: Low Risk  (8/18/2023)    Patient History     Smoking Tobacco Use: Never     Smokeless Tobacco Use: Never     Passive Exposure: Not on file   Alcohol Use: Not on file   Financial Resource Strain: Not on file   Food Insecurity: Not on file   Transportation Needs: Not on file   Physical Activity: Not on file   Stress: Not on file   Social  Connections: Not on file   Housing Stability: Not on file   Depression: Low Risk  (1/10/2023)    Depression     Last PHQ-4: Flowsheet Data: 0     Review of patient's allergies indicates:   Allergen Reactions    Avelox [moxifloxacin] Swelling    Bactrim [sulfamethoxazole-trimethoprim] Swelling    Ciprofloxacin Swelling and Hives    Isothiazolinones Dermatitis     Strong contact dermatitis    Neomycin sulfate Dermatitis     Has contact allergy to neomycin sulfate and to Kathon CG, or isothiazolinone    Apremilast Other (See Comments)     Muscle cramps, cough    Bacitracin      Other reaction(s): unknown     Current Outpatient Medications   Medication Instructions    azelastine (ASTELIN) 137 mcg, Nasal, 2 times daily    betamethasone dipropionate 0.05 % cream Topical (Top), Daily    cholecalciferol (vitamin D3) (VITAMIN D3) 2,000 Units, Oral, Daily    clobetasol 0.05% (TEMOVATE) 0.05 % Oint 1 application , Daily, Apply to affected area    desonide 0.05% (DESOWEN) 0.05 % Oint Topical (Top), 2 times daily    desoximetasone (TOPICORT) 0.05 % cream Topical (Top), 2 times daily    doxycycline (MONODOX) 100 mg, Oral, 2 times daily    FLUoxetine 10 mg, Oral, Daily    fluticasone propionate (FLONASE) 50 mcg, Each Nostril, Daily    ketoconazole (NIZORAL) 2 % shampoo Topical (Top), Twice weekly    levocetirizine (XYZAL) 5 MG tablet Take one capsule by mouth daily    linaCLOtide (LINZESS) 72 mcg, Oral, Daily PRN    MAGNESIUM ORAL Oral, Once, Pt takes 400mg once daily    thyroid (pork) (ARMOUR THYROID) 60 mg, Oral, Before breakfast         ENT ROS negative except as stated above.             Objective:      There were no vitals filed for this visit.    General: NAD, well appearing  Eyes: Normal conjunctiva and lids  Face: symmetric, nerve intact  Nose: The nose is without any evidence of any deformity. The nasal mucosa is moist. The septum is midline. There is no evidence of septal hematoma. The turbinates are without abnormality.    Ears: The ears are with normal-appearing pinna. Examination of the canals is normal appearing bilaterally. There is no drainage or erythema noted. The tympanic membranes are normal appearing with pearly color, normal-appearing landmarks and normal light reflex. Hearing is grossly intact.  Mouth: No obvious abnormalities to the lips. The teeth are unremarkable. The gingivae are without any obvious evidence of infection or lesion. The oral mucosa is moist and pink. There are no obvious masses to the hard or soft palate.   Oropharynx: The uvula is midline.  The tongue is midline. The posterior pharynx is without erythema or exudate. The tonsils are normal appearing.  Salivary glands: The salivary glands are symmetric and not enlarged, no masses  Neck: No lymphadenopathy, trachea midline, thryoid not enlarged.  Psych: Normal mood and affect.   Neuro: Grossly intact  Speech: fluent         Assessment and Plan:       1. 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  Continue astelin as needed.     CT sinus 1/12/2023 without significant sinus disease, all sinuses opened surgically and patent.     RTC: 6 months per her request, message with drainage and I can do phone call or virtual if needed as she knows her symptoms well and is usually correct about MRSA sinusitis.      Plan of care was discussed in detail with the patient, who agreed with the plan as above. All questions were answered in detail.     Eboni Briggs MD  Otolaryngology;

## 2023-09-08 ENCOUNTER — OFFICE VISIT (OUTPATIENT)
Dept: FAMILY MEDICINE | Facility: CLINIC | Age: 83
End: 2023-09-08
Payer: MEDICARE

## 2023-09-08 VITALS
HEIGHT: 62 IN | HEART RATE: 60 BPM | OXYGEN SATURATION: 98 % | WEIGHT: 131.38 LBS | SYSTOLIC BLOOD PRESSURE: 124 MMHG | BODY MASS INDEX: 24.18 KG/M2 | DIASTOLIC BLOOD PRESSURE: 68 MMHG

## 2023-09-08 DIAGNOSIS — Z23 IMMUNIZATION DUE: ICD-10-CM

## 2023-09-08 DIAGNOSIS — F41.1 GENERALIZED ANXIETY DISORDER: Primary | ICD-10-CM

## 2023-09-08 DIAGNOSIS — R39.9 LOWER URINARY TRACT SYMPTOMS (LUTS): ICD-10-CM

## 2023-09-08 LAB
BILIRUBIN, UA POC OHS: NEGATIVE
BLOOD, UA POC OHS: ABNORMAL
CLARITY, UA POC OHS: CLEAR
COLOR, UA POC OHS: YELLOW
GLUCOSE, UA POC OHS: NEGATIVE
KETONES, UA POC OHS: NEGATIVE
LEUKOCYTES, UA POC OHS: ABNORMAL
NITRITE, UA POC OHS: NEGATIVE
PH, UA POC OHS: 6
PROTEIN, UA POC OHS: NEGATIVE
SPECIFIC GRAVITY, UA POC OHS: 1.02
UROBILINOGEN, UA POC OHS: 0.2

## 2023-09-08 PROCEDURE — 99999PBSHW PR PBB SHADOW TECHNICAL ONLY FILED TO HB: Mod: PBBFAC,,,

## 2023-09-08 PROCEDURE — 99999 PR PBB SHADOW E&M-EST. PATIENT-LVL III: CPT | Mod: PBBFAC,,, | Performed by: FAMILY MEDICINE

## 2023-09-08 PROCEDURE — 99999PBSHW PR PBB SHADOW TECHNICAL ONLY FILED TO HB: ICD-10-PCS | Mod: PBBFAC,,,

## 2023-09-08 PROCEDURE — 99214 OFFICE O/P EST MOD 30 MIN: CPT | Mod: S$PBB,,, | Performed by: FAMILY MEDICINE

## 2023-09-08 PROCEDURE — G0008 ADMIN INFLUENZA VIRUS VAC: HCPCS | Mod: PBBFAC,PN

## 2023-09-08 PROCEDURE — 99214 PR OFFICE/OUTPT VISIT, EST, LEVL IV, 30-39 MIN: ICD-10-PCS | Mod: S$PBB,,, | Performed by: FAMILY MEDICINE

## 2023-09-08 PROCEDURE — 99213 OFFICE O/P EST LOW 20 MIN: CPT | Mod: PBBFAC,PN,25 | Performed by: FAMILY MEDICINE

## 2023-09-08 PROCEDURE — 99999PBSHW POCT URINALYSIS(INSTRUMENT): Mod: PBBFAC,,,

## 2023-09-08 PROCEDURE — 99999 PR PBB SHADOW E&M-EST. PATIENT-LVL III: ICD-10-PCS | Mod: PBBFAC,,, | Performed by: FAMILY MEDICINE

## 2023-09-08 PROCEDURE — 99999PBSHW FLU VACCINE - QUADRIVALENT - ADJUVANTED: Mod: PBBFAC,,,

## 2023-09-08 PROCEDURE — 81003 URINALYSIS AUTO W/O SCOPE: CPT | Mod: PBBFAC,PN | Performed by: FAMILY MEDICINE

## 2023-09-08 RX ORDER — CITALOPRAM 10 MG/1
10 TABLET ORAL DAILY
Qty: 30 TABLET | Refills: 11 | Status: SHIPPED | OUTPATIENT
Start: 2023-09-08 | End: 2024-09-07

## 2023-09-08 RX ADMIN — CYANOCOBALAMIN 1000 MCG: 1000 INJECTION, SOLUTION INTRAMUSCULAR at 08:09

## 2023-09-08 NOTE — PROGRESS NOTES
THIS DOCUMENT WAS MADE IN PART WITH VOICE RECOGNITION SOFTWARE.  OCCASIONALLY THIS SOFTWARE WILL MISINTERPRET WORDS OR PHRASES.    Assessment and Plan:    1. Generalized anxiety disorder  citalopram (CELEXA) 10 MG tablet      2. Immunization due        3. Lower urinary tract symptoms (LUTS)  POCT Urinalysis(Instrument)          PLAN    New medication Celexa in place of fluoxetine secondary to side effect, follow-up in 4 weeks for recheck     Check urinalysis today for lower urinary tract symptoms     Counseled on RSV vaccine, COVID vaccine.  High-dose flu vaccine today           ______________________________________________________________________  Subjective:    Chief Complaint:  Chief Complaint   Patient presents with    Follow-up        HPI:  Amarilis is a 82 y.o. year old         Follow-up anxiety   Replace Wellbutrin for fluoxetine.  Patient suspected Wellbutrin contributing to imbalance issues  Anxiety improved, BUT had flare of psoriasis / skin itching   Stopped prozac about 1 week ago and itching improved      LUTS  Urgency, hesitancy   No dysuria   No fever or other systemic symptoms.            Primary insomnia  Rx- Melatonin  Previous Rx- Remeron (ineffective), Doxepin (ineffective), trazodone (hangover)      Depressed mood / Anxiety   Prev Rx-Lexapro (nightmares), buspirone (ineffective),  wellbutrin (constipation, imbalance, ineffective), Fluoxetine (itching)  Stressor-fall out with daughter      Hypothyroidism  Current Rx-armor Thyroid 60 mg + 15 mg daily   Previous Rx-levothyroxine (ineffective)  Previous TSH in normal range   Endocrinology- Dr Fritz      Bronchiectasis, history of pulmonary nodules  Seen by pulmonology - Dr. 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\     Senile osteoporosis  Prev Rx-Boniva 150 (pt choice to quit)  Taking vitamin-D supplement  No recent fractures     Seasonal allergies / chronic sinusitis   Rx-Flonase, Xyzal, montelukast,  astelin  Does have recurrent sinusitis, seen by ENT ([place])      Psoriasis  Rx-betamethasone, clobetasol, desonide, Topicort  Followed by dermatology - Jesenia Loera MD      B12 deficiency  Rx-B12 injections 500 mcg every 2 weeks     Chronic Low back pain , sacroiliitis  Pain Mgt : Bestbel   Rx : Mobic 15 mg      ALONZO  Sleep MD : Catarina Morfin  Awaiting machine         Past Medical History:  Past Medical History:   Diagnosis Date    Allergy     Asthma     GERD (gastroesophageal reflux disease)     Globus syndrome     Headache(784.0)     Hypothyroidism     Psoriasis     Rash     Sacroiliitis, not elsewhere classified 1/12/2023       Past Surgical History:  Past Surgical History:   Procedure Laterality Date    ADENOIDECTOMY      CHOLECYSTECTOMY      COLONOSCOPY      DEXA      WNL    SINUS SURGERY      Dr Cordova    SINUS SURGERY  01/2017    xs 5    THYROID SURGERY      nodules removed //Dr Amato     TONSILLECTOMY      TYMPANOSTOMY TUBE PLACEMENT         Family History:  Family History   Problem Relation Age of Onset    Hypertension Mother     Obesity Father     Diabetes Sister     Obesity Sister     No Known Problems Maternal Grandmother     No Known Problems Maternal Grandfather     No Known Problems Paternal Grandmother     No Known Problems Paternal Grandfather     Breast cancer Paternal Aunt 43    Thyroid cancer Daughter 61    Ovarian cancer Neg Hx        Social History:  Social History     Socioeconomic History    Marital status:    Tobacco Use    Smoking status: Never    Smokeless tobacco: Never   Substance and Sexual Activity    Alcohol use: Yes     Comment: twice a month    Drug use: No    Sexual activity: Never       Medications:  Current Outpatient Medications on File Prior to Visit   Medication Sig Dispense Refill    cholecalciferol, vitamin D3, (VITAMIN D3) 50 mcg (2,000 unit) Tab Take 2,000 Units by mouth once daily.      fluticasone propionate (FLONASE) 50 mcg/actuation nasal spray 1 spray (50 mcg  total) by Each Nostril route once daily. 54 g 3    MAGNESIUM ORAL Take by mouth once. Pt takes 400mg once daily      thyroid, pork, (ARMOUR THYROID) 60 mg Tab Take 1 tablet (60 mg total) by mouth before breakfast. 90 tablet 3    azelastine (ASTELIN) 137 mcg (0.1 %) nasal spray 1 spray (137 mcg total) by Nasal route 2 (two) times daily. (Patient not taking: Reported on 9/8/2023) 30 mL 3    betamethasone dipropionate 0.05 % cream Apply topically once daily. (Patient not taking: Reported on 9/8/2023) 45 g 5    clobetasol 0.05% (TEMOVATE) 0.05 % Oint 1 application once daily. Apply to affected area      desonide 0.05% (DESOWEN) 0.05 % Oint Apply topically 2 (two) times daily. (Patient not taking: Reported on 9/8/2023) 60 g 2    desoximetasone (TOPICORT) 0.05 % cream Apply topically 2 (two) times daily. (Patient not taking: Reported on 9/8/2023) 60 g 11    FLUoxetine 10 MG capsule Take 1 capsule (10 mg total) by mouth once daily. (Patient not taking: Reported on 9/8/2023) 30 capsule 0    ketoconazole (NIZORAL) 2 % shampoo Apply topically twice a week. (Patient not taking: Reported on 9/8/2023) 120 mL 3    levocetirizine (XYZAL) 5 MG tablet Take one capsule by mouth daily (Patient not taking: Reported on 9/8/2023) 90 tablet 0    linaCLOtide (LINZESS) 72 mcg Cap capsule Take 1 capsule (72 mcg total) by mouth daily as needed (constipation). (Patient not taking: Reported on 9/8/2023) 20 capsule 1    mupirocin (BACTROBAN) 2 % ointment Apply topically once daily. (Patient not taking: Reported on 9/8/2023) 30 g 6     Current Facility-Administered Medications on File Prior to Visit   Medication Dose Route Frequency Provider Last Rate Last Admin    cyanocobalamin injection 1,000 mcg  1,000 mcg Intramuscular Q28 Days Luc Teixeira MD   1,000 mcg at 08/10/23 0736       Allergies:  Avelox [moxifloxacin], Bactrim [sulfamethoxazole-trimethoprim], Ciprofloxacin, Isothiazolinones, Neomycin sulfate, Apremilast, and  "Bacitracin    Immunizations:  Immunization History   Administered Date(s) Administered    COVID-19 Vaccine 03/31/2021    COVID-19, MRNA, LN-S, PF (MODERNA FULL 0.5 ML DOSE) 12/16/2021    COVID-19, vector-nr, rS-Ad26, PF (Johanna) 04/05/2021    Hepatitis A, Adult 08/09/2007    IPV 08/09/2007    Influenza 11/18/2011, 10/21/2013    Influenza (FLUAD) - Quadrivalent - Adjuvanted - PF *Preferred* (65+) 10/07/2021, 10/03/2022    Influenza - High Dose - PF (65 years and older) 10/14/2014, 10/08/2015, 09/27/2016    Influenza - Quadrivalent - MDCK - PF 10/10/2017    Influenza - Quadrivalent - PF *Preferred* (6 months and older) 10/29/2019, 09/25/2020    Influenza - Trivalent (ADULT) 11/18/2011    Influenza - Trivalent - PF (ADULT) 10/21/2013    Pneumococcal Conjugate - 13 Valent 04/12/2016    Pneumococcal Polysaccharide - 23 Valent 08/01/2011    Td (Adult), Unspecified Formulation 08/02/2018    Tdap 08/09/2007, 08/01/2018    Zoster 07/11/2007       Review of Systems:  Review of Systems   All other systems reviewed and are negative.      Objective:    Vitals:  Vitals:    09/08/23 0723   BP: 124/68   Pulse: 60   SpO2: 98%   Weight: 59.6 kg (131 lb 6.3 oz)   Height: 5' 2" (1.575 m)   PainSc: 0-No pain       Physical Exam  Vitals reviewed.   Constitutional:       General: She is not in acute distress.  HENT:      Head: Normocephalic and atraumatic.   Eyes:      Pupils: Pupils are equal, round, and reactive to light.   Cardiovascular:      Rate and Rhythm: Normal rate and regular rhythm.      Heart sounds: No murmur heard.     No friction rub.   Pulmonary:      Effort: Pulmonary effort is normal.      Breath sounds: Normal breath sounds.   Abdominal:      General: Bowel sounds are normal. There is no distension.      Palpations: Abdomen is soft.      Tenderness: There is no abdominal tenderness.   Musculoskeletal:      Cervical back: Neck supple.   Skin:     General: Skin is warm and dry.      Findings: No rash.   Psychiatric:    "      Behavior: Behavior normal.           Luc Teixeira MD  Family Medicine

## 2023-09-20 ENCOUNTER — PATIENT MESSAGE (OUTPATIENT)
Dept: FAMILY MEDICINE | Facility: CLINIC | Age: 83
End: 2023-09-20
Payer: MEDICARE

## 2023-09-20 ENCOUNTER — PATIENT MESSAGE (OUTPATIENT)
Dept: RHEUMATOLOGY | Facility: CLINIC | Age: 83
End: 2023-09-20
Payer: MEDICARE

## 2023-09-20 ENCOUNTER — TELEPHONE (OUTPATIENT)
Dept: RHEUMATOLOGY | Facility: CLINIC | Age: 83
End: 2023-09-20
Payer: MEDICARE

## 2023-09-20 DIAGNOSIS — M25.50 POLYARTHRALGIA: Primary | ICD-10-CM

## 2023-09-20 NOTE — TELEPHONE ENCOUNTER
Sent message through the portal.  ----- Message from Bhakti Griffin sent at 9/20/2023  9:44 AM CDT -----  Regarding: Appointment request  Contact: [phone]  Good morning,    Pt reached out on Live Chat this morning requesting an appointment to see Dr. Mensah for arthritis.  Pt asked me to mention her  is a patient with Dr. Mensah.  I was unable to schedule an appoint -  Pt is requesting a call from clinical staff, in hopes to bw scheduled with an appointment.  No additional information was shared.    DX: Arthritis      Thank you,   Shelby CLAIRE  Access Navigator

## 2023-09-21 ENCOUNTER — TELEPHONE (OUTPATIENT)
Dept: RHEUMATOLOGY | Facility: CLINIC | Age: 83
End: 2023-09-21
Payer: MEDICARE

## 2023-09-21 NOTE — TELEPHONE ENCOUNTER
Spoke to the patient and made an new patient appt with Dr Mensah.  ----- Message from Karen Espinal sent at 9/21/2023  1:53 PM CDT -----  Contact: self  Type: Sooner Appointment Request        Caller is requesting a sooner appointment. Caller declined first available appointment listed below. Caller will not accept being placed on the waitlist and is requesting a message be sent to doctor.        Name of Caller: Patient   Best Call Back Number: [phone]  Additional Information: Pt has a referral read to get scheduled. Pt cat do Wednesdays. Plz call to get scheduled. Thanks

## 2023-10-09 ENCOUNTER — DOCUMENTATION ONLY (OUTPATIENT)
Dept: FAMILY MEDICINE | Facility: CLINIC | Age: 83
End: 2023-10-09

## 2023-10-09 ENCOUNTER — OFFICE VISIT (OUTPATIENT)
Dept: FAMILY MEDICINE | Facility: CLINIC | Age: 83
End: 2023-10-09
Payer: MEDICARE

## 2023-10-09 VITALS
BODY MASS INDEX: 24.11 KG/M2 | HEART RATE: 60 BPM | DIASTOLIC BLOOD PRESSURE: 64 MMHG | OXYGEN SATURATION: 97 % | SYSTOLIC BLOOD PRESSURE: 122 MMHG | WEIGHT: 131.81 LBS

## 2023-10-09 DIAGNOSIS — M25.50 POLYARTHRALGIA: ICD-10-CM

## 2023-10-09 DIAGNOSIS — K21.9 GASTROESOPHAGEAL REFLUX DISEASE, UNSPECIFIED WHETHER ESOPHAGITIS PRESENT: ICD-10-CM

## 2023-10-09 DIAGNOSIS — R41.3 MEMORY IMPAIRMENT: ICD-10-CM

## 2023-10-09 DIAGNOSIS — F41.9 ANXIETY: Primary | ICD-10-CM

## 2023-10-09 DIAGNOSIS — Z23 IMMUNIZATION DUE: ICD-10-CM

## 2023-10-09 PROCEDURE — 99214 OFFICE O/P EST MOD 30 MIN: CPT | Mod: S$PBB,,, | Performed by: FAMILY MEDICINE

## 2023-10-09 PROCEDURE — 99999 PR PBB SHADOW E&M-EST. PATIENT-LVL III: ICD-10-PCS | Mod: PBBFAC,,, | Performed by: FAMILY MEDICINE

## 2023-10-09 PROCEDURE — 99214 PR OFFICE/OUTPT VISIT, EST, LEVL IV, 30-39 MIN: ICD-10-PCS | Mod: S$PBB,,, | Performed by: FAMILY MEDICINE

## 2023-10-09 PROCEDURE — 99213 OFFICE O/P EST LOW 20 MIN: CPT | Mod: 25,PBBFAC,PN | Performed by: FAMILY MEDICINE

## 2023-10-09 PROCEDURE — 99999 PR PBB SHADOW E&M-EST. PATIENT-LVL III: CPT | Mod: PBBFAC,,, | Performed by: FAMILY MEDICINE

## 2023-10-09 PROCEDURE — 99999PBSHW PR PBB SHADOW TECHNICAL ONLY FILED TO HB: ICD-10-PCS | Mod: PBBFAC,,,

## 2023-10-09 PROCEDURE — 96372 THER/PROPH/DIAG INJ SC/IM: CPT | Mod: PBBFAC,PN

## 2023-10-09 PROCEDURE — 99999PBSHW PR PBB SHADOW TECHNICAL ONLY FILED TO HB: Mod: PBBFAC,,,

## 2023-10-09 RX ORDER — PANTOPRAZOLE SODIUM 40 MG/1
40 TABLET, DELAYED RELEASE ORAL DAILY
Qty: 30 TABLET | Refills: 11 | Status: SHIPPED | OUTPATIENT
Start: 2023-10-09 | End: 2024-10-08

## 2023-10-09 RX ORDER — HYDROCODONE BITARTRATE AND ACETAMINOPHEN 5; 325 MG/1; MG/1
1 TABLET ORAL EVERY 8 HOURS PRN
Qty: 20 TABLET | Refills: 0 | Status: SHIPPED | OUTPATIENT
Start: 2023-10-09

## 2023-10-09 RX ADMIN — CYANOCOBALAMIN 1000 MCG: 1000 INJECTION, SOLUTION INTRAMUSCULAR at 07:10

## 2023-10-09 NOTE — PROGRESS NOTES
______________________________________________________________________  Subjective:    Chief Complaint:  No chief complaint on file.       HPI:  Amarilis is a 82 y.o. year old     Follow-up anxiety/depression   New medications Celexa prescribed at prior visit  Previously on several other medicines such as Lexapro, buspirone, Wellbutrin and fluoxetine all of which cause side effects or were ineffective   Never took new med  Pt reports making life changes which has improved symptoms     Polyarthralgia   Pt concerned about possibility of psoriatic arthritis  Rarely using hydrocodone PRN     B12 shot     GERD     Memory Concerns           Follow-up immunizations   Flu vaccine at prior visit, counseled on RSV and COVID vaccine         Primary insomnia  Rx- Melatonin  Previous Rx- Remeron (ineffective), Doxepin (ineffective), trazodone (hangover)      Depressed mood / Anxiety   Prev Rx-Lexapro (nightmares), buspirone (ineffective),  wellbutrin (constipation, imbalance, ineffective), Fluoxetine (itching)  Stressor-fall out with daughter      Hypothyroidism  Current Rx-armor Thyroid 60 mg + 15 mg daily   Previous Rx-levothyroxine (ineffective)  Previous TSH in normal range   Endocrinology- Dr Fritz      Bronchiectasis, history of pulmonary nodules  Seen by pulmonology - Dr. 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\     Senile osteoporosis  Prev Rx-Boniva 150 (pt choice to quit)  Taking vitamin-D supplement  No recent fractures     Seasonal allergies / chronic sinusitis   Rx-Flonase, Xyzal, montelukast, astelin  Does have recurrent sinusitis, seen by ENT ([place])      Psoriasis  Rx-betamethasone, clobetasol, desonide, Topicort  Followed by dermatology - Jesenia Loera MD      B12 deficiency  Rx-B12 injections 500 mcg every 2 weeks     Chronic Low back pain , sacroiliitis  Pain Mgt : Juan F   Rx : Mobic 15 mg      ALONZO  Sleep MD : Catarina Morfin  Awaiting machine       Past Medical  History:  Past Medical History:   Diagnosis Date    Allergy     Asthma     GERD (gastroesophageal reflux disease)     Globus syndrome     Headache(784.0)     Hypothyroidism     Psoriasis     Rash     Sacroiliitis, not elsewhere classified 1/12/2023       Past Surgical History:  Past Surgical History:   Procedure Laterality Date    ADENOIDECTOMY      CHOLECYSTECTOMY      COLONOSCOPY      DEXA      WNL    SINUS SURGERY      Dr Cordova    SINUS SURGERY  01/2017    xs 5    THYROID SURGERY      nodules removed //Dr Amato     TONSILLECTOMY      TYMPANOSTOMY TUBE PLACEMENT         Family History:  Family History   Problem Relation Age of Onset    Hypertension Mother     Obesity Father     Diabetes Sister     Obesity Sister     No Known Problems Maternal Grandmother     No Known Problems Maternal Grandfather     No Known Problems Paternal Grandmother     No Known Problems Paternal Grandfather     Breast cancer Paternal Aunt 43    Thyroid cancer Daughter 61    Ovarian cancer Neg Hx        Social History:  Social History     Socioeconomic History    Marital status:    Tobacco Use    Smoking status: Never    Smokeless tobacco: Never   Substance and Sexual Activity    Alcohol use: Yes     Comment: twice a month    Drug use: No    Sexual activity: Never       Medications:  Current Outpatient Medications on File Prior to Visit   Medication Sig Dispense Refill    azelastine (ASTELIN) 137 mcg (0.1 %) nasal spray 1 spray (137 mcg total) by Nasal route 2 (two) times daily. (Patient not taking: Reported on 9/8/2023) 30 mL 3    betamethasone dipropionate 0.05 % cream Apply topically once daily. (Patient not taking: Reported on 9/8/2023) 45 g 5    cholecalciferol, vitamin D3, (VITAMIN D3) 50 mcg (2,000 unit) Tab Take 2,000 Units by mouth once daily.      citalopram (CELEXA) 10 MG tablet Take 1 tablet (10 mg total) by mouth once daily. 30 tablet 11    clobetasol 0.05% (TEMOVATE) 0.05 % Oint 1 application once daily. Apply to  affected area      desonide 0.05% (DESOWEN) 0.05 % Oint Apply topically 2 (two) times daily. (Patient not taking: Reported on 9/8/2023) 60 g 2    desoximetasone (TOPICORT) 0.05 % cream Apply topically 2 (two) times daily. (Patient not taking: Reported on 9/8/2023) 60 g 11    fluticasone propionate (FLONASE) 50 mcg/actuation nasal spray 1 spray (50 mcg total) by Each Nostril route once daily. 54 g 3    ketoconazole (NIZORAL) 2 % shampoo Apply topically twice a week. (Patient not taking: Reported on 9/8/2023) 120 mL 3    levocetirizine (XYZAL) 5 MG tablet Take one capsule by mouth daily (Patient not taking: Reported on 9/8/2023) 90 tablet 0    linaCLOtide (LINZESS) 72 mcg Cap capsule Take 1 capsule (72 mcg total) by mouth daily as needed (constipation). (Patient not taking: Reported on 9/8/2023) 20 capsule 1    MAGNESIUM ORAL Take by mouth once. Pt takes 400mg once daily      thyroid, pork, (ARMOUR THYROID) 60 mg Tab Take 1 tablet (60 mg total) by mouth before breakfast. 90 tablet 3     Current Facility-Administered Medications on File Prior to Visit   Medication Dose Route Frequency Provider Last Rate Last Admin    cyanocobalamin injection 1,000 mcg  1,000 mcg Intramuscular Q28 Days Luc Teixeira MD   1,000 mcg at 09/08/23 0849       Allergies:  Avelox [moxifloxacin], Bactrim [sulfamethoxazole-trimethoprim], Ciprofloxacin, Isothiazolinones, Neomycin sulfate, Apremilast, and Bacitracin    Immunizations:  Immunization History   Administered Date(s) Administered    COVID-19 Vaccine 03/31/2021    COVID-19, MRNA, LN-S, PF (MODERNA FULL 0.5 ML DOSE) 12/16/2021    COVID-19, vector-nr, rS-Ad26, PF (Johanna) 04/05/2021    Hepatitis A, Adult 08/09/2007    IPV 08/09/2007    Influenza 11/18/2011, 10/21/2013    Influenza (FLUAD) - Quadrivalent - Adjuvanted - PF *Preferred* (65+) 10/07/2021, 10/03/2022, 09/08/2023    Influenza - High Dose - PF (65 years and older) 10/14/2014, 10/08/2015, 09/27/2016    Influenza - Quadrivalent  - MDCK - PF 10/10/2017    Influenza - Quadrivalent - PF *Preferred* (6 months and older) 10/29/2019, 09/25/2020    Influenza - Trivalent (ADULT) 11/18/2011    Influenza - Trivalent - PF (ADULT) 10/21/2013    Pneumococcal Conjugate - 13 Valent 04/12/2016    Pneumococcal Polysaccharide - 23 Valent 08/01/2011    Td (Adult), Unspecified Formulation 08/02/2018    Tdap 08/09/2007, 08/01/2018    Zoster 07/11/2007       Review of Systems:  Review of Systems   All other systems reviewed and are negative.      Objective:    Vitals:  There were no vitals filed for this visit.    Physical Exam  Vitals reviewed.   Constitutional:       General: She is not in acute distress.  HENT:      Head: Normocephalic and atraumatic.   Eyes:      Pupils: Pupils are equal, round, and reactive to light.   Cardiovascular:      Rate and Rhythm: Normal rate and regular rhythm.      Heart sounds: No murmur heard.     No friction rub.   Pulmonary:      Effort: Pulmonary effort is normal.      Breath sounds: Normal breath sounds.   Abdominal:      General: Bowel sounds are normal. There is no distension.      Palpations: Abdomen is soft.      Tenderness: There is no abdominal tenderness.   Musculoskeletal:      Cervical back: Neck supple.   Skin:     General: Skin is warm and dry.      Findings: No rash.   Psychiatric:         Behavior: Behavior normal.           Luc Teixeira MD  Family Medicine

## 2023-10-09 NOTE — PROGRESS NOTES
THIS DOCUMENT WAS MADE IN PART WITH VOICE RECOGNITION SOFTWARE.  OCCASIONALLY THIS SOFTWARE WILL MISINTERPRET WORDS OR PHRASES.     Assessment and Plan:     1. Anxiety        2. Immunization due        3. Polyarthralgia  HYDROcodone-acetaminophen (NORCO) 5-325 mg per tablet      4. Gastroesophageal reflux disease, unspecified whether esophagitis present  pantoprazole (PROTONIX) 40 MG tablet      5. Memory impairment            PLAN    Proceed with adult wellness visit, suspect patient likely has pseudodementia, but offered to consider referral to neuropsychology if no improvement     New med Protonix for duration of 2 weeks.  If no improvement, low threshold for EGD     Refill vicodin, pt uses VERY sparingly   ______________________________________________________________________  Subjective:    Chief Complaint:  Chief Complaint   Patient presents with    Follow-up        HPI:  Amarilis is a 82 y.o. year old       ______________________________________________________________________  Subjective:     Chief Complaint:  Multiple issues        HPI:  Amarilis is a 82 y.o. year old      Follow-up anxiety/depression   New medications Celexa prescribed at prior visit  Previously on several other medicines such as Lexapro, buspirone, Wellbutrin and fluoxetine all of which cause side effects or were ineffective   Never took new med  Pt reports making life changes which has improved symptoms      Polyarthralgia   Pt concerned about possibility of psoriatic arthritis  Rarely using hydrocodone PRN   Has appointment with Rheumatology scheduled     B12 shot -requesting today     GERD   Flared up recently, denies any dysphagia  No current medications     Memory Concerns   Patient suspects memory may be failing, concerned about dementia diagnosis   Forgets what she was talking about sometimes in the middle of a conversation           Follow-up immunizations   Flu vaccine at prior visit, counseled on RSV and COVID vaccine             Primary insomnia  Rx- Melatonin  Previous Rx- Remeron (ineffective), Doxepin (ineffective), trazodone (hangover)      Depressed mood / Anxiety   Prev Rx-Lexapro (nightmares), buspirone (ineffective),  wellbutrin (constipation, imbalance, ineffective), Fluoxetine (itching)  Stressor-fall out with daughter      Hypothyroidism  Current Rx-armor Thyroid 60 mg + 15 mg daily   Previous Rx-levothyroxine (ineffective)  Previous TSH in normal range   Endocrinology- Dr Fritz      Bronchiectasis, history of pulmonary nodules  Seen by pulmonology - Dr. 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\     Senile osteoporosis  Prev Rx-Boniva 150 (pt choice to quit)  Taking vitamin-D supplement  No recent fractures     Seasonal allergies / chronic sinusitis   Rx-Flonase, Xyzal, montelukast, astelin  Does have recurrent sinusitis, seen by ENT ([place])      Psoriasis  Rx-betamethasone, clobetasol, desonide, Topicort  Followed by dermatology - Jesenia Loera MD      B12 deficiency  Rx-B12 injections 500 mcg every 2 weeks     Chronic Low back pain , sacroiliitis  Pain Mgt : Juan F   Rx : Mobic 15 mg      ALONZO  Sleep MD : Catarina Morfin  Awaiting machine        Past Medical History:       Past Medical History:   Diagnosis Date    Allergy      Asthma      GERD (gastroesophageal reflux disease)      Globus syndrome      Headache(784.0)      Hypothyroidism      Psoriasis      Rash      Sacroiliitis, not elsewhere classified 1/12/2023         Past Surgical History:        Past Surgical History:   Procedure Laterality Date    ADENOIDECTOMY        CHOLECYSTECTOMY        COLONOSCOPY        DEXA         WNL    SINUS SURGERY         Dr Cordova    SINUS SURGERY   01/2017     xs 5    THYROID SURGERY         nodules removed //Dr Amato     TONSILLECTOMY        TYMPANOSTOMY TUBE PLACEMENT             Family History:        Family History   Problem Relation Age of Onset    Hypertension Mother      Obesity  Father      Diabetes Sister      Obesity Sister      No Known Problems Maternal Grandmother      No Known Problems Maternal Grandfather      No Known Problems Paternal Grandmother      No Known Problems Paternal Grandfather      Breast cancer Paternal Aunt 43    Thyroid cancer Daughter 61    Ovarian cancer Neg Hx           Social History:  Social History            Socioeconomic History    Marital status:    Tobacco Use    Smoking status: Never    Smokeless tobacco: Never   Substance and Sexual Activity    Alcohol use: Yes       Comment: twice a month    Drug use: No    Sexual activity: Never         Medications:         Current Outpatient Medications on File Prior to Visit   Medication Sig Dispense Refill    azelastine (ASTELIN) 137 mcg (0.1 %) nasal spray 1 spray (137 mcg total) by Nasal route 2 (two) times daily. (Patient not taking: Reported on 9/8/2023) 30 mL 3    betamethasone dipropionate 0.05 % cream Apply topically once daily. (Patient not taking: Reported on 9/8/2023) 45 g 5    cholecalciferol, vitamin D3, (VITAMIN D3) 50 mcg (2,000 unit) Tab Take 2,000 Units by mouth once daily.        citalopram (CELEXA) 10 MG tablet Take 1 tablet (10 mg total) by mouth once daily. 30 tablet 11    clobetasol 0.05% (TEMOVATE) 0.05 % Oint 1 application once daily. Apply to affected area        desonide 0.05% (DESOWEN) 0.05 % Oint Apply topically 2 (two) times daily. (Patient not taking: Reported on 9/8/2023) 60 g 2    desoximetasone (TOPICORT) 0.05 % cream Apply topically 2 (two) times daily. (Patient not taking: Reported on 9/8/2023) 60 g 11    fluticasone propionate (FLONASE) 50 mcg/actuation nasal spray 1 spray (50 mcg total) by Each Nostril route once daily. 54 g 3    ketoconazole (NIZORAL) 2 % shampoo Apply topically twice a week. (Patient not taking: Reported on 9/8/2023) 120 mL 3    levocetirizine (XYZAL) 5 MG tablet Take one capsule by mouth daily (Patient not taking: Reported on 9/8/2023) 90 tablet 0     linaCLOtide (LINZESS) 72 mcg Cap capsule Take 1 capsule (72 mcg total) by mouth daily as needed (constipation). (Patient not taking: Reported on 9/8/2023) 20 capsule 1    MAGNESIUM ORAL Take by mouth once. Pt takes 400mg once daily        thyroid, pork, (ARMOUR THYROID) 60 mg Tab Take 1 tablet (60 mg total) by mouth before breakfast. 90 tablet 3                Current Facility-Administered Medications on File Prior to Visit   Medication Dose Route Frequency Provider Last Rate Last Admin    cyanocobalamin injection 1,000 mcg  1,000 mcg Intramuscular Q28 Days Luc Teixeira MD   1,000 mcg at 09/08/23 0849         Allergies:  Avelox [moxifloxacin], Bactrim [sulfamethoxazole-trimethoprim], Ciprofloxacin, Isothiazolinones, Neomycin sulfate, Apremilast, and Bacitracin     Immunizations:       Immunization History   Administered Date(s) Administered    COVID-19 Vaccine 03/31/2021    COVID-19, MRNA, LN-S, PF (MODERNA FULL 0.5 ML DOSE) 12/16/2021    COVID-19, vector-nr, rS-Ad26, PF (Johanna) 04/05/2021    Hepatitis A, Adult 08/09/2007    IPV 08/09/2007    Influenza 11/18/2011, 10/21/2013    Influenza (FLUAD) - Quadrivalent - Adjuvanted - PF *Preferred* (65+) 10/07/2021, 10/03/2022, 09/08/2023    Influenza - High Dose - PF (65 years and older) 10/14/2014, 10/08/2015, 09/27/2016    Influenza - Quadrivalent - MDCK - PF 10/10/2017    Influenza - Quadrivalent - PF *Preferred* (6 months and older) 10/29/2019, 09/25/2020    Influenza - Trivalent (ADULT) 11/18/2011    Influenza - Trivalent - PF (ADULT) 10/21/2013    Pneumococcal Conjugate - 13 Valent 04/12/2016    Pneumococcal Polysaccharide - 23 Valent 08/01/2011    Td (Adult), Unspecified Formulation 08/02/2018    Tdap 08/09/2007, 08/01/2018    Zoster 07/11/2007         Review of Systems:  Review of Systems   All other systems reviewed and are negative.        Objective:     Vitals:  There were no vitals filed for this visit.     Physical Exam  Vitals reviewed.    Constitutional:       General: She is not in acute distress.  HENT:      Head: Normocephalic and atraumatic.   Eyes:      Pupils: Pupils are equal, round, and reactive to light.   Cardiovascular:      Rate and Rhythm: Normal rate and regular rhythm.      Heart sounds: No murmur heard.     No friction rub.   Pulmonary:      Effort: Pulmonary effort is normal.      Breath sounds: Normal breath sounds.   Abdominal:      General: Bowel sounds are normal. There is no distension.      Palpations: Abdomen is soft.      Tenderness: There is no abdominal tenderness.   Musculoskeletal:      Cervical back: Neck supple.   Skin:     General: Skin is warm and dry.      Findings: No rash.   Psychiatric:         Behavior: Behavior normal.               Luc Teixeira MD  Family Medicine    Past Medical History:  Past Medical History:   Diagnosis Date    Allergy     Asthma     GERD (gastroesophageal reflux disease)     Globus syndrome     Headache(784.0)     Hypothyroidism     Psoriasis     Rash     Sacroiliitis, not elsewhere classified 1/12/2023       Past Surgical History:  Past Surgical History:   Procedure Laterality Date    ADENOIDECTOMY      CHOLECYSTECTOMY      COLONOSCOPY      DEXA      WNL    SINUS SURGERY      Dr Cordova    SINUS SURGERY  01/2017    xs 5    THYROID SURGERY      nodules removed //Dr Amato     TONSILLECTOMY      TYMPANOSTOMY TUBE PLACEMENT         Family History:  Family History   Problem Relation Age of Onset    Hypertension Mother     Obesity Father     Diabetes Sister     Obesity Sister     No Known Problems Maternal Grandmother     No Known Problems Maternal Grandfather     No Known Problems Paternal Grandmother     No Known Problems Paternal Grandfather     Breast cancer Paternal Aunt 43    Thyroid cancer Daughter 61    Ovarian cancer Neg Hx        Social History:  Social History     Socioeconomic History    Marital status:    Tobacco Use    Smoking status: Never    Smokeless tobacco: Never    Substance and Sexual Activity    Alcohol use: Yes     Comment: twice a month    Drug use: No    Sexual activity: Never       Medications:  Current Outpatient Medications on File Prior to Visit   Medication Sig Dispense Refill    azelastine (ASTELIN) 137 mcg (0.1 %) nasal spray 1 spray (137 mcg total) by Nasal route 2 (two) times daily. 30 mL 3    betamethasone dipropionate 0.05 % cream Apply topically once daily. 45 g 5    cholecalciferol, vitamin D3, (VITAMIN D3) 50 mcg (2,000 unit) Tab Take 2,000 Units by mouth once daily.      citalopram (CELEXA) 10 MG tablet Take 1 tablet (10 mg total) by mouth once daily. 30 tablet 11    clobetasol 0.05% (TEMOVATE) 0.05 % Oint 1 application once daily. Apply to affected area      desonide 0.05% (DESOWEN) 0.05 % Oint Apply topically 2 (two) times daily. 60 g 2    desoximetasone (TOPICORT) 0.05 % cream Apply topically 2 (two) times daily. 60 g 11    fluticasone propionate (FLONASE) 50 mcg/actuation nasal spray 1 spray (50 mcg total) by Each Nostril route once daily. 54 g 3    ketoconazole (NIZORAL) 2 % shampoo Apply topically twice a week. 120 mL 3    levocetirizine (XYZAL) 5 MG tablet Take one capsule by mouth daily 90 tablet 0    linaCLOtide (LINZESS) 72 mcg Cap capsule Take 1 capsule (72 mcg total) by mouth daily as needed (constipation). 20 capsule 1    MAGNESIUM ORAL Take by mouth once. Pt takes 400mg once daily      thyroid, pork, (ARMOUR THYROID) 60 mg Tab Take 1 tablet (60 mg total) by mouth before breakfast. 90 tablet 3     Current Facility-Administered Medications on File Prior to Visit   Medication Dose Route Frequency Provider Last Rate Last Admin    cyanocobalamin injection 1,000 mcg  1,000 mcg Intramuscular Q28 Days Luc Teixeira MD   1,000 mcg at 10/09/23 0755       Allergies:  Avelox [moxifloxacin], Bactrim [sulfamethoxazole-trimethoprim], Ciprofloxacin, Isothiazolinones, Neomycin sulfate, Apremilast, and Bacitracin    Immunizations:  Immunization History    Administered Date(s) Administered    COVID-19 Vaccine 03/31/2021    COVID-19, MRNA, LN-S, PF (MODERNA FULL 0.5 ML DOSE) 12/16/2021    COVID-19, vector-nr, rS-Ad26, PF (Johanna) 04/05/2021    Hepatitis A, Adult 08/09/2007    IPV 08/09/2007    Influenza 11/18/2011, 10/21/2013    Influenza (FLUAD) - Quadrivalent - Adjuvanted - PF *Preferred* (65+) 10/07/2021, 10/03/2022, 09/08/2023    Influenza - High Dose - PF (65 years and older) 10/14/2014, 10/08/2015, 09/27/2016    Influenza - Quadrivalent - MDCK - PF 10/10/2017    Influenza - Quadrivalent - PF *Preferred* (6 months and older) 10/29/2019, 09/25/2020    Influenza - Trivalent (ADULT) 11/18/2011    Influenza - Trivalent - PF (ADULT) 10/21/2013    Pneumococcal Conjugate - 13 Valent 04/12/2016    Pneumococcal Polysaccharide - 23 Valent 08/01/2011    Td (Adult), Unspecified Formulation 08/02/2018    Tdap 08/09/2007, 08/01/2018    Zoster 07/11/2007       Review of Systems:  Review of Systems   All other systems reviewed and are negative.      Objective:    Vitals:  Vitals:    10/09/23 0745   BP: 122/64   Pulse: 60   SpO2: 97%   Weight: 59.8 kg (131 lb 13.4 oz)       Physical Exam  Vitals reviewed.   Constitutional:       General: She is not in acute distress.  HENT:      Head: Normocephalic and atraumatic.   Eyes:      Pupils: Pupils are equal, round, and reactive to light.   Cardiovascular:      Rate and Rhythm: Normal rate and regular rhythm.      Heart sounds: No murmur heard.     No friction rub.   Pulmonary:      Effort: Pulmonary effort is normal.      Breath sounds: Normal breath sounds.   Abdominal:      General: Bowel sounds are normal. There is no distension.      Palpations: Abdomen is soft.      Tenderness: There is no abdominal tenderness.   Musculoskeletal:      Cervical back: Neck supple.   Skin:     General: Skin is warm and dry.      Findings: No rash.   Psychiatric:         Behavior: Behavior normal.             Luc Teixeira MD  Family Medicine

## 2023-10-12 ENCOUNTER — PATIENT MESSAGE (OUTPATIENT)
Dept: PSYCHIATRY | Facility: CLINIC | Age: 83
End: 2023-10-12
Payer: MEDICARE

## 2023-10-20 ENCOUNTER — TELEPHONE (OUTPATIENT)
Dept: ADMINISTRATIVE | Facility: CLINIC | Age: 83
End: 2023-10-20
Payer: MEDICARE

## 2023-10-23 ENCOUNTER — OFFICE VISIT (OUTPATIENT)
Dept: FAMILY MEDICINE | Facility: CLINIC | Age: 83
End: 2023-10-23
Payer: MEDICARE

## 2023-10-23 VITALS
DIASTOLIC BLOOD PRESSURE: 64 MMHG | SYSTOLIC BLOOD PRESSURE: 116 MMHG | HEART RATE: 64 BPM | BODY MASS INDEX: 24.59 KG/M2 | WEIGHT: 133.63 LBS | HEIGHT: 62 IN

## 2023-10-23 DIAGNOSIS — J47.9 BRONCHIECTASIS WITHOUT COMPLICATION: ICD-10-CM

## 2023-10-23 DIAGNOSIS — I70.0 AORTIC ATHEROSCLEROSIS: ICD-10-CM

## 2023-10-23 DIAGNOSIS — Z00.00 ENCOUNTER FOR PREVENTIVE HEALTH EXAMINATION: Primary | ICD-10-CM

## 2023-10-23 DIAGNOSIS — M46.1 SACROILIITIS, NOT ELSEWHERE CLASSIFIED: ICD-10-CM

## 2023-10-23 DIAGNOSIS — I65.23 BILATERAL CAROTID ARTERY STENOSIS: ICD-10-CM

## 2023-10-23 DIAGNOSIS — F33.9 MAJOR DEPRESSIVE DISORDER, RECURRENT EPISODE WITH ANXIOUS DISTRESS: ICD-10-CM

## 2023-10-23 PROCEDURE — 99999 PR PBB SHADOW E&M-EST. PATIENT-LVL IV: CPT | Mod: PBBFAC,,, | Performed by: NURSE PRACTITIONER

## 2023-10-23 PROCEDURE — G0439 PR MEDICARE ANNUAL WELLNESS SUBSEQUENT VISIT: ICD-10-PCS | Mod: ,,, | Performed by: NURSE PRACTITIONER

## 2023-10-23 PROCEDURE — 99999 PR PBB SHADOW E&M-EST. PATIENT-LVL IV: ICD-10-PCS | Mod: PBBFAC,,, | Performed by: NURSE PRACTITIONER

## 2023-10-23 PROCEDURE — G0439 PPPS, SUBSEQ VISIT: HCPCS | Mod: ,,, | Performed by: NURSE PRACTITIONER

## 2023-10-23 PROCEDURE — 99214 OFFICE O/P EST MOD 30 MIN: CPT | Mod: PBBFAC,PO | Performed by: NURSE PRACTITIONER

## 2023-10-23 NOTE — PATIENT INSTRUCTIONS
Counseling and Referral of Other Preventative  (Italic type indicates deductible and co-insurance are waived)    Patient Name: Amarilis Decker  Today's Date: 10/23/2023    Health Maintenance       Date Due Completion Date    COVID-19 Vaccine (4 - 2023-24 season) 09/01/2023 12/16/2021    Shingles Vaccine (2 of 3) 01/01/2024 (Originally 9/5/2007) 7/11/2007    DEXA Scan 01/13/2025 1/13/2022    Lipid Panel 09/07/2026 9/7/2021    TETANUS VACCINE 08/02/2028 8/2/2018        No orders of the defined types were placed in this encounter.    The following information is provided to all patients.  This information is to help you find resources for any of the problems found today that may be affecting your health:                Living healthy guide: www.[facility].[place].gov      Understanding Diabetes: www.diabetes.org      Eating healthy: www.cdc.gov/healthyweight      CDC home safety checklist: www.cdc.gov/steadi/patient.html      Agency on Aging: www.goea.[place].[facility]      Alcoholics anonymous (AA): www.aa.org      Physical Activity: www.candice.nih.gov/ji3vmyi      Tobacco use: www.quitwithusla.org

## 2023-10-23 NOTE — PROGRESS NOTES
"  Amarilis Decker presented for a  Medicare AWV and comprehensive Health Risk Assessment today. The following components were reviewed and updated:    Medical history  Family History  Social history  Allergies and Current Medications  Health Risk Assessment  Health Maintenance  Care Team         ** See Completed Assessments for Annual Wellness Visit within the encounter summary.**         The following assessments were completed:  Living Situation  CAGE  Depression Screening  Timed Get Up and Go  Whisper Test  Cognitive Function Screening      Nutrition Screening  ADL Screening  PAQ Screening    Review for Opioid Screening: Patient does not have rx for Opioids.    Review for Substance Use Disorders: Patient does not use substance.     Vitals:    10/23/23 0745   BP: 116/64   BP Location: Left arm   Patient Position: Sitting   BP Method: Medium (Manual)   Pulse: 64   Weight: 60.6 kg (133 lb 9.6 oz)   Height: 5' 2" (1.575 m)     Body mass index is 24.44 kg/m².  Physical Exam  Vitals reviewed.   Constitutional:       General: She is not in acute distress.  HENT:      Head: Normocephalic.   Cardiovascular:      Rate and Rhythm: Normal rate.   Pulmonary:      Effort: Pulmonary effort is normal. No respiratory distress.   Skin:     General: Skin is warm.   Neurological:      General: No focal deficit present.      Mental Status: She is alert.   Psychiatric:         Mood and Affect: Mood normal.               Diagnoses and health risks identified today and associated recommendations/orders:    1. Encounter for preventive health examination  Reviewed health maintenance and provided recommendations    Recommend shingrix and RSV vaccine    2. Major depressive disorder, recurrent episode with anxious distress  Continue to monitor  Followed by Luc Teixeira MD   No si/hi.      3. Bronchiectasis without complication  Continue to monitor  Followed by Chrissie.      4. Aortic atherosclerosis  Continue to monitor  Followed by Lluvia" Luc WILHELM MD  .      5. Sacroiliitis, not elsewhere classified  Continue to monitor  Followed by Luc Teixeira MD .      6.  Carotid artery disease  Continue to monitor  Followed by Luc Teixeira MD .    CT 1/12/23      Provided Amarilis with a 5-10 year written screening schedule and personal prevention plan. Recommendations were developed using the USPSTF age appropriate recommendations. Education, counseling, and referrals were provided as needed. After Visit Summary printed and given to patient which includes a list of additional screenings\tests needed.    Follow up in one year    Mary Avina NP    I offered to discuss advanced care planning, including how to pick a person who would make decisions for you if you were unable to make them for yourself, called a health care power of , and what kind of decisions you might make such as use of life sustaining treatments such as ventilators and tube feeding when faced with a life limiting illness recorded on a living will that they will need to know. (How you want to be cared for as you near the end of your natural life)     X  Patient has advanced directives written and agrees to provide copies to the institution.

## 2023-10-31 PROBLEM — I65.23 BILATERAL CAROTID ARTERY STENOSIS: Status: ACTIVE | Noted: 2023-10-31

## 2023-11-01 NOTE — PROGRESS NOTES
"Subjective:      Patient ID: Amarilis Decker is a 82 y.o. female.    Chief Complaint: Disease Management    HPI    Rheumatologic History:     - Diagnosis/es:   - Fibromyalgia   - Osteopenia with elevated FRAX and history of compression fractures   - Osteoarthritis   - Psoriasis  - Family History: No known autoimmune conditions  - Gyn History: , no miscarriages  - Social History: Non smoker, rare alcohol intake  - Positive serologies: -  - Negative serologies: HARLEY, TTG, gliadin, antiphospholipid antibodies  - Infectious screening labs: -  - Imaging:   - Xray SI joints (2017) Mild bilateral symmetric chronic sacroiliitis   - Xray hands (2017) Negative radiographs of both hands.   - Xray lumbar spine (2022) The bones are osteopenic.  There is a chronic/remote superior endplate compression fracture present at L2 with approximately 40% maximal height loss noted.  No new/acute lumbar compression fracture appreciated on today's study.  There is a fixed grade I retrolisthesis of L2 on L3 and L3 on L4. no translational instability at any lumbar level.  There is multilevel degenerative facet arthropathy present in the lumbar spine, most pronounced at L4-L5 and L5-S1.  No pars defects.  There is a 10° levoscoliotic curvature of the thoracolumbar spine.  There are postoperative changes of prior cholecystectomy.  There are probable calcified splenic granulomas present.   - DEXA (2022) Osteopenia with 23% risk of a major osteoporotic fracture and a 13% risk of hip fracture in the next 10 years (FRAX).  - Previous Treatments:   - MTX: caused "throat choking"   - Otezla: caused coughing and leg pain   - Humira: rash    - Elavil  - Current Treatments:    - Vicodin  Interval History:   This is an 82-year-old woman with history of depression, anxiety, sinusitis, pulmonary nodules, bronchiectasis, bilateral carotid artery stenosis, hypothyroidism, DDD, vitamin-D deficiency, fibromyalgia, insomnia, hypothyroidism, osteopenia " "with elevated FRAX and history of compression fracture, and psoriasis.  She was seen by Dr. Alejandro in 2017 for polyarthralgias but she did not follow-up. She reports pain in her lower back, hips, knees, and all over her body. Over the past few weeks, her back has felt worse at night.    Objective:   /68   Pulse 65   Ht 5' 2" (1.575 m)   Wt 59 kg (130 lb 1.1 oz)   BMI 23.79 kg/m²   Physical Exam   Constitutional: normal appearance.   HENT:   Head: Normocephalic and atraumatic.   Cardiovascular: Normal rate, regular rhythm and normal heart sounds.   Pulmonary/Chest: Effort normal and breath sounds normal.   Musculoskeletal:      Comments: + Degenerative changes  + Schober's test   Unable to touch occiput to wall   Neurological: She is alert.   Skin: Skin is warm and dry. Rash (Psoriasis, scalp) noted.       No data to display     Assessment:     1. Chronic midline low back pain, unspecified whether sciatica present    2. Polyarthralgia    3. Psoriasis    4. Primary osteoarthritis involving multiple joints    5. Fibromyalgia    6. Senile osteopenia      This is an 82-year-old woman with history of depression, anxiety, sinusitis, pulmonary nodules, bronchiectasis, bilateral carotid artery stenosis, hypothyroidism, DDD, vitamin-D deficiency, fibromyalgia, insomnia, hypothyroidism, osteopenia with elevated FRAX and history of compression fracture, and psoriasis.  She was seen by Dr. Tee in 2017 for polyarthralgias but she did not follow-up. She reports pain in her lower back, hips, knees, and all over her body. Over the past few weeks, her back has felt worse at night.  She has a positive Schober's test and is unable to touch her occiput to the wall.  In 2017, x-ray of the sacroiliac joints did show evidence of chronic sacroiliitis.  Given her history of psoriasis, I will need to look for axial spondyloarthropathy.  Of note, she does have significant degenerative disc disease as well as vertebral compression " fractures that may be causing her symptoms.  Workup as outlined below, and I will have her follow-up in a few weeks to discuss management.  She wishes to remain on less medication if possible, and if workup for axial SpA is negative, may consider pain clinic referral.    In terms of her osteopenia with elevated FRAX, she does have a history of compression fractures and should be on Reclast.  I will discuss this at her upcoming visit.    Plan:     Problem List Items Addressed This Visit          Derm    Psoriasis (Chronic)    Relevant Orders    CBC Auto Differential    Comprehensive Metabolic Panel    Sedimentation rate    C-Reactive Protein    HLA B27 Antigen    Hepatitis B surface antigen    HBcAB    Hepatitis B surface antibody    Hepatitis C antibody    Quantiferon Gold TB    X-Ray Sacroiliac Joints Complete    MRI Sacroiliac Joint W W/O Contrast       Orthopedic    Senile osteopenia     Other Visit Diagnoses       Chronic midline low back pain, unspecified whether sciatica present    -  Primary    Relevant Orders    CBC Auto Differential    Comprehensive Metabolic Panel    Sedimentation rate    C-Reactive Protein    HLA B27 Antigen    Hepatitis B surface antigen    HBcAB    Hepatitis B surface antibody    Hepatitis C antibody    Quantiferon Gold TB    X-Ray Sacroiliac Joints Complete    MRI Sacroiliac Joint W W/O Contrast    Polyarthralgia        Relevant Orders    CBC Auto Differential    Comprehensive Metabolic Panel    Sedimentation rate    C-Reactive Protein    HLA B27 Antigen    Hepatitis B surface antigen    HBcAB    Hepatitis B surface antibody    Hepatitis C antibody    Quantiferon Gold TB    X-Ray Sacroiliac Joints Complete    MRI Sacroiliac Joint W W/O Contrast    Primary osteoarthritis involving multiple joints        Fibromyalgia              - HLA B27  - Xray SI joints, MRI SI joints    Follow up in 3-4 weeks    60 minutes of total time spent on the encounter, which includes face to face time and  non-face to face time preparing to see the patient (eg, review of tests), Obtaining and/or reviewing separately obtained history, Documenting clinical information in the electronic or other health record, Independently interpreting results (not separately reported) and communicating results to the patient/family/caregiver, or Care coordination (not separately reported).     This note was prepared with any[employer] Direct voice recognition transcription software. Garbled syntax, mangled pronouns, and other bizarre constructions may be attributed to that software system       Marline Gavin M.D.  Rheumatology Dept  [place], [place]

## 2023-11-02 ENCOUNTER — OFFICE VISIT (OUTPATIENT)
Dept: RHEUMATOLOGY | Facility: CLINIC | Age: 83
End: 2023-11-02
Payer: MEDICARE

## 2023-11-02 ENCOUNTER — PATIENT MESSAGE (OUTPATIENT)
Dept: RHEUMATOLOGY | Facility: CLINIC | Age: 83
End: 2023-11-02

## 2023-11-02 VITALS
SYSTOLIC BLOOD PRESSURE: 134 MMHG | BODY MASS INDEX: 23.93 KG/M2 | WEIGHT: 130.06 LBS | HEART RATE: 65 BPM | DIASTOLIC BLOOD PRESSURE: 68 MMHG | HEIGHT: 62 IN

## 2023-11-02 DIAGNOSIS — M54.50 CHRONIC MIDLINE LOW BACK PAIN, UNSPECIFIED WHETHER SCIATICA PRESENT: Primary | ICD-10-CM

## 2023-11-02 DIAGNOSIS — M79.7 FIBROMYALGIA: ICD-10-CM

## 2023-11-02 DIAGNOSIS — L40.9 PSORIASIS: ICD-10-CM

## 2023-11-02 DIAGNOSIS — M15.9 PRIMARY OSTEOARTHRITIS INVOLVING MULTIPLE JOINTS: ICD-10-CM

## 2023-11-02 DIAGNOSIS — M25.50 POLYARTHRALGIA: ICD-10-CM

## 2023-11-02 DIAGNOSIS — M85.80 SENILE OSTEOPENIA: ICD-10-CM

## 2023-11-02 DIAGNOSIS — G89.29 CHRONIC MIDLINE LOW BACK PAIN, UNSPECIFIED WHETHER SCIATICA PRESENT: Primary | ICD-10-CM

## 2023-11-02 PROCEDURE — 99999 PR PBB SHADOW E&M-EST. PATIENT-LVL V: ICD-10-PCS | Mod: PBBFAC,,, | Performed by: STUDENT IN AN ORGANIZED HEALTH CARE EDUCATION/TRAINING PROGRAM

## 2023-11-02 PROCEDURE — 99999 PR PBB SHADOW E&M-EST. PATIENT-LVL V: CPT | Mod: PBBFAC,,, | Performed by: STUDENT IN AN ORGANIZED HEALTH CARE EDUCATION/TRAINING PROGRAM

## 2023-11-02 PROCEDURE — 99205 OFFICE O/P NEW HI 60 MIN: CPT | Mod: S$PBB,,, | Performed by: STUDENT IN AN ORGANIZED HEALTH CARE EDUCATION/TRAINING PROGRAM

## 2023-11-02 PROCEDURE — 99215 OFFICE O/P EST HI 40 MIN: CPT | Mod: PBBFAC,PN | Performed by: STUDENT IN AN ORGANIZED HEALTH CARE EDUCATION/TRAINING PROGRAM

## 2023-11-02 PROCEDURE — 99205 PR OFFICE/OUTPT VISIT, NEW, LEVL V, 60-74 MIN: ICD-10-PCS | Mod: S$PBB,,, | Performed by: STUDENT IN AN ORGANIZED HEALTH CARE EDUCATION/TRAINING PROGRAM

## 2023-11-02 ASSESSMENT — ROUTINE ASSESSMENT OF PATIENT INDEX DATA (RAPID3)
PAIN SCORE: 8
PSYCHOLOGICAL DISTRESS SCORE: 3.3
TOTAL RAPID3 SCORE: 5.55
PATIENT GLOBAL ASSESSMENT SCORE: 5
MDHAQ FUNCTION SCORE: 1.1
FATIGUE SCORE: 3.3

## 2023-11-13 ENCOUNTER — TELEPHONE (OUTPATIENT)
Dept: FAMILY MEDICINE | Facility: CLINIC | Age: 83
End: 2023-11-13
Payer: MEDICARE

## 2023-11-13 NOTE — TELEPHONE ENCOUNTER
Apt cancelled.     Attempted to call patient but was unsuccessful. LMOM for patient to r/c to clinic

## 2023-11-13 NOTE — TELEPHONE ENCOUNTER
----- Message from Kim Lira sent at 2023  8:25 AM CST -----  Contact: Robert  Type: Appointment Request    Name of Caller:  Daughter  When is the first available appointment?  NA     Would the patient rather a call back or a response via [facility]? [facility]  Best Call Back Number:  [phone]      Additional Information:  Please reschedule B12 injection for tomorrow in the morning. Pt had a death in family and had to attend .

## 2023-11-16 ENCOUNTER — CLINICAL SUPPORT (OUTPATIENT)
Dept: FAMILY MEDICINE | Facility: CLINIC | Age: 83
End: 2023-11-16
Payer: MEDICARE

## 2023-11-16 DIAGNOSIS — R53.82 CHRONIC FATIGUE: Primary | ICD-10-CM

## 2023-11-16 PROCEDURE — 99999PBSHW PR PBB SHADOW TECHNICAL ONLY FILED TO HB: ICD-10-PCS | Mod: PBBFAC,,,

## 2023-11-16 PROCEDURE — 99999PBSHW PR PBB SHADOW TECHNICAL ONLY FILED TO HB: Mod: PBBFAC,,,

## 2023-11-16 PROCEDURE — 96372 THER/PROPH/DIAG INJ SC/IM: CPT | Mod: PBBFAC,PN

## 2023-11-16 RX ADMIN — CYANOCOBALAMIN 1000 MCG: 1000 INJECTION, SOLUTION INTRAMUSCULAR at 10:11

## 2023-11-16 NOTE — PROGRESS NOTES
Patient present to the clinic today for her monthly B 12 injection.  Injection administered into the left glute per patient request.  Patient tolerated well with no complaints.

## 2023-11-17 ENCOUNTER — HOSPITAL ENCOUNTER (OUTPATIENT)
Dept: RADIOLOGY | Facility: HOSPITAL | Age: 83
Discharge: HOME | End: 2023-11-17
Attending: STUDENT IN AN ORGANIZED HEALTH CARE EDUCATION/TRAINING PROGRAM
Payer: MEDICARE

## 2023-11-17 DIAGNOSIS — L40.9 PSORIASIS: ICD-10-CM

## 2023-11-17 DIAGNOSIS — M25.50 POLYARTHRALGIA: ICD-10-CM

## 2023-11-17 DIAGNOSIS — M54.50 CHRONIC MIDLINE LOW BACK PAIN, UNSPECIFIED WHETHER SCIATICA PRESENT: ICD-10-CM

## 2023-11-17 DIAGNOSIS — G89.29 CHRONIC MIDLINE LOW BACK PAIN, UNSPECIFIED WHETHER SCIATICA PRESENT: ICD-10-CM

## 2023-11-17 PROCEDURE — 72197 MRI PELVIS W/O & W/DYE: CPT | Mod: 26,,, | Performed by: RADIOLOGY

## 2023-11-17 PROCEDURE — 72202 X-RAY EXAM SI JOINTS 3/> VWS: CPT | Mod: TC,FY,PO

## 2023-11-17 PROCEDURE — A9585 GADOBUTROL INJECTION: HCPCS | Mod: PO | Performed by: STUDENT IN AN ORGANIZED HEALTH CARE EDUCATION/TRAINING PROGRAM

## 2023-11-17 PROCEDURE — 72197 MRI PELVIS W/O & W/DYE: CPT | Mod: TC,PO

## 2023-11-17 PROCEDURE — 72202 X-RAY EXAM SI JOINTS 3/> VWS: CPT | Mod: 26,,, | Performed by: RADIOLOGY

## 2023-11-17 PROCEDURE — 25500020 PHARM REV CODE 255: Mod: PO | Performed by: STUDENT IN AN ORGANIZED HEALTH CARE EDUCATION/TRAINING PROGRAM

## 2023-11-17 PROCEDURE — 72202 XR SACROILIAC JOINTS COMPLETE: ICD-10-PCS | Mod: 26,,, | Performed by: RADIOLOGY

## 2023-11-17 PROCEDURE — 72197 MRI SACROILIAC JOINTS W W/O CONTRAST: ICD-10-PCS | Mod: 26,,, | Performed by: RADIOLOGY

## 2023-11-17 RX ORDER — GADOBUTROL 604.72 MG/ML
6 INJECTION INTRAVENOUS
Status: COMPLETED | OUTPATIENT
Start: 2023-11-17 | End: 2023-11-17

## 2023-11-17 RX ADMIN — GADOBUTROL 6 ML: 604.72 INJECTION INTRAVENOUS at 01:11

## 2023-11-20 NOTE — PROGRESS NOTES
Subjective:      Patient ID: Amarilis Decker is a 82 y.o. female.    Chief Complaint: Disease Management    HPI    Rheumatologic History:      - Diagnosis/es:              - Fibromyalgia              - Osteopenia with elevated FRAX and history of compression fractures              - Osteoarthritis              - Psoriasis   - Ankylosing spondylitis  - Family History: No known autoimmune conditions  - Gyn History: , no miscarriages  - Social History: Non smoker, rare alcohol intake  - Positive serologies: -  - Negative serologies: HARLEY, TTG, gliadin, antiphospholipid antibodies  - Infectious screening labs:  Negative hepatitis-B, C, and QuantiFERON (2023)   - Imaging:              - Xray SI joints (2023) Sclerosis lateral aspects of sacroiliac joints bilaterally.    - MRI SI joints (2023) There are small focal areas of increased T2/stir signal and decreased T1 signal sacroiliac joints involving the sacral and iliac side of the left SI joint.  To lesser degree also the right SI joint sacral side more apparent than iliac side.  Finding most apparent on the left with the area of increased T2/stir signal measures approximate 1.5 cm transversely.  Mild enhancement.  No widening or ankylosis or erosions.               - Xray hands () Negative radiographs of both hands.              - Xray lumbar spine (2022) The bones are osteopenic.  There is a chronic/remote superior endplate compression fracture present at L2 with approximately 40% maximal height loss noted.  No new/acute lumbar compression fracture appreciated on today's study.  There is a fixed grade I retrolisthesis of L2 on L3 and L3 on L4. no translational instability at any lumbar level.  There is multilevel degenerative facet arthropathy present in the lumbar spine, most pronounced at L4-L5 and L5-S1.  No pars defects.  There is a 10° levoscoliotic curvature of the thoracolumbar spine.  There are postoperative changes of prior  "cholecystectomy.  There are probable calcified splenic granulomas present.              - DEXA (1/2022) Osteopenia with 23% risk of a major osteoporotic fracture and a 13% risk of hip fracture in the next 10 years (FRAX).  - Previous Treatments:              - MTX: caused "throat choking"              - Otezla: caused coughing and leg pain              - Humira: rash               - Elavil  - Current Treatments:               - Vicodin  Interval History:   She still has chronic pain and stiffness in her back, and migratory polyarthralgias.     Objective:   /69   Pulse 80   Ht 5' 2" (1.575 m)   Wt 59 kg (130 lb 1.1 oz)   BMI 23.79 kg/m²   Physical Exam   Constitutional: normal appearance.   HENT:   Head: Normocephalic and atraumatic.   Pulmonary/Chest: Effort normal.   Musculoskeletal:      Comments: No synovitis, dactylitis, enthesitis, effusions     Neurological: She is alert.       No data to display    Labs independently reviewed by me (11/17/2023)  CBC Cr 1, GFR 56, ALP 49, AST and ALT WNL  CMP WNL  ESR and CRP WNL      Assessment:     1. Ankylosing spondylitis, unspecified site of spine    2. High risk medication use    3. Drug-induced immunodeficiency    4. Psoriasis    5. Primary osteoarthritis involving multiple joints    6. Fibromyalgia    7. Senile osteopenia        This is an 82-year-old woman with history of depression, anxiety, sinusitis, pulmonary nodules, bronchiectasis, bilateral carotid artery stenosis, hypothyroidism, DDD, vitamin-D deficiency, fibromyalgia, insomnia, hypothyroidism, osteopenia with elevated FRAX and history of compression fracture, psoriasis, and ankylosing spondylitis.  Pre and MRI show evidence of active sacroiliitis.  Will request prior authorization for Enbrel.    In terms of her osteopenia with elevated FRAX, she does have a history of compression fractures and should be on Reclast.  I will discuss this at her upcoming visit. I brought this up today but she is " hesitant to try more than one medication at a time.     Plan:     Problem List Items Addressed This Visit          Derm    Psoriasis (Chronic)       Orthopedic    Senile osteopenia     Other Visit Diagnoses       Ankylosing spondylitis, unspecified site of spine    -  Primary    Relevant Medications    etanercept (ENBREL SURECLICK) 50 mg/mL (1 mL)    Other Relevant Orders    Comprehensive Metabolic Panel    CBC Auto Differential    Sedimentation rate    C-Reactive Protein    High risk medication use        Relevant Orders    Comprehensive Metabolic Panel    CBC Auto Differential    Sedimentation rate    C-Reactive Protein    Drug-induced immunodeficiency        Relevant Orders    Comprehensive Metabolic Panel    CBC Auto Differential    Sedimentation rate    C-Reactive Protein    Primary osteoarthritis involving multiple joints        Fibromyalgia              - Request PA for Enbrel weekly. I discussed potential side effects including injection site reactions, malignancy, infection, blood count, liver and kidney function abnormalities.  Hold for infection. ACR patient information sheet on TNF inhibitors was provided.  - CBC, CMP, ESR, CRP every 12 weeks  - Pre-DMARD labs due for repeat 11/2024  - I recommended PCV20 and the RSV vaccines    Follow up in 3 months    45 minutes of total time spent on the encounter, which includes face to face time and non-face to face time preparing to see the patient (eg, review of tests), Obtaining and/or reviewing separately obtained history, Documenting clinical information in the electronic or other health record, Independently interpreting results (not separately reported) and communicating results to the patient/family/caregiver, or Care coordination (not separately reported).     This note was prepared with [employer]ar[employer] Direct voice recognition transcription software. Garbled syntax, mangled pronouns, and other bizarre constructions may be attributed to that software system        Marline Gavin M.D.  Rheumatology Dept  [place], [place]

## 2023-11-21 ENCOUNTER — OFFICE VISIT (OUTPATIENT)
Dept: RHEUMATOLOGY | Facility: CLINIC | Age: 83
End: 2023-11-21
Payer: MEDICARE

## 2023-11-21 VITALS
HEIGHT: 62 IN | BODY MASS INDEX: 23.93 KG/M2 | WEIGHT: 130.06 LBS | HEART RATE: 80 BPM | DIASTOLIC BLOOD PRESSURE: 69 MMHG | SYSTOLIC BLOOD PRESSURE: 131 MMHG

## 2023-11-21 DIAGNOSIS — M45.9 ANKYLOSING SPONDYLITIS, UNSPECIFIED SITE OF SPINE: Primary | ICD-10-CM

## 2023-11-21 DIAGNOSIS — M15.9 PRIMARY OSTEOARTHRITIS INVOLVING MULTIPLE JOINTS: ICD-10-CM

## 2023-11-21 DIAGNOSIS — L40.9 PSORIASIS: ICD-10-CM

## 2023-11-21 DIAGNOSIS — D84.821 DRUG-INDUCED IMMUNODEFICIENCY: ICD-10-CM

## 2023-11-21 DIAGNOSIS — Z79.899 DRUG-INDUCED IMMUNODEFICIENCY: ICD-10-CM

## 2023-11-21 DIAGNOSIS — Z79.899 HIGH RISK MEDICATION USE: ICD-10-CM

## 2023-11-21 DIAGNOSIS — M85.80 SENILE OSTEOPENIA: ICD-10-CM

## 2023-11-21 DIAGNOSIS — M79.7 FIBROMYALGIA: ICD-10-CM

## 2023-11-21 PROCEDURE — 99215 OFFICE O/P EST HI 40 MIN: CPT | Mod: S$PBB,,, | Performed by: STUDENT IN AN ORGANIZED HEALTH CARE EDUCATION/TRAINING PROGRAM

## 2023-11-21 PROCEDURE — 99215 PR OFFICE/OUTPT VISIT, EST, LEVL V, 40-54 MIN: ICD-10-PCS | Mod: S$PBB,,, | Performed by: STUDENT IN AN ORGANIZED HEALTH CARE EDUCATION/TRAINING PROGRAM

## 2023-11-21 PROCEDURE — 99999 PR PBB SHADOW E&M-EST. PATIENT-LVL III: ICD-10-PCS | Mod: PBBFAC,,, | Performed by: STUDENT IN AN ORGANIZED HEALTH CARE EDUCATION/TRAINING PROGRAM

## 2023-11-21 PROCEDURE — 99213 OFFICE O/P EST LOW 20 MIN: CPT | Mod: PBBFAC,PN | Performed by: STUDENT IN AN ORGANIZED HEALTH CARE EDUCATION/TRAINING PROGRAM

## 2023-11-21 PROCEDURE — 99999 PR PBB SHADOW E&M-EST. PATIENT-LVL III: CPT | Mod: PBBFAC,,, | Performed by: STUDENT IN AN ORGANIZED HEALTH CARE EDUCATION/TRAINING PROGRAM

## 2023-11-21 RX ORDER — ETANERCEPT 50 MG/ML
50 SOLUTION SUBCUTANEOUS WEEKLY
Qty: 12 ML | Refills: 3 | Status: ACTIVE | OUTPATIENT
Start: 2023-11-21 | End: 2024-03-11

## 2023-11-21 RX ORDER — ETANERCEPT 50 MG/ML
50 SOLUTION SUBCUTANEOUS WEEKLY
Qty: 12 ML | Refills: 3 | Status: SHIPPED | OUTPATIENT
Start: 2023-11-21 | End: 2023-11-21 | Stop reason: SDUPTHER

## 2023-11-21 ASSESSMENT — ROUTINE ASSESSMENT OF PATIENT INDEX DATA (RAPID3)
PAIN SCORE: 7.5
PSYCHOLOGICAL DISTRESS SCORE: 2.2
FATIGUE SCORE: 1.1
TOTAL RAPID3 SCORE: 4.5
MDHAQ FUNCTION SCORE: 0.6
PATIENT GLOBAL ASSESSMENT SCORE: 4

## 2023-11-28 PROBLEM — M45.9 ANKYLOSING SPONDYLITIS: Status: ACTIVE | Noted: 2023-11-28

## 2023-11-29 ENCOUNTER — PATIENT MESSAGE (OUTPATIENT)
Dept: FAMILY MEDICINE | Facility: CLINIC | Age: 83
End: 2023-11-29
Payer: MEDICARE

## 2023-11-29 DIAGNOSIS — Z23 NEED FOR VACCINATION: Primary | ICD-10-CM

## 2023-12-04 ENCOUNTER — TELEPHONE (OUTPATIENT)
Dept: FAMILY MEDICINE | Facility: CLINIC | Age: 83
End: 2023-12-04
Payer: MEDICARE

## 2023-12-04 ENCOUNTER — PATIENT MESSAGE (OUTPATIENT)
Dept: FAMILY MEDICINE | Facility: CLINIC | Age: 83
End: 2023-12-04
Payer: MEDICARE

## 2023-12-04 DIAGNOSIS — M46.1 SACROILIITIS, NOT ELSEWHERE CLASSIFIED: Primary | ICD-10-CM

## 2023-12-04 NOTE — TELEPHONE ENCOUNTER
----- Message from Sammy Liang sent at 12/4/2023  1:31 PM CST -----  Regarding: advise  Contact: Amarilis Castano  Type: Needs Medical Advice  Who Called:  Amarilis Daughter   Symptoms (please be specific):    How long has patient had these symptoms:    Pharmacy name and phone #:    Best Call Back Number: [phone]  Additional Information:Daughter left message in partal please read.Thanks

## 2023-12-11 ENCOUNTER — PATIENT MESSAGE (OUTPATIENT)
Dept: OTOLARYNGOLOGY | Facility: CLINIC | Age: 83
End: 2023-12-11
Payer: MEDICARE

## 2023-12-14 ENCOUNTER — CLINICAL SUPPORT (OUTPATIENT)
Dept: FAMILY MEDICINE | Facility: CLINIC | Age: 83
End: 2023-12-14
Payer: MEDICARE

## 2023-12-14 ENCOUNTER — OFFICE VISIT (OUTPATIENT)
Dept: OTOLARYNGOLOGY | Facility: CLINIC | Age: 83
End: 2023-12-14
Payer: MEDICARE

## 2023-12-14 VITALS — BODY MASS INDEX: 24.38 KG/M2 | WEIGHT: 132.5 LBS | HEIGHT: 62 IN

## 2023-12-14 DIAGNOSIS — E53.8 B12 DEFICIENCY: Primary | ICD-10-CM

## 2023-12-14 DIAGNOSIS — Z98.890 HISTORY OF SINUS SURGERY: ICD-10-CM

## 2023-12-14 DIAGNOSIS — Z22.322 MRSA CARRIER: ICD-10-CM

## 2023-12-14 DIAGNOSIS — R51.9 FACIAL PAIN: ICD-10-CM

## 2023-12-14 DIAGNOSIS — J01.01 ACUTE RECURRENT MAXILLARY SINUSITIS: Primary | ICD-10-CM

## 2023-12-14 PROCEDURE — 99999 PR PBB SHADOW E&M-EST. PATIENT-LVL III: ICD-10-PCS | Mod: PBBFAC,,, | Performed by: STUDENT IN AN ORGANIZED HEALTH CARE EDUCATION/TRAINING PROGRAM

## 2023-12-14 PROCEDURE — 96372 THER/PROPH/DIAG INJ SC/IM: CPT | Mod: PBBFAC,PN

## 2023-12-14 PROCEDURE — 31231 PR NASAL ENDOSCOPY, DX: ICD-10-PCS | Mod: 51,S$PBB,, | Performed by: STUDENT IN AN ORGANIZED HEALTH CARE EDUCATION/TRAINING PROGRAM

## 2023-12-14 PROCEDURE — 99999PBSHW PR PBB SHADOW TECHNICAL ONLY FILED TO HB: Mod: PBBFAC,,,

## 2023-12-14 PROCEDURE — 31231 NASAL ENDOSCOPY DX: CPT | Mod: 51,S$PBB,, | Performed by: STUDENT IN AN ORGANIZED HEALTH CARE EDUCATION/TRAINING PROGRAM

## 2023-12-14 PROCEDURE — 31000 PR IRRIGATION MAXILLARY SINUS: ICD-10-PCS | Mod: S$PBB,LT,, | Performed by: STUDENT IN AN ORGANIZED HEALTH CARE EDUCATION/TRAINING PROGRAM

## 2023-12-14 PROCEDURE — 99214 OFFICE O/P EST MOD 30 MIN: CPT | Mod: 25,S$PBB,, | Performed by: STUDENT IN AN ORGANIZED HEALTH CARE EDUCATION/TRAINING PROGRAM

## 2023-12-14 PROCEDURE — 31000 IRRIGATION MAXILLARY SINUS: CPT | Mod: S$PBB,LT,, | Performed by: STUDENT IN AN ORGANIZED HEALTH CARE EDUCATION/TRAINING PROGRAM

## 2023-12-14 PROCEDURE — 99999 PR PBB SHADOW E&M-EST. PATIENT-LVL III: CPT | Mod: PBBFAC,,, | Performed by: STUDENT IN AN ORGANIZED HEALTH CARE EDUCATION/TRAINING PROGRAM

## 2023-12-14 PROCEDURE — 99214 PR OFFICE/OUTPT VISIT, EST, LEVL IV, 30-39 MIN: ICD-10-PCS | Mod: 25,S$PBB,, | Performed by: STUDENT IN AN ORGANIZED HEALTH CARE EDUCATION/TRAINING PROGRAM

## 2023-12-14 PROCEDURE — 31000 IRRIGATION MAXILLARY SINUS: CPT | Mod: PBBFAC,PO,LT | Performed by: STUDENT IN AN ORGANIZED HEALTH CARE EDUCATION/TRAINING PROGRAM

## 2023-12-14 PROCEDURE — 31231 NASAL ENDOSCOPY DX: CPT | Mod: 51,PBBFAC,PO | Performed by: STUDENT IN AN ORGANIZED HEALTH CARE EDUCATION/TRAINING PROGRAM

## 2023-12-14 PROCEDURE — 99213 OFFICE O/P EST LOW 20 MIN: CPT | Mod: PBBFAC,PO | Performed by: STUDENT IN AN ORGANIZED HEALTH CARE EDUCATION/TRAINING PROGRAM

## 2023-12-14 RX ORDER — CLINDAMYCIN HYDROCHLORIDE 300 MG/1
300 CAPSULE ORAL 3 TIMES DAILY
Qty: 30 CAPSULE | Refills: 0 | Status: SHIPPED | OUTPATIENT
Start: 2023-12-14 | End: 2023-12-24

## 2023-12-14 RX ORDER — MUPIROCIN 20 MG/G
OINTMENT TOPICAL
COMMUNITY
Start: 2023-09-16

## 2023-12-14 RX ORDER — METHYLPREDNISOLONE 8 MG/1
8 TABLET ORAL DAILY
Qty: 5 TABLET | Refills: 0 | Status: SHIPPED | OUTPATIENT
Start: 2023-12-14 | End: 2024-01-09

## 2023-12-14 RX ORDER — METHYLPREDNISOLONE 8 MG/1
8 TABLET ORAL DAILY
Qty: 5 TABLET | Refills: 0 | Status: SHIPPED | OUTPATIENT
Start: 2023-12-14 | End: 2023-12-14 | Stop reason: SDUPTHER

## 2023-12-14 RX ORDER — CLINDAMYCIN HYDROCHLORIDE 300 MG/1
300 CAPSULE ORAL 3 TIMES DAILY
Qty: 30 CAPSULE | Refills: 0 | Status: SHIPPED | OUTPATIENT
Start: 2023-12-14 | End: 2023-12-14 | Stop reason: SDUPTHER

## 2023-12-14 RX ADMIN — CYANOCOBALAMIN 1000 MCG: 1000 INJECTION, SOLUTION INTRAMUSCULAR at 10:12

## 2023-12-14 NOTE — PROGRESS NOTES
"Otolaryngology Clinic Note    Subjective:       Patient ID: Amarilis Decker is a 83 y.o. female.    Chief Complaint: Sinusitis and Cough        History of Present Illness: Amarilis Decker is a 83 y.o. female presenting with sinus concerns. She reports she has had 5 sinus surgeries, got staph infection and eye damage from one.   Has been to the dentist 5 times for oral pain. Pain moves, has been lower jaw on left. Has also had headaches, moves from cheeks to forehead. Has been similar since August 27th, which was after her visit by Dr. Delgado.   Has had a crown placed on right maxilla on August 25 but pain has been on the left.   4 days ago had black discharge from her nose. No other discharge from nose, no bad smell but poor sense of smell. No fevers, no chills.   Does sinus rinses every night, no output. Uses flonase PRN with afrin. Not often.      She saw Dr. Delgado 8/23: "Amarilis is here for follow-up of chronic sinusitis.  Seen 1 mo ago for persistent sinusitis. Was on Doxy and I cleaned out sinus.  Put her on Minocycline for her concerns"   He scoped her at that time with no purulence or polyps. I personally reviewed this scope today.   It looks like he gave her mupirocin and betamethasone spray.     10/14/22: Gave her steroids and augmentin at last visit. She did not do the steroids. Culture came back sensitive to bactrim. Sent clindamycin 10/3/22. She reports she was at [place] on Friday, came back Tuesday. Pain in left cheek down to left neck for 2 days this weekend, now it is gone. Did have some neck pain in back of neck last night, has lots of tension- does have degenerative disease. Still using ointment in rinses. Never had drainage congestion. Does take zyrtec daily. She has done allergy shots in past. Uses nasal sprays PRN. Pain rotates from cheek to jaw.     1/5/23: RTC. Reports over holidays- 2 months or so, started getting nightly headaches. Stopped mupirocin, no change. Sometimes so " bad, cannot sleep. Face, head and teeth are painful, some pressure in cheeks and forehead. Has gotten yellow stuff from bottle in last month. Never had headaches before. Happening every night, aspirin helps. Still doing rinses BID. Nerve pain in cheek and jaw is better, this is different. Taking xyzal nightly.     6/20/23: RTC. Started with nasal congestion 3 weeks ago, felt like allergies. Started with colored drainage 5 days ago or so. Getting chunks out with rinses and with blowing her nose. Worse on left. Doing rinses in AM. Has been using flonase as well with illness. Has been using astelin sometimes. No fevers. Has cheek pressure. Using steroid in rinses.     8/18/23: RTC. Was still sick after clinda/doxy with dark stuff in sinus rinses. Got another round of doxy for leg infection. No more sinus concerns, still doing rinses but thinks she cannot tell when she has infections. No fevers, no green snot lately. Doing steroid in rinses. Did mupirocin for a couple of weeks.     12/14/23: RTC, sick with yellow drainage past 2 weeks, now feels like she has a cold. Sinus pressure, PND, feels clogged up with mucous. No fevers. She has been using mupirocin in her rinses.         Past Surgical History:   Procedure Laterality Date    ADENOIDECTOMY      CHOLECYSTECTOMY      COLONOSCOPY      JANKI      WNL    SINUS SURGERY      Dr Cordova    SINUS SURGERY  01/2017    xs 5    THYROID SURGERY      nodules removed //Dr Amato     TONSILLECTOMY      TYMPANOSTOMY TUBE PLACEMENT       Past Medical History:   Diagnosis Date    Allergy     Asthma     GERD (gastroesophageal reflux disease)     Globus syndrome     Headache(784.0)     Hypothyroidism     Psoriasis     Rash     Sacroiliitis, not elsewhere classified 1/12/2023     Social Determinants of Health     Tobacco Use: Low Risk  (12/14/2023)    Patient History     Smoking Tobacco Use: Never     Smokeless Tobacco Use: Never     Passive Exposure: Not on file   Alcohol Use: Not At  Risk (10/23/2023)    AUDIT-C     Frequency of Alcohol Consumption: Monthly or less     Average Number of Drinks: 1 or 2     Frequency of Binge Drinking: Never   Financial Resource Strain: Low Risk  (10/23/2023)    Overall Financial Resource Strain (CARDIA)     Difficulty of Paying Living Expenses: Not hard at all   Food Insecurity: No Food Insecurity (10/23/2023)    Hunger Vital Sign     Worried About Running Out of Food in the Last Year: Never true     Ran Out of Food in the Last Year: Never true   Transportation Needs: Unknown (10/23/2023)    PRAPARE - Transportation     Lack of Transportation (Medical): No     Lack of Transportation (Non-Medical): Not on file   Physical Activity: Inactive (10/23/2023)    Exercise Vital Sign     Days of Exercise per Week: 0 days     Minutes of Exercise per Session: 0 min   Stress: No Stress Concern Present (10/23/2023)    [employer] [place] [employer] - Occupational Stress Questionnaire     Feeling of Stress : Not at all   Social Connections: Moderately Isolated (10/23/2023)    Social Connection and Isolation Panel [NHANES]     Frequency of Communication with Friends and Family: Once a week     Frequency of Social Gatherings with Friends and Family: Once a week     Attends Taoist Services: More than 4 times per year     Active Member of Clubs or Organizations: No     Attends Club or Organization Meetings: Never     Marital Status:    Housing Stability: Low Risk  (10/23/2023)    Housing Stability Vital Sign     Unable to Pay for Housing in the Last Year: No     Number of Places Lived in the Last Year: 1     Unstable Housing in the Last Year: No   Depression: Low Risk  (10/23/2023)    Depression     Last PHQ-4: Flowsheet Data: 0     Review of patient's allergies indicates:   Allergen Reactions    Avelox [moxifloxacin] Swelling    Bactrim [sulfamethoxazole-trimethoprim] Swelling    Ciprofloxacin Swelling and Hives    Isothiazolinones Dermatitis     Strong contact  dermatitis    Neomycin sulfate Dermatitis     Has contact allergy to neomycin sulfate and to Kathon CG, or isothiazolinone    Apremilast Other (See Comments)     Muscle cramps, cough    Bacitracin      Other reaction(s): unknown     Current Outpatient Medications   Medication Instructions    azelastine (ASTELIN) 137 mcg, Nasal, 2 times daily    betamethasone dipropionate 0.05 % cream Topical (Top), Daily    cholecalciferol (vitamin D3) (VITAMIN D3) 2,000 Units, Oral, Daily    citalopram (CELEXA) 10 mg, Oral, Daily    clindamycin (CLEOCIN) 300 mg, Oral, 3 times daily    clobetasol 0.05% (TEMOVATE) 0.05 % Oint 1 application , Daily, Apply to affected area    desonide 0.05% (DESOWEN) 0.05 % Oint Topical (Top), 2 times daily    desoximetasone (TOPICORT) 0.05 % cream Topical (Top), 2 times daily    EnbreL SureClick 50 mg, Subcutaneous, Weekly    fluticasone propionate (FLONASE) 50 mcg, Each Nostril, Daily    HYDROcodone-acetaminophen (NORCO) 5-325 mg per tablet 1 tablet, Oral, Every 8 hours PRN    ketoconazole (NIZORAL) 2 % shampoo Topical (Top), Twice weekly    levocetirizine (XYZAL) 5 MG tablet Take one capsule by mouth daily    linaCLOtide (LINZESS) 72 mcg, Oral, Daily PRN    MAGNESIUM ORAL Oral, Once, Pt takes 400mg once daily    methylPREDNISolone (MEDROL) 8 mg, Oral, Daily    mupirocin (BACTROBAN) 2 % ointment SMARTSI Application Topical 2-3 Times Daily    pantoprazole (PROTONIX) 40 mg, Oral, Daily    thyroid (pork) (ARMOUR THYROID) 60 mg, Oral, Before breakfast         ENT ROS negative except as stated above.             Objective:      There were no vitals filed for this visit.    General: NAD, well appearing  Eyes: Normal conjunctiva and lids  Face: symmetric, nerve intact  Nose: The nose is without any evidence of any deformity. The nasal mucosa is moist. The septum is midline. There is no evidence of septal hematoma. The turbinates are without abnormality.   Ears: The ears are with normal-appearing pinna.  Examination of the canals is normal appearing bilaterally. There is no drainage or erythema noted. The tympanic membranes are normal appearing with pearly color, normal-appearing landmarks and normal light reflex. Hearing is grossly intact.  Mouth: No obvious abnormalities to the lips. The teeth are unremarkable. The gingivae are without any obvious evidence of infection or lesion. The oral mucosa is moist and pink. There are no obvious masses to the hard or soft palate.   Oropharynx: The uvula is midline.  The tongue is midline. The posterior pharynx is without erythema or exudate. The tonsils are normal appearing.  Salivary glands: The salivary glands are symmetric and not enlarged, no masses  Neck: No lymphadenopathy, trachea midline, thryoid not enlarged.  Psych: Normal mood and affect.   Neuro: Grossly intact  Speech: fluent    Procedure: Nasal Endoscopy wi th debridement  Indications: acute sinusitis   Verbal consent obtained  Anesthesia: topical lidocaine and phenylephrine spray    Procedure: Rigid 30 degree endoscope was passed into each nare to nasopharynx showing findings below.     Septum midline. Max and ethmoids open on right, no drainage, SER inflamed but no drainage. Left max open, some polypoid changes of mucosa, ethmoid bed with polypoid mucosa with purulence, SER with purulence. Purulence in Nasopharynx. Irrigated left sinuses with saline.            Assessment and Plan:       1. Acute recurrent maxillary sinusitis    2. MRSA carrier    3. History of sinus surgery    4. Facial pain        Hx MRSA    - clindamycin TID 10 days  - medrol 5 days as needed    - mupirocin 1 tsp in rinses once a week or so  - Continue rinses with betamethasone, can also reduce this to every few days if desired.  Continue astelin as needed.     CT sinus 1/12/2023 without significant sinus disease, all sinuses opened surgically and patent.     RTC: as scheduled in feb, message with drainage and I can do phone call or  virtual if needed as she knows her symptoms well and is usually correct about sinusitis, usually cover mrsa with history of + culture.      Plan of care was discussed in detail with the patient, who agreed with the plan as above. All questions were answered in detail.     Eboni Briggs MD  Otolaryngology;

## 2023-12-20 PROBLEM — D72.10 EOSINOPHILIA: Status: ACTIVE | Noted: 2023-12-20

## 2023-12-29 ENCOUNTER — OFFICE VISIT (OUTPATIENT)
Dept: PHYSICAL MEDICINE AND REHAB | Facility: CLINIC | Age: 83
End: 2023-12-29
Payer: MEDICARE

## 2023-12-29 ENCOUNTER — PATIENT MESSAGE (OUTPATIENT)
Dept: FAMILY MEDICINE | Facility: CLINIC | Age: 83
End: 2023-12-29
Payer: MEDICARE

## 2023-12-29 VITALS
HEART RATE: 77 BPM | WEIGHT: 133 LBS | SYSTOLIC BLOOD PRESSURE: 141 MMHG | BODY MASS INDEX: 24.48 KG/M2 | DIASTOLIC BLOOD PRESSURE: 65 MMHG | HEIGHT: 62 IN

## 2023-12-29 DIAGNOSIS — M46.1 SACROILIITIS, NOT ELSEWHERE CLASSIFIED: Primary | ICD-10-CM

## 2023-12-29 PROCEDURE — 99999 PR PBB SHADOW E&M-EST. PATIENT-LVL IV: CPT | Mod: PBBFAC,,, | Performed by: STUDENT IN AN ORGANIZED HEALTH CARE EDUCATION/TRAINING PROGRAM

## 2023-12-29 PROCEDURE — 99204 PR OFFICE/OUTPT VISIT, NEW, LEVL IV, 45-59 MIN: ICD-10-PCS | Mod: S$PBB,,, | Performed by: STUDENT IN AN ORGANIZED HEALTH CARE EDUCATION/TRAINING PROGRAM

## 2023-12-29 PROCEDURE — 99204 OFFICE O/P NEW MOD 45 MIN: CPT | Mod: S$PBB,,, | Performed by: STUDENT IN AN ORGANIZED HEALTH CARE EDUCATION/TRAINING PROGRAM

## 2023-12-29 PROCEDURE — 99214 OFFICE O/P EST MOD 30 MIN: CPT | Mod: PBBFAC,PO | Performed by: STUDENT IN AN ORGANIZED HEALTH CARE EDUCATION/TRAINING PROGRAM

## 2023-12-29 PROCEDURE — 99999 PR PBB SHADOW E&M-EST. PATIENT-LVL IV: ICD-10-PCS | Mod: PBBFAC,,, | Performed by: STUDENT IN AN ORGANIZED HEALTH CARE EDUCATION/TRAINING PROGRAM

## 2023-12-29 NOTE — PROGRESS NOTES
PHYSICAL MEDICINE AND REHABILITATION  New Patient Consult:    Subjective:   Chief Complaint:    Chief Complaint   Patient presents with    SI Joint Pain      HPI: Amarilis Decker is a 83 y.o. female with  has a past medical history of Allergy, Asthma, GERD (gastroesophageal reflux disease), Globus syndrome, Headache(784.0), Hypothyroidism, Psoriasis, Rash, and Sacroiliitis, not elsewhere classified (1/12/2023). She presents to me as a self-referral for SI Joint Pain    Today,  she complains of SI joint pain.  Her pain is rated 3/10.  She describes the pain as aching, and throbbing.  X-rays of the bilateral SI joints on 11/17/2023 revealed sclerosis on the lateral aspects of the sacroiliac joints bilaterally. She recalls a history of herniated discs in the L spine that were relieved with injections in 2015. She has been on elavil in the past but had to stop due to unwanted s/e. She is currently on norco and Celexa. She has had enbrel recommended for AS but is hesitant to start her history of unwanted side effects to other rheumatologic medications.  She also reports a chronic history of pain in different areas throughout her body.  She recalls a recent episode of bilateral shoulder pain that has resolved.  She has a history of fibromyalgia and found the best relief with Elavil.  Her  is a patient of Dr. Louisa Vazquez and they were hoping for an injection for her pain to see if she experiences relief.  They prefer to address her symptoms with injections instead of chronic medications if possible.  She also has an MRI of the lumbar spine from 05/24/2022 that shows extensive lumbar spondylosis.  See below for details.    Review of Systems  Denies    Imaging/Diagnostic Studies  XR SACROILIAC JOINTS COMPLETE     CLINICAL HISTORY: Patient with psoriasis, chronic back pain, and evidence of sacroiliitis on x-ray in 2017.  Please look for inflammatory back disease;  Pain in unspecified joint     TECHNIQUE:  Three  views of the sacroiliac joints     COMPARISON:  05/19/2017     FINDINGS:  Mild sclerosis of the iliac side of the SI joints bilaterally slightly more so on the left.  Similar distribution although slightly increased compared to the prior exam.  No erosions or widening.     Impression: Sclerosis lateral aspects of sacroiliac joints bilaterally.        Electronically signed by: Sulema Benítez MD  Date:                                            11/17/2023  ----------------------------------------------------  MRI SACROILIAC JOINTS W W/O CONTRAST     CLINICAL HISTORY:  Patient with psoriasis, chronic back pain, and evidence of sacroiliitis on x-ray in 2017.  Please look for inflammatory back disease;  Pain in unspecified joint     TECHNIQUE:  Multisequence multiplanar imaging of the sacroiliac joints obtained without and with contrast with 6 cc Gadavist injected intravenously     COMPARISON:  None     FINDINGS:  There are small focal areas of increased T2/stir signal and decreased T1 signal sacroiliac joints involving the sacral and iliac side of the left SI joint.  To lesser degree also the right SI joint sacral side more apparent than iliac side.  Finding most apparent on the left with the area of increased T2/stir signal measures approximate 1.5 cm transversely.  Mild enhancement.  No widening or ankylosis or erosions.     Impression: Findings consistent with sacroiliitis     Electronically signed by: Sulema Benítez MD  Date:                                            11/17/2023  ----------------------------------  MRI LUMBAR SPINE WITHOUT CONTRAST     CLINICAL HISTORY:  Other spondylosis, lumbar region     TECHNIQUE:  Multiplanar, multi-sequence MR images were acquired from the thoracolumbar junction to the sacrum without the administration of contrast.     COMPARISON:  Lumbar spine radiographs-05/24/2022.  MRI lumbar spine-11/13/2015.     FINDINGS:  There is a remote L2 superior endplate compression fracture with  approximately 40% maximal height loss noted centrally.  The lumbar vertebral bodies show normal height and signal intensity without evidence of acute compression fracture or pathologic marrow replacement process.  There are multiple incidentally observed vertebral body hemangiomas present within T11, L1, and L5.  There is a grade I retrolisthesis of L2 on L3 and L3 on L4. There is degenerative disc desiccation at every lumbar level with disc space narrowing noted at L3-L4 and L2-L3..     The conus medullaris terminates at L2.  The visualized lower thoracic spinal cord is normal in signal intensity with no evidence of cord edema, myelomalacia, or cord syrinx.  No epidural fluid collections or masses.  The paraspinous musculature is unremarkable.     The incidentally observed abdominal/retroperitoneal soft tissues demonstrate a 20 mm circumscribed focus of T2 signal hyperintensity arising from the medial aspect of the left renal midpole, most likely a cyst.     T12-L1: There is ligamentum flavum thickening and minor facet arthropathy.  No disc protrusion or extrusion. No central canal stenosis or neuroforaminal stenosis.     L1-L2: There is a broad disc bulge, asymmetric to the left, which extends into the left neural foramen and left extraforaminal soft tissues.  There is ligamentum flavum thickening and facet arthropathy.  No central canal stenosis or neuroforaminal stenosis.     L2-L3: There is a broad disc bulge, asymmetric to the left, which extends into the neural foramina, left greater than right.  There is an annular fissure present within the left extraforaminal portion of the intervertebral disc, and there is abutment of the exited left L2 nerve in the left extraforaminal soft tissues.  Please correlate clinically for symptoms referable to the left L2 nerve.  There is ligamentum flavum thickening and facet arthropathy.  No overall central canal stenosis.  There is mild left neuroforaminal stenosis.  No right  neuroforaminal stenosis.     L3-L4: There is a broad disc bulge which extends into both neural foramina.  There is a superimposed broad left foraminal/extraforaminal disc protrusion which abuts the exited left L3 nerve in the left extraforaminal soft tissues on series 6, image 23 and series 5, image 15.  Please correlate clinically for symptoms referable to the left L3 nerve.  No central canal stenosis.  There is ligamentum flavum thickening and facet arthropathy.  There is a minimal subligamentous intraspinal synovial cyst arising from the left facet joint on series 5, image 16.  There is a 5 mm intraspinal synovial cyst present within the right neural foramen (series 4, image 6 and series 5, image 13) which exerts mass effect upon the exiting right L3 nerve.  Please correlate clinically for symptoms referable to the right L3 nerve.  There is mild bilateral neuroforaminal stenosis.     L4-L5: There is a broad disc bulge which extends into both neural foramina.  There is ligamentum flavum thickening and bilateral hypertrophic facet arthropathy.  There is mild proximal right neuroforaminal stenosis present.  No left neuroforaminal stenosis.  No central canal stenosis.  There is bilateral lateral recess stenosis.     L5-S1: No disc protrusion or extrusion. No central canal stenosis or neuroforaminal stenosis.     There is a 13 mm craniocaudad dimension right paramidline sacral Tarlov cyst present at S2 (series 4, image 7) which results in minimal bony remodeling of the posterior cortex of S2.     Impression:     1. Multilevel degenerative change of the lumbar spine, slightly more pronounced than at the time of the prior study.  A previously noted extruded free disc fragment versus descending left paracentral disc extrusion at L2 has resolved in the interim since the prior study.  2. Broad disc bulge at L2-L3 which extends into the left neural foramen resulting in contact upon the exited left L2 nerve in the left  extraforaminal soft tissues.  Please correlate clinically for symptoms referable to the left L2 nerve.  There is an associated left extraforaminal annular fissure present at L2-L3.  3. Broad left foraminal/extraforaminal disc protrusion at L3-L4 which abuts the exited left L3 nerve in the left extraforaminal soft tissues.  Please correlate clinically for symptoms referable to the left L3 nerve.  4. Unchanged 5 mm intraspinal synovial cyst within the proximal right L3-L4 neural foramen which exerts mass effect upon the exiting right L3 nerve.  Please correlate clinically for symptoms referable to the right L3 nerve.  5. 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  6. Remote L2 superior endplate compression fracture resulting in 40% maximal height loss.  7. Grade I retrolisthesis of L2 on L3 and L3 on L4.        Electronically signed by: Nakul Forte MD  Date:                                            05/24/2022    Past Medical History:   Diagnosis Date    Allergy     Asthma     GERD (gastroesophageal reflux disease)     Globus syndrome     Headache(784.0)     Hypothyroidism     Psoriasis     Rash     Sacroiliitis, not elsewhere classified 1/12/2023       Past Surgical History:   Procedure Laterality Date    ADENOIDECTOMY      CHOLECYSTECTOMY      COLONOSCOPY      DEXA      WNL    SINUS SURGERY      Dr Cordova    SINUS SURGERY  01/2017    xs 5    THYROID SURGERY      nodules removed //Dr Amato     TONSILLECTOMY      TYMPANOSTOMY TUBE PLACEMENT         Review of patient's allergies indicates:   Allergen Reactions    Avelox [moxifloxacin] Swelling    Bactrim [sulfamethoxazole-trimethoprim] Swelling    Ciprofloxacin Swelling and Hives    Isothiazolinones Dermatitis     Strong contact dermatitis    Neomycin sulfate Dermatitis     Has contact allergy to neomycin sulfate and to Kathon CG, or isothiazolinone    Apremilast Other (See Comments)     Muscle cramps, cough    Bacitracin      Other reaction(s): unknown       Current  Outpatient Medications   Medication Sig Dispense Refill    azelastine (ASTELIN) 137 mcg (0.1 %) nasal spray 1 spray (137 mcg total) by Nasal route 2 (two) times daily. 30 mL 3    betamethasone dipropionate 0.05 % cream Apply topically once daily. 45 g 5    cefdinir (OMNICEF) 300 MG capsule Take 1 capsule (300 mg total) by mouth 2 (two) times daily. for 10 days 10 capsule 0    cholecalciferol, vitamin D3, (VITAMIN D3) 50 mcg (2,000 unit) Tab Take 2,000 Units by mouth once daily.      citalopram (CELEXA) 10 MG tablet Take 1 tablet (10 mg total) by mouth once daily. 30 tablet 11    clobetasol 0.05% (TEMOVATE) 0.05 % Oint 1 application once daily. Apply to affected area      desonide 0.05% (DESOWEN) 0.05 % Oint Apply topically 2 (two) times daily. 60 g 2    desoximetasone (TOPICORT) 0.05 % cream Apply topically 2 (two) times daily. 60 g 11    etanercept (ENBREL SURECLICK) 50 mg/mL (1 mL) Inject 1 mL (50 mg total) into the skin once a week. (Patient taking differently: Inject 50 mg into the skin once a week. Once a month) 12 mL 3    fluticasone propionate (FLONASE) 50 mcg/actuation nasal spray 1 spray (50 mcg total) by Each Nostril route once daily. 54 g 3    HYDROcodone-acetaminophen (NORCO) 5-325 mg per tablet Take 1 tablet by mouth every 8 (eight) hours as needed for Pain. 20 tablet 0    ketoconazole (NIZORAL) 2 % shampoo Apply topically twice a week. 120 mL 3    levocetirizine (XYZAL) 5 MG tablet Take one capsule by mouth daily 90 tablet 0    linaCLOtide (LINZESS) 72 mcg Cap capsule Take 1 capsule (72 mcg total) by mouth daily as needed (constipation). 20 capsule 1    MAGNESIUM ORAL Take by mouth once. Pt takes 400mg once daily      mupirocin (BACTROBAN) 2 % ointment SMARTSI Application Topical 2-3 Times Daily      pantoprazole (PROTONIX) 40 MG tablet Take 1 tablet (40 mg total) by mouth once daily. (Patient taking differently: Take 40 mg by mouth once daily. prn) 30 tablet 11    thyroid, pork, (ARMOUR THYROID) 60  mg Tab Take 1 tablet (60 mg total) by mouth before breakfast. 90 tablet 3     No current facility-administered medications for this visit.       Family History   Problem Relation Age of Onset    Hypertension Mother     Obesity Father     Diabetes Sister     Obesity Sister     No Known Problems Maternal Grandmother     No Known Problems Maternal Grandfather     No Known Problems Paternal Grandmother     No Known Problems Paternal Grandfather     Breast cancer Paternal Aunt 43    Thyroid cancer Daughter 61    Ovarian cancer Neg Hx        Social History     Socioeconomic History    Marital status:    Tobacco Use    Smoking status: Never    Smokeless tobacco: Never   Substance and Sexual Activity    Alcohol use: Yes     Comment: twice a month    Drug use: No    Sexual activity: Never     Social Determinants of Health     Financial Resource Strain: Low Risk  (10/23/2023)    Overall Financial Resource Strain (CARDIA)     Difficulty of Paying Living Expenses: Not hard at all   Food Insecurity: No Food Insecurity (10/23/2023)    Hunger Vital Sign     Worried About Running Out of Food in the Last Year: Never true     Ran Out of Food in the Last Year: Never true   Transportation Needs: Unknown (10/23/2023)    PRAPARE - Transportation     Lack of Transportation (Medical): No   Physical Activity: Inactive (10/23/2023)    Exercise Vital Sign     Days of Exercise per Week: 0 days     Minutes of Exercise per Session: 0 min   Stress: No Stress Concern Present (10/23/2023)    [employer] [place] [employer] - Occupational Stress Questionnaire     Feeling of Stress : Not at all   Social Connections: Moderately Isolated (10/23/2023)    Social Connection and Isolation Panel [NHANES]     Frequency of Communication with Friends and Family: Once a week     Frequency of Social Gatherings with Friends and Family: Once a week     Attends [employer] Services: More than 4 times per year     Active Member of Clubs or  "Organizations: No     Attends Club or Organization Meetings: Never     Marital Status:    Housing Stability: Low Risk  (10/23/2023)    Housing Stability Vital Sign     Unable to Pay for Housing in the Last Year: No     Number of Places Lived in the Last Year: 1     Unstable Housing in the Last Year: No         Objective:    BP (!) 141/65 (BP Location: Right arm, Patient Position: Sitting, BP Method: Medium (Automatic))   Pulse 77   Ht 5' 2" (1.575 m)   Wt 60.3 kg (133 lb)   BMI 24.33 kg/m²   Physical Exam  Vitals and nursing note reviewed.   Constitutional:       Appearance: Normal appearance.   HENT:      Head: Normocephalic.   Eyes:      Extraocular Movements: Extraocular movements intact.   Cardiovascular:      Rate and Rhythm: Normal rate.      Pulses: Normal pulses.   Pulmonary:      Effort: Pulmonary effort is normal.   Abdominal:      General: Abdomen is flat.   Musculoskeletal:      Lumbar back: Negative right straight leg raise test and negative left straight leg raise test.   Skin:     General: Skin is warm and dry.   Neurological:      General: No focal deficit present.      Mental Status: She is alert and oriented to person, place, and time. Mental status is at baseline.   Psychiatric:         Mood and Affect: Mood normal.         Behavior: Behavior normal.         Thought Content: Thought content normal.        Back Exam     Tenderness   The patient is experiencing tenderness in the sacroiliac and lumbar.    Range of Motion   Extension:  abnormal   Flexion:  abnormal   Lateral bend right:  abnormal   Lateral bend left:  abnormal   Rotation right:  abnormal   Rotation left:  abnormal     Tests   Straight leg raise right: negative  Straight leg raise left: negative    Other   Sensation: normal  Gait: normal   Erythema: no back redness  Scars: absent    Comments:  Positive VANE bilaterally causing back pain   Negative FADIR bilaterally   Pain with sacral compression  The remainder of the " physical exam is limited by patient's mobility                 Assessment:       ICD-10-CM ICD-9-CM    1. Sacroiliitis, not elsewhere classified  M46.1 720.2             Plan:   1. Sacroiliitis, not elsewhere classified  Assessment & Plan:  Her symptoms are primarily over her sacroiliac joints bilaterally.  She does have mild radicular complaints although these seem to be related to her SI joint as well.  She has a history of lumbar herniated discs and injections approximately 8 years ago with good relief of her pain.  I think if she does not respond to SI joint injections, would consider imaging of the lumbar spine and repeat epidurals.  Discussed the role of Enbrel in treating her chronic pain   Reviewed MRI of the SI joints and lumbar spine as well as x-rays  Return to clinic asap for bilateral SI joint injections.  If no relief with the above plan, I recommended that she start Enbrel.  We could also have her lumbar spine injections repeated as indicated.        Maynor Santacruz MD  Physical Medicine & Rehabilitation     Disclaimer:  This note may have been prepared using voice recognition software, it may have not been extensively proofed, as such there could be errors within the text such as sound alike errors.  Contact the author of this note for clarification.

## 2023-12-29 NOTE — ASSESSMENT & PLAN NOTE
Her symptoms are primarily over her sacroiliac joints bilaterally.  She does have mild radicular complaints although these seem to be related to her SI joint as well.  She has a history of lumbar herniated discs and injections approximately 8 years ago with good relief of her pain.  I think if she does not respond to SI joint injections, would consider imaging of the lumbar spine and repeat epidurals.  Discussed the role of Enbrel in treating her chronic pain   Reviewed MRI of the SI joints and lumbar spine as well as x-rays  Return to clinic asap for bilateral SI joint injections.  If no relief with the above plan, I recommended that she start Enbrel.  We could also have her lumbar spine injections repeated as indicated.

## 2024-01-03 ENCOUNTER — CLINICAL SUPPORT (OUTPATIENT)
Dept: PHYSICAL MEDICINE AND REHAB | Facility: CLINIC | Age: 84
End: 2024-01-03
Payer: MEDICARE

## 2024-01-03 VITALS
HEIGHT: 62 IN | BODY MASS INDEX: 24.48 KG/M2 | WEIGHT: 133 LBS | DIASTOLIC BLOOD PRESSURE: 65 MMHG | SYSTOLIC BLOOD PRESSURE: 110 MMHG

## 2024-01-03 DIAGNOSIS — M46.1 SACROILIITIS, NOT ELSEWHERE CLASSIFIED: Primary | ICD-10-CM

## 2024-01-03 PROCEDURE — 76942 ECHO GUIDE FOR BIOPSY: CPT | Mod: PBBFAC,PN | Performed by: STUDENT IN AN ORGANIZED HEALTH CARE EDUCATION/TRAINING PROGRAM

## 2024-01-03 PROCEDURE — 20611 DRAIN/INJ JOINT/BURSA W/US: CPT | Mod: PBBFAC,PN | Performed by: STUDENT IN AN ORGANIZED HEALTH CARE EDUCATION/TRAINING PROGRAM

## 2024-01-03 PROCEDURE — 27096 INJECT SACROILIAC JOINT: CPT | Mod: PBBFAC,PN,LT | Performed by: STUDENT IN AN ORGANIZED HEALTH CARE EDUCATION/TRAINING PROGRAM

## 2024-01-03 PROCEDURE — 99999 PR PBB SHADOW E&M-EST. PATIENT-LVL III: CPT | Mod: PBBFAC,,, | Performed by: STUDENT IN AN ORGANIZED HEALTH CARE EDUCATION/TRAINING PROGRAM

## 2024-01-03 PROCEDURE — 99999PBSHW PR PBB SHADOW TECHNICAL ONLY FILED TO HB: Mod: PBBFAC,,,

## 2024-01-03 PROCEDURE — 99499 UNLISTED E&M SERVICE: CPT | Mod: S$PBB,,, | Performed by: STUDENT IN AN ORGANIZED HEALTH CARE EDUCATION/TRAINING PROGRAM

## 2024-01-03 PROCEDURE — 27096 INJECT SACROILIAC JOINT: CPT | Mod: S$PBB,LT,, | Performed by: STUDENT IN AN ORGANIZED HEALTH CARE EDUCATION/TRAINING PROGRAM

## 2024-01-03 RX ORDER — KETOCONAZOLE 20 MG/G
CREAM TOPICAL
COMMUNITY
Start: 2023-11-02

## 2024-01-03 RX ORDER — TRIAMCINOLONE ACETONIDE 40 MG/ML
40 INJECTION, SUSPENSION INTRA-ARTICULAR; INTRAMUSCULAR
Status: DISCONTINUED | OUTPATIENT
Start: 2024-01-03 | End: 2024-01-03 | Stop reason: HOSPADM

## 2024-01-03 RX ADMIN — TRIAMCINOLONE ACETONIDE 40 MG: 40 INJECTION, SUSPENSION INTRA-ARTICULAR; INTRAMUSCULAR at 08:01

## 2024-01-03 NOTE — PROCEDURES
Large Joint Aspiration/Injection: L hip joint    Date/Time: 1/3/2024 8:30 AM    Performed by: Maynor Santacruz MD  Authorized by: Maynor Santacruz MD    Consent Done?:  Yes (Verbal)  Indications:  Arthritis, pain and diagnostic evaluation  Site marked: the procedure site was marked    Timeout: prior to procedure the correct patient, procedure, and site was verified    Prep: patient was prepped and draped in usual sterile fashion      Local anesthesia used?: Yes    Local anesthetic:  Lidocaine 1% without epinephrine  Anesthetic total (ml):  2      Details:  Needle Size:  22 G and 25 G  Ultrasonic Guidance for needle placement?: Yes    Images are saved and documented.  Approach:  Posterior  Location:  Hip (Left SIJ)  Site:  L hip joint (Left sacroiliac joint)  Medications:  40 mg triamcinolone acetonide 40 mg/mL  Medications comment:  2 cc of 1% lidocaine mixed with 40 mg triamcinolone  Patient tolerance:  Patient tolerated the procedure well with no immediate complications     Ultrasound guidance was used for correct needle placement, the images were saved in the ultrasound machine.

## 2024-01-03 NOTE — PROGRESS NOTES
LEFT SI joint injection with steroid and lidocaine.  She was having minimal pain on the right side and elected to move forward with left-sided intra-articular SI joint injection with ultrasound guidance.  She tolerated the procedure well and reported significant improvement in her pain immediately after the procedure.  See procedure note for details.  For 3 months was made for repeat injection if needed.  She can also return sooner if she would like to have her right SI joint injected.

## 2024-01-05 ENCOUNTER — PATIENT MESSAGE (OUTPATIENT)
Dept: RHEUMATOLOGY | Facility: CLINIC | Age: 84
End: 2024-01-05
Payer: MEDICARE

## 2024-01-05 ENCOUNTER — TELEPHONE (OUTPATIENT)
Dept: RHEUMATOLOGY | Facility: CLINIC | Age: 84
End: 2024-01-05
Payer: MEDICARE

## 2024-01-05 RX ORDER — SULFAMETHOXAZOLE AND TRIMETHOPRIM 800; 160 MG/1; MG/1
60 TABLET ORAL
Qty: 30 TABLET | Refills: 11 | Status: SHIPPED | OUTPATIENT
Start: 2024-01-05 | End: 2024-01-09 | Stop reason: SDUPTHER

## 2024-01-05 NOTE — TELEPHONE ENCOUNTER
Marline Gavin MD Bosco, Jared, PharmD; Daxa Gilliam, PharmD  Yes, Daxa, can you help with this please?          Previous Messages       ----- Message -----  From: Shant Willis, PharmD  Sent: 12/7/2023   1:47 PM CST  To: Marline Gavin MD  Subject: Enbrel Affordability                            Good afternoon,    Ms. Decker will not qualify for any of the PAP programs and cannot afford the Enbrel co-pay. What do you think about trying Cimzia through buy & bill instead? Other options might be Remicade or Cosentyx through infusion.    Shant Willis, PharmD  Clinical Pharmacist    [facility]  [address] [place] [id number]  Phone: [phone]  Fax: [phone]

## 2024-01-05 NOTE — TELEPHONE ENCOUNTER
Previously discussed with provider. Alternative option is simponi aria. Reached out to [facility] for additional options. No other options available for [facility]    Called patient to see if she is open to starting Simponi Aria. Provided some info to patient about infusion but was coming out of the grocery store and would like to read more information about it before agreeing to start it. Sent simponi aria website and info via [employer] message and provided some info during call. Stated that she would review information and let the office know what she decides. Of note, next OV not til 4/1/24

## 2024-01-09 ENCOUNTER — OFFICE VISIT (OUTPATIENT)
Dept: FAMILY MEDICINE | Facility: CLINIC | Age: 84
End: 2024-01-09
Payer: MEDICARE

## 2024-01-09 VITALS
SYSTOLIC BLOOD PRESSURE: 126 MMHG | WEIGHT: 131.06 LBS | DIASTOLIC BLOOD PRESSURE: 66 MMHG | BODY MASS INDEX: 23.97 KG/M2 | RESPIRATION RATE: 14 BRPM

## 2024-01-09 DIAGNOSIS — M79.7 FIBROMYALGIA AFFECTING MULTIPLE SITES: Chronic | ICD-10-CM

## 2024-01-09 DIAGNOSIS — J30.9 ALLERGIC RHINITIS, UNSPECIFIED SEASONALITY, UNSPECIFIED TRIGGER: Chronic | ICD-10-CM

## 2024-01-09 DIAGNOSIS — M85.80 SENILE OSTEOPENIA: ICD-10-CM

## 2024-01-09 DIAGNOSIS — F33.1 MODERATE EPISODE OF RECURRENT MAJOR DEPRESSIVE DISORDER: ICD-10-CM

## 2024-01-09 DIAGNOSIS — L40.9 PSORIASIS: Chronic | ICD-10-CM

## 2024-01-09 DIAGNOSIS — E03.9 HYPOTHYROIDISM (ACQUIRED): Chronic | ICD-10-CM

## 2024-01-09 DIAGNOSIS — Z79.899 DRUG THERAPY: ICD-10-CM

## 2024-01-09 DIAGNOSIS — N18.31 CHRONIC KIDNEY DISEASE, STAGE 3A: ICD-10-CM

## 2024-01-09 DIAGNOSIS — K59.01 SLOW TRANSIT CONSTIPATION: ICD-10-CM

## 2024-01-09 DIAGNOSIS — Z23 IMMUNIZATION DUE: ICD-10-CM

## 2024-01-09 DIAGNOSIS — R91.8 ABNORMAL CT LUNG SCREENING: ICD-10-CM

## 2024-01-09 DIAGNOSIS — I65.23 BILATERAL CAROTID ARTERY STENOSIS: ICD-10-CM

## 2024-01-09 DIAGNOSIS — J47.9 BRONCHIECTASIS WITHOUT COMPLICATION: ICD-10-CM

## 2024-01-09 DIAGNOSIS — F41.1 GENERALIZED ANXIETY DISORDER: Primary | ICD-10-CM

## 2024-01-09 DIAGNOSIS — J32.9 CHRONIC RECURRENT SINUSITIS: ICD-10-CM

## 2024-01-09 DIAGNOSIS — M45.9 ANKYLOSING SPONDYLITIS, UNSPECIFIED SITE OF SPINE: ICD-10-CM

## 2024-01-09 DIAGNOSIS — R53.82 CHRONIC FATIGUE: ICD-10-CM

## 2024-01-09 DIAGNOSIS — F51.01 PRIMARY INSOMNIA: ICD-10-CM

## 2024-01-09 DIAGNOSIS — I70.0 AORTIC ATHEROSCLEROSIS: ICD-10-CM

## 2024-01-09 DIAGNOSIS — E55.9 VITAMIN D DEFICIENCY: ICD-10-CM

## 2024-01-09 PROCEDURE — 99212 OFFICE O/P EST SF 10 MIN: CPT | Mod: PBBFAC,PN | Performed by: FAMILY MEDICINE

## 2024-01-09 PROCEDURE — 99999 PR PBB SHADOW E&M-EST. PATIENT-LVL II: CPT | Mod: PBBFAC,,, | Performed by: FAMILY MEDICINE

## 2024-01-09 PROCEDURE — 99214 OFFICE O/P EST MOD 30 MIN: CPT | Mod: S$PBB,,, | Performed by: FAMILY MEDICINE

## 2024-01-09 RX ORDER — THYROID 60 MG/1
60 TABLET ORAL
Qty: 90 TABLET | Refills: 3 | Status: SHIPPED | OUTPATIENT
Start: 2024-01-09

## 2024-01-09 NOTE — PROGRESS NOTES
THIS DOCUMENT WAS MADE IN PART WITH VOICE RECOGNITION SOFTWARE.  OCCASIONALLY THIS SOFTWARE WILL MISINTERPRET WORDS OR PHRASES.    Assessment and Plan:    1. Generalized anxiety disorder        2. Moderate episode of recurrent major depressive disorder        3. Allergic rhinitis, unspecified seasonality, unspecified trigger        4. Chronic recurrent sinusitis        5. Psoriasis        6. Abnormal CT lung screening, upper lung zone ground ground-glass nodules        7. Bronchiectasis without complication        8. Aortic atherosclerosis        9. Bilateral carotid artery stenosis        10. Hypothyroidism (acquired)  thyroid, pork, (ARMOUR THYROID) 60 mg Tab      11. Vitamin D deficiency        12. Slow transit constipation        13. Senile osteopenia        14. Fibromyalgia affecting multiple sites        15. Chronic fatigue        16. Primary insomnia        17. Chronic kidney disease, stage 3a        18. Ankylosing spondylitis, unspecified site of spine        19. Immunization due        20. Drug therapy  CBC Auto Differential    Comprehensive Metabolic Panel    Lipid Panel    Hemoglobin A1C    Urinalysis Microscopic    Urinalysis, Reflex to Urine Culture Urine, Clean Catch          Refilled thyroid medicine     Wellness labs as above     GERD improved     Counseled on immunizations     Follow-up in 3 months    ______________________________________________________________________  Subjective:    Chief Complaint:  Chief Complaint   Patient presents with    Annual Exam        HPI:  Amarilis is a 83 y.o. year old     Symptoms   Previously prescribed pantoprazole  Reports heartburn symptoms improved   Denies dysphagia    Back pain   Had SI joint injection 6 days ago   Good results so far  Rheum suggested Enbrel vs Simponi at prior visit in November 2023           Primary insomnia  Rx- Melatonin  Previous Rx- Remeron (ineffective), Doxepin (ineffective), trazodone (hangover)    GERD  Rx-pantoprazole 40  mg  Denies dysphagia      Depressed mood / Anxiety   Prev Rx-Lexapro (nightmares), buspirone (ineffective),  wellbutrin (constipation, imbalance, ineffective), Fluoxetine (itching)  Stressor-fall out with daughter      Hypothyroidism  Current Rx-armor Thyroid 60 mg + 15 mg daily   Previous Rx-levothyroxine (ineffective)  Previous TSH in normal range   Endocrinology- Dr Fritz      Bronchiectasis, history of pulmonary nodules  Seen by pulmonology - Dr. 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\     Senile osteoporosis  Prev Rx-Boniva 150 (pt choice to quit)  Taking vitamin-D supplement  No recent fractures     Seasonal allergies / chronic sinusitis   Rx-Flonase, Xyzal, montelukast, astelin  Does have recurrent sinusitis, seen by ENT ([place])      Psoriasis  Rx-betamethasone, clobetasol, desonide, Topicort  Followed by dermatology - Jesenia Loera MD      B12 deficiency  Rx-B12 injections 500 mcg every 2 weeks     Chronic Low back pain , sacroiliitis, ankylosing spondylitis   Rheum : Gregg Mensah MD   Pain Mgt : Juan F   Rx : Mobic 15 mg , Norco 5 mg p.r.n., use sparingly     ALONZO  Sleep MD : Catarina Morfin  Awaiting machine    Past Medical History:  Past Medical History:   Diagnosis Date    Asthma     GERD (gastroesophageal reflux disease)     Psoriasis        Past Surgical History:  Past Surgical History:   Procedure Laterality Date    ADENOIDECTOMY      CHOLECYSTECTOMY      COLONOSCOPY      DEXA      WNL    SINUS SURGERY      Dr Cordova    SINUS SURGERY  01/2017    xs 5    THYROID SURGERY      nodules removed //Dr Amato     TONSILLECTOMY      TYMPANOSTOMY TUBE PLACEMENT         Family History:  Family History   Problem Relation Age of Onset    Hypertension Mother     Obesity Father     Diabetes Sister     Obesity Sister     No Known Problems Maternal Grandmother     No Known Problems Maternal Grandfather     No Known Problems Paternal Grandmother     No Known Problems  Paternal Grandfather     Breast cancer Paternal Aunt 43    Thyroid cancer Daughter 61    Ovarian cancer Neg Hx        Social History:  Social History     Socioeconomic History    Marital status:    Tobacco Use    Smoking status: Never    Smokeless tobacco: Never   Substance and Sexual Activity    Alcohol use: Yes     Comment: twice a month    Drug use: No    Sexual activity: Never     Social Determinants of Health     Financial Resource Strain: Low Risk  (10/23/2023)    Overall Financial Resource Strain (CARDIA)     Difficulty of Paying Living Expenses: Not hard at all   Food Insecurity: No Food Insecurity (10/23/2023)    Hunger Vital Sign     Worried About Running Out of Food in the Last Year: Never true     Ran Out of Food in the Last Year: Never true   Transportation Needs: Unknown (10/23/2023)    PRAPARE - Transportation     Lack of Transportation (Medical): No   Physical Activity: Inactive (10/23/2023)    Exercise Vital Sign     Days of Exercise per Week: 0 days     Minutes of Exercise per Session: 0 min   Stress: No Stress Concern Present (10/23/2023)    [employer] [place] [employer] - Occupational Stress Questionnaire     Feeling of Stress : Not at all   Social Connections: Moderately Isolated (10/23/2023)    Social Connection and Isolation Panel [NHANES]     Frequency of Communication with Friends and Family: Once a week     Frequency of Social Gatherings with Friends and Family: Once a week     Attends Quaker Services: More than 4 times per year     Active Member of Clubs or Organizations: No     Attends Club or Organization Meetings: Never     Marital Status:    Housing Stability: Low Risk  (10/23/2023)    Housing Stability Vital Sign     Unable to Pay for Housing in the Last Year: No     Number of Places Lived in the Last Year: 1     Unstable Housing in the Last Year: No       Medications:  Current Outpatient Medications on File Prior to Visit   Medication Sig Dispense Refill     azelastine (ASTELIN) 137 mcg (0.1 %) nasal spray 1 spray (137 mcg total) by Nasal route 2 (two) times daily. 30 mL 3    betamethasone dipropionate 0.05 % cream Apply topically once daily. 45 g 5    cholecalciferol, vitamin D3, (VITAMIN D3) 50 mcg (2,000 unit) Tab Take 2,000 Units by mouth once daily.      citalopram (CELEXA) 10 MG tablet Take 1 tablet (10 mg total) by mouth once daily. 30 tablet 11    clobetasol 0.05% (TEMOVATE) 0.05 % Oint 1 application once daily. Apply to affected area      desonide 0.05% (DESOWEN) 0.05 % Oint Apply topically 2 (two) times daily. 60 g 2    desoximetasone (TOPICORT) 0.05 % cream Apply topically 2 (two) times daily. 60 g 11    etanercept (ENBREL SURECLICK) 50 mg/mL (1 mL) Inject 1 mL (50 mg total) into the skin once a week. (Patient taking differently: Inject 50 mg into the skin once a week. Once a month) 12 mL 3    fluticasone propionate (FLONASE) 50 mcg/actuation nasal spray 1 spray (50 mcg total) by Each Nostril route once daily. 54 g 3    HYDROcodone-acetaminophen (NORCO) 5-325 mg per tablet Take 1 tablet by mouth every 8 (eight) hours as needed for Pain. 20 tablet 0    ketoconazole (NIZORAL) 2 % cream Apply topically.      ketoconazole (NIZORAL) 2 % shampoo Apply topically twice a week. 120 mL 3    levocetirizine (XYZAL) 5 MG tablet Take one capsule by mouth daily 90 tablet 0    linaCLOtide (LINZESS) 72 mcg Cap capsule Take 1 capsule (72 mcg total) by mouth daily as needed (constipation). 20 capsule 1    MAGNESIUM ORAL Take by mouth once. Pt takes 400mg once daily      mupirocin (BACTROBAN) 2 % ointment SMARTSI Application Topical 2-3 Times Daily      pantoprazole (PROTONIX) 40 MG tablet Take 1 tablet (40 mg total) by mouth once daily. (Patient taking differently: Take 40 mg by mouth once daily. prn) 30 tablet 11    [DISCONTINUED] ARMOUR THYROID 60 mg Tab TAKE ONE TABLET BY MOUTH BEFORE BREAKFAST 30 tablet 11    [DISCONTINUED] methylPREDNISolone (MEDROL) 8 MG tablet Take  1 tablet (8 mg total) by mouth once daily. for 5 days (Patient not taking: Reported on 12/20/2023) 5 tablet 0    [DISCONTINUED] oseltamivir (TAMIFLU) 30 MG capsule Take 1 capsule (30 mg total) by mouth once daily. for 5 days 7 capsule 0     No current facility-administered medications on file prior to visit.       Allergies:  Avelox [moxifloxacin], Bactrim [sulfamethoxazole-trimethoprim], Ciprofloxacin, Isothiazolinones, Neomycin sulfate, Apremilast, and Bacitracin    Immunizations:  Immunization History   Administered Date(s) Administered    COVID-19 Vaccine 03/31/2021    COVID-19, MRNA, LN-S, PF (MODERNA FULL 0.5 ML DOSE) 12/16/2021    COVID-19, vector-nr, rS-Ad26, PF (Johanna) 04/05/2021    Hepatitis A, Adult 08/09/2007    IPV 08/09/2007    Influenza 11/18/2011, 10/21/2013    Influenza (FLUAD) - Quadrivalent - Adjuvanted - PF *Preferred* (65+) 10/07/2021, 10/03/2022, 09/08/2023    Influenza - High Dose - PF (65 years and older) 10/14/2014, 10/08/2015, 09/27/2016    Influenza - Quadrivalent - MDCK - PF 10/10/2017    Influenza - Quadrivalent - PF *Preferred* (6 months and older) 10/29/2019, 09/25/2020    Influenza - Trivalent (ADULT) 11/18/2011    Influenza - Trivalent - PF (ADULT) 10/21/2013    Pneumococcal Conjugate - 13 Valent 04/12/2016    Pneumococcal Polysaccharide - 23 Valent 08/01/2011    Td (Adult), Unspecified Formulation 08/02/2018    Tdap 08/09/2007, 08/01/2018    Zoster 07/11/2007       Review of Systems:  Review of Systems   All other systems reviewed and are negative.      Objective:    Vitals:  Vitals:    01/09/24 0823   BP: 126/66   Resp: 14   Weight: 59.5 kg (131 lb 1 oz)       Physical Exam  Vitals reviewed.   Constitutional:       General: She is not in acute distress.  HENT:      Head: Normocephalic and atraumatic.   Eyes:      Pupils: Pupils are equal, round, and reactive to light.   Cardiovascular:      Rate and Rhythm: Normal rate and regular rhythm.      Heart sounds: No murmur heard.      No friction rub.   Pulmonary:      Effort: Pulmonary effort is normal.      Breath sounds: Normal breath sounds.   Abdominal:      General: Bowel sounds are normal. There is no distension.      Palpations: Abdomen is soft.      Tenderness: There is no abdominal tenderness.   Musculoskeletal:      Cervical back: Neck supple.   Skin:     General: Skin is warm and dry.      Findings: No rash.   Psychiatric:         Behavior: Behavior normal.           Luc Teixeira MD  Family Medicine

## 2024-01-13 ENCOUNTER — PATIENT MESSAGE (OUTPATIENT)
Dept: OTOLARYNGOLOGY | Facility: CLINIC | Age: 84
End: 2024-01-13
Payer: MEDICARE

## 2024-01-16 ENCOUNTER — CLINICAL SUPPORT (OUTPATIENT)
Dept: FAMILY MEDICINE | Facility: CLINIC | Age: 84
End: 2024-01-16
Payer: MEDICARE

## 2024-01-16 DIAGNOSIS — E53.8 B12 DEFICIENCY: Primary | ICD-10-CM

## 2024-01-16 PROCEDURE — 96372 THER/PROPH/DIAG INJ SC/IM: CPT | Mod: PBBFAC,PN

## 2024-01-16 PROCEDURE — 99999PBSHW PR PBB SHADOW TECHNICAL ONLY FILED TO HB: Mod: PBBFAC,,,

## 2024-01-16 RX ORDER — CYANOCOBALAMIN 1000 UG/ML
1000 INJECTION, SOLUTION INTRAMUSCULAR; SUBCUTANEOUS
Status: DISCONTINUED | OUTPATIENT
Start: 2024-01-16 | End: 2024-05-22

## 2024-01-16 RX ADMIN — CYANOCOBALAMIN 1000 MCG: 1000 INJECTION, SOLUTION INTRAMUSCULAR at 11:01

## 2024-01-17 ENCOUNTER — LAB VISIT (OUTPATIENT)
Dept: LAB | Facility: HOSPITAL | Age: 84
End: 2024-01-17
Attending: FAMILY MEDICINE
Payer: MEDICARE

## 2024-01-17 DIAGNOSIS — Z79.899 DRUG THERAPY: ICD-10-CM

## 2024-01-17 LAB
ALBUMIN SERPL BCP-MCNC: 3.7 G/DL (ref 3.5–5.2)
ALP SERPL-CCNC: 57 U/L (ref 55–135)
ALT SERPL W/O P-5'-P-CCNC: 19 U/L (ref 10–44)
ANION GAP SERPL CALC-SCNC: 8 MMOL/L (ref 8–16)
AST SERPL-CCNC: 17 U/L (ref 10–40)
BASOPHILS # BLD AUTO: 0.04 K/UL (ref 0–0.2)
BASOPHILS NFR BLD: 0.5 % (ref 0–1.9)
BILIRUB SERPL-MCNC: 0.5 MG/DL (ref 0.1–1)
BUN SERPL-MCNC: 20 MG/DL (ref 8–23)
CALCIUM SERPL-MCNC: 9.2 MG/DL (ref 8.7–10.5)
CHLORIDE SERPL-SCNC: 108 MMOL/L (ref 95–110)
CHOLEST SERPL-MCNC: 196 MG/DL (ref 120–199)
CHOLEST/HDLC SERPL: 2.8 {RATIO} (ref 2–5)
CO2 SERPL-SCNC: 28 MMOL/L (ref 23–29)
CREAT SERPL-MCNC: 0.9 MG/DL (ref 0.5–1.4)
DIFFERENTIAL METHOD BLD: ABNORMAL
EOSINOPHIL # BLD AUTO: 0.3 K/UL (ref 0–0.5)
EOSINOPHIL NFR BLD: 3.8 % (ref 0–8)
ERYTHROCYTE [DISTWIDTH] IN BLOOD BY AUTOMATED COUNT: 13.3 % (ref 11.5–14.5)
EST. GFR  (NO RACE VARIABLE): >60 ML/MIN/1.73 M^2
ESTIMATED AVG GLUCOSE: 114 MG/DL (ref 68–131)
GLUCOSE SERPL-MCNC: 87 MG/DL (ref 70–110)
HBA1C MFR BLD: 5.6 % (ref 4–5.6)
HCT VFR BLD AUTO: 43.9 % (ref 37–48.5)
HDLC SERPL-MCNC: 71 MG/DL (ref 40–75)
HDLC SERPL: 36.2 % (ref 20–50)
HGB BLD-MCNC: 13.7 G/DL (ref 12–16)
IMM GRANULOCYTES # BLD AUTO: 0.01 K/UL (ref 0–0.04)
IMM GRANULOCYTES NFR BLD AUTO: 0.1 % (ref 0–0.5)
LDLC SERPL CALC-MCNC: 104.6 MG/DL (ref 63–159)
LYMPHOCYTES # BLD AUTO: 2.2 K/UL (ref 1–4.8)
LYMPHOCYTES NFR BLD: 29.5 % (ref 18–48)
MCH RBC QN AUTO: 30 PG (ref 27–31)
MCHC RBC AUTO-ENTMCNC: 31.2 G/DL (ref 32–36)
MCV RBC AUTO: 96 FL (ref 82–98)
MONOCYTES # BLD AUTO: 0.7 K/UL (ref 0.3–1)
MONOCYTES NFR BLD: 9.3 % (ref 4–15)
NEUTROPHILS # BLD AUTO: 4.1 K/UL (ref 1.8–7.7)
NEUTROPHILS NFR BLD: 56.8 % (ref 38–73)
NONHDLC SERPL-MCNC: 125 MG/DL
NRBC BLD-RTO: 0 /100 WBC
PLATELET # BLD AUTO: 300 K/UL (ref 150–450)
PMV BLD AUTO: 9 FL (ref 9.2–12.9)
POTASSIUM SERPL-SCNC: 4.1 MMOL/L (ref 3.5–5.1)
PROT SERPL-MCNC: 6.9 G/DL (ref 6–8.4)
RBC # BLD AUTO: 4.56 M/UL (ref 4–5.4)
SODIUM SERPL-SCNC: 144 MMOL/L (ref 136–145)
TRIGL SERPL-MCNC: 102 MG/DL (ref 30–150)
WBC # BLD AUTO: 7.28 K/UL (ref 3.9–12.7)

## 2024-01-17 PROCEDURE — 83036 HEMOGLOBIN GLYCOSYLATED A1C: CPT | Performed by: FAMILY MEDICINE

## 2024-01-17 PROCEDURE — 85025 COMPLETE CBC W/AUTO DIFF WBC: CPT | Performed by: FAMILY MEDICINE

## 2024-01-17 PROCEDURE — 80061 LIPID PANEL: CPT | Performed by: FAMILY MEDICINE

## 2024-01-17 PROCEDURE — 36415 COLL VENOUS BLD VENIPUNCTURE: CPT | Mod: PN | Performed by: FAMILY MEDICINE

## 2024-01-17 PROCEDURE — 80053 COMPREHEN METABOLIC PANEL: CPT | Performed by: FAMILY MEDICINE

## 2024-01-17 RX ORDER — DOXYCYCLINE HYCLATE 100 MG
100 TABLET ORAL 2 TIMES DAILY
Qty: 20 TABLET | Refills: 0 | Status: SHIPPED | OUTPATIENT
Start: 2024-01-17 | End: 2024-01-27

## 2024-01-17 RX ORDER — DOXYCYCLINE 100 MG/1
100 CAPSULE ORAL EVERY 12 HOURS
Qty: 20 CAPSULE | Refills: 0 | Status: SHIPPED | OUTPATIENT
Start: 2024-01-17 | End: 2024-01-17

## 2024-01-25 ENCOUNTER — PATIENT MESSAGE (OUTPATIENT)
Dept: OTOLARYNGOLOGY | Facility: CLINIC | Age: 84
End: 2024-01-25
Payer: MEDICARE

## 2024-01-25 DIAGNOSIS — L40.9 PSORIASIS: ICD-10-CM

## 2024-01-25 RX ORDER — BETAMETHASONE DIPROPIONATE 0.5 MG/G
CREAM TOPICAL DAILY
Qty: 45 G | Refills: 5 | Status: SHIPPED | OUTPATIENT
Start: 2024-01-25

## 2024-02-15 ENCOUNTER — OFFICE VISIT (OUTPATIENT)
Dept: OTOLARYNGOLOGY | Facility: CLINIC | Age: 84
End: 2024-02-15
Payer: MEDICARE

## 2024-02-15 VITALS — BODY MASS INDEX: 24.75 KG/M2 | WEIGHT: 134.5 LBS | HEIGHT: 62 IN

## 2024-02-15 DIAGNOSIS — H92.01 OTALGIA, RIGHT: Primary | ICD-10-CM

## 2024-02-15 DIAGNOSIS — Z22.322 MRSA CARRIER: ICD-10-CM

## 2024-02-15 DIAGNOSIS — J01.01 ACUTE RECURRENT MAXILLARY SINUSITIS: ICD-10-CM

## 2024-02-15 PROCEDURE — 99214 OFFICE O/P EST MOD 30 MIN: CPT | Mod: 25,S$PBB,, | Performed by: STUDENT IN AN ORGANIZED HEALTH CARE EDUCATION/TRAINING PROGRAM

## 2024-02-15 PROCEDURE — 31231 NASAL ENDOSCOPY DX: CPT | Mod: PBBFAC,PO | Performed by: STUDENT IN AN ORGANIZED HEALTH CARE EDUCATION/TRAINING PROGRAM

## 2024-02-15 PROCEDURE — 31231 NASAL ENDOSCOPY DX: CPT | Mod: S$PBB,,, | Performed by: STUDENT IN AN ORGANIZED HEALTH CARE EDUCATION/TRAINING PROGRAM

## 2024-02-15 PROCEDURE — 99213 OFFICE O/P EST LOW 20 MIN: CPT | Mod: PBBFAC,PO | Performed by: STUDENT IN AN ORGANIZED HEALTH CARE EDUCATION/TRAINING PROGRAM

## 2024-02-15 PROCEDURE — 99999 PR PBB SHADOW E&M-EST. PATIENT-LVL III: CPT | Mod: PBBFAC,,, | Performed by: STUDENT IN AN ORGANIZED HEALTH CARE EDUCATION/TRAINING PROGRAM

## 2024-02-15 RX ORDER — METHYLPREDNISOLONE 4 MG/1
TABLET ORAL
COMMUNITY
Start: 2023-12-14

## 2024-02-15 RX ORDER — DOXYCYCLINE 100 MG/1
100 CAPSULE ORAL 2 TIMES DAILY
COMMUNITY
Start: 2024-01-17 | End: 2024-03-13 | Stop reason: ALTCHOICE

## 2024-02-15 NOTE — PROGRESS NOTES
"Otolaryngology Clinic Note    Subjective:       Patient ID: Amarilis Decker is a 83 y.o. female.    Chief Complaint: Follow-up (sinus) and Otalgia        History of Present Illness: Amarilis Decker is a 83 y.o. female presenting with sinus concerns. She reports she has had 5 sinus surgeries, got staph infection and eye damage from one.   Has been to the dentist 5 times for oral pain. Pain moves, has been lower jaw on left. Has also had headaches, moves from cheeks to forehead. Has been similar since August 27th, which was after her visit by Dr. Delgado.   Has had a crown placed on right maxilla on August 25 but pain has been on the left.   4 days ago had black discharge from her nose. No other discharge from nose, no bad smell but poor sense of smell. No fevers, no chills.   Does sinus rinses every night, no output. Uses flonase PRN with afrin. Not often.      She saw Dr. Delgado 8/23: "Amarilis is here for follow-up of chronic sinusitis.  Seen 1 mo ago for persistent sinusitis. Was on Doxy and I cleaned out sinus.  Put her on Minocycline for her concerns"   He scoped her at that time with no purulence or polyps. I personally reviewed this scope today.   It looks like he gave her mupirocin and betamethasone spray.     10/14/22: Gave her steroids and augmentin at last visit. She did not do the steroids. Culture came back sensitive to bactrim. Sent clindamycin 10/3/22. She reports she was at [place] on Friday, came back Tuesday. Pain in left cheek down to left neck for 2 days this weekend, now it is gone. Did have some neck pain in back of neck last night, has lots of tension- does have degenerative disease. Still using ointment in rinses. Never had drainage congestion. Does take zyrtec daily. She has done allergy shots in past. Uses nasal sprays PRN. Pain rotates from cheek to jaw.     1/5/23: RTC. Reports over holidays- 2 months or so, started getting nightly headaches. Stopped mupirocin, no change. " Sometimes so bad, cannot sleep. Face, head and teeth are painful, some pressure in cheeks and forehead. Has gotten yellow stuff from bottle in last month. Never had headaches before. Happening every night, aspirin helps. Still doing rinses BID. Nerve pain in cheek and jaw is better, this is different. Taking xyzal nightly.     6/20/23: RTC. Started with nasal congestion 3 weeks ago, felt like allergies. Started with colored drainage 5 days ago or so. Getting chunks out with rinses and with blowing her nose. Worse on left. Doing rinses in AM. Has been using flonase as well with illness. Has been using astelin sometimes. No fevers. Has cheek pressure. Using steroid in rinses.     8/18/23: RTC. Was still sick after clinda/doxy with dark stuff in sinus rinses. Got another round of doxy for leg infection. No more sinus concerns, still doing rinses but thinks she cannot tell when she has infections. No fevers, no green snot lately. Doing steroid in rinses. Did mupirocin for a couple of weeks.     12/14/23: RTC, sick with yellow drainage past 2 weeks, now feels like she has a cold. Sinus pressure, PND, feels clogged up with mucous. No fevers. She has been using mupirocin in her rinses.    2/15/24: RTC. Was sick 2 days after last seen. Did clinda then doxycyline. Has been good since. Ears have been hurting. She has not been wearing her mouthguard. Doing rinses.  She  is doing steroids rinses. She had blood in sinuses until doxy. Not using astelin.         Past Surgical History:   Procedure Laterality Date    ADENOIDECTOMY      CHOLECYSTECTOMY      COLONOSCOPY      DEXA      WNL    SINUS SURGERY      Dr Cordova    SINUS SURGERY  01/2017    xs 5    THYROID SURGERY      nodules removed //Dr Amato     TONSILLECTOMY      TYMPANOSTOMY TUBE PLACEMENT       Past Medical History:   Diagnosis Date    Asthma     GERD (gastroesophageal reflux disease)     Psoriasis      Social Determinants of Health     Tobacco Use: Low Risk   (2/15/2024)    Patient History     Smoking Tobacco Use: Never     Smokeless Tobacco Use: Never     Passive Exposure: Not on file   Alcohol Use: Not At Risk (10/23/2023)    AUDIT-C     Frequency of Alcohol Consumption: Monthly or less     Average Number of Drinks: 1 or 2     Frequency of Binge Drinking: Never   Financial Resource Strain: Low Risk  (10/23/2023)    Overall Financial Resource Strain (CARDIA)     Difficulty of Paying Living Expenses: Not hard at all   Food Insecurity: No Food Insecurity (10/23/2023)    Hunger Vital Sign     Worried About Running Out of Food in the Last Year: Never true     Ran Out of Food in the Last Year: Never true   Transportation Needs: Unknown (10/23/2023)    PRAPARE - Transportation     Lack of Transportation (Medical): No     Lack of Transportation (Non-Medical): Not on file   Physical Activity: Inactive (10/23/2023)    Exercise Vital Sign     Days of Exercise per Week: 0 days     Minutes of Exercise per Session: 0 min   Stress: No Stress Concern Present (10/23/2023)    [employer] [place] [employer] - Occupational Stress Questionnaire     Feeling of Stress : Not at all   Social Connections: Moderately Isolated (10/23/2023)    Social Connection and Isolation Panel [NHANES]     Frequency of Communication with Friends and Family: Once a week     Frequency of Social Gatherings with Friends and Family: Once a week     Attends [employer] Services: More than 4 times per year     Active Member of Clubs or Organizations: No     Attends Club or Organization Meetings: Never     Marital Status:    Housing Stability: Low Risk  (10/23/2023)    Housing Stability Vital Sign     Unable to Pay for Housing in the Last Year: No     Number of Places Lived in the Last Year: 1     Unstable Housing in the Last Year: No   Depression: Low Risk  (1/3/2024)    Depression     Last PHQ-4: Flowsheet Data: 0     Review of patient's allergies indicates:   Allergen Reactions    Avelox  [moxifloxacin] Swelling    Bactrim [sulfamethoxazole-trimethoprim] Swelling    Ciprofloxacin Swelling and Hives    Isothiazolinones Dermatitis     Strong contact dermatitis    Neomycin sulfate Dermatitis     Has contact allergy to neomycin sulfate and to Kathon CG, or isothiazolinone    Apremilast Other (See Comments)     Muscle cramps, cough    Bacitracin      Other reaction(s): unknown     Current Outpatient Medications   Medication Instructions    azelastine (ASTELIN) 137 mcg, Nasal, 2 times daily    betamethasone dipropionate 0.05 % cream Topical (Top), Daily    cholecalciferol (vitamin D3) (VITAMIN D3) 2,000 Units, Oral, Daily    citalopram (CELEXA) 10 mg, Oral, Daily    clobetasol 0.05% (TEMOVATE) 0.05 % Oint 1 application , Daily, Apply to affected area    desonide 0.05% (DESOWEN) 0.05 % Oint Topical (Top), 2 times daily    desoximetasone (TOPICORT) 0.05 % cream Topical (Top), 2 times daily    doxycycline (VIBRAMYCIN) 100 mg, Oral, 2 times daily    EnbreL SureClick 50 mg, Subcutaneous, Weekly    fluticasone propionate (FLONASE) 50 mcg, Each Nostril, Daily    HYDROcodone-acetaminophen (NORCO) 5-325 mg per tablet 1 tablet, Oral, Every 8 hours PRN    ketoconazole (NIZORAL) 2 % cream Topical (Top)    ketoconazole (NIZORAL) 2 % shampoo Topical (Top), Twice weekly    levocetirizine (XYZAL) 5 MG tablet Take one capsule by mouth daily    linaCLOtide (LINZESS) 72 mcg, Oral, Daily PRN    MAGNESIUM ORAL Oral, Once, Pt takes 400mg once daily    methylPREDNISolone (MEDROL) 4 MG Tab Oral    mupirocin (BACTROBAN) 2 % ointment SMARTSI Application Topical 2-3 Times Daily    pantoprazole (PROTONIX) 40 mg, Oral, Daily    thyroid (pork) (ARMOUR THYROID) 60 mg, Oral, Before breakfast         ENT ROS negative except as stated above.             Objective:      There were no vitals filed for this visit.    General: NAD, well appearing  Eyes: Normal conjunctiva and lids  Face: symmetric, nerve intact  Nose: The nose is without  any evidence of any deformity. The nasal mucosa is moist. The septum is midline. There is no evidence of septal hematoma. The turbinates are without abnormality.   Ears: The ears are with normal-appearing pinna. Examination of the canals is normal appearing bilaterally. There is no drainage or erythema noted. Monomeric Tm on right, retracted mild, mild effusion?, left normal, reduced mobility. Hearing is grossly intact.  Mouth: No obvious abnormalities to the lips. The teeth are unremarkable. The gingivae are without any obvious evidence of infection or lesion. The oral mucosa is moist and pink. There are no obvious masses to the hard or soft palate.   Oropharynx: The uvula is midline.  The tongue is midline. The posterior pharynx is without erythema or exudate. The tonsils are normal appearing.  Salivary glands: The salivary glands are symmetric and not enlarged, no masses  Neck: No lymphadenopathy, trachea midline, thryoid not enlarged.  Psych: Normal mood and affect.   Neuro: Grossly intact  Speech: fluent    Procedure: Nasal Endoscopy wi th debridement  Indications: acute sinusitis   Verbal consent obtained  Anesthesia: topical lidocaine and phenylephrine spray    Procedure: Rigid 30 degree endoscope was passed into each nare to nasopharynx showing findings below.     Septum midline. Max and ethmoids open on right, no drainage, SER inflamed but no drainage. Left max open, some polypoid changes of mucosa, ethmoid bed with polypoid mucosa but no drainage, SER clear. Some inflammation in Nasopharynx. Et orifices normal           Assessment and Plan:       1. Otalgia, right    2. Acute recurrent maxillary sinusitis    3. MRSA carrier          Hx MRSA    - Continue rinses with betamethasone.   Astelin for next few weeks.     CT sinus 1/12/2023 without significant sinus disease, all sinuses opened surgically and patent.     She needs a hearing test.     RTC: 3 months. Audiogram with ashley in meantime, message with  drainage and I can do phone call or virtual if needed as she knows her symptoms well and is usually correct about sinusitis, usually cover mrsa with history of + culture.      Plan of care was discussed in detail with the patient, who agreed with the plan as above. All questions were answered in detail.     Eboni Briggs MD  Otolaryngology;

## 2024-02-20 ENCOUNTER — CLINICAL SUPPORT (OUTPATIENT)
Dept: AUDIOLOGY | Facility: CLINIC | Age: 84
End: 2024-02-20
Payer: MEDICARE

## 2024-02-20 DIAGNOSIS — Z71.89 HEARING AID CONSULTATION: Primary | ICD-10-CM

## 2024-02-20 DIAGNOSIS — H90.3 BILATERAL SENSORINEURAL HEARING LOSS: Primary | ICD-10-CM

## 2024-02-20 PROCEDURE — 92567 TYMPANOMETRY: CPT | Mod: PBBFAC,PO | Performed by: AUDIOLOGIST-HEARING AID FITTER

## 2024-02-20 PROCEDURE — 92557 COMPREHENSIVE HEARING TEST: CPT | Mod: PBBFAC,PO | Performed by: AUDIOLOGIST-HEARING AID FITTER

## 2024-02-20 NOTE — PROGRESS NOTES
Amarilis Decker was seen 02/20/2024 for an audiological evaluation. Pt was alone during today's visit. Pertinent complaints today include hearing loss. Pt denies history of loud noise exposure and denies early onset of genetic family history of hearing loss. Otoscopy revealed no cerumen in both ears. The tympanic membrane was visualized AU prior to proceeding with the hearing testing. She says she has difficulty understanding dialogue on the TV, especially foreign accents. She says she has difficulty understanding conversation, especially in the presence of background noise. She can't hear conversation when the speaker's head is facing away from her.     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    Speech Reception Thresholds were  35 dBHL for the right ear and 30 dBHL for the left ear.    Word recognition scores were good for the right ear and good for the left ear.   Tympanograms were Type A for the right ear and Type A for the left ear.    Audiogram results were reviewed in detail with patient and all questions were answered. Results will be reviewed by the referring provider at the completion of this note. All complaints were addressed during this visit to the patient's satisfaction. Recommend binaural amplification pending medical clearance, repeat hearing testing in one year and bilateral hearing protection with either muffs or in-ear protection in loud noises. Plan of care was discussed in detail with the patient, who agreed with the plan as above. Pt was seen immediately following this encounter for a HA consult.

## 2024-02-20 NOTE — PROGRESS NOTES
Amarilis Decker was seen on 02/20/2024 for a hearing aid consultation. Pt was alone during today's visit. Patient's audiogram was discussed in detail. We also discussed the different brands, styles and levels of technology. It was decided based on the patients lifestyle that the best choice would be Phonak Audeo L90-RL in color P1 with size 1M receivers AU. The price quoted was $5004.00 with  discount and tax for 2 aids. Pt will pay the $250.00 non refundable fitting fee at the end of this visit and will pay the remaining balance for the hearing aids at time of fitting. Pt was also informed that insurance reimbursement by their insurance company for any hearing aid benefits would need to be filed for by the patient since [facility] does not file for insurance benefits for hearing aids at this time. The pt scheduled a fitting on 2/29/2024.  Plan of care was discussed in detail with the patient, who agreed with the plan as above. All complaints were addressed during this visit to the patient's satisfaction. Order number is [id number] .

## 2024-02-20 NOTE — Clinical Note
Hearing test results reveal a bilateral mild sloping to severe sensorineural hearing loss.    Speech Reception Thresholds were  35 dBHL for the right ear and 30 dBHL for the left ear.    Word recognition scores were good for the right ear and good for the left ear.   Tympanograms were Type A for the right ear and Type A for the left ear. Recommend binaural amplification pending medical clearance and repeat hearing testing in one year. Pt was seen immediately following this encounter for a HA consult.

## 2024-02-22 ENCOUNTER — DOCUMENTATION ONLY (OUTPATIENT)
Dept: AUDIOLOGY | Facility: CLINIC | Age: 84
End: 2024-02-22
Payer: MEDICARE

## 2024-02-22 NOTE — PROGRESS NOTES
The patient canceled her hearing aid order, but the order was not canceled before the order could ship. I received the order in, and sent a copy of the invoice to [employer] letting them know we are returning the aids for credit. I also filled out the [employer] return for credit form and sent the aids back via [employer]. [employer] told me the patient's $250.00 deposit will be refunded to her.

## 2024-02-27 ENCOUNTER — CLINICAL SUPPORT (OUTPATIENT)
Dept: AUDIOLOGY | Facility: CLINIC | Age: 84
End: 2024-02-27
Payer: MEDICARE

## 2024-02-27 DIAGNOSIS — Z46.1 ENCOUNTER FOR HEARING AID FITTING OF BOTH EARS: Primary | ICD-10-CM

## 2024-02-27 PROCEDURE — 99499 UNLISTED E&M SERVICE: CPT | Mod: S$PBB,,, | Performed by: AUDIOLOGIST-HEARING AID FITTER

## 2024-02-27 NOTE — PROGRESS NOTES
Amarilis Decker was seen on 02/27/2024  for a hearing aid fitting. Pt was alone during today's visit.     HA model and style: Phonak Audeo P90-R in color P7  Right S/N: [id number]  Left S/N: [id number]   size[facility] 1M AU    [facility]         Dome size: med open     Repair warranty and L&D coverage expires: 12/26/24    Set target gain to auto from % over 14 days with instructions that pt will not be hearing to total capacity for a couple weeks when the target gain has fully increased and demonstrated low battery signal for pt. Paired the patient's hearing aids with their cell phone and performed a test call to demonstrate function. Had pt play music through the phone to demonstrate streaming capabilities. Had pt download the hearing aid  janette, paired the aids within the janette and demonstrated function of the janette.  Practiced insertion and removal until pt felt comfortable with the process. Patient was able to manipulate hearing aids well and reported satisfaction with sound quality thus far.  She was counseled on realistic expectations and acclimation strategies.  The $300 ONE TIME replacement fee was discussed in detail with pt voicing understanding. She will follow up for adjustments to the settings and instructions on cleaning the aids in one month. Pt will return on 3/28/2024 at 9:00 for her 1 month follow up. All complaints were addressed during this visit to the patient's satisfaction. Plan of care was discussed in detail with the patient, who agreed with the plan as above.

## 2024-03-13 ENCOUNTER — LAB VISIT (OUTPATIENT)
Dept: LAB | Facility: HOSPITAL | Age: 84
End: 2024-03-13
Attending: FAMILY MEDICINE
Payer: MEDICARE

## 2024-03-13 ENCOUNTER — OFFICE VISIT (OUTPATIENT)
Dept: FAMILY MEDICINE | Facility: CLINIC | Age: 84
End: 2024-03-13
Payer: MEDICARE

## 2024-03-13 VITALS
WEIGHT: 135.25 LBS | HEIGHT: 62 IN | SYSTOLIC BLOOD PRESSURE: 116 MMHG | HEART RATE: 65 BPM | DIASTOLIC BLOOD PRESSURE: 64 MMHG | BODY MASS INDEX: 24.89 KG/M2 | OXYGEN SATURATION: 99 %

## 2024-03-13 DIAGNOSIS — Z23 IMMUNIZATION DUE: Primary | ICD-10-CM

## 2024-03-13 DIAGNOSIS — R14.0 FUNCTIONAL BLOATING: ICD-10-CM

## 2024-03-13 DIAGNOSIS — M45.9 ANKYLOSING SPONDYLITIS, UNSPECIFIED SITE OF SPINE: ICD-10-CM

## 2024-03-13 DIAGNOSIS — E53.8 B12 DEFICIENCY: ICD-10-CM

## 2024-03-13 LAB
IGA SERPL-MCNC: 189 MG/DL (ref 40–350)
VIT B12 SERPL-MCNC: 728 PG/ML (ref 210–950)

## 2024-03-13 PROCEDURE — 36415 COLL VENOUS BLD VENIPUNCTURE: CPT | Mod: PN | Performed by: FAMILY MEDICINE

## 2024-03-13 PROCEDURE — 82784 ASSAY IGA/IGD/IGG/IGM EACH: CPT | Performed by: FAMILY MEDICINE

## 2024-03-13 PROCEDURE — 86364 TISS TRNSGLTMNASE EA IG CLAS: CPT | Performed by: FAMILY MEDICINE

## 2024-03-13 PROCEDURE — 82607 VITAMIN B-12: CPT | Performed by: FAMILY MEDICINE

## 2024-03-13 PROCEDURE — 99214 OFFICE O/P EST MOD 30 MIN: CPT | Mod: S$PBB,,, | Performed by: FAMILY MEDICINE

## 2024-03-13 PROCEDURE — 99214 OFFICE O/P EST MOD 30 MIN: CPT | Mod: PBBFAC,PN | Performed by: FAMILY MEDICINE

## 2024-03-13 PROCEDURE — 83921 ORGANIC ACID SINGLE QUANT: CPT | Performed by: FAMILY MEDICINE

## 2024-03-13 PROCEDURE — 99999 PR PBB SHADOW E&M-EST. PATIENT-LVL IV: CPT | Mod: PBBFAC,,, | Performed by: FAMILY MEDICINE

## 2024-03-13 RX ORDER — MELOXICAM 15 MG/1
15 TABLET ORAL DAILY
Qty: 90 TABLET | Refills: 1 | Status: SHIPPED | OUTPATIENT
Start: 2024-03-13

## 2024-03-13 NOTE — PROGRESS NOTES
THIS DOCUMENT WAS MADE IN PART WITH VOICE RECOGNITION SOFTWARE.  OCCASIONALLY THIS SOFTWARE WILL MISINTERPRET WORDS OR PHRASES.    Assessment and Plan:    1. Immunization due        2. B12 deficiency  Methylmalonic Acid, Serum    VITAMIN B12      3. Functional bloating  Tissue transglutaminase, IgA    IgA      4. Ankylosing spondylitis, unspecified site of spine  meloxicam (MOBIC) 15 MG tablet          New medication meloxicam 15 mg to use p.r.n. pain     Recommended lactose avoidance for bloating complaint   Trial of Metamucil in place of MiraLax  Lab work as above for celiac disease, consider gluten avoidance moving forward if no symptom relief with lactose avoidance    ______________________________________________________________________  Subjective:    Chief Complaint:  Chief Complaint   Patient presents with    Gas        HPI:  Amarilis is a 83 y.o. year old     Complains of bloating, gas discomfort   Reports high vegetable, fruit diet  Minimal lactose intake   Associated with intermittent constipation symptoms, taking half a dose of MiraLax every other day, daily dose causes stool to be too soft   Has occasional nocturnal enuresis    Primary insomnia  Rx- Melatonin  Previous Rx- Remeron (ineffective), Doxepin (ineffective), trazodone (hangover)     GERD  Rx-pantoprazole 40 mg  Denies dysphagia      Depressed mood / Anxiety   Prev Rx-Lexapro (nightmares), buspirone (ineffective),  wellbutrin (constipation, imbalance, ineffective), Fluoxetine (itching)  Stressor-fall out with daughter      Hypothyroidism  Current Rx-armor Thyroid 60 mg + 15 mg daily   Previous Rx-levothyroxine (ineffective)  Previous TSH in normal range   Endocrinology- Dr Fritz      Bronchiectasis, history of pulmonary nodules  Seen by pulmonology - Dr. 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\     Senile osteoporosis  Prev Rx-Boniva 150 (pt choice to quit)  Taking vitamin-D supplement  No recent  fractures     Seasonal allergies / chronic sinusitis   Rx-Flonase, Xyzal, montelukast, astelin  Does have recurrent sinusitis, seen by ENT ([place])      Psoriasis  Rx-betamethasone, clobetasol, desonide, Topicort  Followed by dermatology - Jesenia Loera MD      B12 deficiency  Rx-B12 injections 500 mcg every 2 weeks     Chronic Low back pain , sacroiliitis, ankylosing spondylitis   Rheum : Gregg Mensah MD   Pain Mgt : Bestbel   Rx : Mobic 15 mg , Norco 5 mg p.r.n., use sparingly     ALONZO  Sleep MD : Catarina Morfin  Awaiting machine      Past Medical History:  Past Medical History:   Diagnosis Date    Asthma     GERD (gastroesophageal reflux disease)     Psoriasis        Past Surgical History:  Past Surgical History:   Procedure Laterality Date    ADENOIDECTOMY      CHOLECYSTECTOMY      COLONOSCOPY      DEXA      WNL    SINUS SURGERY      Dr Cordova    SINUS SURGERY  01/2017    xs 5    THYROID SURGERY      nodules removed //Dr Amato     TONSILLECTOMY      TYMPANOSTOMY TUBE PLACEMENT         Family History:  Family History   Problem Relation Age of Onset    Hypertension Mother     Obesity Father     Diabetes Sister     Obesity Sister     No Known Problems Maternal Grandmother     No Known Problems Maternal Grandfather     No Known Problems Paternal Grandmother     No Known Problems Paternal Grandfather     Breast cancer Paternal Aunt 43    Thyroid cancer Daughter 61    Ovarian cancer Neg Hx        Social History:  Social History     Socioeconomic History    Marital status:    Tobacco Use    Smoking status: Never    Smokeless tobacco: Never   Substance and Sexual Activity    Alcohol use: Yes     Comment: twice a month    Drug use: No    Sexual activity: Never     Social Determinants of Health     Financial Resource Strain: Low Risk  (10/23/2023)    Overall Financial Resource Strain (CARDIA)     Difficulty of Paying Living Expenses: Not hard at all   Food Insecurity: No Food Insecurity (10/23/2023)    Hunger Vital  Sign     Worried About Running Out of Food in the Last Year: Never true     Ran Out of Food in the Last Year: Never true   Transportation Needs: Unknown (10/23/2023)    PRAPARE - Transportation     Lack of Transportation (Medical): No   Physical Activity: Inactive (10/23/2023)    Exercise Vital Sign     Days of Exercise per Week: 0 days     Minutes of Exercise per Session: 0 min   Stress: No Stress Concern Present (10/23/2023)    [employer] [place] [employer] - Occupational Stress Questionnaire     Feeling of Stress : Not at all   Social Connections: Moderately Isolated (10/23/2023)    Social Connection and Isolation Panel [NHANES]     Frequency of Communication with Friends and Family: Once a week     Frequency of Social Gatherings with Friends and Family: Once a week     Attends Hoahaoism Services: More than 4 times per year     Active Member of Clubs or Organizations: No     Attends Club or Organization Meetings: Never     Marital Status:    Housing Stability: Low Risk  (10/23/2023)    Housing Stability Vital Sign     Unable to Pay for Housing in the Last Year: No     Number of Places Lived in the Last Year: 1     Unstable Housing in the Last Year: No       Medications:  Current Outpatient Medications on File Prior to Visit   Medication Sig Dispense Refill    azelastine (ASTELIN) 137 mcg (0.1 %) nasal spray 1 spray (137 mcg total) by Nasal route 2 (two) times daily. 30 mL 3    betamethasone dipropionate 0.05 % cream Apply topically once daily. 45 g 5    cholecalciferol, vitamin D3, (VITAMIN D3) 50 mcg (2,000 unit) Tab Take 2,000 Units by mouth once daily.      clobetasol 0.05% (TEMOVATE) 0.05 % Oint 1 application once daily. Apply to affected area      desonide 0.05% (DESOWEN) 0.05 % Oint Apply topically 2 (two) times daily. 60 g 2    desoximetasone (TOPICORT) 0.05 % cream Apply topically 2 (two) times daily. 60 g 11    fluticasone propionate (FLONASE) 50 mcg/actuation nasal spray 1 spray (50  mcg total) by Each Nostril route once daily. 54 g 3    ketoconazole (NIZORAL) 2 % cream Apply topically.      ketoconazole (NIZORAL) 2 % shampoo Apply topically twice a week. 120 mL 3    levocetirizine (XYZAL) 5 MG tablet Take one capsule by mouth daily 90 tablet 0    mupirocin (BACTROBAN) 2 % ointment SMARTSI Application Topical 2-3 Times Daily      thyroid, pork, (ARMOUR THYROID) 60 mg Tab Take 1 tablet (60 mg total) by mouth before breakfast. 90 tablet 3    citalopram (CELEXA) 10 MG tablet Take 1 tablet (10 mg total) by mouth once daily. (Patient not taking: Reported on 3/13/2024) 30 tablet 11    doxycycline (VIBRAMYCIN) 100 MG Cap Take 100 mg by mouth 2 (two) times daily.      HYDROcodone-acetaminophen (NORCO) 5-325 mg per tablet Take 1 tablet by mouth every 8 (eight) hours as needed for Pain. (Patient not taking: Reported on 3/13/2024) 20 tablet 0    linaCLOtide (LINZESS) 72 mcg Cap capsule Take 1 capsule (72 mcg total) by mouth daily as needed (constipation). (Patient not taking: Reported on 3/13/2024) 20 capsule 1    MAGNESIUM ORAL Take by mouth once. Pt takes 400mg once daily      methylPREDNISolone (MEDROL) 4 MG Tab Take by mouth.      pantoprazole (PROTONIX) 40 MG tablet Take 1 tablet (40 mg total) by mouth once daily. (Patient taking differently: Take 40 mg by mouth once daily. prn) 30 tablet 11    [DISCONTINUED] etanercept (ENBREL SURECLICK) 50 mg/mL (1 mL) Inject 1 mL (50 mg total) into the skin once a week. (Patient taking differently: Inject 50 mg into the skin once a week. Once a month) 12 mL 3     Current Facility-Administered Medications on File Prior to Visit   Medication Dose Route Frequency Provider Last Rate Last Admin    cyanocobalamin injection 1,000 mcg  1,000 mcg Intramuscular Q30 Days Luc Teixeira MD   1,000 mcg at 24 1126       Allergies:  Avelox [moxifloxacin], Bactrim [sulfamethoxazole-trimethoprim], Ciprofloxacin, Isothiazolinones, Neomycin sulfate, Apremilast, and  "Bacitracin    Immunizations:  Immunization History   Administered Date(s) Administered    COVID-19 MRNA, LN-S PF (MODERNA HALF 0.25 ML DOSE) 12/16/2021    COVID-19 Vaccine 03/31/2021    COVID-19, vector-nr, rS-Ad26, PF (Johanna) 04/05/2021    Hepatitis A, Adult 08/09/2007    IPV 08/09/2007    Influenza 11/18/2011, 10/21/2013    Influenza (FLUAD) - Quadrivalent - Adjuvanted - PF *Preferred* (65+) 10/07/2021, 10/03/2022, 09/08/2023    Influenza - High Dose - PF (65 years and older) 10/14/2014, 10/08/2015, 09/27/2016    Influenza - Quadrivalent - MDCK - PF 10/10/2017    Influenza - Quadrivalent - PF *Preferred* (6 months and older) 10/29/2019, 09/25/2020    Influenza - Trivalent (ADULT) 11/18/2011    Influenza - Trivalent - PF (ADULT) 10/21/2013    Pneumococcal Conjugate - 13 Valent 04/12/2016    Pneumococcal Polysaccharide - 23 Valent 08/01/2011    Td (Adult), Unspecified Formulation 08/02/2018    Tdap 08/09/2007, 08/01/2018    Zoster 07/11/2007       Review of Systems:  Review of Systems   All other systems reviewed and are negative.      Objective:    Vitals:  Vitals:    03/13/24 0829   BP: 116/64   Pulse: 65   SpO2: 99%   Weight: 61.4 kg (135 lb 4 oz)   Height: 5' 2" (1.575 m)   PainSc:   2       Physical Exam  Vitals reviewed.   Constitutional:       General: She is not in acute distress.  HENT:      Head: Normocephalic and atraumatic.   Eyes:      Pupils: Pupils are equal, round, and reactive to light.   Cardiovascular:      Rate and Rhythm: Normal rate and regular rhythm.      Heart sounds: No murmur heard.     No friction rub.   Pulmonary:      Effort: Pulmonary effort is normal.      Breath sounds: Normal breath sounds.   Abdominal:      General: Bowel sounds are normal. There is no distension.      Palpations: Abdomen is soft.      Tenderness: There is no abdominal tenderness.   Musculoskeletal:      Cervical back: Neck supple.   Skin:     General: Skin is warm and dry.      Findings: No rash.   Psychiatric:  "        Behavior: Behavior normal.           Luc Teixeira MD  Family Medicine

## 2024-03-18 LAB — TTG IGA SER-ACNC: 0.7 U/ML

## 2024-03-19 LAB — METHYLMALONATE SERPL-SCNC: 0.31 UMOL/L

## 2024-03-26 ENCOUNTER — LAB VISIT (OUTPATIENT)
Dept: LAB | Facility: HOSPITAL | Age: 84
End: 2024-03-26
Attending: STUDENT IN AN ORGANIZED HEALTH CARE EDUCATION/TRAINING PROGRAM
Payer: MEDICARE

## 2024-03-26 DIAGNOSIS — Z79.899 HIGH RISK MEDICATION USE: ICD-10-CM

## 2024-03-26 DIAGNOSIS — Z79.899 DRUG-INDUCED IMMUNODEFICIENCY: ICD-10-CM

## 2024-03-26 DIAGNOSIS — M45.9 ANKYLOSING SPONDYLITIS, UNSPECIFIED SITE OF SPINE: ICD-10-CM

## 2024-03-26 DIAGNOSIS — D84.821 DRUG-INDUCED IMMUNODEFICIENCY: ICD-10-CM

## 2024-03-26 LAB
ALBUMIN SERPL BCP-MCNC: 3.9 G/DL (ref 3.5–5.2)
ALP SERPL-CCNC: 50 U/L (ref 55–135)
ALT SERPL W/O P-5'-P-CCNC: 21 U/L (ref 10–44)
ANION GAP SERPL CALC-SCNC: 7 MMOL/L (ref 8–16)
AST SERPL-CCNC: 26 U/L (ref 10–40)
BASOPHILS # BLD AUTO: 0.03 K/UL (ref 0–0.2)
BASOPHILS NFR BLD: 0.6 % (ref 0–1.9)
BILIRUB SERPL-MCNC: 0.5 MG/DL (ref 0.1–1)
BUN SERPL-MCNC: 9 MG/DL (ref 8–23)
CALCIUM SERPL-MCNC: 9.7 MG/DL (ref 8.7–10.5)
CHLORIDE SERPL-SCNC: 108 MMOL/L (ref 95–110)
CO2 SERPL-SCNC: 28 MMOL/L (ref 23–29)
CREAT SERPL-MCNC: 0.8 MG/DL (ref 0.5–1.4)
CRP SERPL-MCNC: 0.8 MG/L (ref 0–8.2)
DIFFERENTIAL METHOD BLD: NORMAL
EOSINOPHIL # BLD AUTO: 0.4 K/UL (ref 0–0.5)
EOSINOPHIL NFR BLD: 7.8 % (ref 0–8)
ERYTHROCYTE [DISTWIDTH] IN BLOOD BY AUTOMATED COUNT: 13.3 % (ref 11.5–14.5)
ERYTHROCYTE [SEDIMENTATION RATE] IN BLOOD BY PHOTOMETRIC METHOD: 16 MM/HR (ref 0–36)
EST. GFR  (NO RACE VARIABLE): >60 ML/MIN/1.73 M^2
GLUCOSE SERPL-MCNC: 92 MG/DL (ref 70–110)
HCT VFR BLD AUTO: 43.1 % (ref 37–48.5)
HGB BLD-MCNC: 13.9 G/DL (ref 12–16)
IMM GRANULOCYTES # BLD AUTO: 0.01 K/UL (ref 0–0.04)
IMM GRANULOCYTES NFR BLD AUTO: 0.2 % (ref 0–0.5)
LYMPHOCYTES # BLD AUTO: 1.6 K/UL (ref 1–4.8)
LYMPHOCYTES NFR BLD: 33.2 % (ref 18–48)
MCH RBC QN AUTO: 29.9 PG (ref 27–31)
MCHC RBC AUTO-ENTMCNC: 32.3 G/DL (ref 32–36)
MCV RBC AUTO: 93 FL (ref 82–98)
MONOCYTES # BLD AUTO: 0.5 K/UL (ref 0.3–1)
MONOCYTES NFR BLD: 9.9 % (ref 4–15)
NEUTROPHILS # BLD AUTO: 2.3 K/UL (ref 1.8–7.7)
NEUTROPHILS NFR BLD: 48.3 % (ref 38–73)
NRBC BLD-RTO: 0 /100 WBC
PLATELET # BLD AUTO: 225 K/UL (ref 150–450)
PMV BLD AUTO: 9.6 FL (ref 9.2–12.9)
POTASSIUM SERPL-SCNC: 4.2 MMOL/L (ref 3.5–5.1)
PROT SERPL-MCNC: 7.1 G/DL (ref 6–8.4)
RBC # BLD AUTO: 4.65 M/UL (ref 4–5.4)
SODIUM SERPL-SCNC: 143 MMOL/L (ref 136–145)
WBC # BLD AUTO: 4.73 K/UL (ref 3.9–12.7)

## 2024-03-26 PROCEDURE — 80053 COMPREHEN METABOLIC PANEL: CPT | Performed by: STUDENT IN AN ORGANIZED HEALTH CARE EDUCATION/TRAINING PROGRAM

## 2024-03-26 PROCEDURE — 85652 RBC SED RATE AUTOMATED: CPT | Performed by: STUDENT IN AN ORGANIZED HEALTH CARE EDUCATION/TRAINING PROGRAM

## 2024-03-26 PROCEDURE — 85025 COMPLETE CBC W/AUTO DIFF WBC: CPT | Performed by: STUDENT IN AN ORGANIZED HEALTH CARE EDUCATION/TRAINING PROGRAM

## 2024-03-26 PROCEDURE — 36415 COLL VENOUS BLD VENIPUNCTURE: CPT | Mod: PN | Performed by: STUDENT IN AN ORGANIZED HEALTH CARE EDUCATION/TRAINING PROGRAM

## 2024-03-26 PROCEDURE — 86140 C-REACTIVE PROTEIN: CPT | Performed by: STUDENT IN AN ORGANIZED HEALTH CARE EDUCATION/TRAINING PROGRAM

## 2024-03-28 ENCOUNTER — CLINICAL SUPPORT (OUTPATIENT)
Dept: AUDIOLOGY | Facility: CLINIC | Age: 84
End: 2024-03-28
Payer: MEDICARE

## 2024-03-28 DIAGNOSIS — Z97.4 WEARS HEARING AID IN BOTH EARS: Primary | ICD-10-CM

## 2024-03-28 PROCEDURE — 99499 UNLISTED E&M SERVICE: CPT | Mod: S$PBB,,, | Performed by: AUDIOLOGIST-HEARING AID FITTER

## 2024-03-28 NOTE — PROGRESS NOTES
Amarilis Decker came in on 03/28/2024 for a 1 month hearing aid follow up following the initial hearing aid fitting. Pt was alone during today's visit. Pt stated she had several questions and they were all answered to her satisfaction. Demonstrated cleaning of hearing aids to include wax filter and dome change with the pt doing it at the same time with their hearing aids for retention purposes. Informed pt that a notification will be mailed when it is time for her annual hearing test and that she should call the audiology clinic directly to schedule the appts to coordinate them correctly. Also informed her that if domes or wax filters are needed, call the clinic and we will leave them at the 2nd floor check in desk to be picked up at the earliest convenience. She should call the clinic PRN otherwise. All complaints were addressed during this visit to the patient's satisfaction. Plan of care was discussed in detail with the patient, who agreed with the plan as above.

## 2024-04-09 ENCOUNTER — OFFICE VISIT (OUTPATIENT)
Dept: FAMILY MEDICINE | Facility: CLINIC | Age: 84
End: 2024-04-09
Payer: MEDICARE

## 2024-04-09 ENCOUNTER — LAB VISIT (OUTPATIENT)
Dept: LAB | Facility: HOSPITAL | Age: 84
End: 2024-04-09
Attending: FAMILY MEDICINE
Payer: MEDICARE

## 2024-04-09 VITALS
WEIGHT: 132.81 LBS | SYSTOLIC BLOOD PRESSURE: 136 MMHG | DIASTOLIC BLOOD PRESSURE: 72 MMHG | OXYGEN SATURATION: 96 % | HEIGHT: 62 IN | HEART RATE: 65 BPM | RESPIRATION RATE: 20 BRPM | BODY MASS INDEX: 24.44 KG/M2

## 2024-04-09 DIAGNOSIS — E03.9 HYPOTHYROIDISM (ACQUIRED): Chronic | ICD-10-CM

## 2024-04-09 DIAGNOSIS — Z23 IMMUNIZATION DUE: ICD-10-CM

## 2024-04-09 DIAGNOSIS — R14.0 FUNCTIONAL BLOATING: Primary | ICD-10-CM

## 2024-04-09 DIAGNOSIS — R15.9 INCONTINENCE OF FECES, UNSPECIFIED FECAL INCONTINENCE TYPE: ICD-10-CM

## 2024-04-09 LAB
T4 FREE SERPL-MCNC: 0.92 NG/DL (ref 0.71–1.51)
TSH SERPL DL<=0.005 MIU/L-ACNC: 0.69 UIU/ML (ref 0.4–4)

## 2024-04-09 PROCEDURE — 84439 ASSAY OF FREE THYROXINE: CPT | Performed by: FAMILY MEDICINE

## 2024-04-09 PROCEDURE — 84443 ASSAY THYROID STIM HORMONE: CPT | Performed by: FAMILY MEDICINE

## 2024-04-09 PROCEDURE — 99999 PR PBB SHADOW E&M-EST. PATIENT-LVL V: CPT | Mod: PBBFAC,,, | Performed by: FAMILY MEDICINE

## 2024-04-09 PROCEDURE — 99214 OFFICE O/P EST MOD 30 MIN: CPT | Mod: S$PBB,,, | Performed by: FAMILY MEDICINE

## 2024-04-09 PROCEDURE — 99215 OFFICE O/P EST HI 40 MIN: CPT | Mod: PBBFAC,PN | Performed by: FAMILY MEDICINE

## 2024-04-09 PROCEDURE — 36415 COLL VENOUS BLD VENIPUNCTURE: CPT | Mod: PN | Performed by: FAMILY MEDICINE

## 2024-04-09 RX ORDER — LUBIPROSTONE 24 UG/1
24 CAPSULE ORAL 2 TIMES DAILY WITH MEALS
Qty: 60 CAPSULE | Refills: 11 | Status: SHIPPED | OUTPATIENT
Start: 2024-04-09 | End: 2024-04-22

## 2024-04-09 NOTE — PROGRESS NOTES
THIS DOCUMENT WAS MADE IN PART WITH VOICE RECOGNITION SOFTWARE.  OCCASIONALLY THIS SOFTWARE WILL MISINTERPRET WORDS OR PHRASES.    Assessment and Plan:    1. Functional bloating        2. Immunization due        3. Incontinence of feces, unspecified fecal incontinence type  lubiprostone (AMITIZA) 24 MCG Cap    Ambulatory referral/consult to Physical/Occupational Therapy    Ambulatory referral/consult to Gastroenterology        New medication Amitiza for constipation   Has failed MiraLax, Metamucil  Lab workup thus far has been unremarkable   Referral to GI for further assistance   Referral to pelvic floor therapy for fecal incontinence      ______________________________________________________________________  Subjective:    Chief Complaint:  Chief Complaint   Patient presents with    Constipation     4 months        HPI:  Amarilis is a 83 y.o. year old       Follow-up bloating, GI discomfort   Recommended limiting lactose   Checked celiac panel, negative   Recommended trial of Metamucil and to consider avoiding gluten if no symptom relief with lactose avoidance  Reports no significant improvement, since starting MiraLax she has been having some nocturnal fecal incontinence   Primary complaints include bloating, constipation and fecal incontinence      Primary insomnia  Rx- Melatonin  Previous Rx- Remeron (ineffective), Doxepin (ineffective), trazodone (hangover)     GERD  Rx-pantoprazole 40 mg  Denies dysphagia      Depressed mood / Anxiety   Prev Rx-Lexapro (nightmares), buspirone (ineffective),  wellbutrin (constipation, imbalance, ineffective), Fluoxetine (itching)  Stressor-fall out with daughter      Hypothyroidism  Current Rx-armor Thyroid 60 mg + 15 mg daily   Previous Rx-levothyroxine (ineffective)  Previous TSH in normal range   Endocrinology- Dr Fritz      Bronchiectasis, history of pulmonary nodules  Seen by pulmonology - Dr. Jovel   pulmonology-chronic cough may be due to colonization for  mycobacterium avium\     Senile osteoporosis  Prev Rx-Boniva 150 (pt choice to quit)  Taking vitamin-D supplement  No recent fractures     Seasonal allergies / chronic sinusitis   Rx-Flonase, Xyzal, montelukast, astelin  Does have recurrent sinusitis, seen by ENT ([place])      Psoriasis  Rx-betamethasone, clobetasol, desonide, Topicort  Followed by dermatology - Jesenia Loera MD      B12 deficiency  Rx-B12 injections 500 mcg every 2 weeks     Chronic Low back pain , sacroiliitis, ankylosing spondylitis   Rheum : Gregg Mensah MD   Pain Mgt : Bestbel   Rx : Mobic 15 mg , Norco 5 mg p.r.n., use sparingly     ALONZO  Sleep MD : Catarina Morfin  Awaiting machine  Past Medical History:  Past Medical History:   Diagnosis Date    Asthma     GERD (gastroesophageal reflux disease)     Psoriasis        Past Surgical History:  Past Surgical History:   Procedure Laterality Date    ADENOIDECTOMY      CHOLECYSTECTOMY      COLONOSCOPY      DEXA      WNL    SINUS SURGERY      Dr Cordova    SINUS SURGERY  01/2017    xs 5    THYROID SURGERY      nodules removed //Dr Amato     TONSILLECTOMY      TYMPANOSTOMY TUBE PLACEMENT         Family History:  Family History   Problem Relation Age of Onset    Hypertension Mother     Obesity Father     Diabetes Sister     Obesity Sister     No Known Problems Maternal Grandmother     No Known Problems Maternal Grandfather     No Known Problems Paternal Grandmother     No Known Problems Paternal Grandfather     Breast cancer Paternal Aunt 43    Thyroid cancer Daughter 61    Ovarian cancer Neg Hx        Social History:  Social History     Socioeconomic History    Marital status:    Tobacco Use    Smoking status: Never    Smokeless tobacco: Never   Substance and Sexual Activity    Alcohol use: Yes     Comment: twice a month    Drug use: No    Sexual activity: Never     Social Determinants of Health     Financial Resource Strain: Low Risk  (10/23/2023)    Overall Financial Resource Strain ([facility])      Difficulty of Paying Living Expenses: Not hard at all   Food Insecurity: No Food Insecurity (10/23/2023)    Hunger Vital Sign     Worried About Running Out of Food in the Last Year: Never true     Ran Out of Food in the Last Year: Never true   Transportation Needs: Unknown (10/23/2023)    PRAPARE - Transportation     Lack of Transportation (Medical): No   Physical Activity: Inactive (10/23/2023)    Exercise Vital Sign     Days of Exercise per Week: 0 days     Minutes of Exercise per Session: 0 min   Stress: No Stress Concern Present (10/23/2023)    [employer] [place] of Occupational Health - Occupational Stress Questionnaire     Feeling of Stress : Not at all   Social Connections: Moderately Isolated (10/23/2023)    Social Connection and Isolation Panel [NHANES]     Frequency of Communication with Friends and Family: Once a week     Frequency of Social Gatherings with Friends and Family: Once a week     Attends [employer] Services: More than 4 times per year     Active Member of Clubs or Organizations: No     Attends Club or Organization Meetings: Never     Marital Status:    Housing Stability: Low Risk  (10/23/2023)    Housing Stability Vital Sign     Unable to Pay for Housing in the Last Year: No     Number of Places Lived in the Last Year: 1     Unstable Housing in the Last Year: No       Medications:  Current Outpatient Medications on File Prior to Visit   Medication Sig Dispense Refill    betamethasone dipropionate 0.05 % cream Apply topically once daily. 45 g 5    cholecalciferol, vitamin D3, (VITAMIN D3) 50 mcg (2,000 unit) Tab Take 2,000 Units by mouth once daily.      clobetasol 0.05% (TEMOVATE) 0.05 % Oint 1 application once daily. Apply to affected area      desonide 0.05% (DESOWEN) 0.05 % Oint Apply topically 2 (two) times daily. 60 g 2    desoximetasone (TOPICORT) 0.05 % cream Apply topically 2 (two) times daily. 60 g 11    fluticasone propionate (FLONASE) 50 mcg/actuation nasal spray 1 spray (50  mcg total) by Each Nostril route once daily. 54 g 3    HYDROcodone-acetaminophen (NORCO) 5-325 mg per tablet Take 1 tablet by mouth every 8 (eight) hours as needed for Pain. 20 tablet 0    ketoconazole (NIZORAL) 2 % cream Apply topically.      ketoconazole (NIZORAL) 2 % shampoo Apply topically twice a week. 120 mL 3    levocetirizine (XYZAL) 5 MG tablet Take one capsule by mouth daily 90 tablet 0    MAGNESIUM ORAL Take by mouth once. Pt takes 400mg once daily      meloxicam (MOBIC) 15 MG tablet Take 1 tablet (15 mg total) by mouth once daily. 90 tablet 1    methylPREDNISolone (MEDROL) 4 MG Tab Take by mouth.      mupirocin (BACTROBAN) 2 % ointment SMARTSI Application Topical 2-3 Times Daily      thyroid, pork, (ARMOUR THYROID) 60 mg Tab Take 1 tablet (60 mg total) by mouth before breakfast. 90 tablet 3    azelastine (ASTELIN) 137 mcg (0.1 %) nasal spray 1 spray (137 mcg total) by Nasal route 2 (two) times daily. 30 mL 3    citalopram (CELEXA) 10 MG tablet Take 1 tablet (10 mg total) by mouth once daily. (Patient not taking: Reported on 3/13/2024) 30 tablet 11    linaCLOtide (LINZESS) 72 mcg Cap capsule Take 1 capsule (72 mcg total) by mouth daily as needed (constipation). (Patient not taking: Reported on 3/13/2024) 20 capsule 1    pantoprazole (PROTONIX) 40 MG tablet Take 1 tablet (40 mg total) by mouth once daily. (Patient taking differently: Take 40 mg by mouth once daily. prn) 30 tablet 11    [DISCONTINUED] etanercept (ENBREL SURECLICK) 50 mg/mL (1 mL) Inject 1 mL (50 mg total) into the skin once a week. (Patient taking differently: Inject 50 mg into the skin once a week. Once a month) 12 mL 3     Current Facility-Administered Medications on File Prior to Visit   Medication Dose Route Frequency Provider Last Rate Last Admin    cyanocobalamin injection 1,000 mcg  1,000 mcg Intramuscular Q30 Days Luc Teixeira MD   1,000 mcg at 24 1126       Allergies:  Avelox [moxifloxacin], Bactrim  "[sulfamethoxazole-trimethoprim], Ciprofloxacin, Isothiazolinones, Apremilast, and Bacitracin    Immunizations:  Immunization History   Administered Date(s) Administered    COVID-19 MRNA, LN-S PF (MODERNA HALF 0.25 ML DOSE) 12/16/2021    COVID-19 Vaccine 03/31/2021    COVID-19, vector-nr, rS-Ad26, PF (Johanna) 04/05/2021    Hepatitis A, Adult 08/09/2007    IPV 08/09/2007    Influenza 11/18/2011, 10/21/2013    Influenza (FLUAD) - Quadrivalent - Adjuvanted - PF *Preferred* (65+) 10/07/2021, 10/03/2022, 09/08/2023    Influenza - High Dose - PF (65 years and older) 10/14/2014, 10/08/2015, 09/27/2016    Influenza - Quadrivalent - MDCK - PF 10/10/2017    Influenza - Quadrivalent - PF *Preferred* (6 months and older) 10/29/2019, 09/25/2020    Influenza - Trivalent (ADULT) 11/18/2011    Influenza - Trivalent - PF (ADULT) 10/21/2013    Pneumococcal Conjugate - 13 Valent 04/12/2016    Pneumococcal Polysaccharide - 23 Valent 08/01/2011    Td (Adult), Unspecified Formulation 08/02/2018    Tdap 08/09/2007, 08/01/2018    Zoster 07/11/2007       Review of Systems:  Review of Systems   All other systems reviewed and are negative.      Objective:    Vitals:  Vitals:    04/09/24 0820   BP: 136/72   Pulse: 65   Resp: 20   SpO2: 96%   Weight: 60.2 kg (132 lb 13.2 oz)   Height: 5' 2" (1.575 m)   PainSc: 0-No pain       Physical Exam  Vitals reviewed.   Constitutional:       General: She is not in acute distress.  HENT:      Head: Normocephalic and atraumatic.   Eyes:      Pupils: Pupils are equal, round, and reactive to light.   Cardiovascular:      Rate and Rhythm: Normal rate and regular rhythm.      Heart sounds: No murmur heard.     No friction rub.   Pulmonary:      Effort: Pulmonary effort is normal.      Breath sounds: Normal breath sounds.   Abdominal:      General: Bowel sounds are normal. There is no distension.      Palpations: Abdomen is soft.      Tenderness: There is no abdominal tenderness.   Musculoskeletal:      Cervical " back: Neck supple.   Skin:     General: Skin is warm and dry.      Findings: No rash.   Psychiatric:         Behavior: Behavior normal.           Luc Teixeira MD  Family Medicine

## 2024-04-11 PROBLEM — R15.9 INCONTINENCE OF FECES: Status: ACTIVE | Noted: 2024-04-11

## 2024-04-19 ENCOUNTER — TELEPHONE (OUTPATIENT)
Dept: GASTROENTEROLOGY | Facility: CLINIC | Age: 84
End: 2024-04-19
Payer: MEDICARE

## 2024-04-19 NOTE — TELEPHONE ENCOUNTER
----- Message from Jeanie Joseph sent at 4/19/2024 10:30 AM CDT -----  Contact: self  Type:  Sooner Appointment Request    Caller is requesting a sooner appointment.  Caller declined first available appointment listed below.  Caller will not accept being placed on the waitlist and is requesting a message be sent to doctor.    Name of Caller:  pt  When is the first available appointment?  4/30    Would the patient rather a call back or a response via [facility]? call  Best Call Back Number:  [phone]   Additional Information:  pt would like a sooner date please advise

## 2024-04-19 NOTE — TELEPHONE ENCOUNTER
Called and spoke to pt, pt stated she needed something later in the afternoon advised pt we didn't have anything sooner. Offered pt sooner appointment with Adela Meraz NP, scheduled pt than ask what else Catarina Vale NP had. Scheduled appointment with Catarina Vale NP. Pt verbalized understanding of appointment date/ time.

## 2024-04-22 ENCOUNTER — OFFICE VISIT (OUTPATIENT)
Dept: GASTROENTEROLOGY | Facility: CLINIC | Age: 84
End: 2024-04-22
Payer: MEDICARE

## 2024-04-22 VITALS — WEIGHT: 133.81 LBS | HEIGHT: 62 IN | BODY MASS INDEX: 24.63 KG/M2

## 2024-04-22 DIAGNOSIS — Z87.19 HISTORY OF GASTROESOPHAGEAL REFLUX (GERD): ICD-10-CM

## 2024-04-22 DIAGNOSIS — Z90.49 S/P CHOLECYSTECTOMY: ICD-10-CM

## 2024-04-22 DIAGNOSIS — K59.04 CHRONIC IDIOPATHIC CONSTIPATION: Primary | ICD-10-CM

## 2024-04-22 DIAGNOSIS — Z86.010 HISTORY OF COLON POLYPS: ICD-10-CM

## 2024-04-22 DIAGNOSIS — R14.0 ABDOMINAL BLOATING: ICD-10-CM

## 2024-04-22 PROCEDURE — 99214 OFFICE O/P EST MOD 30 MIN: CPT | Mod: S$PBB,,, | Performed by: NURSE PRACTITIONER

## 2024-04-22 PROCEDURE — 99999PBSHW PR PBB SHADOW TECHNICAL ONLY FILED TO HB: Mod: PBBFAC,,,

## 2024-04-22 PROCEDURE — 96372 THER/PROPH/DIAG INJ SC/IM: CPT | Mod: PBBFAC,PN

## 2024-04-22 PROCEDURE — 99214 OFFICE O/P EST MOD 30 MIN: CPT | Mod: PBBFAC,PO,25 | Performed by: NURSE PRACTITIONER

## 2024-04-22 PROCEDURE — 99999 PR PBB SHADOW E&M-EST. PATIENT-LVL IV: CPT | Mod: PBBFAC,,, | Performed by: NURSE PRACTITIONER

## 2024-04-22 RX ORDER — LUBIPROSTONE 8 UG/1
8 CAPSULE ORAL 2 TIMES DAILY WITH MEALS
Qty: 60 CAPSULE | Refills: 2 | Status: SHIPPED | OUTPATIENT
Start: 2024-04-22 | End: 2024-06-03

## 2024-04-22 RX ADMIN — CYANOCOBALAMIN 1000 MCG: 1000 INJECTION, SOLUTION INTRAMUSCULAR at 01:04

## 2024-04-22 NOTE — PROGRESS NOTES
Subjective:       Patient ID: Amarilis Decker is a 83 y.o. female, Body mass index is 24.48 kg/m².    Chief Complaint: Constipation      Patient is new to me. Referred by Dr. Teixeira for incontinence of feces.  Former patient of Dr. Beltrán.    Constipation  This is a chronic problem. The current episode started more than 1 year ago. The problem is unchanged. Her stool frequency is 4 to 5 times per week. The stool is described as firm, formed and pellet like (describes stool as type 1 on bristol scale; denies bloody stools). The patient is on a high fiber diet. She Exercises regularly. There has Not been adequate water intake. Associated symptoms include bloating and flatus. Pertinent negatives include no abdominal pain, anorexia, diarrhea, difficulty urinating, fecal incontinence, fever, hematochezia, melena, nausea, rectal pain, vomiting or weight loss. Risk factors: None. She has tried fiber and laxatives (Past: OTC Miralax- caused fecal incontinence; Amitiza 24 mcg BID- helped but possibly caused lightheadedness; Currently: OTC Metamucil once daily- somewhat helps) for the symptoms. Her past medical history is significant for abdominal surgery (Hx of cholecystectomy). There is no history of endocrine disease, inflammatory bowel disease or irritable bowel syndrome (scheduled to see pelvic floor physical therapy).     Review of Systems   Constitutional:  Negative for appetite change, fever, unexpected weight change and weight loss.   HENT:  Negative for trouble swallowing.    Respiratory:  Negative for cough and shortness of breath.    Cardiovascular:  Negative for chest pain.   Gastrointestinal:  Positive for abdominal distention, bloating, constipation and flatus. Negative for abdominal pain, anal bleeding, anorexia, blood in stool, diarrhea, hematochezia, melena, nausea, rectal pain and vomiting.   Genitourinary:  Negative for difficulty urinating and dysuria.   Musculoskeletal:  Negative for gait problem.    Skin:  Negative for rash.   Neurological:  Negative for speech difficulty.   Psychiatric/Behavioral:  Negative for confusion.        Past Medical History:   Diagnosis Date    Asthma     GERD (gastroesophageal reflux disease)     Psoriasis       Past Surgical History:   Procedure Laterality Date    ADENOIDECTOMY      CHOLECYSTECTOMY      COLONOSCOPY  2019    unremarkable per pt report    DEXA      WNL    SINUS SURGERY      Dr Cordova    SINUS SURGERY  01/2017    xs 5    THYROID SURGERY      nodules removed //Dr Amato     TONSILLECTOMY      TYMPANOSTOMY TUBE PLACEMENT      UPPER GASTROINTESTINAL ENDOSCOPY        Family History   Problem Relation Name Age of Onset    Hypertension Mother      Obesity Father      Diabetes Sister      Obesity Sister      Breast cancer Paternal Aunt  43    No Known Problems Maternal Grandmother      No Known Problems Maternal Grandfather      No Known Problems Paternal Grandmother      No Known Problems Paternal Grandfather      Thyroid cancer Daughter  61    Ovarian cancer Neg Hx      Colon cancer Neg Hx        Wt Readings from Last 10 Encounters:   04/22/24 60.7 kg (133 lb 13.1 oz)   04/09/24 60.2 kg (132 lb 13.2 oz)   03/13/24 61.4 kg (135 lb 4 oz)   02/15/24 61 kg (134 lb 7.7 oz)   01/09/24 59.5 kg (131 lb 1 oz)   01/03/24 60.3 kg (133 lb)   12/29/23 60.3 kg (133 lb)   12/20/23 60.5 kg (133 lb 6.4 oz)   12/14/23 60.1 kg (132 lb 7.9 oz)   11/21/23 59 kg (130 lb 1.1 oz)     Lab Results   Component Value Date    WBC 4.73 03/26/2024    HGB 13.9 03/26/2024    HCT 43.1 03/26/2024    MCV 93 03/26/2024     03/26/2024     CMP  Sodium   Date Value Ref Range Status   03/26/2024 143 136 - 145 mmol/L Final     Potassium   Date Value Ref Range Status   03/26/2024 4.2 3.5 - 5.1 mmol/L Final     Chloride   Date Value Ref Range Status   03/26/2024 108 95 - 110 mmol/L Final     CO2   Date Value Ref Range Status   03/26/2024 28 23 - 29 mmol/L Final     Glucose   Date Value Ref Range Status    03/26/2024 92 70 - 110 mg/dL Final     BUN   Date Value Ref Range Status   03/26/2024 9 8 - 23 mg/dL Final     Creatinine   Date Value Ref Range Status   03/26/2024 0.8 0.5 - 1.4 mg/dL Final     Calcium   Date Value Ref Range Status   03/26/2024 9.7 8.7 - 10.5 mg/dL Final     Total Protein   Date Value Ref Range Status   03/26/2024 7.1 6.0 - 8.4 g/dL Final     Albumin   Date Value Ref Range Status   03/26/2024 3.9 3.5 - 5.2 g/dL Final     Total Bilirubin   Date Value Ref Range Status   03/26/2024 0.5 0.1 - 1.0 mg/dL Final     Comment:     For infants and newborns, interpretation of results should be based  on gestational age, weight and in agreement with clinical  observations.    Premature Infant recommended reference ranges:  Up to 24 hours.............<8.0 mg/dL  Up to 48 hours............<12.0 mg/dL  3-5 days..................<15.0 mg/dL  6-29 days.................<15.0 mg/dL       Alkaline Phosphatase   Date Value Ref Range Status   03/26/2024 50 (L) 55 - 135 U/L Final     AST ([place])   Date Value Ref Range Status   04/15/2016 27 14 - 36 U/L Final     AST   Date Value Ref Range Status   03/26/2024 26 10 - 40 U/L Final     ALT   Date Value Ref Range Status   03/26/2024 21 10 - 44 U/L Final     Anion Gap   Date Value Ref Range Status   03/26/2024 7 (L) 8 - 16 mmol/L Final     eGFR if    Date Value Ref Range Status   05/24/2022 >60 >60 mL/min/1.73 m^2 Final     eGFR if non    Date Value Ref Range Status   05/24/2022 >60 >60 mL/min/1.73 m^2 Final     Comment:     Calculation used to obtain the estimated glomerular filtration  rate (eGFR) is the CKD-EPI equation.        Lab Results   Component Value Date    TSH 0.687 04/09/2024            Reviewed prior medical records including radiology report of CT of abdomen and pelvis 9/20/21 & endoscopy history (see surgical history).     Objective:      Physical Exam  Constitutional:       General: She is not in acute distress.      Appearance: She is well-developed.   HENT:      Head: Normocephalic.      Right Ear: Hearing normal.      Left Ear: Hearing normal.      Nose: Nose normal.      Mouth/Throat:      Mouth: No oral lesions.      Pharynx: Uvula midline.   Eyes:      General: Lids are normal.      Conjunctiva/sclera: Conjunctivae normal.      Pupils: Pupils are equal, round, and reactive to light.   Neck:      Trachea: Trachea normal.   Cardiovascular:      Rate and Rhythm: Normal rate and regular rhythm.      Heart sounds: Normal heart sounds. No murmur heard.  Pulmonary:      Effort: Pulmonary effort is normal. No respiratory distress.      Breath sounds: Normal breath sounds. No stridor. No wheezing.   Abdominal:      General: Bowel sounds are normal. There is no distension.      Palpations: Abdomen is soft. There is no mass.      Tenderness: There is no abdominal tenderness. There is no guarding or rebound.   Musculoskeletal:         General: Normal range of motion.      Cervical back: Normal range of motion.   Skin:     General: Skin is warm and dry.      Findings: No rash.      Comments: Non jaundiced   Neurological:      Mental Status: She is alert and oriented to person, place, and time.   Psychiatric:         Speech: Speech normal.         Behavior: Behavior normal. Behavior is cooperative.           Assessment:       1. Chronic idiopathic constipation    2. Abdominal bloating    3. History of colon polyps    4. History of gastroesophageal reflux (GERD)    5. S/P cholecystectomy           Plan:   All diagnoses and orders for this visit:    Chronic idiopathic constipation & Abdominal bloating  - Decrease Amitiza to 8 mcg BID: lubiprostone (AMITIZA) 8 MCG Cap; Take 1 capsule (8 mcg total) by mouth 2 (two) times daily with meals.  Dispense: 60 capsule; Refill: 2 (due to possible side effect)  - Recommend daily exercise as tolerated, adequate water intake, and high fiber diet.   - Recommend OTC fiber supplement (Benefiber)  -  Continue with scheduled pelvic floor physical therapy  - Discussed colonoscopy but patient declined    History of colon polyps   - Discussed colonoscopy but patient declined    History of gastroesophageal reflux (GERD)  - Recommend to avoid large meals, avoid eating within 3 hours of bedtime, elevate head of bed if nocturnal symptoms are present, smoking cessation (if current smoker), & weight loss (if overweight).   - Recommend minimize/avoid high-fat foods, chocolate, caffeine, citrus, alcohol, & tomato products.  - Advised to avoid/limit use of NSAID's, since they can cause GI upset, bleeding, and/or ulcers. If needed, take with food.      S/P cholecystectomy    If no improvement in symptoms or symptoms worsen, call/follow-up at clinic or go to ER

## 2024-05-14 ENCOUNTER — CLINICAL SUPPORT (OUTPATIENT)
Dept: AUDIOLOGY | Facility: CLINIC | Age: 84
End: 2024-05-14
Payer: MEDICARE

## 2024-05-14 ENCOUNTER — OFFICE VISIT (OUTPATIENT)
Dept: OTOLARYNGOLOGY | Facility: CLINIC | Age: 84
End: 2024-05-14
Payer: MEDICARE

## 2024-05-14 VITALS — HEIGHT: 62 IN | BODY MASS INDEX: 24.38 KG/M2 | WEIGHT: 132.5 LBS

## 2024-05-14 DIAGNOSIS — J01.01 ACUTE RECURRENT MAXILLARY SINUSITIS: Primary | ICD-10-CM

## 2024-05-14 DIAGNOSIS — Z22.322 MRSA CARRIER: ICD-10-CM

## 2024-05-14 DIAGNOSIS — Z98.890 HISTORY OF SINUS SURGERY: ICD-10-CM

## 2024-05-14 DIAGNOSIS — Z97.4 WEARS HEARING AID IN BOTH EARS: Primary | ICD-10-CM

## 2024-05-14 PROCEDURE — 99213 OFFICE O/P EST LOW 20 MIN: CPT | Mod: PBBFAC,PO | Performed by: STUDENT IN AN ORGANIZED HEALTH CARE EDUCATION/TRAINING PROGRAM

## 2024-05-14 PROCEDURE — 99213 OFFICE O/P EST LOW 20 MIN: CPT | Mod: S$PBB,,, | Performed by: STUDENT IN AN ORGANIZED HEALTH CARE EDUCATION/TRAINING PROGRAM

## 2024-05-14 PROCEDURE — 99499 UNLISTED E&M SERVICE: CPT | Mod: S$PBB,,, | Performed by: AUDIOLOGIST-HEARING AID FITTER

## 2024-05-14 PROCEDURE — 99999 PR PBB SHADOW E&M-EST. PATIENT-LVL III: CPT | Mod: PBBFAC,,, | Performed by: STUDENT IN AN ORGANIZED HEALTH CARE EDUCATION/TRAINING PROGRAM

## 2024-05-14 NOTE — PROGRESS NOTES
Amarilis Decker came in on 05/14/2024 for a hearing aid follow up. Pt was alone during today's visit. Pt stated her keystrokes on her phone are too loud and the ringer is too loud. Fixed all the issues to her satisfaction. Reviewed cleaning and changing the wax filters. All complaints were addressed during this visit to the patient's satisfaction. Plan of care was discussed in detail with the patient, who agreed with the plan as above.

## 2024-05-14 NOTE — PROGRESS NOTES
Otolaryngology Clinic Note    Subjective:       Patient ID: Amarilis Decker is a 83 y.o. female.    Chief Complaint: Follow-up (sinus)        History of Present Illness: Amarilis Decker is a 83 y.o. female presenting with sinus concerns. She reports she has had 5 sinus surgeries, got staph infection and eye damage from one. Gets several left max sinus infections a year. We have treated with oral and topical abx. She does regular steroids sinus rinses.     2/15/24: RTC. Was sick 2 days after last seen. Did clinda then doxycyline. Has been good since. Ears have been hurting. She has not been wearing her mouthguard. Doing rinses.  She  is doing steroids rinses. She had blood in sinuses until doxy. Not using astelin.     5/14/24: She got hearing test and aids since last seen. She knows they are helping. Noticed pus 2 days last week and yesterday. Her memory is worse due to stress,  is sick. Has not been doing mupirocin.     Past Surgical History:   Procedure Laterality Date    ADENOIDECTOMY      CHOLECYSTECTOMY      COLONOSCOPY  2019    unremarkable per pt report    JANKI      WNL    SINUS SURGERY      Dr Cordova    SINUS SURGERY  01/2017    xs 5    THYROID SURGERY      nodules removed //Dr Amato     TONSILLECTOMY      TYMPANOSTOMY TUBE PLACEMENT      UPPER GASTROINTESTINAL ENDOSCOPY       Past Medical History:   Diagnosis Date    Asthma     GERD (gastroesophageal reflux disease)     Psoriasis      Social Determinants of Health     Tobacco Use: Low Risk  (5/14/2024)    Patient History     Smoking Tobacco Use: Never     Smokeless Tobacco Use: Never     Passive Exposure: Not on file   Alcohol Use: Not At Risk (10/23/2023)    AUDIT-C     Frequency of Alcohol Consumption: Monthly or less     Average Number of Drinks: 1 or 2     Frequency of Binge Drinking: Never   Financial Resource Strain: Low Risk  (10/23/2023)    Overall Financial Resource Strain (CARDIA)     Difficulty of Paying Living Expenses: Not hard  at all   Food Insecurity: No Food Insecurity (10/23/2023)    Hunger Vital Sign     Worried About Running Out of Food in the Last Year: Never true     Ran Out of Food in the Last Year: Never true   Transportation Needs: Unknown (10/23/2023)    PRAPARE - Transportation     Lack of Transportation (Medical): No     Lack of Transportation (Non-Medical): Not on file   Physical Activity: Inactive (10/23/2023)    Exercise Vital Sign     Days of Exercise per Week: 0 days     Minutes of Exercise per Session: 0 min   Stress: No Stress Concern Present (10/23/2023)    [employer] - Occupational Stress Questionnaire     Feeling of Stress : Not at all   Housing Stability: Low Risk  (10/23/2023)    Housing Stability Vital Sign     Unable to Pay for Housing in the Last Year: No     Number of Places Lived in the Last Year: 1     Unstable Housing in the Last Year: No   Depression: Low Risk  (1/3/2024)    Depression     Last PHQ-4: Flowsheet Data: 0   Utilities: Not on file   Health Literacy: Not on file   Social Isolation: Not on file     Review of patient's allergies indicates:   Allergen Reactions    Avelox [moxifloxacin] Swelling    Bactrim [sulfamethoxazole-trimethoprim] Swelling    Ciprofloxacin Swelling and Hives    Isothiazolinones Dermatitis     Strong contact dermatitis    Apremilast Other (See Comments)     Muscle cramps, cough    Bacitracin      Other reaction(s): unknown     Current Outpatient Medications   Medication Instructions    azelastine (ASTELIN) 137 mcg, Nasal, 2 times daily    betamethasone dipropionate 0.05 % cream Topical (Top), Daily    cholecalciferol (vitamin D3) (VITAMIN D3) 2,000 Units, Oral, Daily    citalopram (CELEXA) 10 mg, Oral, Daily    clobetasol 0.05% (TEMOVATE) 0.05 % Oint 1 application , Daily, Apply to affected area    desonide 0.05% (DESOWEN) 0.05 % Oint Topical (Top), 2 times daily    desoximetasone (TOPICORT) 0.05 % cream Topical (Top), 2 times daily     fluticasone propionate (FLONASE) 50 mcg, Each Nostril, Daily    HYDROcodone-acetaminophen (NORCO) 5-325 mg per tablet 1 tablet, Oral, Every 8 hours PRN    ketoconazole (NIZORAL) 2 % cream Topical (Top)    ketoconazole (NIZORAL) 2 % shampoo Topical (Top), Twice weekly    levocetirizine (XYZAL) 5 MG tablet Take one capsule by mouth daily    linaCLOtide (LINZESS) 72 mcg, Oral, Daily PRN    lubiprostone (AMITIZA) 8 mcg, Oral, 2 times daily with meals    MAGNESIUM ORAL Oral, Once, Pt takes 400mg once daily    meloxicam (MOBIC) 15 mg, Oral, Daily    methylPREDNISolone (MEDROL) 4 MG Tab Oral    mupirocin (BACTROBAN) 2 % ointment SMARTSI Application Topical 2-3 Times Daily    thyroid (pork) (ARMOUR THYROID) 60 mg, Oral, Before breakfast         ENT ROS negative except as stated above.             Objective:      There were no vitals filed for this visit.    General: NAD, well appearing  Eyes: Normal conjunctiva and lids  Face: symmetric, nerve intact  Nose: The nose is without any evidence of any deformity. The nasal mucosa is moist. The septum is midline. There is no evidence of septal hematoma. The turbinates are without abnormality.   Ears: The ears are with normal-appearing pinna. Examination of the canals is normal appearing bilaterally. There is no drainage or erythema noted. Monomeric Tm on right, retracted mild, mild effusion?, left normal, reduced mobility. Hearing is grossly intact.  Mouth: No obvious abnormalities to the lips. The teeth are unremarkable. The gingivae are without any obvious evidence of infection or lesion. The oral mucosa is moist and pink. There are no obvious masses to the hard or soft palate.   Oropharynx: The uvula is midline.  The tongue is midline. The posterior pharynx is without erythema or exudate. The tonsils are normal appearing.  Salivary glands: The salivary glands are symmetric and not enlarged, no masses  Neck: No lymphadenopathy, trachea midline, thryoid not enlarged.  Psych:  Normal mood and affect.   Neuro: Grossly intact  Speech: fluent    Prior scope:     Septum midline. Max and ethmoids open on right, no drainage, SER inflamed but no drainage. Left max open, some polypoid changes of mucosa, ethmoid bed with polypoid mucosa but no drainage, SER clear. Some inflammation in Nasopharynx. Et orifices normal           Assessment and Plan:       1. Acute recurrent maxillary sinusitis    2. MRSA carrier    3. History of sinus surgery        Hx MRSA    - Continue rinses with betamethasone.   - mupirocin BID in rinses 1 week when purulence noted.     CT sinus 1/12/2023 without significant sinus disease, all sinuses opened surgically and patent.     She got hearing test and aids since last seen.     RTC: PRN  message with drainage and I can do phone call or virtual if needed as she knows her symptoms well and is usually correct about sinusitis, usually cover mrsa with history of + culture.      Plan of care was discussed in detail with the patient, who agreed with the plan as above. All questions were answered in detail.     Eboni Briggs MD  Otolaryngology;

## 2024-05-15 ENCOUNTER — HOSPITAL ENCOUNTER (OUTPATIENT)
Dept: RADIOLOGY | Facility: HOSPITAL | Age: 84
Discharge: HOME | End: 2024-05-15
Attending: INTERNAL MEDICINE
Payer: MEDICARE

## 2024-05-15 DIAGNOSIS — R91.8 ABNORMAL CT LUNG SCREENING: ICD-10-CM

## 2024-05-15 DIAGNOSIS — D72.19 OTHER EOSINOPHILIA: ICD-10-CM

## 2024-05-15 PROCEDURE — 71046 X-RAY EXAM CHEST 2 VIEWS: CPT | Mod: TC,FY,PO

## 2024-05-15 PROCEDURE — 71046 X-RAY EXAM CHEST 2 VIEWS: CPT | Mod: 26,,, | Performed by: RADIOLOGY

## 2024-05-20 PROBLEM — Z87.09 HISTORY OF ASTHMA: Status: ACTIVE | Noted: 2024-05-20

## 2024-05-22 ENCOUNTER — OFFICE VISIT (OUTPATIENT)
Dept: FAMILY MEDICINE | Facility: CLINIC | Age: 84
End: 2024-05-22
Payer: MEDICARE

## 2024-05-22 VITALS
HEART RATE: 75 BPM | HEIGHT: 62 IN | BODY MASS INDEX: 24.26 KG/M2 | TEMPERATURE: 98 F | WEIGHT: 131.81 LBS | DIASTOLIC BLOOD PRESSURE: 64 MMHG | SYSTOLIC BLOOD PRESSURE: 132 MMHG | OXYGEN SATURATION: 98 %

## 2024-05-22 DIAGNOSIS — E53.8 B12 DEFICIENCY: ICD-10-CM

## 2024-05-22 DIAGNOSIS — K59.04 FUNCTIONAL CONSTIPATION: ICD-10-CM

## 2024-05-22 DIAGNOSIS — R10.2 SUPRAPUBIC PAIN: ICD-10-CM

## 2024-05-22 DIAGNOSIS — Z23 IMMUNIZATION DUE: Primary | ICD-10-CM

## 2024-05-22 LAB
BILIRUBIN, UA POC OHS: NEGATIVE
BLOOD, UA POC OHS: NEGATIVE
CLARITY, UA POC OHS: CLEAR
COLOR, UA POC OHS: YELLOW
GLUCOSE, UA POC OHS: NEGATIVE
KETONES, UA POC OHS: NEGATIVE
LEUKOCYTES, UA POC OHS: NEGATIVE
NITRITE, UA POC OHS: NEGATIVE
PH, UA POC OHS: 6.5
PROTEIN, UA POC OHS: NEGATIVE
SPECIFIC GRAVITY, UA POC OHS: 1.01
UROBILINOGEN, UA POC OHS: 0.2

## 2024-05-22 PROCEDURE — 99999 PR PBB SHADOW E&M-EST. PATIENT-LVL IV: CPT | Mod: PBBFAC,,, | Performed by: FAMILY MEDICINE

## 2024-05-22 PROCEDURE — 96372 THER/PROPH/DIAG INJ SC/IM: CPT | Mod: PBBFAC,PN

## 2024-05-22 PROCEDURE — 81003 URINALYSIS AUTO W/O SCOPE: CPT | Mod: PBBFAC,PN | Performed by: FAMILY MEDICINE

## 2024-05-22 PROCEDURE — 99999PBSHW POCT URINALYSIS(INSTRUMENT): Mod: PBBFAC,,,

## 2024-05-22 PROCEDURE — 99214 OFFICE O/P EST MOD 30 MIN: CPT | Mod: S$PBB,,, | Performed by: FAMILY MEDICINE

## 2024-05-22 PROCEDURE — 99999PBSHW PR PBB SHADOW TECHNICAL ONLY FILED TO HB: Mod: PBBFAC,,,

## 2024-05-22 PROCEDURE — 99214 OFFICE O/P EST MOD 30 MIN: CPT | Mod: PBBFAC,PN | Performed by: FAMILY MEDICINE

## 2024-05-22 RX ORDER — CYANOCOBALAMIN 1000 UG/ML
1000 INJECTION, SOLUTION INTRAMUSCULAR; SUBCUTANEOUS
Status: SHIPPED | OUTPATIENT
Start: 2024-05-22 | End: 2025-04-17

## 2024-05-22 RX ADMIN — CYANOCOBALAMIN 1000 MCG: 1000 INJECTION, SOLUTION INTRAMUSCULAR at 09:05

## 2024-05-22 NOTE — PROGRESS NOTES
THIS DOCUMENT WAS MADE IN PART WITH VOICE RECOGNITION SOFTWARE.  OCCASIONALLY THIS SOFTWARE WILL MISINTERPRET WORDS OR PHRASES.    Assessment and Plan:    1. Immunization due        2. B12 deficiency  cyanocobalamin injection 1,000 mcg      3. Suprapubic pain  POCT Urinalysis(Instrument)      4. Functional constipation            Resume B12 injections     Continue current dose of Amitiza 24 mcg b.i.d., encourage trying prunes and increasing hydration   Follow-up with Gastroenterology p.r.n.   Consider follow-up with pelvic floor therapy     Check point of care urinalysis        ______________________________________________________________________  Subjective:    Chief Complaint:  Chief Complaint   Patient presents with    Follow-up        HPI:  Amarilis is a 83 y.o. year old         Follow-up constipation   Prescribed Amitiza at prior visit   Had previously failed MiraLax, Metamucil   Was referred to Gastroenterology who lower dose of Amitiza because of side effect lightheadedness  Also referred to pelvic floor therapy for assistance with fecal incontinence   Reports she went back to the 24 mcg dosing of Amitiza as the 8 mcg dosing was ineffective   Completed 2 courses of pelvic floor therapy, was kind of uncomfortable with the idea of some of the maneuvers and has not followed up for a 3rd course    Pelvic discomfort/suprapubic discomfort   Requesting point of care urinalysis to rule out any urinary tract infection today     Requesting her B12 injection             Primary insomnia  Rx- Melatonin  Previous Rx- Remeron (ineffective), Doxepin (ineffective), trazodone (hangover)     GERD  Rx-pantoprazole 40 mg  Denies dysphagia      Depressed mood / Anxiety   Prev Rx-Lexapro (nightmares), buspirone (ineffective),  wellbutrin (constipation, imbalance, ineffective), Fluoxetine (itching)  Stressor-fall out with daughter      Hypothyroidism  Current Rx-armor Thyroid 60 mg + 15 mg daily   Previous Rx-levothyroxine  (ineffective)  Previous TSH in normal range   Endocrinology- Dr Fritz      Bronchiectasis, history of pulmonary nodules  Seen by pulmonology - Dr. 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\     Senile osteoporosis  Prev Rx-Boniva 150 (pt choice to quit)  Taking vitamin-D supplement  No recent fractures     Seasonal allergies / chronic sinusitis   Rx-Flonase, Xyzal, montelukast, astelin  Does have recurrent sinusitis, seen by ENT ([place])      Psoriasis  Rx-betamethasone, clobetasol, desonide, Topicort  Followed by dermatology - Jesenia Loera MD      B12 deficiency  Rx-B12 injections 500 mcg every 2 weeks     Chronic Low back pain , sacroiliitis, ankylosing spondylitis   Rheum : Gregg Mensah MD   Pain Mgt : Juan F   Rx : Mobic 15 mg , Norco 5 mg p.r.n., use sparingly     ALONZO  Sleep MD : Catarina Morfin  Awaiting machine          Past Medical History:  Past Medical History:   Diagnosis Date    Asthma     GERD (gastroesophageal reflux disease)     Psoriasis        Past Surgical History:  Past Surgical History:   Procedure Laterality Date    ADENOIDECTOMY      CHOLECYSTECTOMY      COLONOSCOPY  2019    unremarkable per pt report    DEXA      WNL    SINUS SURGERY      Dr Cordova    SINUS SURGERY  01/2017    xs 5    THYROID SURGERY      nodules removed //Dr Amato     TONSILLECTOMY      TYMPANOSTOMY TUBE PLACEMENT      UPPER GASTROINTESTINAL ENDOSCOPY         Family History:  Family History   Problem Relation Name Age of Onset    Hypertension Mother      Obesity Father      Diabetes Sister      Obesity Sister      Breast cancer Paternal Aunt  43    No Known Problems Maternal Grandmother      No Known Problems Maternal Grandfather      No Known Problems Paternal Grandmother      No Known Problems Paternal Grandfather      Thyroid cancer Daughter  61    Ovarian cancer Neg Hx      Colon cancer Neg Hx         Social History:  Social History     Socioeconomic History    Marital status:     Tobacco Use    Smoking status: Never    Smokeless tobacco: Never   Substance and Sexual Activity    Alcohol use: Yes     Comment: twice a month    Drug use: No    Sexual activity: Never     Social Determinants of Health     Financial Resource Strain: Low Risk  (10/23/2023)    Overall Financial Resource Strain (CARDIA)     Difficulty of Paying Living Expenses: Not hard at all   Food Insecurity: No Food Insecurity (10/23/2023)    Hunger Vital Sign     Worried About Running Out of Food in the Last Year: Never true     Ran Out of Food in the Last Year: Never true   Transportation Needs: Unknown (10/23/2023)    PRAPARE - Transportation     Lack of Transportation (Medical): No   Physical Activity: Inactive (10/23/2023)    Exercise Vital Sign     Days of Exercise per Week: 0 days     Minutes of Exercise per Session: 0 min   Stress: No Stress Concern Present (10/23/2023)    [employer] - Occupational Stress Questionnaire     Feeling of Stress : Not at all   Housing Stability: Low Risk  (10/23/2023)    Housing Stability Vital Sign     Unable to Pay for Housing in the Last Year: No     Number of Places Lived in the Last Year: 1     Unstable Housing in the Last Year: No       Medications:  Current Outpatient Medications on File Prior to Visit   Medication Sig Dispense Refill    azelastine (ASTELIN) 137 mcg (0.1 %) nasal spray 1 spray (137 mcg total) by Nasal route 2 (two) times daily. 30 mL 3    betamethasone dipropionate 0.05 % cream Apply topically once daily. 45 g 5    cholecalciferol, vitamin D3, (VITAMIN D3) 50 mcg (2,000 unit) Tab Take 2,000 Units by mouth once daily.      citalopram (CELEXA) 10 MG tablet Take 1 tablet (10 mg total) by mouth once daily. 30 tablet 11    clobetasol 0.05% (TEMOVATE) 0.05 % Oint 1 application once daily. Apply to affected area      desonide 0.05% (DESOWEN) 0.05 % Oint Apply topically 2 (two) times daily. 60 g 2    desoximetasone (TOPICORT) 0.05 % cream  Apply topically 2 (two) times daily. 60 g 11    fluticasone propionate (FLONASE) 50 mcg/actuation nasal spray 1 spray (50 mcg total) by Each Nostril route once daily. 54 g 3    HYDROcodone-acetaminophen (NORCO) 5-325 mg per tablet Take 1 tablet by mouth every 8 (eight) hours as needed for Pain. 20 tablet 0    ketoconazole (NIZORAL) 2 % cream Apply topically.      ketoconazole (NIZORAL) 2 % shampoo Apply topically twice a week. 120 mL 3    levalbuterol (XOPENEX HFA) 45 mcg/actuation inhaler Inhale 1-2 puffs into the lungs every 6 (six) hours as needed for Wheezing or Shortness of Breath (or before exercise). Rescue 15 g 0    levocetirizine (XYZAL) 5 MG tablet Take one capsule by mouth daily 90 tablet 0    linaCLOtide (LINZESS) 72 mcg Cap capsule Take 1 capsule (72 mcg total) by mouth daily as needed (constipation). 20 capsule 1    lubiprostone (AMITIZA) 8 MCG Cap Take 1 capsule (8 mcg total) by mouth 2 (two) times daily with meals. 60 capsule 2    MAGNESIUM ORAL Take by mouth once. Pt takes 400mg once daily      meloxicam (MOBIC) 15 MG tablet Take 1 tablet (15 mg total) by mouth once daily. 90 tablet 1    methylPREDNISolone (MEDROL) 4 MG Tab Take by mouth.      mupirocin (BACTROBAN) 2 % ointment SMARTSI Application Topical 2-3 Times Daily      thyroid, pork, (ARMOUR THYROID) 60 mg Tab Take 1 tablet (60 mg total) by mouth before breakfast. 90 tablet 3    [DISCONTINUED] etanercept (ENBREL SURECLICK) 50 mg/mL (1 mL) Inject 1 mL (50 mg total) into the skin once a week. (Patient taking differently: Inject 50 mg into the skin once a week. Once a month) 12 mL 3     Current Facility-Administered Medications on File Prior to Visit   Medication Dose Route Frequency Provider Last Rate Last Admin    [DISCONTINUED] cyanocobalamin injection 1,000 mcg  1,000 mcg Intramuscular Q30 Days Luc Teixeira MD   1,000 mcg at 24 1353       Allergies:  Avelox [moxifloxacin], Bactrim [sulfamethoxazole-trimethoprim], Ciprofloxacin,  "Isothiazolinones, Apremilast, and Bacitracin    Immunizations:  Immunization History   Administered Date(s) Administered    COVID-19 MRNA, LN-S PF (MODERNA HALF 0.25 ML DOSE) 12/16/2021    COVID-19 Vaccine 03/31/2021    COVID-19, vector-nr, [id number], PF (Johanna) 04/05/2021    Hepatitis A, Adult 08/09/2007    IPV 08/09/2007    Influenza 11/18/2011, 10/21/2013    Influenza (FLUAD) - Quadrivalent - Adjuvanted - PF *Preferred* (65+) 10/07/2021, 10/03/2022, 09/08/2023    Influenza - High Dose - PF (65 years and older) 10/14/2014, 10/08/2015, 09/27/2016    Influenza - Quadrivalent - MDCK - PF 10/10/2017    Influenza - Quadrivalent - PF *Preferred* (6 months and older) 10/29/2019, 09/25/2020    Influenza - Trivalent (ADULT) 11/18/2011    Influenza - Trivalent - PF (ADULT) 10/21/2013    Pneumococcal Conjugate - 13 Valent 04/12/2016    Pneumococcal Polysaccharide - 23 Valent 08/01/2011    Td (Adult), Unspecified Formulation 08/02/2018    Tdap 08/09/2007, 08/01/2018    Zoster 07/11/2007       Review of Systems:  Review of Systems   All other systems reviewed and are negative.      Objective:    Vitals:  Vitals:    05/22/24 0838   BP: 132/64   Pulse: 75   Temp: 98.3 °F (36.8 °C)   SpO2: 98%   Weight: 59.8 kg (131 lb 13.4 oz)   Height: 5' 2" (1.575 m)   PainSc: 0-No pain       Physical Exam  Vitals reviewed.   Constitutional:       General: She is not in acute distress.  HENT:      Head: Normocephalic and atraumatic.   Eyes:      Pupils: Pupils are equal, round, and reactive to light.   Cardiovascular:      Rate and Rhythm: Normal rate and regular rhythm.      Heart sounds: No murmur heard.     No friction rub.   Pulmonary:      Effort: Pulmonary effort is normal.      Breath sounds: Normal breath sounds.   Abdominal:      General: Bowel sounds are normal. There is no distension.      Palpations: Abdomen is soft.      Tenderness: There is no abdominal tenderness.   Musculoskeletal:      Cervical back: Neck supple.   Skin:     " General: Skin is warm and dry.      Findings: No rash.   Psychiatric:         Behavior: Behavior normal.             Luc Teixeira MD  Family Medicine

## 2024-06-03 DIAGNOSIS — K59.04 FUNCTIONAL CONSTIPATION: Primary | ICD-10-CM

## 2024-06-03 DIAGNOSIS — M25.50 POLYARTHRALGIA: ICD-10-CM

## 2024-06-03 RX ORDER — LUBIPROSTONE 24 UG/1
24 CAPSULE ORAL 2 TIMES DAILY WITH MEALS
Qty: 180 CAPSULE | Refills: 3 | Status: SHIPPED | OUTPATIENT
Start: 2024-06-03

## 2024-06-03 RX ORDER — HYDROCODONE BITARTRATE AND ACETAMINOPHEN 5; 325 MG/1; MG/1
1 TABLET ORAL EVERY 8 HOURS PRN
Qty: 20 TABLET | Refills: 0 | Status: SHIPPED | OUTPATIENT
Start: 2024-06-03

## 2024-06-20 ENCOUNTER — TELEPHONE (OUTPATIENT)
Dept: FAMILY MEDICINE | Facility: CLINIC | Age: 84
End: 2024-06-20
Payer: MEDICARE

## 2024-06-20 DIAGNOSIS — Z79.899 DRUG THERAPY: ICD-10-CM

## 2024-06-20 DIAGNOSIS — R53.83 FATIGUE, UNSPECIFIED TYPE: Primary | ICD-10-CM

## 2024-06-21 ENCOUNTER — LAB VISIT (OUTPATIENT)
Dept: LAB | Facility: HOSPITAL | Age: 84
End: 2024-06-21
Attending: FAMILY MEDICINE
Payer: MEDICARE

## 2024-06-21 DIAGNOSIS — Z79.899 DRUG THERAPY: ICD-10-CM

## 2024-06-21 DIAGNOSIS — R53.83 FATIGUE, UNSPECIFIED TYPE: ICD-10-CM

## 2024-06-21 LAB
ALBUMIN SERPL BCP-MCNC: 3.8 G/DL (ref 3.5–5.2)
ALP SERPL-CCNC: 57 U/L (ref 55–135)
ALT SERPL W/O P-5'-P-CCNC: 22 U/L (ref 10–44)
ANION GAP SERPL CALC-SCNC: 10 MMOL/L (ref 8–16)
AST SERPL-CCNC: 24 U/L (ref 10–40)
BASOPHILS # BLD AUTO: 0.05 K/UL (ref 0–0.2)
BASOPHILS NFR BLD: 1.1 % (ref 0–1.9)
BILIRUB SERPL-MCNC: 0.6 MG/DL (ref 0.1–1)
BUN SERPL-MCNC: 15 MG/DL (ref 8–23)
CALCIUM SERPL-MCNC: 9.2 MG/DL (ref 8.7–10.5)
CHLORIDE SERPL-SCNC: 103 MMOL/L (ref 95–110)
CO2 SERPL-SCNC: 26 MMOL/L (ref 23–29)
CREAT SERPL-MCNC: 0.9 MG/DL (ref 0.5–1.4)
CRP SERPL-MCNC: 1 MG/L (ref 0–8.2)
DIFFERENTIAL METHOD BLD: ABNORMAL
EOSINOPHIL # BLD AUTO: 0.4 K/UL (ref 0–0.5)
EOSINOPHIL NFR BLD: 8.6 % (ref 0–8)
ERYTHROCYTE [DISTWIDTH] IN BLOOD BY AUTOMATED COUNT: 12.9 % (ref 11.5–14.5)
ERYTHROCYTE [SEDIMENTATION RATE] IN BLOOD BY PHOTOMETRIC METHOD: 20 MM/HR (ref 0–36)
EST. GFR  (NO RACE VARIABLE): >60 ML/MIN/1.73 M^2
GLUCOSE SERPL-MCNC: 88 MG/DL (ref 70–110)
HCT VFR BLD AUTO: 43 % (ref 37–48.5)
HGB BLD-MCNC: 13.9 G/DL (ref 12–16)
IMM GRANULOCYTES # BLD AUTO: 0.01 K/UL (ref 0–0.04)
IMM GRANULOCYTES NFR BLD AUTO: 0.2 % (ref 0–0.5)
LYMPHOCYTES # BLD AUTO: 1.4 K/UL (ref 1–4.8)
LYMPHOCYTES NFR BLD: 30.8 % (ref 18–48)
MCH RBC QN AUTO: 29.8 PG (ref 27–31)
MCHC RBC AUTO-ENTMCNC: 32.3 G/DL (ref 32–36)
MCV RBC AUTO: 92 FL (ref 82–98)
MONOCYTES # BLD AUTO: 0.4 K/UL (ref 0.3–1)
MONOCYTES NFR BLD: 9.5 % (ref 4–15)
NEUTROPHILS # BLD AUTO: 2.3 K/UL (ref 1.8–7.7)
NEUTROPHILS NFR BLD: 49.8 % (ref 38–73)
NRBC BLD-RTO: 0 /100 WBC
PLATELET # BLD AUTO: 270 K/UL (ref 150–450)
PMV BLD AUTO: 9.6 FL (ref 9.2–12.9)
POTASSIUM SERPL-SCNC: 4.4 MMOL/L (ref 3.5–5.1)
PROT SERPL-MCNC: 7.1 G/DL (ref 6–8.4)
RBC # BLD AUTO: 4.66 M/UL (ref 4–5.4)
SARS-COV-2 IGG SERPL IA-ACNC: 771.3 AU/ML
SARS-COV-2 IGG SERPL QL IA: POSITIVE
SODIUM SERPL-SCNC: 139 MMOL/L (ref 136–145)
T4 FREE SERPL-MCNC: 0.82 NG/DL (ref 0.71–1.51)
TSH SERPL DL<=0.005 MIU/L-ACNC: 0.86 UIU/ML (ref 0.4–4)
VIT B12 SERPL-MCNC: 622 PG/ML (ref 210–950)
WBC # BLD AUTO: 4.64 K/UL (ref 3.9–12.7)

## 2024-06-21 PROCEDURE — 84439 ASSAY OF FREE THYROXINE: CPT | Performed by: FAMILY MEDICINE

## 2024-06-21 PROCEDURE — 86769 SARS-COV-2 COVID-19 ANTIBODY: CPT | Performed by: FAMILY MEDICINE

## 2024-06-21 PROCEDURE — 84443 ASSAY THYROID STIM HORMONE: CPT | Performed by: FAMILY MEDICINE

## 2024-06-21 PROCEDURE — 83921 ORGANIC ACID SINGLE QUANT: CPT | Performed by: FAMILY MEDICINE

## 2024-06-21 PROCEDURE — 85025 COMPLETE CBC W/AUTO DIFF WBC: CPT | Performed by: FAMILY MEDICINE

## 2024-06-21 PROCEDURE — 82607 VITAMIN B-12: CPT | Performed by: FAMILY MEDICINE

## 2024-06-21 PROCEDURE — 85652 RBC SED RATE AUTOMATED: CPT | Performed by: FAMILY MEDICINE

## 2024-06-21 PROCEDURE — 86140 C-REACTIVE PROTEIN: CPT | Performed by: FAMILY MEDICINE

## 2024-06-21 PROCEDURE — 36415 COLL VENOUS BLD VENIPUNCTURE: CPT | Mod: PN | Performed by: FAMILY MEDICINE

## 2024-06-21 PROCEDURE — 80053 COMPREHEN METABOLIC PANEL: CPT | Performed by: FAMILY MEDICINE

## 2024-06-24 ENCOUNTER — CLINICAL SUPPORT (OUTPATIENT)
Dept: FAMILY MEDICINE | Facility: CLINIC | Age: 84
End: 2024-06-24
Payer: MEDICARE

## 2024-06-24 DIAGNOSIS — E53.8 B12 DEFICIENCY: Primary | ICD-10-CM

## 2024-06-24 PROCEDURE — 99999PBSHW PR PBB SHADOW TECHNICAL ONLY FILED TO HB: Mod: PBBFAC,,,

## 2024-06-24 PROCEDURE — 96372 THER/PROPH/DIAG INJ SC/IM: CPT | Mod: PBBFAC,PN

## 2024-06-24 RX ADMIN — CYANOCOBALAMIN 1000 MCG: 1000 INJECTION, SOLUTION INTRAMUSCULAR at 09:06

## 2024-06-25 LAB — METHYLMALONATE SERPL-SCNC: 0.31 UMOL/L

## 2024-07-08 ENCOUNTER — PATIENT MESSAGE (OUTPATIENT)
Dept: PHYSICAL MEDICINE AND REHAB | Facility: CLINIC | Age: 84
End: 2024-07-08
Payer: MEDICARE

## 2024-07-10 ENCOUNTER — TELEPHONE (OUTPATIENT)
Dept: PHYSICAL MEDICINE AND REHAB | Facility: CLINIC | Age: 84
End: 2024-07-10
Payer: MEDICARE

## 2024-07-10 NOTE — TELEPHONE ENCOUNTER
Called and spoke with patient in regards to PT. Patient is wanting a PT order so that she can go a few times a week. I let the patient know that Dr. Santacruz is out and that I will forward the message to his nurse and we will get an order put in as soon as possible. She verbalized understanding and was very appreciative.       ----- Message from Elaine Nelson sent at 7/10/2024 11:16 AM CDT -----  Contact: daughter/Lou  Type:  Patient Requesting Referral    Who Called:  kourtney/Lou  Does the patient already have the specialty appointment scheduled?:  no     Referral to What Specialty:  pt  Reason for Referral:  pain  Does the patient want the referral with a specific physician?:  no  [facility]  Patient Requesting a Call Back?:  [email or web address]  Best Call Back Number:  [phone]  Additional Information:   daughter states pain injection is not working, now we are looking for the next step, we have heard back and spine is the best way to start. Please call daughter/or message the portal cell # [phone]

## 2024-07-15 DIAGNOSIS — M46.1 SACROILIITIS, NOT ELSEWHERE CLASSIFIED: Primary | ICD-10-CM

## 2024-07-15 NOTE — TELEPHONE ENCOUNTER
Patient would like to attend PT and wants to referred. I can put the order in if you would like or we can schedule her a follow up appt for more detail and evaluation. Please advise.       Janice Gomez LPN

## 2024-07-15 NOTE — PROGRESS NOTES
Patient would like to attempt PT for her chronic SI joint and low back pain. Order placed for healthy back.

## 2024-07-22 ENCOUNTER — CLINICAL SUPPORT (OUTPATIENT)
Dept: REHABILITATION | Facility: HOSPITAL | Age: 84
End: 2024-07-22
Attending: STUDENT IN AN ORGANIZED HEALTH CARE EDUCATION/TRAINING PROGRAM
Payer: MEDICARE

## 2024-07-22 DIAGNOSIS — M46.1 SACROILIITIS, NOT ELSEWHERE CLASSIFIED: ICD-10-CM

## 2024-07-22 DIAGNOSIS — M25.69 DECREASED ROM OF TRUNK AND BACK: ICD-10-CM

## 2024-07-22 DIAGNOSIS — M54.50 CHRONIC BILATERAL LOW BACK PAIN WITHOUT SCIATICA: Primary | ICD-10-CM

## 2024-07-22 DIAGNOSIS — R29.898 DECREASED STRENGTH OF TRUNK AND BACK: ICD-10-CM

## 2024-07-22 DIAGNOSIS — G89.29 CHRONIC BILATERAL LOW BACK PAIN WITHOUT SCIATICA: Primary | ICD-10-CM

## 2024-07-22 PROCEDURE — 97530 THERAPEUTIC ACTIVITIES: CPT | Mod: PO | Performed by: PHYSICAL THERAPIST

## 2024-07-22 PROCEDURE — 97110 THERAPEUTIC EXERCISES: CPT | Mod: PO | Performed by: PHYSICAL THERAPIST

## 2024-07-22 PROCEDURE — 97162 PT EVAL MOD COMPLEX 30 MIN: CPT | Mod: PO | Performed by: PHYSICAL THERAPIST

## 2024-07-22 NOTE — PLAN OF CARE
[facility] - [facility]ALTHY BACK  Physical Therapy Lumbar Evaluation      Name: Amarilis Decker  Clinic Number: [id number]    Therapy Diagnosis:   Encounter Diagnoses   Name Primary?    Sacroiliitis, not elsewhere classified     Chronic bilateral low back pain without sciatica Yes    Decreased ROM of trunk and back     Decreased strength of trunk and back      Physician: Maynor Santacruz*    Physician Orders: PT Eval and Treat- Healthy Back  Medical Diagnosis from Referral: sacroiliitis  Evaluation Date: 7/22/2024  Authorization Period Expiration: 7/15/25  Plan of Care Expiration: 10/1/2024  Reassessment Due: 8/23/2024  Visit # / Visits authorized: 1/1  MedX testing visit 2    Time In: 2:00PM  Time Out: 3:00PM  Total Billable Time: 60 minutes  INSURANCE and OUTCOMES: Fee for Service with FOTO Outcomes 1/3    Precautions: standard    Pattern of pain determined: Pattern 1 PEN    Subjective     Date of onset: worse in last 3-4 months  History of current condition: Tish reports she has had increased pain in last few months, that tends to move around. Sometimes she has low back pain, bilateral lateral hip pain, anterior hip pain and aching in anterior thighs. She can feel exhausted, fatigued by end of day. She feels it may be related to her fibromyalgia. She also reports some difficulty with balance. She fell in vegetable garden recently when she put foot up on ledge and leaned over. She stopped playing tennis at 74 yo after falling on steps. She just doesn't feel as secure on tennis court. She did have injection of right SI joint which helped for a few weeks before pain returned.     Medical History:   Past Medical History:   Diagnosis Date    Asthma     GERD (gastroesophageal reflux disease)     Psoriasis        Surgical History:   Amarilis Decker  has a past surgical history that includes Tympanostomy tube placement; Tonsillectomy; Adenoidectomy; Sinus surgery; Cholecystectomy; Thyroid  surgery; Colonoscopy (2019); DEXA; Sinus surgery (01/2017); and Upper gastrointestinal endoscopy.    Medications:   Amarilis has a current medication list which includes the following prescription(s): azelastine, betamethasone dipropionate, cholecalciferol (vitamin d3), citalopram, clobetasol 0.05%, desonide 0.05%, desoximetasone, fluticasone propionate, hydrocodone-acetaminophen, ketoconazole, ketoconazole, levalbuterol, levocetirizine, linaclotide, lubiprostone, magnesium, meloxicam, methylprednisolone, mupirocin, thyroid (pork), and [DISCONTINUED] enbrel sureclick, and the following Facility-Administered Medications: cyanocobalamin.    Allergies:   Review of patient's allergies indicates:   Allergen Reactions    Avelox [moxifloxacin] Swelling    Bactrim [sulfamethoxazole-trimethoprim] Swelling    Ciprofloxacin Swelling and Hives    Isothiazolinones Dermatitis     Strong contact dermatitis    Apremilast Other (See Comments)     Muscle cramps, cough    Bacitracin      Other reaction(s): unknown        Imaging: MRI  lumbar 5/24/22:  1. Multilevel degenerative change of the lumbar spine, slightly more pronounced than at the time of the prior study.  A previously noted extruded free disc fragment versus descending left paracentral disc extrusion at L2 has resolved in the interim since the prior study.  2. Broad disc bulge at L2-L3 which extends into the left neural foramen resulting in contact upon the exited left L2 nerve in the left extraforaminal soft tissues.  Please correlate clinically for symptoms referable to the left L2 nerve.  There is an associated left extraforaminal annular fissure present at L2-L3.  3. Broad left foraminal/extraforaminal disc protrusion at L3-L4 which abuts the exited left L3 nerve in the left extraforaminal soft tissues.  Please correlate clinically for symptoms referable to the left L3 nerve.  4. Unchanged 5 mm intraspinal synovial cyst within the proximal right L3-L4 neural foramen  which exerts mass effect upon the exiting right L3 nerve.  Please correlate clinically for symptoms referable to the right L3 nerve.  5. 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  6. Remote L2 superior endplate compression fracture resulting in 40% maximal height loss.  7. Grade I retrolisthesis of L2 on L3 and L3 on L4.    Xray lumbar 5/24/22:  The bones are osteopenic. There is a chronic/remote superior endplate compression fracture present at L2 with approximately 40% maximal height loss noted. No new/acute lumbar compression fracture appreciated on today's study. There is a fixed grade I retrolisthesis of L2 on L3 and L3 on L4. no translational instability at any lumbar level. There is multilevel degenerative facet arthropathy present in the lumbar spine, most pronounced at L4-L5 and L5-S1. No pars defects. There is a 10° levoscoliotic curvature of the thoracolumbar spine. There are postoperative changes of prior cholecystectomy. There are probable calcified splenic granulomas present.    Xray hip 5/24/22:  There is mild degenerative arthrosis of both hips with no interval progression since the prior study. There is mild degenerative change at the symphysis pubis which is also unchanged from the prior study. There is chronic degenerative arthrosis of the sacroiliac joints with joint space narrowing and periarticular sclerosis. No erosive changes are present. No fracture or subluxation are evident. There has been no significant change.    MRI SI joints: 11/17/23  There are small focal areas of increased T2/stir signal and decreased T1 signal sacroiliac joints involving the sacral and iliac side of the left SI joint.  To lesser degree also the right SI joint sacral side more apparent than iliac side.  Finding most apparent on the left with the area of increased T2/stir signal measures approximate 1.5 cm transversely.  Mild enhancement.  No widening or ankylosis or erosions.  Impression:  Findings consistent with  sacroiliitis    Xray SI joints: 11/17/23  Mild sclerosis of the iliac side of the SI joints bilaterally slightly more so on the left.  Similar distribution although slightly increased compared to the prior exam.  No erosions or widening.       Prior Therapy: yes 2 years ago  Prior Treatment: injection right SI joint,injections for sciatica  Social History:   lives with their spouse, 1 step in and out, have second home in [place] with flight of stairs  Occupation: retired  Leisure: work in greenhouse      Prior Level of Function: active, gardening, up/down stairs, independent  Current Level of Function: feels unsteady with gait, pain can limit activity  DME owned/used: no  Gym Membership: no    Pain:  Current 0/10, worst 7/10, best 0/10   Location: low back, hips, groin, anterior thighs   Description: Burning  Aggravating Factors: Standing and Bending  Easing Factors: rest  Disturbed Sleep: yes    Pattern of pain questions:  1.  Where is your pain the worst? Groin pain, left hip/buttock  2.  Is your pain constant or intermittent? intermittent  3.  Does bending forward make your typical pain worse? yes  4.  Since the start of your back pain, has there been a change in your bowel or bladder? no  5.  What can't you do now that you use to be able to do? Pain limits activity duration    Pts goals: no pain    Red Flag Screening:   Cough/Sneeze Strain: (--)  Bladder/Bowel: (--)  Falls: (+) 3 days ago in [place]  Night pain: (--)  Unexplained weight loss: (--)  General health: hypothyroidism    Objective      Postural examination/scapula alignment: Rounded shoulder, Head forward, and Decreased lordosis  Joint integrity: decreased anterior glide thoracic and lumbar, decreased hip joint mobility bilaterally  Skin integrity:WNL   Edema: None  Correction of posture: better with lumbar roll  Sitting: flexed  Standing: as above    MOVEMENT LOSS - Lumbar   Norms ROM Loss Initial   Flexion Fingers touch toes, sacral angle  >/= 70 deg, uniform spinal curvature, posterior weight shift  moderate loss   Extension ASIS surpasses toes, spine of scapulae surpasses heels, uniform spinal curve major loss   Side glide Right  moderate loss   Side glide Left  moderate loss   Rotation Right PT observes contralateral shoulder moderate loss   Rotation Left PT observes contralateral shoulder moderate loss     Lower Extremity Strength  Right LE  Left LE    Hip flexion: 4-/5 Hip flexion: 4-/5   Hip extension:  4/5 Hip extension: 4/5   Hip abduction: 4/5 Hip abduction: 4/5   Hip adduction:  5/5 Hip adduction:  5/5   Hip External Rotation 4+/5 Hip External Rotation 4+/5   Hip Internal rotation   4/5 Hip Internal rotation 4/5   Knee Flexion 5/5 Knee Flexion 5/5   Knee Extension 4+/5 Knee Extension 4+/5   Ankle dorsiflexion: 4+/5 Ankle dorsiflexion: 4+/5   Ankle plantarflexion: 5/5 Ankle plantarflexion: 5/5     GAIT:  Assistive Device used: none  Level of Assistance: independent  Patient displays the following gait deviations: no gait deviations observed.     Special Tests:   Test Name  Test Result   Prone Instability Test (--)   SI Joint Provocation Test (+)   Straight Leg Raise (--)   Neural Tension Test (--)   Crossed Straight Leg Raise (--)   Walking on toes Able   Walking on heels  Able     NEUROLOGICAL SCREENING:     Sensory deficits: no    Reflexes:    Left Right   Patella Tendon 2+ 2+   Achilles Tendon 2+ 2+   Babinski  (--) (--)   Clonus (--) (--)     REPEATED TEST MOVEMENTS:    Baseline symptoms:  Repeated Flexion in Standing no effect   Repeated Extension in Standing worse   Repeated Flexion in lying no effect   Repeated Extension in lying  unable       STATIC TESTS and other movements:   Prone lie better   Prone lie on elbows better   Sitting slouched  worse   Sitting erect better   Standing slouched no effect   Standing erect  better   Lying prone in extension  unable   Long sitting   worse   Sustained flexion no effect     Lumbar testing  Visit 2    OUTCOMES SELECTION:   Intake Outcome Measure for FOTO lumbar Survey    Therapist reviewed FOTO scores for Amarilis Decker on 7/22/2024.   FOTO documents entered into [employer] - see Media section.    Intake Score: XX% functional limitation  Goal Score: XX% functional limitation          Treatment     Total Treatment time separate from Evaluation: 40 minutes    Amarilis received therapeutic exercises to develop/improve posture, lumbar ROM, strength, and muscular endurance for 15 minutes including the following exercises:     Bilateral KTC 10x 5 sec  LTR x10  Posterior pelvic tilt x20  Slouch/overcorrect x10    To add:  Piriformis stretch  Hip flexor stretch  Balance activities    Written Home Exercises Provided: yes.    HEP AS FOLLOWS:  Bilateral KTC  LTR  PPT  Slouch/overcorrect    Exercises were reviewed and Tish was able to demonstrate them prior to the end of the session. Tish demonstrated good  understanding of the education provided.     See EMR under Patient Instructions for exercises provided 7/22/2024.    MedX Testing:  MedX testing to be performed next visit          7/22/2024     4:09 PM   [facility]   Visit Number 1   VAS Pain Rating 0   Lumbar Stretches - Slouch Overcorrection 10   Flexion in Lying 10         Therapeutic Education/Activity provided for 25 minutes:   - Patient was given an [facility] Visit 1 handout which discusses the following:  - what to expect in therapy  - an overview of the program, including health coaching and wellness  - importance of spinal hygiene, proper posture, lifting mechanics, sleep quality, and nutrition/hydration   - Rossi roll trialed, recommended, and purchase information was provided.  - Patient received a handout regarding anticipated muscular soreness following the isometric test and strategies for management were reviewed with patient including stretching, using ice and scheduled rest.   - Patient received verbal education on the  following:   - Healthy Back program   - purpose of the isometric test  - safe progression of lumbar strengthening, wellness approach, and systemic strengthening.   - safe usage of MedX machine and testing protocols.    Tish received cold pack for 00 minutes to low back in [place].    Assessment     Amarilis is a 83 y.o. female referred to [facility] with a medical diagnosis of sacroiliitis. Pt presents with back, hip and anterior thigh pain, decreased balance, limited lumbar and hip mobility and decreased strength of back, core and lower extremities. She is a good candidate for healthy back program, but if pt would like to change to traditional PT, she will be given that option.An active physical therapy program is recommended with the goal to restore function and reduce pain. A program of progressive resisted exercise, stretching, instruction in proper body mechanics, aerobic conditioning and a HEP will be included. Balance activities will be included.      Pain Pattern: Pattern 1 PEN       Pt prognosis is Excellent.     Pt will benefit from skilled outpatient Physical Therapy to address the deficits stated above and in the chart below, to provide pt/family education, and to maximize pt's level of independence. Based on the above history and physical examination an active physical therapy program is recommended.      Plan of care discussed with patient: Yes  Pt's spiritual, cultural and educational needs considered and patient is agreeable to the plan of care and goals as stated below:     Anticipated Barriers for therapy: none    PT Evaluation Completed? Yes    Medical necessity is demonstrated by the following problem list:    History  Co-morbidities and personal factors that may impact the plan of care [] LOW: no personal factors / co-morbidities  [x] MODERATE: 1-2 personal factors / co-morbidities  [] HIGH: 3+ personal factors / co-morbidities    Moderate / High Support Documentation:   Co-morbidities  affecting plan of care: fibromyalgia, GERD, psoriasis    Personal Factors:   no deficits     Examination  Body Structures and Functions, activity limitations and participation restrictions that may impact the plan of care [] LOW: addressing 1-2 elements  [x] MODERATE: 3+ elements  [] HIGH: 4+ elements (please support below)    Moderate / High Support Documentation:     Clinical Presentation [] LOW: stable  [x] MODERATE: Evolving  [] HIGH: Unstable     Decision Making/ Complexity Score: moderate         GOALS: Pt is in agreement with the following goals.    Short term goals:  6 weeks or 10 visits   - Pt will demonstrate increased lumbar MedX ROM by at least 3 degrees from the initial ROM value with improvements noted in functional ROM and ability to perform ADLs. Appropriate and Ongoing  - Pt will demonstrate increased MedX average isometric strength value by 20% from initial test resulting in improved ability to perform bending, lifting, and carrying activities safely, confidently. Appropriate and Ongoing  - Pt will report a reduction in worst pain score by 1-2 points for improved tolerance for ADLs. Appropriate and Ongoing  - Pt able to perform HEP correctly with minimal cueing or supervision from therapist to encourage independent management of symptoms. Appropriate and Ongoing    Long term goals: 10 weeks or 20 visits   - Pt will demonstrate increased lumbar MedX ROM by at least 6 degrees from initial ROM value, resulting in improved ability to perform functional forward bending while standing and sitting. Appropriate and Ongoing  - Pt will demonstrate increased MedX average isometric strength value by 30% from initial test resulting in improved ability to perform bending, lifting, and carrying activities safely and confidently. Appropriate and Ongoing  - Pt to demonstrate ability to independently control and reduce their pain through posture positioning and mechanical movements throughout a typical day.  Appropriate and Ongoing  - Pt will demonstrate reduced pain and improved functional outcomes as reported on the FOTO by reaching an intake score of >/= XX% functional ability in order to demonstrate subjective improvement in patient's condition. . Appropriate and Ongoing  - Pt will demonstrate independence with the HEP at discharge. Appropriate and Ongoing  - Pt will be able to resolve pain(patient goal) Appropriate and Ongoing    Plan     Outpatient physical therapy 2x week for 10 weeks or 20 visits to include the following:   - Patient education  - Therapeutic exercise  - Manual therapy  - Performance testing   - Neuromuscular Re-education  - Therapeutic activity   - Modalities    Pt may be seen by PTA as part of the rehabilitation team.     Therapist: Viktoria Chávez, PT  7/23/2024

## 2024-07-23 PROBLEM — M25.69 DECREASED ROM OF TRUNK AND BACK: Status: ACTIVE | Noted: 2024-07-23

## 2024-07-23 PROBLEM — G89.29 CHRONIC BILATERAL LOW BACK PAIN WITHOUT SCIATICA: Status: ACTIVE | Noted: 2024-07-23

## 2024-07-23 PROBLEM — R29.898 DECREASED STRENGTH OF TRUNK AND BACK: Status: ACTIVE | Noted: 2024-07-23

## 2024-07-23 PROBLEM — M54.50 CHRONIC BILATERAL LOW BACK PAIN WITHOUT SCIATICA: Status: ACTIVE | Noted: 2024-07-23

## 2024-07-25 ENCOUNTER — CLINICAL SUPPORT (OUTPATIENT)
Dept: FAMILY MEDICINE | Facility: CLINIC | Age: 84
End: 2024-07-25
Payer: MEDICARE

## 2024-07-25 DIAGNOSIS — E53.8 B12 DEFICIENCY: Primary | ICD-10-CM

## 2024-07-25 PROCEDURE — 99999PBSHW PR PBB SHADOW TECHNICAL ONLY FILED TO HB: Mod: PBBFAC,,,

## 2024-07-25 PROCEDURE — 96372 THER/PROPH/DIAG INJ SC/IM: CPT | Mod: PBBFAC,PN

## 2024-07-25 PROCEDURE — 99211 OFF/OP EST MAY X REQ PHY/QHP: CPT | Mod: S$PBB,,, | Performed by: FAMILY MEDICINE

## 2024-07-25 RX ADMIN — CYANOCOBALAMIN 1000 MCG: 1000 INJECTION, SOLUTION INTRAMUSCULAR at 08:07

## 2024-07-25 NOTE — PROGRESS NOTES
Pt here for cyanocabalamin injection(see MAR). Confirmed name and . Cyanocabalamin 1000mcg given right glut. Pt tolerated well. Confirmed next dose and appt.

## 2024-07-26 ENCOUNTER — CLINICAL SUPPORT (OUTPATIENT)
Dept: REHABILITATION | Facility: HOSPITAL | Age: 84
End: 2024-07-26
Attending: STUDENT IN AN ORGANIZED HEALTH CARE EDUCATION/TRAINING PROGRAM
Payer: MEDICARE

## 2024-07-26 DIAGNOSIS — M25.69 DECREASED ROM OF TRUNK AND BACK: Primary | ICD-10-CM

## 2024-07-26 DIAGNOSIS — R29.898 DECREASED STRENGTH OF TRUNK AND BACK: ICD-10-CM

## 2024-07-26 PROCEDURE — 97112 NEUROMUSCULAR REEDUCATION: CPT | Mod: PO | Performed by: PHYSICAL THERAPIST

## 2024-07-26 PROCEDURE — 97110 THERAPEUTIC EXERCISES: CPT | Mod: PO | Performed by: PHYSICAL THERAPIST

## 2024-07-26 NOTE — PROGRESS NOTES
[facility]  Physical Therapy Treatment Note     Name: Amarilis Decker  Clinic Number: [id number]    Therapy Diagnosis:   Encounter Diagnoses   Name Primary?    Decreased ROM of trunk and back Yes    Decreased strength of trunk and back      Physician: Maynor Santacruz*    Visit Date: 7/26/2024    Physician Orders: PT Eval and Treat- Healthy Back  Medical Diagnosis from Referral: sacroiliitis  Evaluation Date: 7/22/2024  Authorization Period Expiration: 12/31/24  Plan of Care Expiration: 10/1/2024  Reassessment Due: 8/23/2024  Visit # / Visits authorized: 1/30 (+1 prior auth)  MedX testing visit 2    PTA Visit #: 0/5     Time In: 9:45AM  Time Out: 11:05AM  Total Billable Time: 70 minutes  INSURANCE and OUTCOMES: Fee for Service with FOTO Outcomes 1/3    Precautions: standard    Pattern of pain determined:  Pattern 1 PEN    Subjective     Tish Decker reports she feels her pain is fibromyalgia related. She has done stretches 1x/day. She is most concerned about her balance..      Patient reports tolerating previous visit without adverse effect  Patient reports their pain to be 2/10 on a 0-10 scale with 0 being no pain and 10 being the worst pain imaginable.  Pain Location: low back, hips, groin, anterior thighs     Work and leisure: Occupation: retired  Leisure: work in [employer]   Pt goals: no pain    Objective      Lumbar  Isometric Testing on Med X equipment: Testing administered by PT    Test Initial Baseline Midpoint Final   Date 7/26/24     ROM 6-39 deg     Max Peak Torque 65      Min Peak Torque 13      Flex/Ext Ratio 5:1     % variance  normative data -51%     % change from initial test N/A visit 1           Outcomes:  Intake Score: 34%  Visit 6 Score:   Visit 10 Score:   Discharge Score:  Goal Score: 28%     Treatment     Tish received the treatments listed below:      Medical MedX Treatment as follows:  MedX testing performed day 2: Patient  received  neuromuscular education to engage spinal musculature correctly for motor control and engagement of musculature for 15 minutes including the MedX exercise component and practice and standard testing. MedX dynamic exercise and baseline isometric test performed with instructions to guide the patient safely through the testing procedure. Patient instructed to perform isometric test correctly and safely while building to an optimal force with a pain-free effort. Patient also instructed that they should feel support/pressure from MedX restraints but no pain/discomfort, and encouraged to report any pain to therapist. Patient demonstrated appropriate understanding of information and tolerance of test.  Education regarding purpose of test, safety during test given, and reviewed possible more soreness and strategies.         Tish participated in neuromuscular re-education activities to improve balance, coordination, proprioception, motor control and/or posture for 08 minutes. The following activities were included:  Posterior pelvic tilt x20  Slouch/overcorrect x10       Tish participated in therapeutic exercises to develop strength, endurance, ROM, flexibility, posture, and core stabilization for 47 minutes including:     Bilateral KTC 10x 5 sec  LTR x10  Piriformis stretch 3x 30 sec ea  Hip flexor stretch 3x 30 sec ea    To add:  Balance activities  Hip adductor stretch    Peripheral muscle strengthening which included one set of 15-20 repetitions at a slow and controlled 10-13 second per rep pace focused on strengthening supporting musculature in order to improve body mechanics and functional mobility. Patient and therapist focused on proper form during treatment to ensure optimal strengthening of each targeted muscle group.  Machines utilized included:Torso rotation, Hip Abd, Hip Add, and Leg Press  To be added next visit:Leg Ext, Leg Curl, Chest Press, Rowing, Triceps, and Biceps        7/26/2024    11:19 AM    [facility]   Visit Number 2   VAS Pain Rating 2   Treadmill Time (in min.) 10 min   Speed 1.5 mph   Lumbar Stretches - Slouch Overcorrection 10   Flexion in Lying 10   Lumbar Extension Seat Pad 1   Femur Restraint 5   Top Dead Center 24   Counterweight 141   Lumbar Flexion 39   Lumbar Extension 6   Lumbar Peak Torque 65 ft. lbs.   Min Torque 13   Test Percent Below Normative Data 51 %   Ice - Z Lie (in min.) 10       Tish participated in dynamic functional therapeutic activities to improve functional performance and simulate household and community activities for 00  minutes. The following activities were included:      Tish received manual therapy techniques for 00  minutes. The following activities were included:      Pt given cold pack for 10 minutes to low back in Z-lie.    Patient Education and Home Exercises     Home exercises include:  Bilateral KTC  LTR  PPT  Slouch/overcorrect  Cardio program (V5): -  Lifting education (V11): -  Posture/Lumbar roll: instruct visit 1  Fridge Magnet Discharge handout (date given): -  Equipment at home/gym membership: no    Education provided:   - PT role and POC  - HEP  - instruct in importance of posture, daily stretching and contributing factors to balance difficulty    Written Home Exercises Provided: yes.  Exercises were reviewed and Tish was able to demonstrate them prior to the end of the session.  Tish demonstrated good  understanding of the education provided.     See EMR under Patient Instructions for exercises provided 7/26/2024.    Assessment     Tish presents to second healthy back visit reporting minimal discomfort, was able to demo HEP with Mod VC for form. Pt was able to tolerate Medical MedX machine well as follows:  MedX testing performed and patient tolerated test well. Her lumbar strength is 51% below normative data with isometric testing on MEDX. Pt was also able to complete half of the peripheral strengthening exercises without  increased discomfort and will complete the complete circuit next visit as tolerated.    Patient is making good progress towards established goals.  Pt will continue to benefit from skilled outpatient physical therapy to address the deficits stated in the impairment chart, provide pt/family education and to maximize pt's level of independence in the home and community environment.     Anticipated Barriers for therapy: none  Pt's spiritual, cultural and educational needs considered and pt agreeable to plan of care and goals as stated below:     Goals:   Short term goals:  6 weeks or 10 visits   - Pt will demonstrate increased lumbar MedX ROM by at least 3 degrees from the initial ROM value with improvements noted in functional ROM and ability to perform ADLs. Appropriate and Ongoing  - Pt will demonstrate increased MedX average isometric strength value by 20% from initial test resulting in improved ability to perform bending, lifting, and carrying activities safely, confidently. Appropriate and Ongoing  - Pt will report a reduction in worst pain score by 1-2 points for improved tolerance for ADLs. Appropriate and Ongoing  - Pt able to perform HEP correctly with minimal cueing or supervision from therapist to encourage independent management of symptoms. Appropriate and Ongoing     Long term goals: 10 weeks or 20 visits   - Pt will demonstrate increased lumbar MedX ROM by at least 6 degrees from initial ROM value, resulting in improved ability to perform functional forward bending while standing and sitting. Appropriate and Ongoing  - Pt will demonstrate increased MedX average isometric strength value by 30% from initial test resulting in improved ability to perform bending, lifting, and carrying activities safely and confidently. Appropriate and Ongoing  - Pt to demonstrate ability to independently control and reduce their pain through posture positioning and mechanical movements throughout a typical day. Appropriate  and Ongoing  - Pt will demonstrate reduced pain and improved functional outcomes as reported on the FOTO by reaching an intake score of >/= 28% functional ability in order to demonstrate subjective improvement in patient's condition. . Appropriate and Ongoing  - Pt will demonstrate independence with the HEP at discharge. Appropriate and Ongoing  - Pt will be able to resolve pain(patient goal) Appropriate and Ongoing       Plan     Continue with established Plan of Care towards established PT goals.     Therapist: Viktoria Chávez, PT  7/26/2024

## 2024-07-31 ENCOUNTER — TELEPHONE (OUTPATIENT)
Dept: FAMILY MEDICINE | Facility: CLINIC | Age: 84
End: 2024-07-31
Payer: MEDICARE

## 2024-07-31 DIAGNOSIS — U07.1 COVID: Primary | ICD-10-CM

## 2024-07-31 RX ORDER — NIRMATRELVIR AND RITONAVIR 300-100 MG
KIT ORAL
Qty: 30 TABLET | Refills: 0 | Status: SHIPPED | OUTPATIENT
Start: 2024-07-31

## 2024-07-31 RX ORDER — NIRMATRELVIR AND RITONAVIR 300-100 MG
KIT ORAL
Qty: 30 TABLET | Refills: 0 | Status: SHIPPED | OUTPATIENT
Start: 2024-07-31 | End: 2024-07-31 | Stop reason: SDUPTHER

## 2024-07-31 NOTE — TELEPHONE ENCOUNTER
----- Message from Leida Kaur sent at 7/31/2024  2:29 PM CDT -----  Regarding: advise  Contact: patient son  Type: Needs Medical Advice  Who Called:  patient son   Symptoms (please be specific):  covid (+)   How long has patient had these symptoms:    Pharmacy name and phone #:    [facility]   #: [phone]    Best Call Back Number: [phone]  Additional Information: seeking a prescription for the following call back paxlovid

## 2024-07-31 NOTE — TELEPHONE ENCOUNTER
Pt called to see if Dr. Teixeira could send the paxlovid from [facility] to [facility]. Told pt he could call [facility] and they get the prescription from [facility]. Pt said thanks and hung up.

## 2024-08-03 ENCOUNTER — PATIENT MESSAGE (OUTPATIENT)
Dept: OTOLARYNGOLOGY | Facility: CLINIC | Age: 84
End: 2024-08-03
Payer: MEDICARE

## 2024-08-03 DIAGNOSIS — L40.9 PSORIASIS: ICD-10-CM

## 2024-08-03 RX ORDER — BETAMETHASONE DIPROPIONATE 0.5 MG/G
CREAM TOPICAL DAILY
Qty: 90 G | Refills: 5 | Status: SHIPPED | OUTPATIENT
Start: 2024-08-03 | End: 2025-08-03

## 2024-08-06 ENCOUNTER — CLINICAL SUPPORT (OUTPATIENT)
Dept: REHABILITATION | Facility: HOSPITAL | Age: 84
End: 2024-08-06
Payer: MEDICARE

## 2024-08-06 DIAGNOSIS — R29.898 DECREASED STRENGTH OF TRUNK AND BACK: ICD-10-CM

## 2024-08-06 DIAGNOSIS — M25.69 DECREASED ROM OF TRUNK AND BACK: Primary | ICD-10-CM

## 2024-08-06 PROCEDURE — 97110 THERAPEUTIC EXERCISES: CPT | Mod: PO,CQ

## 2024-08-06 PROCEDURE — 97112 NEUROMUSCULAR REEDUCATION: CPT | Mod: PO,CQ

## 2024-08-12 ENCOUNTER — CLINICAL SUPPORT (OUTPATIENT)
Dept: REHABILITATION | Facility: HOSPITAL | Age: 84
End: 2024-08-12
Payer: MEDICARE

## 2024-08-12 DIAGNOSIS — R29.898 DECREASED STRENGTH OF TRUNK AND BACK: ICD-10-CM

## 2024-08-12 DIAGNOSIS — M25.69 DECREASED ROM OF TRUNK AND BACK: Primary | ICD-10-CM

## 2024-08-12 PROCEDURE — 97112 NEUROMUSCULAR REEDUCATION: CPT | Mod: PO,CQ

## 2024-08-12 NOTE — PROGRESS NOTES
[facility]  Physical Therapy Treatment Note     Name: Amarilis Decker  Clinic Number: [id number]    Therapy Diagnosis:   Encounter Diagnoses   Name Primary?    Decreased ROM of trunk and back Yes    Decreased strength of trunk and back          Physician: Maynor Santacruz*    Visit Date: 8/12/2024    Physician Orders: PT Eval and Treat- Healthy Back  Medical Diagnosis from Referral: sacroiliitis  Evaluation Date: 7/22/2024  Authorization Period Expiration: 12/31/24  Plan of Care Expiration: 10/1/2024  Reassessment Due: 8/23/2024  Visit # / Visits authorized:3/30 (+1 prior auth)  MedX testing visit 2    PTA Visit #: 2/5      Time In: 2:00 PM  Time Out: 3:10 PM  Total Billable Time: 55 minutes  INSURANCE and OUTCOMES: Fee for Service with FOTO Outcomes 1/3    Precautions: standard    Pattern of pain determined:  Pattern 1 PEN    Subjective     Tish Decker reports was in pain post last session.     Patient reports tolerating previous visit without adverse effect  Patient reports their pain to be 2/10 on a 0-10 scale with 0 being no pain and 10 being the worst pain imaginable.  Pain Location: low back, hips, groin, anterior thighs     Work and leisure: Occupation: retired  Leisure: work in greenhouse   Pt goals: no pain    Objective      Lumbar  Isometric Testing on Med X equipment: Testing administered by PT    Test Initial Baseline Midpoint Final   Date 7/26/24     ROM 6-39 deg     Max Peak Torque 65      Min Peak Torque 13      Flex/Ext Ratio 5:1     % variance  normative data -51%     % change from initial test N/A visit 1           Outcomes:  Intake Score: 34%  Visit 6 Score:   Visit 10 Score:   Discharge Score:  Goal Score: 28%     Treatment     Tish received the treatments listed below:      Medical MedX Treatment as follows:  MedX testing performed day 2: Patient  received neuromuscular education to engage spinal musculature correctly for motor control  and engagement of musculature for 00 minutes including the MedX exercise component and practice and standard testing. MedX dynamic exercise and baseline isometric test performed with instructions to guide the patient safely through the testing procedure. Patient instructed to perform isometric test correctly and safely while building to an optimal force with a pain-free effort. Patient also instructed that they should feel support/pressure from MedX restraints but no pain/discomfort, and encouraged to report any pain to therapist. Patient demonstrated appropriate understanding of information and tolerance of test.  Education regarding purpose of test, safety during test given, and reviewed possible more soreness and strategies.         MedX exercise started day 2:  Patient received neuromuscular education for 15 minutes via participation on the Medical MedX Machine. Therapist assisted patient in isolating and engaging spinal stabilization musculature in order to improve functional ability and postural control. Patient performed exercise with therapist guidance in order to accurately use pacer function, avoid valsalva, and optimally exert effort within a safe and effective range via the Jenn Exertion Rating Scale. Patient instructed to perform at a midrange of exertion and to complete 15-20 repetitions within appropriate split time, with proper technique, and while maintaining safety.             8/12/2024     9:10 AM   [facility]   Visit Number 4   Treadmill Time (in min.) 10 min   Speed 1.5 mph   Lumbar Stretches - Slouch Overcorrection 10   Flexion in Lying 10   Lumbar Weight 33 lbs   Repetitions 18   Rating of Perceived Exertion 3   Ice - Z Lie (in min.) 10           Tish participated in neuromuscular re-education activities to improve balance, coordination, proprioception, motor control and/or posture for 08 minutes. The following activities were included:  Posterior pelvic tilt  x20  Slouch/overcorrect x10       Tish participated in therapeutic exercises to develop strength, endurance, ROM, flexibility, posture, and core stabilization for 32 minutes including:     Bilateral KTC 10x 5 sec  LTR x10  Piriformis stretch 3x 30 sec ea  Hip flexor stretch 3x 30 sec ea  Bridging 2 x 10x    To add:   Balance activities  Hip adductor stretch    Peripheral muscle strengthening which included one set of 15-20 repetitions at a slow and controlled 10-13 second per rep pace focused on strengthening supporting musculature in order to improve body mechanics and functional mobility. Patient and therapist focused on proper form during treatment to ensure optimal strengthening of each targeted muscle group.  Machines utilized included:Torso rotation, Hip Abd, Hip Add, and Leg Press,hip add/abd, Chest press, rowing, leg curl, leg extension. Add next visit bicep and tricep.        Tish participated in dynamic functional therapeutic activities to improve functional performance and simulate household and community activities for 00  minutes. The following activities were included:      Tish received manual therapy techniques for 00  minutes. The following activities were included:      Pt given cold pack for 10 minutes to low back in Z-lie.    Patient Education and Home Exercises     Home exercises include:  Bilateral KTC  LTR  PPT  Slouch/overcorrect  Cardio program (V5): -  Lifting education (V11): -  Posture/Lumbar roll: instruct visit 1  Fridge Magnet Discharge handout (date given): -  Equipment at home/gym membership: no    Education provided:   - PT role and POC  - HEP  - instruct in importance of posture, daily stretching and contributing factors to balance difficulty    Written Home Exercises Provided: yes.  Exercises were reviewed and Tish was able to demonstrate them prior to the end of the session.  Tish demonstrated good  understanding of the education provided.     See EMR under Patient  Instructions for exercises provided 7/26/2024.    Assessment     Tish came into visit with minimal LBP pain and did reduce with visit. She cont to require vc on correct NM engagement with lumbar-pelvic exercise.  Pt tolerated a progression of reps on lumbar medx machine. Needed to turn toggle off due to difficult. She also had difficult with torso machine and she only performed ROM. Attempt in added weight again next visit if pt willing. Pt tolerated additional machines well, but unable to complete all machines due to fatigue today. Add next visit.      Patient is making good progress towards established goals.  Pt will continue to benefit from skilled outpatient physical therapy to address the deficits stated in the impairment chart, provide pt/family education and to maximize pt's level of independence in the home and community environment.     Anticipated Barriers for therapy: none  Pt's spiritual, cultural and educational needs considered and pt agreeable to plan of care and goals as stated below:     Goals:   Short term goals:  6 weeks or 10 visits   - Pt will demonstrate increased lumbar MedX ROM by at least 3 degrees from the initial ROM value with improvements noted in functional ROM and ability to perform ADLs. Appropriate and Ongoing  - Pt will demonstrate increased MedX average isometric strength value by 20% from initial test resulting in improved ability to perform bending, lifting, and carrying activities safely, confidently. Appropriate and Ongoing  - Pt will report a reduction in worst pain score by 1-2 points for improved tolerance for ADLs. Appropriate and Ongoing  - Pt able to perform HEP correctly with minimal cueing or supervision from therapist to encourage independent management of symptoms. Appropriate and Ongoing     Long term goals: 10 weeks or 20 visits   - Pt will demonstrate increased lumbar MedX ROM by at least 6 degrees from initial ROM value, resulting in improved ability to perform  functional forward bending while standing and sitting. Appropriate and Ongoing  - Pt will demonstrate increased MedX average isometric strength value by 30% from initial test resulting in improved ability to perform bending, lifting, and carrying activities safely and confidently. Appropriate and Ongoing  - Pt to demonstrate ability to independently control and reduce their pain through posture positioning and mechanical movements throughout a typical day. Appropriate and Ongoing  - Pt will demonstrate reduced pain and improved functional outcomes as reported on the FOTO by reaching an intake score of >/= 28% functional ability in order to demonstrate subjective improvement in patient's condition. . Appropriate and Ongoing  - Pt will demonstrate independence with the HEP at discharge. Appropriate and Ongoing  - Pt will be able to resolve pain(patient goal) Appropriate and Ongoing       Plan     Continue with established Plan of Care towards established PT goals.     Therapist: Jeanie Cowart, PTA  8/12/2024

## 2024-08-19 ENCOUNTER — CLINICAL SUPPORT (OUTPATIENT)
Dept: REHABILITATION | Facility: HOSPITAL | Age: 84
End: 2024-08-19
Payer: MEDICARE

## 2024-08-19 DIAGNOSIS — M25.69 DECREASED ROM OF TRUNK AND BACK: Primary | ICD-10-CM

## 2024-08-19 DIAGNOSIS — R29.898 DECREASED STRENGTH OF TRUNK AND BACK: ICD-10-CM

## 2024-08-19 PROCEDURE — 97110 THERAPEUTIC EXERCISES: CPT | Mod: PO | Performed by: PHYSICAL THERAPIST

## 2024-08-19 PROCEDURE — 97112 NEUROMUSCULAR REEDUCATION: CPT | Mod: PO | Performed by: PHYSICAL THERAPIST

## 2024-08-19 NOTE — PROGRESS NOTES
[facility]  Physical Therapy Treatment Note     Name: Amarilis Decker  Clinic Number: [id number]    Therapy Diagnosis:   Encounter Diagnoses   Name Primary?    Decreased ROM of trunk and back Yes    Decreased strength of trunk and back            Physician: Maynor Santacruz*    Visit Date: 8/19/2024    Physician Orders: PT Eval and Treat- Healthy Back  Medical Diagnosis from Referral: sacroiliitis  Evaluation Date: 7/22/2024  Authorization Period Expiration: 12/31/24  Plan of Care Expiration: 10/1/2024  Reassessment Due: 8/23/2024  Visit # / Visits authorized:4/30 (+1 prior auth)  MedX testing visit 2    PTA Visit #: 0/5      Time In: 2:50 PM  Time Out: 4:05 PM  Total Billable Time: 65 minutes  INSURANCE and OUTCOMES: Fee for Service with FOTO Outcomes 1/3    Precautions: standard    Pattern of pain determined:  Pattern 1 PEN    Subjective     Tish Decker reports right L-S/SI pain. She states pain moves around which is why she feels it is fibromyalgia. She states she has been doing her exercises. Unsure if they help.     Patient reports tolerating previous visit without adverse effect  Patient reports their pain to be 1/10 on a 0-10 scale with 0 being no pain and 10 being the worst pain imaginable.  Pain Location: low back, hips, groin, anterior thighs     Work and leisure: Occupation: retired  Leisure: work in [employer]   Pt goals: no pain    Objective      Lumbar  Isometric Testing on Med X equipment: Testing administered by PT    Test Initial Baseline Midpoint Final   Date 7/26/24     ROM 6-39 deg     Max Peak Torque 65      Min Peak Torque 13      Flex/Ext Ratio 5:1     % variance  normative data -51%     % change from initial test N/A visit 1           Outcomes:  Intake Score: 34%  Visit 6 Score:   Visit 10 Score:   Discharge Score:  Goal Score: 28%     Treatment     Tish received the treatments listed below:      Medical MedX Treatment as  follows:    MedX exercise started day 3:  Patient received neuromuscular education for 15 minutes via participation on the Medical MedX Machine. Therapist assisted patient in isolating and engaging spinal stabilization musculature in order to improve functional ability and postural control. Patient performed exercise with therapist guidance in order to accurately use pacer function, avoid valsalva, and optimally exert effort within a safe and effective range via the Jenn Exertion Rating Scale. Patient instructed to perform at a midrange of exertion and to complete 15-20 repetitions within appropriate split time, with proper technique, and while maintaining safety.           8/19/2024     3:29 PM   [facility]   Visit Number 5   VAS Pain Rating 1   Treadmill Time (in min.) 10 min   Speed 1.8 mph   Lumbar Stretches - Slouch Overcorrection 10   Flexion in Lying 10   Lumbar Weight 33 lbs   Repetitions 20   Rating of Perceived Exertion 5   Ice - Z Lie (in min.) 10               Tish participated in neuromuscular re-education activities to improve balance, coordination, proprioception, motor control and/or posture for 10 minutes. The following activities were included:  Posterior pelvic tilt x20  Slouch/overcorrect x10   Bridging 2 x 10    Tish participated in therapeutic exercises to develop strength, endurance, ROM, flexibility, posture, and core stabilization for 40 minutes including:     Bilateral KTC 10x 5 sec  LTR x10  Piriformis stretch 3x 30 sec ea  Hip flexor stretch 3x 30 sec ea  Hip adductor stretch 3x 30 sec (butterfly)    To add:   Balance activities      Peripheral muscle strengthening which included one set of 15-20 repetitions at a slow and controlled 10-13 second per rep pace focused on strengthening supporting musculature in order to improve body mechanics and functional mobility. Patient and therapist focused on proper form during treatment to ensure optimal strengthening of each targeted  muscle group.  Machines utilized included:Torso rotation, Hip Abd, Hip Add, and Leg Press,hip add/abd, Chest press, rowing, leg curl, leg extension, biceps and triceps.        Tish participated in dynamic functional therapeutic activities to improve functional performance and simulate household and community activities for 00  minutes. The following activities were included:      Tish received manual therapy techniques for 00  minutes. The following activities were included:      Pt given cold pack for 10 minutes to low back in Z-lie.    Patient Education and Home Exercises     Home exercises include:  Bilateral KTC  LTR  PPT  Bridging  Hip flexor stretch  Piriformis stretch  Adductor stretch  Slouch/overcorrect  Cardio program (V5): -  Lifting education (V11): -  Posture/Lumbar roll: instruct visit 1  Fridge [employer] Discharge handout (date given): -  Equipment at home/gym membership: no    Education provided:   - PT role and POC  - HEP  - instruct in importance of posture, daily stretching and contributing factors to balance difficulty    Written Home Exercises Provided: yes.  Exercises were reviewed and Tish was able to demonstrate them prior to the end of the session.  Tish demonstrated good  understanding of the education provided.     See EMR under Patient Instructions for exercises provided 7/26/2024.    Assessment     Added hip adductor stretch to exercise program. Needs some cueing to perform exercises and neuromuscular activities properly. Pt has limited memory of some of the exercises performed, but is able to perform correctly with cueing. Able to complete circuit today. Pt has some difficulty even with light loads indicating deconditioning, but able to perform with appropriate perceived exertion and without pain.     Patient is making good progress towards established goals.  Pt will continue to benefit from skilled outpatient physical therapy to address the deficits stated in the impairment  chart, provide pt/family education and to maximize pt's level of independence in the home and community environment.     Anticipated Barriers for therapy: none  Pt's spiritual, cultural and educational needs considered and pt agreeable to plan of care and goals as stated below:     Goals:   Short term goals:  6 weeks or 10 visits   - Pt will demonstrate increased lumbar MedX ROM by at least 3 degrees from the initial ROM value with improvements noted in functional ROM and ability to perform ADLs. Appropriate and Ongoing  - Pt will demonstrate increased MedX average isometric strength value by 20% from initial test resulting in improved ability to perform bending, lifting, and carrying activities safely, confidently. Appropriate and Ongoing  - Pt will report a reduction in worst pain score by 1-2 points for improved tolerance for ADLs. Appropriate and Ongoing  - Pt able to perform HEP correctly with minimal cueing or supervision from therapist to encourage independent management of symptoms. Appropriate and Ongoing     Long term goals: 10 weeks or 20 visits   - Pt will demonstrate increased lumbar MedX ROM by at least 6 degrees from initial ROM value, resulting in improved ability to perform functional forward bending while standing and sitting. Appropriate and Ongoing  - Pt will demonstrate increased MedX average isometric strength value by 30% from initial test resulting in improved ability to perform bending, lifting, and carrying activities safely and confidently. Appropriate and Ongoing  - Pt to demonstrate ability to independently control and reduce their pain through posture positioning and mechanical movements throughout a typical day. Appropriate and Ongoing  - Pt will demonstrate reduced pain and improved functional outcomes as reported on the FOTO by reaching an intake score of >/= 28% functional ability in order to demonstrate subjective improvement in patient's condition. . Appropriate and Ongoing  - Pt  will demonstrate independence with the HEP at discharge. Appropriate and Ongoing  - Pt will be able to resolve pain(patient goal) Appropriate and Ongoing       Plan     Continue with established Plan of Care towards established PT goals.     Therapist: Viktoria Chávez, PT  8/19/2024

## 2024-08-19 NOTE — PATIENT INSTRUCTIONS
"  POSTERIOR PELVIC TILT    Lie on your back on a firm surface with knees comfortably bent (top picture).  Then flatten back against the table while mandy abdominal muscles as if pulling belly button toward ribs (bottom picture).   Hold 3 seconds. Repeat 20x.      BRIDGE - BRIDGING    While lying on your back with knees bent, tighten your lower abdominal muscles, squeeze your buttocks and then raise your buttocks off the floor/bed as creating a "Bridge" with your body. Hold and then lower yourself and repeat. 2 sets of 10      Butterfly Stretch     Laying on your back in a comfortable, let your knees fall apart and put the bottoms of your feet together. Feel for a tolerable stretch in your inner thighs. If needed, you can put pillows underneath your knees to reduce the amount of stretch.  Hold 30 seconds. Repeat 3x.  "

## 2024-08-22 ENCOUNTER — CLINICAL SUPPORT (OUTPATIENT)
Dept: REHABILITATION | Facility: HOSPITAL | Age: 84
End: 2024-08-22
Payer: MEDICARE

## 2024-08-22 DIAGNOSIS — M25.69 DECREASED ROM OF TRUNK AND BACK: Primary | ICD-10-CM

## 2024-08-22 DIAGNOSIS — R29.898 DECREASED STRENGTH OF TRUNK AND BACK: ICD-10-CM

## 2024-08-22 PROCEDURE — 97112 NEUROMUSCULAR REEDUCATION: CPT | Mod: PO,CQ

## 2024-08-22 PROCEDURE — 97110 THERAPEUTIC EXERCISES: CPT | Mod: PO,CQ

## 2024-08-22 NOTE — PROGRESS NOTES
[facility] OUTPATIENT THERAPY AND WELLNESS - HEALTH[facility] BACK  Physical Therapy Treatment Note     Name: Amarilis Decker  Clinic Number: [id number]    Therapy Diagnosis:   No diagnosis found.          Physician: Maynor Santacruz*    Visit Date: 8/22/2024    Physician Orders: PT Eval and Treat- Healthy Back  Medical Diagnosis from Referral: sacroiliitis  Evaluation Date: 7/22/2024  Authorization Period Expiration: 12/31/24  Plan of Care Expiration: 10/1/2024  Reassessment Due: 8/23/2024  Visit # / Visits authorized:4/30 (+1 prior auth)  MedX testing visit 2    PTA Visit #: 0/5      Time In: 2:50 PM  Time Out: 4:05 PM  Total Billable Time: 65 minutes  INSURANCE and OUTCOMES: Fee for Service with FOTO Outcomes 1/3    Precautions: standard    Pattern of pain determined:  Pattern 1 PEN    Subjective     Tish Decker reports right L-S/SI pain. She states pain moves around which is why she feels it is fibromyalgia. She states she has been doing her exercises. Unsure if they help.     Patient reports tolerating previous visit without adverse effect  Patient reports their pain to be 1/10 on a 0-10 scale with 0 being no pain and 10 being the worst pain imaginable.  Pain Location: low back, hips, groin, anterior thighs     Work and leisure: Occupation: retired  Leisure: work in [employer]   Pt goals: no pain    Objective      Lumbar  Isometric Testing on Med X equipment: Testing administered by PT    Test Initial Baseline Midpoint Final   Date 7/26/24     ROM 6-39 deg     Max Peak Torque 65      Min Peak Torque 13      Flex/Ext Ratio 5:1     % variance  normative data -51%     % change from initial test N/A visit 1           Outcomes:  Intake Score: 34%  Visit 6 Score:   Visit 10 Score:   Discharge Score:  Goal Score: 28%     Treatment     Tish received the treatments listed below:      Medical MedX Treatment as follows:    MedX exercise started day 3:  Patient received neuromuscular education for 15 minutes via  participation on the [employer] Machine. Therapist assisted patient in isolating and engaging spinal stabilization musculature in order to improve functional ability and postural control. Patient performed exercise with therapist guidance in order to accurately use pacer function, avoid valsalva, and optimally exert effort within a safe and effective range via the Jenn Exertion Rating Scale. Patient instructed to perform at a midrange of exertion and to complete 15-20 repetitions within appropriate split time, with proper technique, and while maintaining safety.           8/19/2024     3:29 PM   [facility]   Visit Number 5   VAS Pain Rating 1   Treadmill Time (in min.) 10 min   Speed 1.8 mph   Lumbar Stretches - Slouch Overcorrection 10   Flexion in Lying 10   Lumbar Weight 33 lbs   Repetitions 20   Rating of Perceived Exertion 5   Ice - Z Lie (in min.) 10               Tish participated in neuromuscular re-education activities to improve balance, coordination, proprioception, motor control and/or posture for 10 minutes. The following activities were included:  Posterior pelvic tilt x20  Slouch/overcorrect x10   Bridging 2 x 10    Tish participated in therapeutic exercises to develop strength, endurance, ROM, flexibility, posture, and core stabilization for 40 minutes including:     Bilateral KTC 10x 5 sec  LTR x10  Piriformis stretch 3x 30 sec ea  Hip flexor stretch 3x 30 sec ea  Hip adductor stretch 3x 30 sec (butterfly)    To add:   Balance activities      Peripheral muscle strengthening which included one set of 15-20 repetitions at a slow and controlled 10-13 second per rep pace focused on strengthening supporting musculature in order to improve body mechanics and functional mobility. Patient and therapist focused on proper form during treatment to ensure optimal strengthening of each targeted muscle group.  Machines utilized included:Torso rotation, Hip Abd, Hip Add, and Leg Press,hip add/abd,  Chest press, rowing, leg curl, leg extension, biceps and triceps.        Tish participated in dynamic functional therapeutic activities to improve functional performance and simulate household and community activities for 00  minutes. The following activities were included:      Tish received manual therapy techniques for 00  minutes. The following activities were included:      Pt given cold pack for 10 minutes to low back in Z-lie.    Patient Education and Home Exercises     Home exercises include:  Bilateral KTC  LTR  PPT  Bridging  Hip flexor stretch  Piriformis stretch  Adductor stretch  Slouch/overcorrect  Cardio program (V5): -  Lifting education (V11): -  Posture/Lumbar roll: instruct visit 1  Fridge Magnet Discharge handout (date given): -  Equipment at home/gym membership: no    Education provided:   - PT role and POC  - HEP  - instruct in importance of posture, daily stretching and contributing factors to balance difficulty    Written Home Exercises Provided: yes.  Exercises were reviewed and Tish was able to demonstrate them prior to the end of the session.  Tish demonstrated good  understanding of the education provided.     See EMR under Patient Instructions for exercises provided 7/26/2024.    Assessment     Added hip adductor stretch to exercise program. Needs some cueing to perform exercises and neuromuscular activities properly. Pt has limited memory of some of the exercises performed, but is able to perform correctly with cueing. Able to complete circuit today. Pt has some difficulty even with light loads indicating deconditioning, but able to perform with appropriate perceived exertion and without pain.     Patient is making good progress towards established goals.  Pt will continue to benefit from skilled outpatient physical therapy to address the deficits stated in the impairment chart, provide pt/family education and to maximize pt's level of independence in the home and community  environment.     Anticipated Barriers for therapy: none  Pt's spiritual, cultural and educational needs considered and pt agreeable to plan of care and goals as stated below:     Goals:   Short term goals:  6 weeks or 10 visits   - Pt will demonstrate increased lumbar MedX ROM by at least 3 degrees from the initial ROM value with improvements noted in functional ROM and ability to perform ADLs. Appropriate and Ongoing  - Pt will demonstrate increased MedX average isometric strength value by 20% from initial test resulting in improved ability to perform bending, lifting, and carrying activities safely, confidently. Appropriate and Ongoing  - Pt will report a reduction in worst pain score by 1-2 points for improved tolerance for ADLs. Appropriate and Ongoing  - Pt able to perform HEP correctly with minimal cueing or supervision from therapist to encourage independent management of symptoms. Appropriate and Ongoing     Long term goals: 10 weeks or 20 visits   - Pt will demonstrate increased lumbar MedX ROM by at least 6 degrees from initial ROM value, resulting in improved ability to perform functional forward bending while standing and sitting. Appropriate and Ongoing  - Pt will demonstrate increased MedX average isometric strength value by 30% from initial test resulting in improved ability to perform bending, lifting, and carrying activities safely and confidently. Appropriate and Ongoing  - Pt to demonstrate ability to independently control and reduce their pain through posture positioning and mechanical movements throughout a typical day. Appropriate and Ongoing  - Pt will demonstrate reduced pain and improved functional outcomes as reported on the FOTO by reaching an intake score of >/= 28% functional ability in order to demonstrate subjective improvement in patient's condition. . Appropriate and Ongoing  - Pt will demonstrate independence with the HEP at discharge. Appropriate and Ongoing  - Pt will be able to  resolve pain(patient goal) Appropriate and Ongoing       Plan     Continue with established Plan of Care towards established PT goals.     Therapist: Jeanie Cowart, PTA  8/22/2024

## 2024-08-22 NOTE — PROGRESS NOTES
[facility]  Physical Therapy Treatment Note     Name: Amarilis Decker  Clinic Number: [id number]    Therapy Diagnosis:   Encounter Diagnoses   Name Primary?    Decreased ROM of trunk and back Yes    Decreased strength of trunk and back              Physician: Maynor Santacruz*    Visit Date: 8/22/2024    Physician Orders: PT Eval and Treat- Healthy Back  Medical Diagnosis from Referral: sacroiliitis  Evaluation Date: 7/22/2024  Authorization Period Expiration: 12/31/24  Plan of Care Expiration: 10/1/2024  Reassessment Due: 8/23/2024  Visit # / Visits authorized:5/30 (+1 prior auth)  MedX testing visit 2    PTA Visit #: 1/5      Time In: 1:00 PM  Time Out: 2:25 PM  Total Billable Time:  75 minutes  INSURANCE and OUTCOMES: Fee for Service with FOTO Outcomes 1/3    Precautions: standard    Pattern of pain determined:  Pattern 1 PEN    Subjective     Tish Decker reports not having pain all over her pelvic area. It's just localized on R side of LB.    Patient reports tolerating previous visit without adverse effect  Patient reports their pain to be 6/10 on a 0-10 scale with 0 being no pain and 10 being the worst pain imaginable.  Pain Location: low back, hips, groin, anterior thighs     Work and leisure: Occupation: retired  Leisure: work in [employer]   Pt goals: no pain    Objective      Lumbar  Isometric Testing on Med X equipment: Testing administered by PT    Test Initial Baseline Midpoint Final   Date 7/26/24     ROM 6-39 deg     Max Peak Torque 65      Min Peak Torque 13      Flex/Ext Ratio 5:1     % variance  normative data -51%     % change from initial test N/A visit 1           Outcomes:  Intake Score: 34%  Visit 6 Score:   Visit 10 Score:   Discharge Score:  Goal Score: 28%     Treatment     Tish received the treatments listed below:      Medical MedX Treatment as follows:    MedX exercise started day 3:  Patient received neuromuscular education  for 15 minutes via participation on the [employer] Machine. Therapist assisted patient in isolating and engaging spinal stabilization musculature in order to improve functional ability and postural control. Patient performed exercise with therapist guidance in order to accurately use pacer function, avoid valsalva, and optimally exert effort within a safe and effective range via the Jenn Exertion Rating Scale. Patient instructed to perform at a midrange of exertion and to complete 15-20 repetitions within appropriate split time, with proper technique, and while maintaining safety.                8/22/2024     1:09 PM   [facility]   Visit Number 6   VAS Pain Rating 6   Treadmill Time (in min.) 10 min   Lumbar Weight 33 lbs   Repetitions 20   Rating of Perceived Exertion 3   Ice - Z Lie (in min.) 10           Tish participated in neuromuscular re-education activities to improve balance, coordination, proprioception, motor control and/or posture for 10 minutes. The following activities were included:  Posterior pelvic tilt x20  Slouch/overcorrect x10  Bridging 2 x 10    Tish participated in therapeutic exercises to develop strength, endurance, ROM, flexibility, posture, and core stabilization for 50 minutes including:     Bilateral KTC 10x 5 sec  LTR x10  Piriformis stretch 3x 30 sec ea  Hip flexor stretch 3x 30 sec ea  Hip adductor stretch 3x 30 sec (butterfly)    To add:   Balance activities      Peripheral muscle strengthening which included one set of 15-20 repetitions at a slow and controlled 10-13 second per rep pace focused on strengthening supporting musculature in order to improve body mechanics and functional mobility. Patient and therapist focused on proper form during treatment to ensure optimal strengthening of each targeted muscle group.  Machines utilized included:Torso rotation, Hip Abd, Hip Add, and Leg Press,hip add/abd, Chest press, rowing, leg curl, leg extension, biceps and  triceps.        Tish participated in dynamic functional therapeutic activities to improve functional performance and simulate household and community activities for 00  minutes. The following activities were included:      Tish received manual therapy techniques for 00  minutes. The following activities were included:      Pt given cold pack for 10 minutes to low back in Z-lie. Cardio handout and Foto given today during icing and pt required assistance of explanation of some questions.    Patient Education and Home Exercises     Home exercises include:  Bilateral KTC  LTR  PPT  Bridging  Hip flexor stretch  Piriformis stretch  Adductor stretch  Slouch/overcorrect  Cardio program (V5): -8/22/24  Lifting education (V11): -  Posture/Lumbar roll: instruct visit 1  [employer] Discharge handout (date given): -  Equipment at home/gym membership: no    Education provided:   - PT role and POC  - HEP  - instruct in importance of posture, daily stretching and contributing factors to balance difficulty    Written Home Exercises Provided: yes.  Exercises were reviewed and Tish was able to demonstrate them prior to the end of the session.  Tish demonstrated good  understanding of the education provided.     See EMR under Patient Instructions for exercises provided 7/26/2024.    Assessment      Pt conts to require cueing on tech with exercises and correct neuromuscular pelvic engagement. Pt demo increased tolerance of lumbar, torso, and all peripheral machines better today with less fatigue and not as much difficulty. Minimal to no assistance with torso machine. LBP reduced post to 0/10 stretch and pelvic NM strengthening. Discussed the importance of starting a cardio program at home and issued her packet.    Patient is making good progress towards established goals.  Pt will continue to benefit from skilled outpatient physical therapy to address the deficits stated in the impairment chart, provide pt/family education  and to maximize pt's level of independence in the home and community environment.     Anticipated Barriers for therapy: none  Pt's spiritual, cultural and educational needs considered and pt agreeable to plan of care and goals as stated below:     Goals:   Short term goals:  6 weeks or 10 visits   - Pt will demonstrate increased lumbar MedX ROM by at least 3 degrees from the initial ROM value with improvements noted in functional ROM and ability to perform ADLs. Appropriate and Ongoing  - Pt will demonstrate increased MedX average isometric strength value by 20% from initial test resulting in improved ability to perform bending, lifting, and carrying activities safely, confidently. Appropriate and Ongoing  - Pt will report a reduction in worst pain score by 1-2 points for improved tolerance for ADLs. Appropriate and Ongoing  - Pt able to perform HEP correctly with minimal cueing or supervision from therapist to encourage independent management of symptoms. Appropriate and Ongoing     Long term goals: 10 weeks or 20 visits   - Pt will demonstrate increased lumbar MedX ROM by at least 6 degrees from initial ROM value, resulting in improved ability to perform functional forward bending while standing and sitting. Appropriate and Ongoing  - Pt will demonstrate increased MedX average isometric strength value by 30% from initial test resulting in improved ability to perform bending, lifting, and carrying activities safely and confidently. Appropriate and Ongoing  - Pt to demonstrate ability to independently control and reduce their pain through posture positioning and mechanical movements throughout a typical day. Appropriate and Ongoing  - Pt will demonstrate reduced pain and improved functional outcomes as reported on the FOTO by reaching an intake score of >/= 28% functional ability in order to demonstrate subjective improvement in patient's condition. . Appropriate and Ongoing  - Pt will demonstrate independence with  the HEP at discharge. Appropriate and Ongoing  - Pt will be able to resolve pain(patient goal) Appropriate and Ongoing       Plan     Continue with established Plan of Care towards established PT goals.     Therapist: Jeanie Cowart, PTA  8/22/2024

## 2024-08-23 ENCOUNTER — TELEPHONE (OUTPATIENT)
Dept: REHABILITATION | Facility: HOSPITAL | Age: 84
End: 2024-08-23
Payer: MEDICARE

## 2024-08-27 ENCOUNTER — CLINICAL SUPPORT (OUTPATIENT)
Dept: REHABILITATION | Facility: HOSPITAL | Age: 84
End: 2024-08-27
Payer: MEDICARE

## 2024-08-27 DIAGNOSIS — R29.898 DECREASED STRENGTH OF TRUNK AND BACK: ICD-10-CM

## 2024-08-27 DIAGNOSIS — M25.69 DECREASED ROM OF TRUNK AND BACK: Primary | ICD-10-CM

## 2024-08-27 PROCEDURE — 97112 NEUROMUSCULAR REEDUCATION: CPT | Mod: PO,CQ

## 2024-08-29 ENCOUNTER — CLINICAL SUPPORT (OUTPATIENT)
Dept: REHABILITATION | Facility: HOSPITAL | Age: 84
End: 2024-08-29
Payer: MEDICARE

## 2024-08-29 DIAGNOSIS — R29.898 DECREASED STRENGTH OF TRUNK AND BACK: ICD-10-CM

## 2024-08-29 DIAGNOSIS — M25.69 DECREASED ROM OF TRUNK AND BACK: Primary | ICD-10-CM

## 2024-08-29 PROCEDURE — 97110 THERAPEUTIC EXERCISES: CPT | Mod: KX,PO | Performed by: PHYSICAL THERAPIST

## 2024-08-29 PROCEDURE — 97112 NEUROMUSCULAR REEDUCATION: CPT | Mod: KX,PO | Performed by: PHYSICAL THERAPIST

## 2024-08-29 NOTE — PROGRESS NOTES
[facility]  Physical Therapy Treatment Note     Name: Amarilis Decker  Clinic Number: [id number]    Therapy Diagnosis:   Encounter Diagnoses   Name Primary?    Decreased ROM of trunk and back Yes    Decreased strength of trunk and back        Physician: Maynor Santacruz*    Visit Date: 8/29/2024    Physician Orders: PT Eval and Treat- Healthy Back  Medical Diagnosis from Referral: sacroiliitis  Evaluation Date: 7/22/2024  Authorization Period Expiration: 12/31/24  Plan of Care Expiration: 10/1/2024  Reassessment Due: 8/23/2024  Visit # / Visits authorized:7/30 (+1 prior auth)  MedX testing visit 2    PTA Visit #: 0/5      Time In: 1:00 PM  Time Out: 2:05PM  Total Billable Time: - 55 minutes  INSURANCE and OUTCOMES: Fee for Service with FOTO Outcomes 2/3    Precautions: standard    Pattern of pain determined:  Pattern 1 PEN     Subjective     Tish Decker reports right L-S pain today. Using Salon Pas patch which helps.  Patient reports tolerating previous visit without adverse effect  Patient reports their pain to be 1/10 on a 0-10 scale with 0 being no pain and 10 being the worst pain imaginable.  Pain Location: low back, hips, groin, anterior thighs     Work and leisure: Occupation: retired  Leisure: work in [employer]   Pt goals: no pain    Objective      Lumbar  Isometric Testing on Med X equipment: Testing administered by PT    Test Initial Baseline Midpoint Final   Date 7/26/24     ROM 6-39 deg     Max Peak Torque 65      Min Peak Torque 13      Flex/Ext Ratio 5:1     % variance  normative data -51%     % change from initial test N/A visit 1           Outcomes:  Intake Score: 34%  Visit 6 Score: 14%  Visit 10 Score:   Discharge Score:  Goal Score: 28%     Treatment     Tish received the treatments listed below:      Medical MedX Treatment as follows:    MedX exercise started day 3:  Patient received neuromuscular education for 15 minutes via  participation on the [employer] Machine. Therapist assisted patient in isolating and engaging spinal stabilization musculature in order to improve functional ability and postural control. Patient performed exercise with therapist guidance in order to accurately use pacer function, avoid valsalva, and optimally exert effort within a safe and effective range via the Jenn Exertion Rating Scale. Patient instructed to perform at a midrange of exertion and to complete 15-20 repetitions within appropriate split time, with proper technique, and while maintaining safety.           8/29/2024     1:29 PM   [facility]   Visit Number 8   VAS Pain Rating 1   Treadmill Time (in min.) 10 min   Speed 1.5 mph   Lumbar Stretches - Slouch Overcorrection 10   Flexion in Lying 10   Lumbar Weight 36 lbs   Repetitions 18   Rating of Perceived Exertion 4   Ice - Z Lie (in min.) 10                Tish participated in neuromuscular re-education activities to improve balance, coordination, proprioception, motor control and/or posture for 10 minutes. The following activities were included:  Posterior pelvic tilt x20  Slouch/overcorrect x10  Bridging 2 x 10    Tish participated in therapeutic exercises to develop strength, endurance, ROM, flexibility, posture, and core stabilization for 30 minutes including:     Bilateral KTC 10x 5 sec  LTR x10  Piriformis stretch 3x 30 sec ea  Hip flexor stretch 3x 30 sec ea  Hip adductor stretch 3x 30 sec (butterfly)    To add:   Balance activities      Peripheral muscle strengthening which included one set of 15-20 repetitions at a slow and controlled 10-13 second per rep pace focused on strengthening supporting musculature in order to improve body mechanics and functional mobility. Patient and therapist focused on proper form during treatment to ensure optimal strengthening of each targeted muscle group.  Machines utilized included:Torso rotation, Hip Abd, Hip Add, and Leg Press,hip add/abd,  Chest press, rowing, leg curl, leg extension, biceps and triceps.        Tish participated in dynamic functional therapeutic activities to improve functional performance and simulate household and community activities for 00  minutes. The following activities were included:      Tish received manual therapy techniques for 00  minutes. The following activities were included:      Pt given cold pack for 10 minutes to low back in Z-lie.   Patient Education and Home Exercises     Home exercises include:  Bilateral KTC  LTR  PPT  Bridging  Hip flexor stretch  Piriformis stretch  Adductor stretch  Slouch/overcorrect  Cardio program (V5): -8/22/24  Lifting education (V11): -  Posture/Lumbar roll: instruct visit 1  [employer] Discharge handout (date given): -  Equipment at home/gym membership: no    Education provided:   - PT role and POC  - HEP  - instruct in importance of posture, daily stretching and contributing factors to balance difficulty    Written Home Exercises Provided: yes.  Exercises were reviewed and Tish was able to demonstrate them prior to the end of the session.  Tish demonstrated good  understanding of the education provided.     See EMR under Patient Instructions for exercises provided 7/26/2024.    Assessment     Slow gradual progression with strength on MEDX and peripheral machines. Some shoulder pain with chest press. Pt is compliant with HEP. She states she ordered a treadmill to use at home. Discussed safety with using treadmill.  Patient is making good progress towards established goals.  Pt will continue to benefit from skilled outpatient physical therapy to address the deficits stated in the impairment chart, provide pt/family education and to maximize pt's level of independence in the home and community environment.     Anticipated Barriers for therapy: none  Pt's spiritual, cultural and educational needs considered and pt agreeable to plan of care and goals as stated below:      Goals:   Short term goals:  6 weeks or 10 visits   - Pt will demonstrate increased lumbar MedX ROM by at least 3 degrees from the initial ROM value with improvements noted in functional ROM and ability to perform ADLs. Appropriate and Ongoing  - Pt will demonstrate increased MedX average isometric strength value by 20% from initial test resulting in improved ability to perform bending, lifting, and carrying activities safely, confidently. Appropriate and Ongoing  - Pt will report a reduction in worst pain score by 1-2 points for improved tolerance for ADLs. Appropriate and Ongoing  - Pt able to perform HEP correctly with minimal cueing or supervision from therapist to encourage independent management of symptoms. Appropriate and Ongoing     Long term goals: 10 weeks or 20 visits   - Pt will demonstrate increased lumbar MedX ROM by at least 6 degrees from initial ROM value, resulting in improved ability to perform functional forward bending while standing and sitting. Appropriate and Ongoing  - Pt will demonstrate increased MedX average isometric strength value by 30% from initial test resulting in improved ability to perform bending, lifting, and carrying activities safely and confidently. Appropriate and Ongoing  - Pt to demonstrate ability to independently control and reduce their pain through posture positioning and mechanical movements throughout a typical day. Appropriate and Ongoing  - Pt will demonstrate reduced pain and improved functional outcomes as reported on the FOTO by reaching an intake score of >/= 28% functional ability in order to demonstrate subjective improvement in patient's condition. . Appropriate and Ongoing  - Pt will demonstrate independence with the HEP at discharge. Appropriate and Ongoing  - Pt will be able to resolve pain(patient goal) Appropriate and Ongoing       Plan     Continue with established Plan of Care towards established PT goals.     Therapist: Viktoria Chávez  PT  8/29/2024

## 2024-09-03 ENCOUNTER — CLINICAL SUPPORT (OUTPATIENT)
Dept: REHABILITATION | Facility: HOSPITAL | Age: 84
End: 2024-09-03
Payer: MEDICARE

## 2024-09-03 DIAGNOSIS — M25.69 DECREASED ROM OF TRUNK AND BACK: Primary | ICD-10-CM

## 2024-09-03 DIAGNOSIS — R29.898 DECREASED STRENGTH OF TRUNK AND BACK: ICD-10-CM

## 2024-09-03 PROCEDURE — 97112 NEUROMUSCULAR REEDUCATION: CPT | Mod: PO,CQ

## 2024-09-03 PROCEDURE — 97110 THERAPEUTIC EXERCISES: CPT | Mod: PO,CQ

## 2024-09-03 NOTE — PROGRESS NOTES
[facility]  Physical Therapy Treatment Note     Name: Amarilis Decker  Clinic Number: [id number]    Therapy Diagnosis:   Encounter Diagnoses   Name Primary?    Decreased ROM of trunk and back Yes    Decreased strength of trunk and back          Physician: Maynor Santacruz*    Visit Date: 9/3/2024    Physician Orders: PT Eval and Treat- Healthy Back  Medical Diagnosis from Referral: sacroiliitis  Evaluation Date: 7/22/2024  Authorization Period Expiration: 12/31/24  Plan of Care Expiration: 10/1/2024  Reassessment Due: 8/23/2024  Visit # / Visits authorized:8/30 (+1 prior auth)  MedX testing visit 2    PTA Visit #: 0/5      Time In: 3:05 PM  Time Out: 4:09 PM  Total Billable Time:  55 minutes  INSURANCE and OUTCOMES: Fee for Service with FOTO Outcomes 2/3    Precautions: standard    Pattern of pain determined:  Pattern 1 PEN     Subjective     Tish Decker reports not having all over back pain, just one spot in her LB.  Patient reports tolerating previous visit without adverse effect  Patient reports their pain to be 3/10 on a 0-10 scale with 0 being no pain and 10 being the worst pain imaginable.  Pain Location: low back, hips, groin, anterior thighs     Work and leisure: Occupation: retired  Leisure: work in [employer]   Pt goals: no pain    Objective      Lumbar  Isometric Testing on Med X equipment: Testing administered by PT    Test Initial Baseline Midpoint Final   Date 7/26/24     ROM 6-39 deg     Max Peak Torque 65      Min Peak Torque 13      Flex/Ext Ratio 5:1     % variance  normative data -51%     % change from initial test N/A visit 1           Outcomes:  Intake Score: 34%  Visit 6 Score: 14%  Visit 10 Score:   Discharge Score:  Goal Score: 28%     Treatment     Tish received the treatments listed below:      Medical MedX Treatment as follows:    MedX exercise started day 3:  Patient received neuromuscular education for 15 minutes via  participation on the [employer] Machine. Therapist assisted patient in isolating and engaging spinal stabilization musculature in order to improve functional ability and postural control. Patient performed exercise with therapist guidance in order to accurately use pacer function, avoid valsalva, and optimally exert effort within a safe and effective range via the Jenn Exertion Rating Scale. Patient instructed to perform at a midrange of exertion and to complete 15-20 repetitions within appropriate split time, with proper technique, and while maintaining safety.                 9/3/2024     3:37 PM   [facility]   Visit Number 9   VAS Pain Rating 3   Treadmill Time (in min.) 10 min   Speed 1.5 mph   Lumbar Stretches - Slouch Overcorrection 10   Flexion in Lying 10   Lumbar Weight 36 lbs   Repetitions 20   Rating of Perceived Exertion 2   Ice - Z Lie (in min.) 10        Tish participated in neuromuscular re-education activities to improve balance, coordination, proprioception, motor control and/or posture for 10 minutes. The following activities were included:  Posterior pelvic tilt x20  Slouch/overcorrect x10  Bridging 2 x 10    Tish participated in therapeutic exercises to develop strength, endurance, ROM, flexibility, posture, and core stabilization for 30 minutes including:     Bilateral KTC 10x 5 sec  LTR x10  Piriformis stretch 3x 30 sec ea  Hip flexor stretch 3x 30 sec ea  Hip adductor stretch 3x 30 sec (butterfly)    To add:   Balance activities      Peripheral muscle strengthening which included one set of 15-20 repetitions at a slow and controlled 10-13 second per rep pace focused on strengthening supporting musculature in order to improve body mechanics and functional mobility. Patient and therapist focused on proper form during treatment to ensure optimal strengthening of each targeted muscle group.  Machines utilized included:Torso rotation, Hip Abd, Hip Add, and Leg Press,hip add/abd,  Chest press, rowing, leg curl, leg extension, biceps and triceps.        Tish participated in dynamic functional therapeutic activities to improve functional performance and simulate household and community activities for 00  minutes. The following activities were included:      Tish received manual therapy techniques for 00  minutes. The following activities were included:      Pt given cold pack for 10 minutes to low back in Z-lie.   Patient Education and Home Exercises     Home exercises include:  Bilateral KTC  LTR  PPT  Bridging  Hip flexor stretch  Piriformis stretch  Adductor stretch  Slouch/overcorrect  Cardio program (V5): -8/22/24  Lifting education (V11): -  Posture/Lumbar roll: instruct visit 1  [employer] Discharge handout (date given): -  Equipment at home/gym membership: no    Education provided:   - PT role and POC  - HEP  - instruct in importance of posture, daily stretching and contributing factors to balance difficulty    Written Home Exercises Provided: yes.  Exercises were reviewed and Tish was able to demonstrate them prior to the end of the session.  Tish demonstrated good  understanding of the education provided.     See EMR under Patient Instructions for exercises provided 7/26/2024.    Assessment   Good tolerance of increase reps on lumbar medx and  peripheral machines with lower RPE. Pt seems to becoming stronger with more stamina due to tolerating session without increased fatigue and difficulty.  Leg extension and tricep press conts to he difficult.  Pt is compliant with HEP and overall feeling better.   Patient is making good progress towards established goals.  Pt will continue to benefit from skilled outpatient physical therapy to address the deficits stated in the impairment chart, provide pt/family education and to maximize pt's level of independence in the home and community environment.     Anticipated Barriers for therapy: none  Pt's spiritual, cultural and educational  needs considered and pt agreeable to plan of care and goals as stated below:     Goals:   Short term goals:  6 weeks or 10 visits   - Pt will demonstrate increased lumbar MedX ROM by at least 3 degrees from the initial ROM value with improvements noted in functional ROM and ability to perform ADLs. Appropriate and Ongoing  - Pt will demonstrate increased MedX average isometric strength value by 20% from initial test resulting in improved ability to perform bending, lifting, and carrying activities safely, confidently. Appropriate and Ongoing  - Pt will report a reduction in worst pain score by 1-2 points for improved tolerance for ADLs. Appropriate and Ongoing  - Pt able to perform HEP correctly with minimal cueing or supervision from therapist to encourage independent management of symptoms. Appropriate and Ongoing     Long term goals: 10 weeks or 20 visits   - Pt will demonstrate increased lumbar MedX ROM by at least 6 degrees from initial ROM value, resulting in improved ability to perform functional forward bending while standing and sitting. Appropriate and Ongoing  - Pt will demonstrate increased MedX average isometric strength value by 30% from initial test resulting in improved ability to perform bending, lifting, and carrying activities safely and confidently. Appropriate and Ongoing  - Pt to demonstrate ability to independently control and reduce their pain through posture positioning and mechanical movements throughout a typical day. Appropriate and Ongoing  - Pt will demonstrate reduced pain and improved functional outcomes as reported on the FOTO by reaching an intake score of >/= 28% functional ability in order to demonstrate subjective improvement in patient's condition. . Appropriate and Ongoing  - Pt will demonstrate independence with the HEP at discharge. Appropriate and Ongoing  - Pt will be able to resolve pain(patient goal) Appropriate and Ongoing       Plan     Continue with established Plan of  Care towards established PT goals.     Therapist: Jeanie Cowart, PTA  9/3/2024

## 2024-09-06 ENCOUNTER — CLINICAL SUPPORT (OUTPATIENT)
Dept: REHABILITATION | Facility: HOSPITAL | Age: 84
End: 2024-09-06
Payer: MEDICARE

## 2024-09-06 ENCOUNTER — DOCUMENTATION ONLY (OUTPATIENT)
Dept: REHABILITATION | Facility: HOSPITAL | Age: 84
End: 2024-09-06
Payer: MEDICARE

## 2024-09-06 DIAGNOSIS — M25.69 DECREASED ROM OF TRUNK AND BACK: Primary | ICD-10-CM

## 2024-09-06 DIAGNOSIS — R29.898 DECREASED STRENGTH OF TRUNK AND BACK: ICD-10-CM

## 2024-09-06 PROCEDURE — 97110 THERAPEUTIC EXERCISES: CPT | Mod: KX,PO | Performed by: PHYSICAL THERAPIST

## 2024-09-06 PROCEDURE — 97750 PHYSICAL PERFORMANCE TEST: CPT | Mod: KX,PO | Performed by: PHYSICAL THERAPIST

## 2024-09-06 PROCEDURE — 97112 NEUROMUSCULAR REEDUCATION: CPT | Mod: KX,PO | Performed by: PHYSICAL THERAPIST

## 2024-09-06 NOTE — PROGRESS NOTES
[facility]  Physical Therapy Treatment Note     Name: Amarilis Decker  Clinic Number: [id number]    Therapy Diagnosis:   Encounter Diagnoses   Name Primary?    Decreased ROM of trunk and back Yes    Decreased strength of trunk and back            Physician: Maynor Santacruz*    Visit Date: 9/6/2024    Physician Orders: PT Eval and Treat- [facility]  Medical Diagnosis from Referral: sacroiliitis  Evaluation Date: 7/22/2024  Authorization Period Expiration: 12/31/24  Plan of Care Expiration: 10/1/2024  Reassessment: 9/6/24  Reassessment Due: 10/16/24  Visit # / Visits authorized:9/30 (+1 prior auth)  MedX testing visit 2    PTA Visit #: 0/5      Time In: 9:00AM  Time Out: 10:10AM  Total Billable Time:  60 minutes  INSURANCE and OUTCOMES: Fee for Service with FOTO Outcomes 3/3    Precautions: standard    Pattern of pain determined:  Pattern 1 PEN     Subjective     Tish Decker reports she is feeling better overall. Has one spot of localized pain.   Patient reports their pain to be 2/10 on a 0-10 scale with 0 being no pain and 10 being the worst pain imaginable.  Pain Location: low back, hips, groin, anterior thighs     Work and leisure: Occupation: retired  Leisure: work in [employer]   Pt goals: no pain    Objective      Lumbar  Isometric Testing on Med X equipment: Testing administered by PT    Test Initial Baseline Midpoint Final   Date 7/26/24 9/6/24    ROM 6-39 deg 3-51 deg    Max Peak Torque 65  93    Min Peak Torque 13  42    Flex/Ext Ratio 5:1 2.2:1    % variance  normative data -51% -31%    % change from initial test N/A visit 1 +36%          Outcomes:  Intake Score: 34%  Visit 6 Score: 14%  Visit 10 Score: 6%  Discharge Score:  Goal Score: 28%     Treatment     Tish received the treatments listed below:      Amarilis was seen for performance testing for spinal musculature  to engage spinal musculature correctly for isometric testing within  patient's range of motion, with comparison to baseline testing for  15 minutes.  This includes the MedX practice exercise component and practice and standard testing.  Med x dynamic exercise and testing performed with instructions to guide the patient safely through the isometric testing.  Patient informed to perform isometric test correctly, and safely, building best force they safely can and not pushing through pain.  Patient demonstrated understanding of information       Medical MedX Treatment as follows:    MedX exercise started day 3:  Patient received neuromuscular education for 00 minutes via participation on the Medical MedX Machine. Therapist assisted patient in isolating and engaging spinal stabilization musculature in order to improve functional ability and postural control. Patient performed exercise with therapist guidance in order to accurately use pacer function, avoid valsalva, and optimally exert effort within a safe and effective range via the Jenn Exertion Rating Scale. Patient instructed to perform at a midrange of exertion and to complete 15-20 repetitions within appropriate split time, with proper technique, and while maintaining safety.                 9/6/2024     9:45 AM   [facility]   Visit Number 10   VAS Pain Rating 2   Treadmill Time (in min.) 10 min   Speed 1.9 mph   Lumbar Stretches - Slouch Overcorrection 10   Flexion in Lying 10   Lumbar Flexion 51   Lumbar Extension 3   Lumbar Peak Torque 93 ft. lbs.   Min Torque 42   Test Percent Below Normative Data 31 %   Test Percent Gain in Strength from Initial  36 %   Ice - Z Lie (in min.) 10            Tish participated in neuromuscular re-education activities to improve balance, coordination, proprioception, motor control and/or posture for 10 minutes. The following activities were included:  Posterior pelvic tilt x20  Slouch/overcorrect x10  Bridging 2 x 10    Tish participated in therapeutic exercises to develop strength,  endurance, ROM, flexibility, posture, and core stabilization for 35 minutes including:     Bilateral KTC 10x 5 sec  LTR x10  Piriformis stretch 3x 30 sec ea  Hip flexor stretch 3x 30 sec ea  Hip adductor stretch 3x 30 sec (butterfly)      Peripheral muscle strengthening which included one set of 15-20 repetitions at a slow and controlled 10-13 second per rep pace focused on strengthening supporting musculature in order to improve body mechanics and functional mobility. Patient and therapist focused on proper form during treatment to ensure optimal strengthening of each targeted muscle group.  Machines utilized included:Torso rotation, Hip Abd, Hip Add, and Leg Press,hip add/abd, Chest press, rowing, leg curl, leg extension, biceps and triceps.        Tish participated in dynamic functional therapeutic activities to improve functional performance and simulate household and community activities for 00  minutes. The following activities were included:      Tish received manual therapy techniques for 00  minutes. The following activities were included:      Pt given cold pack for 10 minutes to low back in Z-lie.   Patient Education and Home Exercises     Home exercises include:  Bilateral KTC  LTR  PPT  Bridging  Hip flexor stretch  Piriformis stretch  Adductor stretch  Slouch/overcorrect  Cardio program (V5): -8/22/24  Lifting education (V11): -  Posture/Lumbar roll: instruct visit 1  [employer] Discharge handout (date given): -  Equipment at home/gym membership: no    Education provided:   - PT role and POC  - HEP  - instruct in importance of posture, daily stretching and contributing factors to balance difficulty    Written Home Exercises Provided: yes.  Exercises were reviewed and Tish was able to demonstrate them prior to the end of the session.  Tish demonstrated good  understanding of the education provided.     See EMR under Patient Instructions for exercises provided 7/26/2024.    Assessment        Reassessment 9/6/24:  Patient has attended 10 visits at [facility] which included MD evaluation, PT evaluation with isometric testing, and physical therapy treatment including HEP instruction, education, aerobic activity, dynamic strengthening on MedX equipment for the spine, and whole body strengthening on MedX equipment with increasing resistance. Patient demonstrates increased ability to reduce symptoms, improve posture, improve ROM, and improve strength, as stated below:    -Improved posture, he is using lumbar roll  -Improved lumbar ROM, 6-39 degrees initially on MedX test and 3-51 degrees currently .  -Improved strength at each test point on lumbar MedX isometric test with 36% average improvement noted with reduced pain noted by patient.  -Initial outcome tool score of 34% and current outcome tool score 6% indicating reduced pain and improved function.        Patient is making good progress towards established goals.  Pt will continue to benefit from skilled outpatient physical therapy to address the deficits stated in the impairment chart, provide pt/family education and to maximize pt's level of independence in the home and community environment.     Anticipated Barriers for therapy: none  Pt's spiritual, cultural and educational needs considered and pt agreeable to plan of care and goals as stated below:     Goals:   Short term goals:  6 weeks or 10 visits   - Pt will demonstrate increased lumbar MedX ROM by at least 3 degrees from the initial ROM value with improvements noted in functional ROM and ability to perform ADLs. Appropriate and Ongoing Met 9/6/24  - Pt will demonstrate increased MedX average isometric strength value by 20% from initial test resulting in improved ability to perform bending, lifting, and carrying activities safely, confidently. Appropriate and Ongoing Met 9/6/24  - Pt will report a reduction in worst pain score by 1-2 points for improved tolerance for ADLs.  Appropriate and Ongoing Met 9/6/24  - Pt able to perform HEP correctly with minimal cueing or supervision from therapist to encourage independent management of symptoms. Appropriate and Ongoing Met 9/6/24     Long term goals: 10 weeks or 20 visits   - Pt will demonstrate increased lumbar MedX ROM by at least 6 degrees from initial ROM value, resulting in improved ability to perform functional forward bending while standing and sitting. Appropriate and Ongoing Met 9/6/24   - Pt will demonstrate increased MedX average isometric strength value by 30% from initial test resulting in improved ability to perform bending, lifting, and carrying activities safely and confidently. Appropriate and Ongoing Met 9/6/24  - Pt to demonstrate ability to independently control and reduce their pain through posture positioning and mechanical movements throughout a typical day. Appropriate and Ongoing Progressing  - Pt will demonstrate reduced pain and improved functional outcomes as reported on the FOTO by reaching an intake score of >/= 28% functional ability in order to demonstrate subjective improvement in patient's condition. . Appropriate and Ongoing Met 9/6/24  - Pt will demonstrate independence with the HEP at discharge. Appropriate and Ongoing  - Pt will be able to resolve pain(patient goal) Appropriate and Ongoing   Added goal 9/6/24:  Pt will demonstrate increased MedX average isometric strength value by 46% from initial test resulting in improved ability to perform bending, lifting, and carrying activities safely and confidently. Appropriate and Ongoing  Plan     Continue with established Plan of Care towards established PT goals.     Therapist: Viktoria Chávez, PT  9/6/2024

## 2024-09-10 ENCOUNTER — CLINICAL SUPPORT (OUTPATIENT)
Dept: REHABILITATION | Facility: HOSPITAL | Age: 84
End: 2024-09-10
Payer: MEDICARE

## 2024-09-10 DIAGNOSIS — M25.69 DECREASED ROM OF TRUNK AND BACK: Primary | ICD-10-CM

## 2024-09-10 DIAGNOSIS — R29.898 DECREASED STRENGTH OF TRUNK AND BACK: ICD-10-CM

## 2024-09-10 PROCEDURE — 97112 NEUROMUSCULAR REEDUCATION: CPT | Mod: PO,CQ

## 2024-09-10 PROCEDURE — 97110 THERAPEUTIC EXERCISES: CPT | Mod: PO,CQ

## 2024-09-10 NOTE — PROGRESS NOTES
[facility]  Physical Therapy Treatment Note     Name: Amarilis Decker  Clinic Number: [id number]    Therapy Diagnosis:   Encounter Diagnoses   Name Primary?    Decreased ROM of trunk and back Yes    Decreased strength of trunk and back              Physician: Maynor Santacruz*    Visit Date: 9/10/2024    Physician Orders: PT Eval and Treat- Healthy Back  Medical Diagnosis from Referral: sacroiliitis  Evaluation Date: 7/22/2024  Authorization Period Expiration: 12/31/24  Plan of Care Expiration: 10/1/2024  Reassessment: 9/6/24  Reassessment Due: 10/16/24  Visit # / Visits authorized:10/30 (+1 prior auth)  MedX testing visit 2    PTA Visit #: 1/5      Time In: 3:00 PM  Time Out: 4:05 PM  Total Billable Time:  55 minutes  INSURANCE and OUTCOMES: Fee for Service with FOTO Outcomes 3/3    Precautions: standard    Pattern of pain determined:  Pattern 1 PEN     Subjective     Tish Decker reports she is feeling better overall. Has one spot of localized pain.   Patient reports their pain to be 2/10 on a 0-10 scale with 0 being no pain and 10 being the worst pain imaginable.  Pain Location: low back, hips, groin, anterior thighs     Work and leisure: Occupation: retired  Leisure: work in [employer]   Pt goals: no pain    Objective      Lumbar  Isometric Testing on Med X equipment: Testing administered by PT    Test Initial Baseline Midpoint Final   Date 7/26/24 9/6/24    ROM 6-39 deg 3-51 deg    Max Peak Torque 65  93    Min Peak Torque 13  42    Flex/Ext Ratio 5:1 2.2:1    % variance  normative data -51% -31%    % change from initial test N/A visit 1 +36%          Outcomes:  Intake Score: 34%  Visit 6 Score: 14%  Visit 10 Score: 6%  Discharge Score:  Goal Score: 28%     Treatment     Tish received the treatments listed below:      Amarilis was seen for performance testing for spinal musculature  to engage spinal musculature correctly for isometric testing  within patient's range of motion, with comparison to baseline testing for  15 minutes.  This includes the MedX practice exercise component and practice and standard testing.  Med x dynamic exercise and testing performed with instructions to guide the patient safely through the isometric testing.  Patient informed to perform isometric test correctly, and safely, building best force they safely can and not pushing through pain.  Patient demonstrated understanding of information       Medical MedX Treatment as follows:    MedX exercise started day 3:  Patient received neuromuscular education for 15 minutes via participation on the Medical MedX Machine. Therapist assisted patient in isolating and engaging spinal stabilization musculature in order to improve functional ability and postural control. Patient performed exercise with therapist guidance in order to accurately use pacer function, avoid valsalva, and optimally exert effort within a safe and effective range via the Jenn Exertion Rating Scale. Patient instructed to perform at a midrange of exertion and to complete 15-20 repetitions within appropriate split time, with proper technique, and while maintaining safety.                 9/10/2024     9:36 AM   [facility]   Visit Number 11   VAS Pain Rating 4   Treadmill Time (in min.) 10 min   Speed 1.9 mph   Lumbar Stretches - Slouch Overcorrection 10   Flexion in Lying 10   Lumbar Weight 39 lbs   Repetitions 15   Rating of Perceived Exertion 3   Ice - Z Lie (in min.) 10             Tish participated in neuromuscular re-education activities to improve balance, coordination, proprioception, motor control and/or posture for 10 minutes. The following activities were included:  Posterior pelvic tilt x20  Slouch/overcorrect x10  Bridging 2 x 10    Tish participated in therapeutic exercises to develop strength, endurance, ROM, flexibility, posture, and core stabilization for 30 minutes including:     Bilateral  KTC 10x 5 sec  LTR x10  Piriformis stretch 3x 30 sec ea  Hip flexor stretch 3x 30 sec ea  Hip adductor stretch 3x 30 sec (butterfly)      Peripheral muscle strengthening which included one set of 15-20 repetitions at a slow and controlled 10-13 second per rep pace focused on strengthening supporting musculature in order to improve body mechanics and functional mobility. Patient and therapist focused on proper form during treatment to ensure optimal strengthening of each targeted muscle group.  Machines utilized included:Torso rotation, Hip Abd, Hip Add, and Leg Press,hip add/abd, Chest press, rowing, leg curl, leg extension, biceps and triceps.        Tish participated in dynamic functional therapeutic activities to improve functional performance and simulate household and community activities for 00  minutes. The following activities were included:      Tish received manual therapy techniques for 00  minutes. The following activities were included:      Pt given cold pack for 10 minutes to low back in Z-lie.   Patient Education and Home Exercises     Home exercises include:  Bilateral KTC  LTR  PPT  Bridging  Hip flexor stretch  Piriformis stretch  Adductor stretch  Slouch/overcorrect  Cardio program (V5): -8/22/24  Lifting education (V11): -9/10/24  Posture/Lumbar roll: instruct visit 1  [employer] Discharge handout (date given): -  Equipment at home/gym membership: no    Education provided:   - PT role and POC  - HEP  - instruct in importance of posture, daily stretching and contributing factors to balance difficulty    Written Home Exercises Provided: yes.  Exercises were reviewed and Tish was able to demonstrate them prior to the end of the session.  Tish demonstrated good  understanding of the education provided.     See EMR under Patient Instructions for exercises provided 7/26/2024.    Assessment   Pt able to increase weight on lumbar machine with no c/o pain and low RPE. She is progressing well  with torso and peripheral machines with good effort and muscle fatigue. Pt LB felt a little better post session. She is complaint with her HEP and encouraged her to cont. Issued pt correct lifting tech sheet and discussed the importance.     Reassessment 9/6/24:  Patient has attended 10 visits at [facility] which included MD evaluation, PT evaluation with isometric testing, and physical therapy treatment including HEP instruction, education, aerobic activity, dynamic strengthening on MedX equipment for the spine, and whole body strengthening on MedX equipment with increasing resistance. Patient demonstrates increased ability to reduce symptoms, improve posture, improve ROM, and improve strength, as stated below:    -Improved posture, he is using lumbar roll  -Improved lumbar ROM, 6-39 degrees initially on MedX test and 3-51 degrees currently .  -Improved strength at each test point on lumbar MedX isometric test with 36% average improvement noted with reduced pain noted by patient.  -Initial outcome tool score of 34% and current outcome tool score 6% indicating reduced pain and improved function.        Patient is making good progress towards established goals.  Pt will continue to benefit from skilled outpatient physical therapy to address the deficits stated in the impairment chart, provide pt/family education and to maximize pt's level of independence in the home and community environment.     Anticipated Barriers for therapy: none  Pt's spiritual, cultural and educational needs considered and pt agreeable to plan of care and goals as stated below:     Goals:   Short term goals:  6 weeks or 10 visits   - Pt will demonstrate increased lumbar MedX ROM by at least 3 degrees from the initial ROM value with improvements noted in functional ROM and ability to perform ADLs. Appropriate and Ongoing Met 9/6/24  - Pt will demonstrate increased MedX average isometric strength value by 20% from initial test resulting  in improved ability to perform bending, lifting, and carrying activities safely, confidently. Appropriate and Ongoing Met 9/6/24  - Pt will report a reduction in worst pain score by 1-2 points for improved tolerance for ADLs. Appropriate and Ongoing Met 9/6/24  - Pt able to perform HEP correctly with minimal cueing or supervision from therapist to encourage independent management of symptoms. Appropriate and Ongoing Met 9/6/24     Long term goals: 10 weeks or 20 visits   - Pt will demonstrate increased lumbar MedX ROM by at least 6 degrees from initial ROM value, resulting in improved ability to perform functional forward bending while standing and sitting. Appropriate and Ongoing Met 9/6/24   - Pt will demonstrate increased MedX average isometric strength value by 30% from initial test resulting in improved ability to perform bending, lifting, and carrying activities safely and confidently. Appropriate and Ongoing Met 9/6/24  - Pt to demonstrate ability to independently control and reduce their pain through posture positioning and mechanical movements throughout a typical day. Appropriate and Ongoing Progressing  - Pt will demonstrate reduced pain and improved functional outcomes as reported on the FOTO by reaching an intake score of >/= 28% functional ability in order to demonstrate subjective improvement in patient's condition. . Appropriate and Ongoing Met 9/6/24  - Pt will demonstrate independence with the HEP at discharge. Appropriate and Ongoing  - Pt will be able to resolve pain(patient goal) Appropriate and Ongoing   Added goal 9/6/24:  Pt will demonstrate increased MedX average isometric strength value by 46% from initial test resulting in improved ability to perform bending, lifting, and carrying activities safely and confidently. Appropriate and Ongoing  Plan     Continue with established Plan of Care towards established PT goals.     Therapist: Jeanie Cowart, PTA  9/12/2024

## 2024-09-12 ENCOUNTER — CLINICAL SUPPORT (OUTPATIENT)
Dept: REHABILITATION | Facility: HOSPITAL | Age: 84
End: 2024-09-12
Payer: MEDICARE

## 2024-09-12 DIAGNOSIS — M25.69 DECREASED ROM OF TRUNK AND BACK: Primary | ICD-10-CM

## 2024-09-12 DIAGNOSIS — R29.898 DECREASED STRENGTH OF TRUNK AND BACK: ICD-10-CM

## 2024-09-12 PROCEDURE — 97110 THERAPEUTIC EXERCISES: CPT | Mod: KX,PO | Performed by: PHYSICAL THERAPIST

## 2024-09-12 PROCEDURE — 97112 NEUROMUSCULAR REEDUCATION: CPT | Mod: KX,PO | Performed by: PHYSICAL THERAPIST
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
## 2024-09-13 ENCOUNTER — OFFICE VISIT (OUTPATIENT)
Dept: FAMILY MEDICINE | Facility: CLINIC | Age: 84
End: 2024-09-13
Payer: MEDICARE

## 2024-09-13 ENCOUNTER — DOCUMENTATION ONLY (OUTPATIENT)
Dept: REHABILITATION | Facility: HOSPITAL | Age: 84
End: 2024-09-13
Payer: MEDICARE

## 2024-09-13 VITALS
OXYGEN SATURATION: 97 % | DIASTOLIC BLOOD PRESSURE: 62 MMHG | SYSTOLIC BLOOD PRESSURE: 112 MMHG | HEART RATE: 72 BPM | BODY MASS INDEX: 24.63 KG/M2 | RESPIRATION RATE: 20 BRPM | HEIGHT: 62 IN | WEIGHT: 133.81 LBS

## 2024-09-13 DIAGNOSIS — F33.1 MODERATE EPISODE OF RECURRENT MAJOR DEPRESSIVE DISORDER: ICD-10-CM

## 2024-09-13 DIAGNOSIS — Z23 IMMUNIZATION DUE: Primary | ICD-10-CM

## 2024-09-13 PROCEDURE — 99999 PR PBB SHADOW E&M-EST. PATIENT-LVL IV: CPT | Mod: PBBFAC,,, | Performed by: FAMILY MEDICINE

## 2024-09-13 PROCEDURE — G0008 ADMIN INFLUENZA VIRUS VAC: HCPCS | Mod: PBBFAC,PN

## 2024-09-13 PROCEDURE — 99999PBSHW PR PBB SHADOW TECHNICAL ONLY FILED TO HB: Mod: PBBFAC,,,

## 2024-09-13 PROCEDURE — 99214 OFFICE O/P EST MOD 30 MIN: CPT | Mod: S$PBB,,, | Performed by: FAMILY MEDICINE

## 2024-09-13 PROCEDURE — 99214 OFFICE O/P EST MOD 30 MIN: CPT | Mod: PBBFAC,PN,25 | Performed by: FAMILY MEDICINE

## 2024-09-13 PROCEDURE — 90653 IIV ADJUVANT VACCINE IM: CPT | Mod: PBBFAC,PN

## 2024-09-13 RX ADMIN — INFLUENZA A VIRUS A/VICTORIA/4897/2022 IVR-238 (H1N1) ANTIGEN (FORMALDEHYDE INACTIVATED), INFLUENZA A VIRUS A/THAILAND/8/2022 IVR-237 (H3N2) ANTIGEN (FORMALDEHYDE INACTIVATED), INFLUENZA B VIRUS B/AUSTRIA/1359417/2021 BVR-26 ANTIGEN (FORMALDEHYDE INACTIVATED) 0.5 ML: 15; 15; 15 INJECTION, SUSPENSION INTRAMUSCULAR at 04:09

## 2024-09-13 NOTE — PROGRESS NOTES
Health  Consult Note    Name: Amarilis Decker  Clinic Number: [id number]  Physician: Maynor Santacruz*  Past Medical History:   Diagnosis Date    Asthma     GERD (gastroesophageal reflux disease)     Psoriasis      Time In:   Time Out:    Health  Agreement signed: yes     Coaching performed: face to face     Subjective:   Patient reports with the following her main goal reduce her stress. Pt has a problem with holding onto things that bother her. She appreciates that she is humble, thoughtful, and loving. The benefits of improving her mental health is being happier. Today, we will set a goal and talk about how she can manage her stress. The plan is to write in her journal every time she feels a stressor and rate how she feels in the moment.     Vision:  I plan to use a journal to write down any intense feelings and rate how I feel in that moment     Values: Restoration, family, feeling love    Strengths:  thoughtful,     Challenges:  her 's health,     Support:  her daughter , but really no one she is the support system for her      Hobbies:  fishing, tennis     Objective:  Amarilis was instructed       INITIAL date:   One a scale of 1-10, with 10 being 100% happy, how would you rate your happiness in each of the wellness areas below?    Happiness:         1     2     3     4     5     6     7     8     9     10    Initial Date: DC Date: +/- Total Change   Exercise/Movement      Physical Health      Stress Level      Nutrition      Sleep      Play      Body Image      Relationships      Energy/Vitality        Assessment:     Plan:  Patient goals for next consult include improve     Health : Debby Bennett  9/13/2024

## 2024-09-13 NOTE — PROGRESS NOTES
THIS DOCUMENT WAS MADE IN PART WITH VOICE RECOGNITION SOFTWARE.  OCCASIONALLY THIS SOFTWARE WILL MISINTERPRET WORDS OR PHRASES.    Assessment and Plan:    1. Immunization due  influenza (adjuvanted) (Fluad) 45 mcg/0.5 mL IM vaccine (> or = 64 yo) 0.5 mL      2. Moderate episode of recurrent major depressive disorder            Assessment & Plan    Considered changing antidepressant medication due to patient's reported anxiety and side effects  Reviewed patient's history with various antidepressants  Proposed Zoloft as a potential alternative, noting its generally good tolerability  Acknowledged patient's engagement with health  [facility] and its potential positive impact on mental health  Recognized patient's tendency for self-criticism and its impact on mental health    DEPRESSION:  - Discussed potential side effects of Zoloft, including nausea (1 in 6 chance), insomnia, sleepiness, and headaches.  - Explained that Zoloft has a similar side effect profile to Lexapro.  - Amarilis to continue working with health  [facility] for mental health support.  - Continued Lexapro 10 mg daily.  - Discontinued Wellbutrin due to reported side effects including anxiety and palpitations.  - Offered to start Zoloft as an alternative to Lexapro if patient experiences another bad dream and decides to switch.  - Contact the office if patient experiences another bad dream or wants to try Zoloft.    FLU VACCINATION:  - Administered flu vaccine in office.    FOLLOW UP:  - Follow up in 4 weeks.  - Message the doctor if needed between appointments, rather than waiting for the next scheduled visit.               ______________________________________________________________________  Subjective:    Chief Complaint:  Chief Complaint   Patient presents with    Anxiety    Follow-up        HPI:  Amarilis is a 83 y.o. year old         History of Present Illness    CHIEF COMPLAINT:  Amarilis presents today for follow up.    ANXIETY AND  "DEPRESSION:  She reports increased anxiety while waiting for her appointment, which she had been anticipating for 2.5 months. She expresses difficulty letting go of hurt feelings, particularly regarding past incidents with her children. She verbalizes feeling like a failure as a mother, despite efforts to raise responsible and stable children. She struggles with self-criticism and inability to overcome these negative thoughts. She also describes experiencing caregiver stress, noting challenges of caring for her hard-of-hearing , whose personality has changed. She mentions feeling frustrated with her caregiving responsibilities and the impact on her daily life.    MEDICATION HISTORY:  She reports a history of multiple antidepressant medications. She previously tried Wellbutrin, which initially helped with weight loss but caused anxiety and palpitations. She is currently taking Lexapro, which was started at 5 mg and increased to 10 mg. She reports bad dreams while on Lexapro, including one this morning. Prior to current regimen, she has tried buspirone, fluoxetine, and citalopram, all of which were ineffective or caused side effects.    THERAPY:  She is attending therapy sessions with [place] Bennett, a health  at [facility], which she finds helpful. She expresses that previous therapy experiences were unsuccessful, including sessions with a private therapist that cost $125 per hour. Initially reluctant to try therapy again due to past disappointments, she is now finding her current sessions beneficial. She has been encouraged to do homework as part of her therapy.    PHYSICAL THERAPY:  She is attending physical therapy sessions at [facility], which she finds beneficial for her body pain. She describes the sessions as "wonderful" and plans to continue with six more sessions before transitioning to monthly maintenance visits.    GASTROINTESTINAL:  She reports resolution of constipation with the use of magnesium " glycinate supplement, describing it as effective.    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 She expresses difficulty in managing his dietary restrictions, particularly his need to avoid salt. She describes having to modify her own meals to accommodate his needs. At nearly 84 years old, she acknowledges feeling frustrated at times with these caregiving responsibilities.    SLEEP:  She reports improved sleep overall, despite experiencing a bad dream this morning.      ROS:  General: -fever, -chills, -fatigue, -weight gain, -weight loss  Eyes: -vision changes, -redness, -discharge  ENT: -ear pain, -nasal congestion, -sore throat  Cardiovascular: -chest pain, -palpitations, -lower extremity edema  Respiratory: -cough, -shortness of breath  Gastrointestinal: -abdominal pain, -nausea, -vomiting, -diarrhea, -constipation, -blood in stool  Genitourinary: -dysuria, -hematuria, -frequency  Musculoskeletal: -joint pain, -muscle pain  Skin: -rash, -lesion  Neurological: -headache, -dizziness, -numbness, -tingling  Psychiatric: +anxiety, -depression, -sleep difficulty               Primary insomnia  Rx- Melatonin  Previous Rx- Remeron (ineffective), Doxepin (ineffective), trazodone (hangover)     GERD  Rx-pantoprazole 40 mg  Denies dysphagia      Depressed mood / Anxiety   Prev Rx-Lexapro (nightmares), buspirone (ineffective),  wellbutrin (constipation, imbalance, ineffective), Fluoxetine (itching), Citalopram   Stressor-fall out with daughter      Hypothyroidism  Current Rx-armor Thyroid 60 mg + 15 mg daily   Previous Rx-levothyroxine (ineffective)  Previous TSH in normal range   Endocrinology- Dr Fritz      Bronchiectasis, history of pulmonary nodules  Seen by pulmonology - Dr. 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\     Senile osteoporosis  Prev Rx-Boniva 150 (pt choice to quit)  Taking vitamin-D  supplement  No recent fractures     Seasonal allergies / chronic sinusitis   Rx-Flonase, Xyzal, montelukast, astelin  Does have recurrent sinusitis, seen by ENT ([place])      Psoriasis  Rx-betamethasone, clobetasol, desonide, Topicort  Followed by dermatology - Jesenia Loera MD      B12 deficiency  Rx-B12 injections 500 mcg every 2 weeks     Chronic Low back pain , sacroiliitis, ankylosing spondylitis   Rheum : Gregg Mensah MD   Pain Mgt : Bestbel   Rx : Mobic 15 mg , Norco 5 mg p.r.n., use sparingly     ALONZO  Sleep MD : Catarina Morfin  Awaiting machine    Chronic Constipation  OTC : Mg Glycinate           Past Medical History:  Past Medical History:   Diagnosis Date    Asthma     GERD (gastroesophageal reflux disease)     Psoriasis        Past Surgical History:  Past Surgical History:   Procedure Laterality Date    ADENOIDECTOMY      CHOLECYSTECTOMY      COLONOSCOPY  2019    unremarkable per pt report    DEXA      WNL    SINUS SURGERY      Dr Cordova    SINUS SURGERY  01/2017    xs 5    THYROID SURGERY      nodules removed //Dr Amato     TONSILLECTOMY      TYMPANOSTOMY TUBE PLACEMENT      UPPER GASTROINTESTINAL ENDOSCOPY         Family History:  Family History   Problem Relation Name Age of Onset    Hypertension Mother      Obesity Father      Diabetes Sister      Obesity Sister      Breast cancer Paternal Aunt  43    No Known Problems Maternal Grandmother      No Known Problems Maternal Grandfather      No Known Problems Paternal Grandmother      No Known Problems Paternal Grandfather      Thyroid cancer Daughter  61    Ovarian cancer Neg Hx      Colon cancer Neg Hx         Social History:  Social History     Socioeconomic History    Marital status:    Tobacco Use    Smoking status: Never    Smokeless tobacco: Never   Substance and Sexual Activity    Alcohol use: Yes     Comment: twice a month    Drug use: No    Sexual activity: Never     Social Determinants of Health     Financial Resource Strain: Low Risk   (10/23/2023)    Overall Financial Resource Strain (CARDIA)     Difficulty of Paying Living Expenses: Not hard at all   Food Insecurity: No Food Insecurity (10/23/2023)    Hunger Vital Sign     Worried About Running Out of Food in the Last Year: Never true     Ran Out of Food in the Last Year: Never true   Transportation Needs: Unknown (10/23/2023)    PRAPARE - Transportation     Lack of Transportation (Medical): No   Physical Activity: Inactive (10/23/2023)    Exercise Vital Sign     Days of Exercise per Week: 0 days     Minutes of Exercise per Session: 0 min   Stress: No Stress Concern Present (10/23/2023)    [employer] - Occupational Stress Questionnaire     Feeling of Stress : Not at all   Housing Stability: Low Risk  (10/23/2023)    Housing Stability Vital Sign     Unable to Pay for Housing in the Last Year: No     Number of Places Lived in the Last Year: 1     Unstable Housing in the Last Year: No       Medications:  Current Outpatient Medications on File Prior to Visit   Medication Sig Dispense Refill    betamethasone dipropionate 0.05 % cream Apply topically once daily. 90 g 5    cholecalciferol, vitamin D3, (VITAMIN D3) 50 mcg (2,000 unit) Tab Take 2,000 Units by mouth once daily.      clobetasol 0.05% (TEMOVATE) 0.05 % Oint 1 application once daily. Apply to affected area      desonide 0.05% (DESOWEN) 0.05 % Oint Apply topically 2 (two) times daily. 60 g 2    desoximetasone (TOPICORT) 0.05 % cream Apply topically 2 (two) times daily. 60 g 11    fluticasone propionate (FLONASE) 50 mcg/actuation nasal spray 1 spray (50 mcg total) by Each Nostril route once daily. 54 g 3    HYDROcodone-acetaminophen (NORCO) 5-325 mg per tablet Take 1 tablet by mouth every 8 (eight) hours as needed for Pain. 20 tablet 0    ketoconazole (NIZORAL) 2 % cream Apply topically.      ketoconazole (NIZORAL) 2 % shampoo Apply topically twice a week. 120 mL 3    levalbuterol (XOPENEX HFA) 45 mcg/actuation  inhaler Inhale 1-2 puffs into the lungs every 6 (six) hours as needed for Wheezing or Shortness of Breath (or before exercise). Rescue 15 g 0    levocetirizine (XYZAL) 5 MG tablet Take one capsule by mouth daily 90 tablet 0    MAGNESIUM ORAL Take by mouth once. Pt takes 400mg once daily      mupirocin (BACTROBAN) 2 % ointment SMARTSI Application Topical 2-3 Times Daily      thyroid, pork, (ARMOUR THYROID) 60 mg Tab Take 1 tablet (60 mg total) by mouth before breakfast. 90 tablet 3    azelastine (ASTELIN) 137 mcg (0.1 %) nasal spray 1 spray (137 mcg total) by Nasal route 2 (two) times daily. 30 mL 3    [DISCONTINUED] etanercept (ENBREL SURECLICK) 50 mg/mL (1 mL) Inject 1 mL (50 mg total) into the skin once a week. (Patient taking differently: Inject 50 mg into the skin once a week. Once a month) 12 mL 3     Current Facility-Administered Medications on File Prior to Visit   Medication Dose Route Frequency Provider Last Rate Last Admin    cyanocobalamin injection 1,000 mcg  1,000 mcg Intramuscular Q30 Days    1,000 mcg at 24 0857       Allergies:  Avelox [moxifloxacin], Bactrim [sulfamethoxazole-trimethoprim], Ciprofloxacin, Isothiazolinones, Apremilast, Bacitracin, and Wellbutrin [bupropion hcl]    Immunizations:  Immunization History   Administered Date(s) Administered    COVID-19 MRNA, LN-S PF (MODERNA HALF 0.25 ML DOSE) 2021    COVID-19 Vaccine 2021    COVID-19, vector-nr, rS-Ad26, PF (Johanna) 2021    Hepatitis A, Adult 2007    IPV 2007    Influenza 2011, 10/21/2013    Influenza (FLUAD) - Quadrivalent - Adjuvanted - PF *Preferred* (65+) 10/07/2021, 10/03/2022, 2023    Influenza (FLUAD) - Trivalent - Adjuvanted - PF (65+) 2024    Influenza - High Dose - PF (65 years and older) 10/14/2014, 10/08/2015, 2016    Influenza - Quadrivalent - MDCK - PF 10/10/2017    Influenza - Quadrivalent - PF *Preferred* (6 months and older) 10/29/2019, 2020     "Influenza - Trivalent (ADULT) 11/18/2011    Influenza - Trivalent - PF (ADULT) 10/21/2013    Pneumococcal Conjugate - 13 Valent 04/12/2016    Pneumococcal Polysaccharide - 23 Valent 08/01/2011    Td (Adult), Unspecified Formulation 08/02/2018    Tdap 08/09/2007, 08/01/2018    Zoster 07/11/2007       Review of Systems:  Review of Systems   All other systems reviewed and are negative.      Objective:    Vitals:  Vitals:    09/13/24 1511   BP: 112/62   Pulse: 72   Resp: 20   SpO2: 97%   Weight: 60.7 kg (133 lb 13.1 oz)   Height: 5' 2" (1.575 m)   PainSc: 0-No pain       Physical Exam  Vitals reviewed.   Constitutional:       General: She is not in acute distress.  HENT:      Head: Normocephalic and atraumatic.   Eyes:      Pupils: Pupils are equal, round, and reactive to light.   Cardiovascular:      Rate and Rhythm: Normal rate and regular rhythm.      Heart sounds: No murmur heard.     No friction rub.   Pulmonary:      Effort: Pulmonary effort is normal.      Breath sounds: Normal breath sounds.   Abdominal:      General: Bowel sounds are normal. There is no distension.      Palpations: Abdomen is soft.      Tenderness: There is no abdominal tenderness.   Musculoskeletal:      Cervical back: Neck supple.   Skin:     General: Skin is warm and dry.      Findings: No rash.   Psychiatric:         Behavior: Behavior normal.             Luc Teixeira MD  Family Medicine        "

## 2024-09-16 ENCOUNTER — CLINICAL SUPPORT (OUTPATIENT)
Dept: REHABILITATION | Facility: HOSPITAL | Age: 84
End: 2024-09-16
Payer: MEDICARE

## 2024-09-16 DIAGNOSIS — M25.69 DECREASED ROM OF TRUNK AND BACK: Primary | ICD-10-CM

## 2024-09-16 DIAGNOSIS — R29.898 DECREASED STRENGTH OF TRUNK AND BACK: ICD-10-CM

## 2024-09-16 PROCEDURE — 97112 NEUROMUSCULAR REEDUCATION: CPT | Mod: PO | Performed by: PHYSICAL THERAPIST

## 2024-09-16 PROCEDURE — 97110 THERAPEUTIC EXERCISES: CPT | Mod: PO | Performed by: PHYSICAL THERAPIST
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
## 2024-09-19 ENCOUNTER — CLINICAL SUPPORT (OUTPATIENT)
Dept: REHABILITATION | Facility: HOSPITAL | Age: 84
End: 2024-09-19
Payer: MEDICARE

## 2024-09-19 DIAGNOSIS — R29.898 DECREASED STRENGTH OF TRUNK AND BACK: ICD-10-CM

## 2024-09-19 DIAGNOSIS — M25.69 DECREASED ROM OF TRUNK AND BACK: Primary | ICD-10-CM

## 2024-09-19 PROCEDURE — 97110 THERAPEUTIC EXERCISES: CPT | Mod: PO,CQ

## 2024-09-19 PROCEDURE — 97112 NEUROMUSCULAR REEDUCATION: CPT | Mod: PO,CQ

## 2024-09-19 NOTE — PROGRESS NOTES
[facility]  Physical Therapy Treatment Note     Name: Amarilis Decker  Clinic Number: [id number]    Therapy Diagnosis:   Encounter Diagnoses   Name Primary?    Decreased ROM of trunk and back Yes    Decreased strength of trunk and back            Physician: Maynor Santacruz*    Visit Date: 9/19/2024    Physician Orders: PT Eval and Treat- Healthy [facility]  Medical Diagnosis from Referral: sacroiliitis  Evaluation Date: 7/22/2024  Authorization Period Expiration: 12/31/24  Plan of Care Expiration: 10/1/2024  Reassessment: 9/6/24  Reassessment Due: 10/16/24  Visit # / Visits authorized:13/30 (+1 prior auth)  MedX testing visit 2    PTA Visit #: 1/5      Time In: 1:00 PM  Time Out: 2:15 PM  Total Billable Time:  60 minutes  INSURANCE and OUTCOMES: Fee for Service with FOTO Outcomes 3/3    Precautions: standard    Pattern of pain determined:  Pattern 1 PEN     Subjective     Tish Decker reports having LBP again today.   Patient reports their pain to be 0/10 on a 0-10 scale with 0 being no pain and 10 being the worst pain imaginable.  Pain Location: low back, hips, groin, anterior thighs     Work and leisure: Occupation: retired  Leisure: work in greenhouse   Pt goals: no pain    Objective      Lumbar  Isometric Testing on Med X equipment: Testing administered by PT    Test Initial Baseline Midpoint Final   Date 7/26/24 9/6/24    ROM 6-39 deg 3-51 deg    Max Peak Torque 65  93    Min Peak Torque 13  42    Flex/Ext Ratio 5:1 2.2:1    % variance  normative data -51% -31%    % change from initial test N/A visit 1 +36%          Outcomes:  Intake Score: 34%  Visit 6 Score: 14%  Visit 10 Score: 6%  Discharge Score:  Goal Score: 28%     Treatment     Tish received the treatments listed below:      Medical MedX Treatment as follows:    MedX exercise started day 3:  Patient received neuromuscular education for 15 minutes via participation on the Medical MedX Machine.  Therapist assisted patient in isolating and engaging spinal stabilization musculature in order to improve functional ability and postural control. Patient performed exercise with therapist guidance in order to accurately use pacer function, avoid valsalva, and optimally exert effort within a safe and effective range via the Jenn Exertion Rating Scale. Patient instructed to perform at a midrange of exertion and to complete 15-20 repetitions within appropriate split time, with proper technique, and while maintaining safety.           9/19/2024     3:58 PM   [facility]   Visit Number 14   VAS Pain Rating 5   Recumbent Bike Seat Pos. 8   Time 10   Lumbar Stretches - Slouch Overcorrection 10   Flexion in Lying 10   Lumbar Weight 42 lbs   Repetitions 19   Rating of Perceived Exertion 5   Ice - Z Lie (in min.) 10        Tish participated in neuromuscular re-education activities to improve balance, coordination, proprioception, motor control and/or posture for 10 minutes. The following activities were included:  Posterior pelvic tilt x20  Slouch/overcorrect x10  Bridging 2 x 10    Tish participated in therapeutic exercises to develop strength, endurance, ROM, flexibility, posture, and core stabilization for 35 minutes including:     Bilateral KTC 10x 5 sec  LTR x10  Piriformis stretch 3x 30 sec ea  Hip flexor stretch 3x 30 sec ea  Hip adductor stretch 3x 30 sec (butterfly)    Peripheral muscle strengthening which included one set of 15-20 repetitions at a slow and controlled 10-13 second per rep pace focused on strengthening supporting musculature in order to improve body mechanics and functional mobility. Patient and therapist focused on proper form during treatment to ensure optimal strengthening of each targeted muscle group.  Machines utilized included:Torso rotation, Hip Abd, Hip Add, and Leg Press,hip add/abd, Chest press, rowing, leg curl, leg extension, biceps and triceps.      Tish participated in  dynamic functional therapeutic activities to improve functional performance and simulate household and community activities for 00  minutes. The following activities were included:      Tish received manual therapy techniques for 00  minutes. The following activities were included:  STM to B iliac area (Very tender to pt)    Pt given cold pack for 10 minutes to low back in Z-lie.   Patient Education and Home Exercises     Home exercises include:  Bilateral KTC  LTR  PPT  Bridging  Hip flexor stretch  Piriformis stretch  Adductor stretch  Slouch/overcorrect  Cardio program (V5): -8/22/24  Lifting education (V11): -9/10/24  Posture/Lumbar roll: instruct visit 1  Fridge [employer] Discharge handout (date given): -  Equipment at home/gym membership: no    Education provided:   - PT role and POC  - HEP  - instruct in importance of posture, daily stretching and contributing factors to balance difficulty    Written Home Exercises Provided: yes.  Exercises were reviewed and Tish was able to demonstrate them prior to the end of the session.  Tish demonstrated good  understanding of the education provided.     See EMR under Patient Instructions for exercises provided 7/26/2024.    Assessment     Tish did feel better post session. Pt did have more ROM with inner thigh/groin butterfly stretch. She was able to progress on MEDX today with less difficulty then last session. Pt very tender medical aspect of iliacs bilateral. Pt did feel torso machine was difficult today and needed to decrease weight. Attempt to increase weight back next visit.     Reassessment 9/6/24:  Patient has attended 10 visits at [facility] which included MD evaluation, PT evaluation with isometric testing, and physical therapy treatment including HEP instruction, education, aerobic activity, dynamic strengthening on MedX equipment for the spine, and whole body strengthening on MedX equipment with increasing resistance. Patient demonstrates  increased ability to reduce symptoms, improve posture, improve ROM, and improve strength, as stated below:    -Improved posture, he is using lumbar roll  -Improved lumbar ROM, 6-39 degrees initially on MedX test and 3-51 degrees currently .  -Improved strength at each test point on lumbar MedX isometric test with 36% average improvement noted with reduced pain noted by patient.  -Initial outcome tool score of 34% and current outcome tool score 6% indicating reduced pain and improved function.        Patient is making good progress towards established goals.  Pt will continue to benefit from skilled outpatient physical therapy to address the deficits stated in the impairment chart, provide pt/family education and to maximize pt's level of independence in the home and community environment.     Anticipated Barriers for therapy: none  Pt's spiritual, cultural and educational needs considered and pt agreeable to plan of care and goals as stated below:     Goals:   Short term goals:  6 weeks or 10 visits   - Pt will demonstrate increased lumbar MedX ROM by at least 3 degrees from the initial ROM value with improvements noted in functional ROM and ability to perform ADLs. Appropriate and Ongoing Met 9/6/24  - Pt will demonstrate increased MedX average isometric strength value by 20% from initial test resulting in improved ability to perform bending, lifting, and carrying activities safely, confidently. Appropriate and Ongoing Met 9/6/24  - Pt will report a reduction in worst pain score by 1-2 points for improved tolerance for ADLs. Appropriate and Ongoing Met 9/6/24  - Pt able to perform HEP correctly with minimal cueing or supervision from therapist to encourage independent management of symptoms. Appropriate and Ongoing Met 9/6/24     Long term goals: 10 weeks or 20 visits   - Pt will demonstrate increased lumbar MedX ROM by at least 6 degrees from initial ROM value, resulting in improved ability to perform functional  forward bending while standing and sitting. Appropriate and Ongoing Met 9/6/24   - Pt will demonstrate increased MedX average isometric strength value by 30% from initial test resulting in improved ability to perform bending, lifting, and carrying activities safely and confidently. Appropriate and Ongoing Met 9/6/24  - Pt to demonstrate ability to independently control and reduce their pain through posture positioning and mechanical movements throughout a typical day. Appropriate and Ongoing Progressing  - Pt will demonstrate reduced pain and improved functional outcomes as reported on the FOTO by reaching an intake score of >/= 28% functional ability in order to demonstrate subjective improvement in patient's condition. . Appropriate and Ongoing Met 9/6/24  - Pt will demonstrate independence with the HEP at discharge. Appropriate and Ongoing  - Pt will be able to resolve pain(patient goal) Appropriate and Ongoing   Added goal 9/6/24:  Pt will demonstrate increased MedX average isometric strength value by 46% from initial test resulting in improved ability to perform bending, lifting, and carrying activities safely and confidently. Appropriate and Ongoing  Plan     Continue with established Plan of Care towards established PT goals.     Therapist: Jeanei Cowart, PTA  9/19/2024

## 2024-09-23 ENCOUNTER — CLINICAL SUPPORT (OUTPATIENT)
Dept: REHABILITATION | Facility: HOSPITAL | Age: 84
End: 2024-09-23
Payer: MEDICARE

## 2024-09-23 DIAGNOSIS — M25.69 DECREASED ROM OF TRUNK AND BACK: Primary | ICD-10-CM

## 2024-09-23 DIAGNOSIS — R29.898 DECREASED STRENGTH OF TRUNK AND BACK: ICD-10-CM

## 2024-09-23 PROCEDURE — 97112 NEUROMUSCULAR REEDUCATION: CPT | Mod: KX,PO | Performed by: PHYSICAL THERAPIST

## 2024-09-23 PROCEDURE — 97110 THERAPEUTIC EXERCISES: CPT | Mod: KX,PO | Performed by: PHYSICAL THERAPIST

## 2024-09-23 NOTE — PROGRESS NOTES
[facility]  Physical Therapy Treatment Note     Name: Amarilis Decker  Clinic Number: [id number]    Therapy Diagnosis:   Encounter Diagnoses   Name Primary?    Decreased ROM of trunk and back Yes    Decreased strength of trunk and back            Physician: Maynor Santacruz*    Visit Date: 9/23/2024    Physician Orders: PT Eval and Treat- Healthy Back  Medical Diagnosis from Referral: sacroiliitis  Evaluation Date: 7/22/2024  Authorization Period Expiration: 12/31/24  Plan of Care Expiration: 10/1/2024  Reassessment: 9/6/24  Reassessment Due: 10/16/24  Visit # / Visits authorized:14/30 (+1 prior auth)  MedX testing visit 2    PTA Visit #: 0/5      Time In: 2:00 PM  Time Out: 3:13 PM  Total Billable Time:  63 minutes  INSURANCE and OUTCOMES: Fee for Service with FOTO Outcomes 3/3    Precautions: standard    Pattern of pain determined:  Pattern 1 PEN     Subjective     Tish Decker reports having a lot of stress recently. Pain is more in shoulders today.  Patient reports their pain to be 2-3/10 on a 0-10 scale with 0 being no pain and 10 being the worst pain imaginable.  Pain Location: low back, hips, groin, anterior thighs     Work and leisure: Occupation: retired  Leisure: work in [employer]   Pt goals: no pain    Objective      Lumbar  Isometric Testing on Med X equipment: Testing administered by PT    Test Initial Baseline Midpoint Final   Date 7/26/24 9/6/24    ROM 6-39 deg 3-51 deg    Max Peak Torque 65  93    Min Peak Torque 13  42    Flex/Ext Ratio 5:1 2.2:1    % variance  normative data -51% -31%    % change from initial test N/A visit 1 +36%          Outcomes:  Intake Score: 34%  Visit 6 Score: 14%  Visit 10 Score: 6%  Discharge Score:  Goal Score: 28%     Treatment     Tish received the treatments listed below:      Medical MedX Treatment as follows:    MedX exercise started day 3:  Patient received neuromuscular education for 15 minutes via  participation on the [employer] Machine. Therapist assisted patient in isolating and engaging spinal stabilization musculature in order to improve functional ability and postural control. Patient performed exercise with therapist guidance in order to accurately use pacer function, avoid valsalva, and optimally exert effort within a safe and effective range via the Jenn Exertion Rating Scale. Patient instructed to perform at a midrange of exertion and to complete 15-20 repetitions within appropriate split time, with proper technique, and while maintaining safety.           9/23/2024     2:41 PM   [facility]   Visit Number 15   VAS Pain Rating 2   Recumbent Bike Seat Pos. 8   Time 10   Lumbar Stretches - Slouch Overcorrection 10   Flexion in Lying 10   Lumbar Weight 42 lbs   Repetitions 20   Rating of Perceived Exertion 4   Ice - Z Lie (in min.) 10            Tish participated in neuromuscular re-education activities to improve balance, coordination, proprioception, motor control and/or posture for 10 minutes. The following activities were included:  Posterior pelvic tilt x20  Slouch/overcorrect x10  Bridging 2 x 10    Tish participated in therapeutic exercises to develop strength, endurance, ROM, flexibility, posture, and core stabilization for 38 minutes including:     Bilateral KTC 10x 5 sec  LTR x10  Piriformis stretch 3x 30 sec ea  Hip flexor stretch 3x 30 sec ea  Hip adductor stretch 3x 30 sec (butterfly)    Peripheral muscle strengthening which included one set of 15-20 repetitions at a slow and controlled 10-13 second per rep pace focused on strengthening supporting musculature in order to improve body mechanics and functional mobility. Patient and therapist focused on proper form during treatment to ensure optimal strengthening of each targeted muscle group.  Machines utilized included:Torso rotation, Hip Abd, Hip Add, and Leg Press,hip add/abd, Chest press, rowing, leg curl, leg extension,  biceps and triceps.      Tish participated in dynamic functional therapeutic activities to improve functional performance and simulate household and community activities for 00  minutes. The following activities were included:      Tish received manual therapy techniques for 00  minutes. The following activities were included:  STM to B iliac area (Very tender to pt)    Pt given cold pack for 10 minutes to low back in Z-lie.   Patient Education and Home Exercises     Home exercises include:  Bilateral KTC  LTR  PPT  Bridging  Hip flexor stretch  Piriformis stretch  Adductor stretch  Slouch/overcorrect  Cardio program (V5): -8/22/24  Lifting education (V11): -9/10/24  Posture/Lumbar roll: instruct visit 1  Fridge Magnet Discharge handout (date given): -  Equipment at home/gym membership: no    Education provided:   - PT role and POC  - HEP  - instruct in importance of posture, daily stretching and contributing factors to balance difficulty    Written Home Exercises Provided: yes.  Exercises were reviewed and Tish was able to demonstrate them prior to the end of the session.  Tish demonstrated good  understanding of the education provided.     See EMR under Patient Instructions for exercises provided 7/26/2024.    Assessment     Pt somewhat frustrated because she still feels exercises are difficult. Tried to encourage pt that she is slowly progressing with resistance, but as it gets easier, we are increasing the resistance, so it feels hard again. Overall, pt is doing much better. She did take pain meds yesterday, but has taken much less overall. Expressed importance of continuing with daily stretches, even when have pain.  Reassessment 9/6/24:  Patient has attended 10 visits at [facility] which included MD evaluation, PT evaluation with isometric testing, and physical therapy treatment including HEP instruction, education, aerobic activity, dynamic strengthening on MedX equipment for the spine, and  whole body strengthening on MedX equipment with increasing resistance. Patient demonstrates increased ability to reduce symptoms, improve posture, improve ROM, and improve strength, as stated below:    -Improved posture, he is using lumbar roll  -Improved lumbar ROM, 6-39 degrees initially on MedX test and 3-51 degrees currently .  -Improved strength at each test point on lumbar MedX isometric test with 36% average improvement noted with reduced pain noted by patient.  -Initial outcome tool score of 34% and current outcome tool score 6% indicating reduced pain and improved function.        Patient is making good progress towards established goals.  Pt will continue to benefit from skilled outpatient physical therapy to address the deficits stated in the impairment chart, provide pt/family education and to maximize pt's level of independence in the home and community environment.     Anticipated Barriers for therapy: none  Pt's spiritual, cultural and educational needs considered and pt agreeable to plan of care and goals as stated below:     Goals:   Short term goals:  6 weeks or 10 visits   - Pt will demonstrate increased lumbar MedX ROM by at least 3 degrees from the initial ROM value with improvements noted in functional ROM and ability to perform ADLs. Appropriate and Ongoing Met 9/6/24  - Pt will demonstrate increased MedX average isometric strength value by 20% from initial test resulting in improved ability to perform bending, lifting, and carrying activities safely, confidently. Appropriate and Ongoing Met 9/6/24  - Pt will report a reduction in worst pain score by 1-2 points for improved tolerance for ADLs. Appropriate and Ongoing Met 9/6/24  - Pt able to perform HEP correctly with minimal cueing or supervision from therapist to encourage independent management of symptoms. Appropriate and Ongoing Met 9/6/24     Long term goals: 10 weeks or 20 visits   - Pt will demonstrate increased lumbar MedX ROM by at  least 6 degrees from initial ROM value, resulting in improved ability to perform functional forward bending while standing and sitting. Appropriate and Ongoing Met 9/6/24   - Pt will demonstrate increased MedX average isometric strength value by 30% from initial test resulting in improved ability to perform bending, lifting, and carrying activities safely and confidently. Appropriate and Ongoing Met 9/6/24  - Pt to demonstrate ability to independently control and reduce their pain through posture positioning and mechanical movements throughout a typical day. Appropriate and Ongoing Progressing  - Pt will demonstrate reduced pain and improved functional outcomes as reported on the FOTO by reaching an intake score of >/= 28% functional ability in order to demonstrate subjective improvement in patient's condition. . Appropriate and Ongoing Met 9/6/24  - Pt will demonstrate independence with the HEP at discharge. Appropriate and Ongoing  - Pt will be able to resolve pain(patient goal) Appropriate and Ongoing   Added goal 9/6/24:  Pt will demonstrate increased MedX average isometric strength value by 46% from initial test resulting in improved ability to perform bending, lifting, and carrying activities safely and confidently. Appropriate and Ongoing  Plan     Continue with established Plan of Care towards established PT goals.     Therapist: Viktoria Chávez, PT  9/23/2024

## 2024-09-27 ENCOUNTER — DOCUMENTATION ONLY (OUTPATIENT)
Dept: REHABILITATION | Facility: HOSPITAL | Age: 84
End: 2024-09-27
Payer: MEDICARE

## 2024-09-27 NOTE — PROGRESS NOTES
Health  Consult Note    Name: Amarilis Decker  Clinic Number: [id number]  Physician: Maynor Santacruz*  Past Medical History:   Diagnosis Date    Asthma     GERD (gastroesophageal reflux disease)     Psoriasis      Time In:   Time Out:    Health  Agreement signed: yes     Coaching performed: face to face     Subjective:   Patient reports with the following her main goal reduce her stress. Pt has a problem with holding onto things that bother her. She appreciates that she is humble, thoughtful, and loving. The benefits of improving her mental health is being happier. Today, we went over some of her stressors. Was able to write down two incidents- she will continue to down incidents this coming week again so we can pinpoint what is bothering her. Pt talks about how her mental health has been effected by her marriage. Goals for this week Is to write down ideas that she can do to quiet down her mind- shopping is the best thing. Pt is currently taking lexapro but is wanting to change her medication.     Vision:  I plan to use a journal to write down any intense feelings and rate how I feel in that moment     Values: Taoism, family, feeling love    Strengths:  thoughtful,     Challenges:  her 's health,     Support:  her daughter , but really no one she is the support system for her      Hobbies:  fishing, tennis     Objective:  Amarilis was instructed to write down her stressors again,       INITIAL date:   One a scale of 1-10, with 10 being 100% happy, how would you rate your happiness in each of the wellness areas below?    Happiness:         1     2     3     4     5     6     7     8     9     10    Initial Date: DC Date: +/- Total Change   Exercise/Movement      Physical Health      Stress Level      Nutrition      Sleep      Play      Body Image      Relationships      Energy/Vitality        Assessment:     Plan:  Patient goals for next consult include to write in her journal every  time she feels a stressor and rate how she feels in the moment.     Health : Debby Bennett  9/27/2024

## 2024-09-30 ENCOUNTER — CLINICAL SUPPORT (OUTPATIENT)
Dept: REHABILITATION | Facility: HOSPITAL | Age: 84
End: 2024-09-30
Payer: MEDICARE

## 2024-09-30 DIAGNOSIS — M25.69 DECREASED ROM OF TRUNK AND BACK: Primary | ICD-10-CM

## 2024-09-30 DIAGNOSIS — R29.898 DECREASED STRENGTH OF TRUNK AND BACK: ICD-10-CM

## 2024-09-30 PROCEDURE — 97112 NEUROMUSCULAR REEDUCATION: CPT | Mod: KX,PO | Performed by: PHYSICAL THERAPIST

## 2024-09-30 PROCEDURE — 97110 THERAPEUTIC EXERCISES: CPT | Mod: KX,PO | Performed by: PHYSICAL THERAPIST

## 2024-09-30 NOTE — PROGRESS NOTES
[facility]  Physical Therapy Treatment Note     Name: Amarilis Decker  Clinic Number: [id number]    Therapy Diagnosis:   Encounter Diagnoses   Name Primary?    Decreased ROM of trunk and back Yes    Decreased strength of trunk and back            Physician: Maynor Santacruz*    Visit Date: 9/30/2024    Physician Orders: PT Eval and Treat- Healthy Back  Medical Diagnosis from Referral: sacroiliitis  Evaluation Date: 7/22/2024  Authorization Period Expiration: 12/31/24  Plan of Care Expiration: 10/1/2024  Reassessment: 9/6/24  Reassessment Due: 10/16/24  Visit # / Visits authorized:15/30 (+1 prior auth)  MedX testing visit 2    PTA Visit #: 0/5      Time In: 2:00 PM  Time Out: 3:10PM  Total Billable Time:  60 minutes  INSURANCE and OUTCOMES: Fee for Service with FOTO Outcomes 3/3    Precautions: standard    Pattern of pain determined:  Pattern 1 PEN     Subjective     Tish Decker reports she feels like weights have been too much for her and she is having increased pain all over. She requests to try reducing weights because she felt much better when doing lighter weights initially.  Patient reports their pain to be 4/10 on a 0-10 scale with 0 being no pain and 10 being the worst pain imaginable.  Pain Location: low back, hips, groin, anterior thighs     Work and leisure: Occupation: retired  Leisure: work in [employer]   Pt goals: no pain    Objective      Lumbar  Isometric Testing on Med X equipment: Testing administered by PT    Test Initial Baseline Midpoint Final   Date 7/26/24 9/6/24    ROM 6-39 deg 3-51 deg    Max Peak Torque 65  93    Min Peak Torque 13  42    Flex/Ext Ratio 5:1 2.2:1    % variance  normative data -51% -31%    % change from initial test N/A visit 1 +36%          Outcomes:  Intake Score: 34%  Visit 6 Score: 14%  Visit 10 Score: 6%  Discharge Score:  Goal Score: 28%     Treatment     Tish received the treatments listed below:       Medical MedX Treatment as follows:    MedX exercise started day 3:  Patient received neuromuscular education for 15 minutes via participation on the Medical MedX Machine. Therapist assisted patient in isolating and engaging spinal stabilization musculature in order to improve functional ability and postural control. Patient performed exercise with therapist guidance in order to accurately use pacer function, avoid valsalva, and optimally exert effort within a safe and effective range via the Jenn Exertion Rating Scale. Patient instructed to perform at a midrange of exertion and to complete 15-20 repetitions within appropriate split time, with proper technique, and while maintaining safety.           9/30/2024     2:29 PM   [facility]   Visit Number 16   VAS Pain Rating 4   Recumbent Bike Seat Pos. 8   Time 10   Lumbar Stretches - Slouch Overcorrection 10   Flexion in Lying 10   Lumbar Weight 39 lbs   Repetitions 15   Rating of Perceived Exertion 4   Ice - Z Lie (in min.) 10                Tish participated in neuromuscular re-education activities to improve balance, coordination, proprioception, motor control and/or posture for 10 minutes. The following activities were included:  Posterior pelvic tilt x20  Slouch/overcorrect x10  Bridging 2 x 10    Tish participated in therapeutic exercises to develop strength, endurance, ROM, flexibility, posture, and core stabilization for 35 minutes including:     Bilateral KTC 10x 5 sec  LTR x10  Piriformis stretch 3x 30 sec ea  Hip flexor stretch 3x 30 sec ea  Hip adductor stretch 3x 30 sec (butterfly)    Peripheral muscle strengthening which included one set of 15-20 repetitions at a slow and controlled 10-13 second per rep pace focused on strengthening supporting musculature in order to improve body mechanics and functional mobility. Patient and therapist focused on proper form during treatment to ensure optimal strengthening of each targeted muscle group.   Machines utilized included:Torso rotation, Hip Abd, Hip Add, and Leg Press,hip add/abd, Chest press, rowing, leg curl, leg extension, biceps and triceps.      Tish participated in dynamic functional therapeutic activities to improve functional performance and simulate household and community activities for 00  minutes. The following activities were included:      Tish received manual therapy techniques for 00  minutes. The following activities were included:  STM to B iliac area (Very tender to pt)    Pt given cold pack for 10 minutes to low back in Z-lie.   Patient Education and Home Exercises     Home exercises include:  Bilateral KTC  LTR  PPT  Bridging  Hip flexor stretch  Piriformis stretch  Adductor stretch  Slouch/overcorrect  Cardio program (V5): -8/22/24  Lifting education (V11): -9/10/24  Posture/Lumbar roll: instruct visit 1  [employer] Discharge handout (date given): -  Equipment at home/gym membership: no    Education provided:   - PT role and POC  - HEP  - instruct in importance of posture, daily stretching and contributing factors to balance difficulty    Written Home Exercises Provided: yes.  Exercises were reviewed and Tish was able to demonstrate them prior to the end of the session.  Tish demonstrated good  understanding of the education provided.     See EMR under Patient Instructions for exercises provided 7/26/2024.    Assessment     Decreased resistance and reps in all areas with all exercises and activities today as pt has been feeling worse after last few sessions. She feels it is too much to progress to heavier resistance at this point. Tolerated all with perceived exertion of 4/10. Will remain at this level next visit, pending tolerance afterwards.  Reassessment 9/6/24:  Patient has attended 10 visits at [facility] which included MD evaluation, PT evaluation with isometric testing, and physical therapy treatment including HEP instruction, education, aerobic activity,  dynamic strengthening on MedX equipment for the spine, and whole body strengthening on MedX equipment with increasing resistance. Patient demonstrates increased ability to reduce symptoms, improve posture, improve ROM, and improve strength, as stated below:    -Improved posture, he is using lumbar roll  -Improved lumbar ROM, 6-39 degrees initially on MedX test and 3-51 degrees currently .  -Improved strength at each test point on lumbar MedX isometric test with 36% average improvement noted with reduced pain noted by patient.  -Initial outcome tool score of 34% and current outcome tool score 6% indicating reduced pain and improved function.        Patient is making good progress towards established goals.  Pt will continue to benefit from skilled outpatient physical therapy to address the deficits stated in the impairment chart, provide pt/family education and to maximize pt's level of independence in the home and community environment.     Anticipated Barriers for therapy: none  Pt's spiritual, cultural and educational needs considered and pt agreeable to plan of care and goals as stated below:     Goals:   Short term goals:  6 weeks or 10 visits   - Pt will demonstrate increased lumbar MedX ROM by at least 3 degrees from the initial ROM value with improvements noted in functional ROM and ability to perform ADLs. Appropriate and Ongoing Met 9/6/24  - Pt will demonstrate increased MedX average isometric strength value by 20% from initial test resulting in improved ability to perform bending, lifting, and carrying activities safely, confidently. Appropriate and Ongoing Met 9/6/24  - Pt will report a reduction in worst pain score by 1-2 points for improved tolerance for ADLs. Appropriate and Ongoing Met 9/6/24  - Pt able to perform HEP correctly with minimal cueing or supervision from therapist to encourage independent management of symptoms. Appropriate and Ongoing Met 9/6/24     Long term goals: 10 weeks or 20  visits   - Pt will demonstrate increased lumbar MedX ROM by at least 6 degrees from initial ROM value, resulting in improved ability to perform functional forward bending while standing and sitting. Appropriate and Ongoing Met 9/6/24   - Pt will demonstrate increased MedX average isometric strength value by 30% from initial test resulting in improved ability to perform bending, lifting, and carrying activities safely and confidently. Appropriate and Ongoing Met 9/6/24  - Pt to demonstrate ability to independently control and reduce their pain through posture positioning and mechanical movements throughout a typical day. Appropriate and Ongoing Progressing  - Pt will demonstrate reduced pain and improved functional outcomes as reported on the FOTO by reaching an intake score of >/= 28% functional ability in order to demonstrate subjective improvement in patient's condition. . Appropriate and Ongoing Met 9/6/24  - Pt will demonstrate independence with the HEP at discharge. Appropriate and Ongoing  - Pt will be able to resolve pain(patient goal) Appropriate and Ongoing   Added goal 9/6/24:  Pt will demonstrate increased MedX average isometric strength value by 46% from initial test resulting in improved ability to perform bending, lifting, and carrying activities safely and confidently. Appropriate and Ongoing  Plan     Continue with established Plan of Care towards established PT goals.     Therapist: Viktoria Chávez, PT  9/30/2024

## 2024-10-03 ENCOUNTER — CLINICAL SUPPORT (OUTPATIENT)
Dept: REHABILITATION | Facility: HOSPITAL | Age: 84
End: 2024-10-03
Payer: MEDICARE

## 2024-10-03 DIAGNOSIS — M25.69 DECREASED ROM OF TRUNK AND BACK: Primary | ICD-10-CM

## 2024-10-03 DIAGNOSIS — M46.1 SACROILIITIS, NOT ELSEWHERE CLASSIFIED: ICD-10-CM

## 2024-10-03 DIAGNOSIS — R29.898 DECREASED STRENGTH OF TRUNK AND BACK: ICD-10-CM

## 2024-10-03 PROCEDURE — 97112 NEUROMUSCULAR REEDUCATION: CPT | Mod: PO,CQ

## 2024-10-03 PROCEDURE — 97110 THERAPEUTIC EXERCISES: CPT | Mod: PO,CQ

## 2024-10-03 NOTE — PROGRESS NOTES
[facility]  Physical Therapy Treatment Note     Name: Amarilis Decker  Clinic Number: [id number]    Therapy Diagnosis:   Encounter Diagnoses   Name Primary?    Decreased ROM of trunk and back Yes    Decreased strength of trunk and back     Sacroiliitis, not elsewhere classified              Physician: Maynor Santacruz*    Visit Date: 10/3/2024    Physician Orders: PT Eval and Treat- Healthy Back  Medical Diagnosis from Referral: sacroiliitis  Evaluation Date: 7/22/2024  Authorization Period Expiration: 12/31/24  Plan of Care Expiration: 10/1/2024  Reassessment: 9/6/24  Reassessment Due: 10/16/24  Visit # / Visits authorized:16/30 (+1 prior auth)  MedX testing visit 2    PTA Visit #: 1/5      Time In: 2:00 PM  Time Out: 3:20PM  Total Billable Time:  60 minutes  INSURANCE and OUTCOMES: Fee for Service with FOTO Outcomes 3/3    Precautions: standard    Pattern of pain determined:  Pattern 1 PEN     Subjective     Tish Decker reports she told Viktoria to back off on the weight b/c she was receiving pain post sessions. She has been feeling better since maintaining weights.  Patient reports their pain to be 4/10 on a 0-10 scale with 0 being no pain and 10 being the worst pain imaginable.  Pain Location: low back, hips, groin, anterior thighs     Work and leisure: Occupation: retired  Leisure: work in [employer]   Pt goals: no pain    Objective      Lumbar  Isometric Testing on Med X equipment: Testing administered by PT    Test Initial Baseline Midpoint Final   Date 7/26/24 9/6/24    ROM 6-39 deg 3-51 deg    Max Peak Torque 65  93    Min Peak Torque 13  42    Flex/Ext Ratio 5:1 2.2:1    % variance  normative data -51% -31%    % change from initial test N/A visit 1 +36%          Outcomes:  Intake Score: 34%  Visit 6 Score: 14%  Visit 10 Score: 6%  Discharge Score:  Goal Score: 28%     Treatment     Tish received the treatments listed below:      Medical MedX  Treatment as follows:    MedX exercise started day 3:  Patient received neuromuscular education for 15 minutes via participation on the Medical MedX Machine. Therapist assisted patient in isolating and engaging spinal stabilization musculature in order to improve functional ability and postural control. Patient performed exercise with therapist guidance in order to accurately use pacer function, avoid valsalva, and optimally exert effort within a safe and effective range via the Jenn Exertion Rating Scale. Patient instructed to perform at a midrange of exertion and to complete 15-20 repetitions within appropriate split time, with proper technique, and while maintaining safety.               10/3/2024     2:34 PM   [facility]   Visit Number 17   VAS Pain Rating 4   Recumbent Bike Seat Pos. 8   Time 10   Lumbar Stretches - Slouch Overcorrection 10   Flexion in Lying 10   Lumbar Weight 39 lbs   Repetitions 16   Rating of Perceived Exertion 3   Ice - Z Lie (in min.) 10           Tish participated in neuromuscular re-education activities to improve balance, coordination, proprioception, motor control and/or posture for 10 minutes. The following activities were included:  Posterior pelvic tilt x20  Slouch/overcorrect x10  Bridging 2 x 10    Tish participated in therapeutic exercises to develop strength, endurance, ROM, flexibility, posture, and core stabilization for 35 minutes including:     Bilateral KTC 10x 5 sec  LTR x10  Piriformis stretch 3x 30 sec ea  Hip flexor stretch 3x 30 sec ea  Hip adductor stretch 3x 30 sec (butterfly)    Peripheral muscle strengthening which included one set of 15-20 repetitions at a slow and controlled 10-13 second per rep pace focused on strengthening supporting musculature in order to improve body mechanics and functional mobility. Patient and therapist focused on proper form during treatment to ensure optimal strengthening of each targeted muscle group.  Machines utilized  included:Torso rotation, Hip Abd, Hip Add, and Leg Press,hip add/abd, Chest press, rowing, leg curl, leg extension, biceps and triceps.      Tish participated in dynamic functional therapeutic activities to improve functional performance and simulate household and community activities for 00  minutes. The following activities were included:      Tish received manual therapy techniques for 00  minutes. The following activities were included:  STM to B iliac area (Very tender to pt)    Pt given cold pack for 10 minutes to low back in Z-lie.   Patient Education and Home Exercises     Home exercises include:  Bilateral KTC  LTR  PPT  Bridging  Hip flexor stretch  Piriformis stretch  Adductor stretch  Slouch/overcorrect  Cardio program (V5): -8/22/24  Lifting education (V11): -9/10/24  Posture/Lumbar roll: instruct visit 1  [employer] Discharge handout (date given): -  Equipment at home/gym membership: no    Education provided:   - PT role and POC  - HEP  - instruct in importance of posture, daily stretching and contributing factors to balance difficulty    Written Home Exercises Provided: yes.  Exercises were reviewed and Tish was able to demonstrate them prior to the end of the session.  Tish demonstrated good  understanding of the education provided.     See EMR under Patient Instructions for exercises provided 7/26/2024.    Assessment     Pt able to progress to 1 more rep on lumbar machine. Maintained weight on peripheral machines due to cont to be difficult, but able to increase 1 reps on a few. No adverse affect from session. Overall, pt has been feeling better since no increase in weight intervention.  Will remain at this level next visit, pending tolerance afterwards.  Reassessment 9/6/24:  Patient has attended 10 visits at [facility] which included MD evaluation, PT evaluation with isometric testing, and physical therapy treatment including HEP instruction, education, aerobic activity,  dynamic strengthening on MedX equipment for the spine, and whole body strengthening on MedX equipment with increasing resistance. Patient demonstrates increased ability to reduce symptoms, improve posture, improve ROM, and improve strength, as stated below:    -Improved posture, he is using lumbar roll  -Improved lumbar ROM, 6-39 degrees initially on MedX test and 3-51 degrees currently .  -Improved strength at each test point on lumbar MedX isometric test with 36% average improvement noted with reduced pain noted by patient.  -Initial outcome tool score of 34% and current outcome tool score 6% indicating reduced pain and improved function.        Patient is making good progress towards established goals.  Pt will continue to benefit from skilled outpatient physical therapy to address the deficits stated in the impairment chart, provide pt/family education and to maximize pt's level of independence in the home and community environment.     Anticipated Barriers for therapy: none  Pt's spiritual, cultural and educational needs considered and pt agreeable to plan of care and goals as stated below:     Goals:   Short term goals:  6 weeks or 10 visits   - Pt will demonstrate increased lumbar MedX ROM by at least 3 degrees from the initial ROM value with improvements noted in functional ROM and ability to perform ADLs. Appropriate and Ongoing Met 9/6/24  - Pt will demonstrate increased MedX average isometric strength value by 20% from initial test resulting in improved ability to perform bending, lifting, and carrying activities safely, confidently. Appropriate and Ongoing Met 9/6/24  - Pt will report a reduction in worst pain score by 1-2 points for improved tolerance for ADLs. Appropriate and Ongoing Met 9/6/24  - Pt able to perform HEP correctly with minimal cueing or supervision from therapist to encourage independent management of symptoms. Appropriate and Ongoing Met 9/6/24     Long term goals: 10 weeks or 20  visits   - Pt will demonstrate increased lumbar MedX ROM by at least 6 degrees from initial ROM value, resulting in improved ability to perform functional forward bending while standing and sitting. Appropriate and Ongoing Met 9/6/24   - Pt will demonstrate increased MedX average isometric strength value by 30% from initial test resulting in improved ability to perform bending, lifting, and carrying activities safely and confidently. Appropriate and Ongoing Met 9/6/24  - Pt to demonstrate ability to independently control and reduce their pain through posture positioning and mechanical movements throughout a typical day. Appropriate and Ongoing Progressing  - Pt will demonstrate reduced pain and improved functional outcomes as reported on the FOTO by reaching an intake score of >/= 28% functional ability in order to demonstrate subjective improvement in patient's condition. . Appropriate and Ongoing Met 9/6/24  - Pt will demonstrate independence with the HEP at discharge. Appropriate and Ongoing  - Pt will be able to resolve pain(patient goal) Appropriate and Ongoing   Added goal 9/6/24:  Pt will demonstrate increased MedX average isometric strength value by 46% from initial test resulting in improved ability to perform bending, lifting, and carrying activities safely and confidently. Appropriate and Ongoing  Plan     Continue with established Plan of Care towards established PT goals.     Therapist: Jeanie Cowart, PTA  10/3/2024

## 2024-10-07 ENCOUNTER — CLINICAL SUPPORT (OUTPATIENT)
Dept: REHABILITATION | Facility: HOSPITAL | Age: 84
End: 2024-10-07
Payer: MEDICARE

## 2024-10-07 ENCOUNTER — DOCUMENTATION ONLY (OUTPATIENT)
Dept: REHABILITATION | Facility: HOSPITAL | Age: 84
End: 2024-10-07
Payer: MEDICARE

## 2024-10-07 DIAGNOSIS — M46.1 SACROILIITIS, NOT ELSEWHERE CLASSIFIED: ICD-10-CM

## 2024-10-07 DIAGNOSIS — M25.69 DECREASED ROM OF TRUNK AND BACK: Primary | ICD-10-CM

## 2024-10-07 DIAGNOSIS — R29.898 DECREASED STRENGTH OF TRUNK AND BACK: ICD-10-CM

## 2024-10-07 PROCEDURE — 97110 THERAPEUTIC EXERCISES: CPT | Mod: PO,CQ

## 2024-10-07 PROCEDURE — 97112 NEUROMUSCULAR REEDUCATION: CPT | Mod: PO,CQ

## 2024-10-07 NOTE — PROGRESS NOTES
[facility] - [facility]  Physical Therapy Treatment Note     Name: Amarilis Decker  Clinic Number: [id number]    Therapy Diagnosis:   Encounter Diagnoses   Name Primary?    Decreased ROM of trunk and back Yes    Decreased strength of trunk and back     Sacroiliitis, not elsewhere classified                Physician: Maynor Santacruz*    Visit Date: 10/7/2024    Physician Orders: PT Eval and Treat- Healthy Back  Medical Diagnosis from Referral: sacroiliitis  Evaluation Date: 7/22/2024  Authorization Period Expiration: 12/31/24  Plan of Care Expiration: 10/1/2024  Reassessment: 9/6/24  Reassessment Due: 10/16/24  Visit # / Visits authorized:16/30 (+1 prior auth)  MedX testing visit 2    PTA Visit #: 1/5      Time In: 2:00 PM  Time Out: 3:20PM  Total Billable Time:  60 minutes  INSURANCE and OUTCOMES: Fee for Service with FOTO Outcomes 3/3    Precautions: standard    Pattern of pain determined:  Pattern 1 PEN     Subjective     Tish Decker reports she feels she needs to reduce the weight more b/c she continues to be sore after.   Patient reports their pain to be 5/10 on a 0-10 scale with 0 being no pain and 10 being the worst pain imaginable.  Pain Location: low back, hips, groin, anterior thighs     Work and leisure: Occupation: retired  Leisure: work in [employer]   Pt goals: no pain    Objective      Lumbar  Isometric Testing on Med X equipment: Testing administered by PT    Test Initial Baseline Midpoint Final   Date 7/26/24 9/6/24    ROM 6-39 deg 3-51 deg    Max Peak Torque 65  93    Min Peak Torque 13  42    Flex/Ext Ratio 5:1 2.2:1    % variance  normative data -51% -31%    % change from initial test N/A visit 1 +36%          Outcomes:  Intake Score: 34%  Visit 6 Score: 14%  Visit 10 Score: 6%  Discharge Score:  Goal Score: 28%     Treatment     Tish received the treatments listed below:      Medical MedX Treatment as follows:    MedX exercise started day 3:   Patient received neuromuscular education for 15 minutes via participation on the Today Tix Machine. Therapist assisted patient in isolating and engaging spinal stabilization musculature in order to improve functional ability and postural control. Patient performed exercise with therapist guidance in order to accurately use pacer function, avoid valsalva, and optimally exert effort within a safe and effective range via the Jenn Exertion Rating Scale. Patient instructed to perform at a midrange of exertion and to complete 15-20 repetitions within appropriate split time, with proper technique, and while maintaining safety.             10/7/2024     2:31 PM   [facility]   Visit Number 18   VAS Pain Rating 5   Recumbent Bike Seat Pos. 8   Time 10   Lumbar Stretches - Slouch Overcorrection 10   Flexion in Lying 10   Lumbar Weight 39 lbs   Repetitions 15   Rating of Perceived Exertion 3   Ice - Z Lie (in min.) 10           Tish participated in neuromuscular re-education activities to improve balance, coordination, proprioception, motor control and/or posture for 10 minutes. The following activities were included:  Posterior pelvic tilt x20  Slouch/overcorrect x10  Bridging 2 x 10    Tish participated in therapeutic exercises to develop strength, endurance, ROM, flexibility, posture, and core stabilization for 40 minutes including:     Bilateral KTC 10x 5 sec  LTR x10  Piriformis stretch 3x 30 sec ea  Hip flexor stretch 3x 30 sec ea  Hip adductor stretch 3x 30 sec (butterfly)    Peripheral muscle strengthening which included one set of 15-20 repetitions at a slow and controlled 10-13 second per rep pace focused on strengthening supporting musculature in order to improve body mechanics and functional mobility. Patient and therapist focused on proper form during treatment to ensure optimal strengthening of each targeted muscle group.  Machines utilized included:Torso rotation, Hip Abd, Hip Add, and Leg  Press,hip add/abd, Chest press, rowing, leg curl, leg extension, biceps and triceps.      Tish participated in dynamic functional therapeutic activities to improve functional performance and simulate household and community activities for 00  minutes. The following activities were included:      Tish received manual therapy techniques for 10  minutes. The following activities were included:  STM to L glute     Pt given cold pack for 10 minutes to low back in Z-lie.   Patient Education and Home Exercises     Home exercises include:  Bilateral KTC  LTR  PPT  Bridging  Hip flexor stretch  Piriformis stretch  Adductor stretch  Slouch/overcorrect  Cardio program (V5): -8/22/24  Lifting education (V11): -9/10/24  Posture/Lumbar roll: instruct visit 1  Fridge Magnet Discharge handout (date given): -  Equipment at home/gym membership: no    Education provided:   - PT role and POC  - HEP  - instruct in importance of posture, daily stretching and contributing factors to balance difficulty    Written Home Exercises Provided: yes.  Exercises were reviewed and Tish was able to demonstrate them prior to the end of the session.  Tish demonstrated good  understanding of the education provided.     See EMR under Patient Instructions for exercises provided 7/26/2024.    Assessment     Maintained weight and less reps  on lumbar and peripheral machines due to cont to be difficult and sore after. She did experience less LB/glute pain post STM.  Will remain at this level next visit, pending tolerance afterwards.  Reassessment 9/6/24:  Patient has attended 10 visits at [facility] which included MD evaluation, PT evaluation with isometric testing, and physical therapy treatment including HEP instruction, education, aerobic activity, dynamic strengthening on MedX equipment for the spine, and whole body strengthening on MedX equipment with increasing resistance. Patient demonstrates increased ability to reduce symptoms,  improve posture, improve ROM, and improve strength, as stated below:    -Improved posture, he is using lumbar roll  -Improved lumbar ROM, 6-39 degrees initially on MedX test and 3-51 degrees currently .  -Improved strength at each test point on lumbar MedX isometric test with 36% average improvement noted with reduced pain noted by patient.  -Initial outcome tool score of 34% and current outcome tool score 6% indicating reduced pain and improved function.        Patient is making good progress towards established goals.  Pt will continue to benefit from skilled outpatient physical therapy to address the deficits stated in the impairment chart, provide pt/family education and to maximize pt's level of independence in the home and community environment.     Anticipated Barriers for therapy: none  Pt's spiritual, cultural and educational needs considered and pt agreeable to plan of care and goals as stated below:     Goals:   Short term goals:  6 weeks or 10 visits   - Pt will demonstrate increased lumbar MedX ROM by at least 3 degrees from the initial ROM value with improvements noted in functional ROM and ability to perform ADLs. Appropriate and Ongoing Met 9/6/24  - Pt will demonstrate increased MedX average isometric strength value by 20% from initial test resulting in improved ability to perform bending, lifting, and carrying activities safely, confidently. Appropriate and Ongoing Met 9/6/24  - Pt will report a reduction in worst pain score by 1-2 points for improved tolerance for ADLs. Appropriate and Ongoing Met 9/6/24  - Pt able to perform HEP correctly with minimal cueing or supervision from therapist to encourage independent management of symptoms. Appropriate and Ongoing Met 9/6/24     Long term goals: 10 weeks or 20 visits   - Pt will demonstrate increased lumbar MedX ROM by at least 6 degrees from initial ROM value, resulting in improved ability to perform functional forward bending while standing and  sitting. Appropriate and Ongoing Met 9/6/24   - Pt will demonstrate increased MedX average isometric strength value by 30% from initial test resulting in improved ability to perform bending, lifting, and carrying activities safely and confidently. Appropriate and Ongoing Met 9/6/24  - Pt to demonstrate ability to independently control and reduce their pain through posture positioning and mechanical movements throughout a typical day. Appropriate and Ongoing Progressing  - Pt will demonstrate reduced pain and improved functional outcomes as reported on the FOTO by reaching an intake score of >/= 28% functional ability in order to demonstrate subjective improvement in patient's condition. . Appropriate and Ongoing Met 9/6/24  - Pt will demonstrate independence with the HEP at discharge. Appropriate and Ongoing  - Pt will be able to resolve pain(patient goal) Appropriate and Ongoing   Added goal 9/6/24:  Pt will demonstrate increased MedX average isometric strength value by 46% from initial test resulting in improved ability to perform bending, lifting, and carrying activities safely and confidently. Appropriate and Ongoing  Plan     Continue with established Plan of Care towards established PT goals.     Therapist: Jeanie Cowart, PTA  10/7/2024

## 2024-10-07 NOTE — PROGRESS NOTES
Health  Consult Note    Name: Amarilis Decker  Clinic Number: [id number]  Physician: Maynor Santacruz*  Past Medical History:   Diagnosis Date    Asthma     GERD (gastroesophageal reflux disease)     Psoriasis      Time In: 12:30  Time Out: 1:00    Health  Agreement signed: yes     Coaching performed: face to face     Subjective:   Patient reports with the following her main goal reduce her stress. Pt has a problem with holding onto things that bother her. She appreciates that she is humble, thoughtful, and loving. The benefits of improving her mental health is being happier. Today, we went over some of her stressors/ eating habits, and health plan. Was able to write down two incidents and her meals for the week. Pt talks about how her mental health has been effected by her marriage, this is still a stain. Goals for this week is to walk for 5 minute once outside of therapy.  Pt is currently taking lexapro, she was wanting to change medications, but thinks that it is starting to work. Pt also feels like it is helping her handle negative comments from .     Vision:  I plan to use a journal to write down any intense feelings and rate how I feel in that moment     Values: Confucianism, family, feeling love    Strengths:  thoughtful,     Challenges:  her 's health,     Support:  her daughter , but really no one she is the support system for her      Hobbies:  fishing, tennis     Objective:  Amarilis was instructed to write down her stressors again,       INITIAL date:   One a scale of 1-10, with 10 being 100% happy, how would you rate your happiness in each of the wellness areas below?    Happiness:         1     2     3     4     5     6     7     8     9     10    Initial Date: VT Date: +/- Total Change   Exercise/Movement      Physical Health      Stress Level      Nutrition      Sleep      Play      Body Image      Relationships      Energy/Vitality        Assessment:      Plan:  Patient goals for next consult include to write in her journal every time she feels a stressor and rate how she feels in the moment.     Health : Debby Bennett  10/7/2024
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
## 2024-10-08 RX ORDER — AMOXICILLIN AND CLAVULANATE POTASSIUM 875; 125 MG/1; MG/1
1 TABLET, FILM COATED ORAL 2 TIMES DAILY
Qty: 6 TABLET | Refills: 0 | Status: SHIPPED | OUTPATIENT
Start: 2024-10-08 | End: 2024-10-11

## 2024-10-10 ENCOUNTER — OFFICE VISIT (OUTPATIENT)
Dept: FAMILY MEDICINE | Facility: CLINIC | Age: 84
End: 2024-10-10
Payer: MEDICARE

## 2024-10-10 VITALS
DIASTOLIC BLOOD PRESSURE: 64 MMHG | HEART RATE: 65 BPM | OXYGEN SATURATION: 97 % | HEIGHT: 62 IN | SYSTOLIC BLOOD PRESSURE: 118 MMHG | BODY MASS INDEX: 24.52 KG/M2 | WEIGHT: 133.25 LBS

## 2024-10-10 DIAGNOSIS — M81.0 SENILE OSTEOPOROSIS: ICD-10-CM

## 2024-10-10 DIAGNOSIS — Z23 IMMUNIZATION DUE: ICD-10-CM

## 2024-10-10 DIAGNOSIS — F33.1 MODERATE EPISODE OF RECURRENT MAJOR DEPRESSIVE DISORDER: Primary | ICD-10-CM

## 2024-10-10 DIAGNOSIS — F41.1 GENERALIZED ANXIETY DISORDER: ICD-10-CM

## 2024-10-10 DIAGNOSIS — M46.1 SACROILIITIS, NOT ELSEWHERE CLASSIFIED: ICD-10-CM

## 2024-10-10 DIAGNOSIS — E53.8 B12 DEFICIENCY: ICD-10-CM

## 2024-10-10 PROCEDURE — 99999PBSHW PR PBB SHADOW TECHNICAL ONLY FILED TO HB: Mod: PBBFAC,,,

## 2024-10-10 PROCEDURE — 99215 OFFICE O/P EST HI 40 MIN: CPT | Mod: PBBFAC,PN | Performed by: FAMILY MEDICINE

## 2024-10-10 PROCEDURE — 96372 THER/PROPH/DIAG INJ SC/IM: CPT | Mod: PBBFAC,PN

## 2024-10-10 PROCEDURE — 99999 PR PBB SHADOW E&M-EST. PATIENT-LVL V: CPT | Mod: PBBFAC,,, | Performed by: FAMILY MEDICINE

## 2024-10-10 PROCEDURE — 99214 OFFICE O/P EST MOD 30 MIN: CPT | Mod: S$PBB,,, | Performed by: FAMILY MEDICINE

## 2024-10-10 RX ORDER — CYANOCOBALAMIN 1000 UG/ML
1000 INJECTION, SOLUTION INTRAMUSCULAR; SUBCUTANEOUS
Status: COMPLETED | OUTPATIENT
Start: 2024-10-10 | End: 2024-10-10

## 2024-10-10 RX ORDER — MUPIROCIN 20 MG/G
OINTMENT TOPICAL 2 TIMES DAILY
Qty: 1 EACH | Refills: 3 | Status: SHIPPED | OUTPATIENT
Start: 2024-10-10

## 2024-10-10 RX ADMIN — CYANOCOBALAMIN 1000 MCG: 1000 INJECTION, SOLUTION INTRAMUSCULAR at 09:10

## 2024-10-10 NOTE — PROGRESS NOTES
THIS DOCUMENT WAS MADE IN PART WITH VOICE RECOGNITION SOFTWARE.  OCCASIONALLY THIS SOFTWARE WILL MISINTERPRET WORDS OR PHRASES.    Assessment and Plan:    1. Moderate episode of recurrent major depressive disorder        2. Generalized anxiety disorder        3. Immunization due        4. Sacroiliitis, not elsewhere classified  Ambulatory referral/consult to Physical/Occupational Therapy      5. B12 deficiency  cyanocobalamin injection 1,000 mcg      6. Senile osteoporosis              Assessment & Plan    RSV (RSV):   Discussed RSV (RSV) and its potential risks, especially for older adults.   Amarilis to obtain RSV vaccine at the pharmacy (covered by Medicare Part D).    MEMORY CONCERNS:   Explained benefits of Prevagen for memory improvement.   Amarilis to consider taking Prevagen supplement for memory improvement.    ANXIETY AND SLEEP ISSUES:   Discussed magnesium glycinate's benefits for anxiety, sleep, bowel function, restless legs, and muscle cramps.    GASTROINTESTINAL ISSUES:   Provided dietary recommendations for managing gas issues.   Continued Gas-X (strongest formulation) at night as needed.    MEDICATIONS/SUPPLEMENTS:   Continued Lexapro 10mg daily, Vitamin D 5000 IU daily, magnesium glycinate.   Restarted monthly B12 injections.   Administered B12 injection in office.    BACK ISSUES:   Referred to [facility] for back issues.    OSTEOPOROSIS:   Referred to osteoporosis clinic run by Daxa Harris for evaluation and potential treatment options.    DEPRESSION:   Contact office if depression symptoms worsen or if changes to depression treatment are needed.    FOLLOW UP:   Follow up in 4 weeks.               ______________________________________________________________________  Subjective:    Chief Complaint:  Chief Complaint   Patient presents with    Follow-up        HPI:  Amarilis is a 83 y.o. year old         History of Present Illness    CHIEF COMPLAINT:  Amarilis presents today for  "follow up.    MENTAL HEALTH:  She reports improvement in depression symptoms since starting Lexapro 10 mg, noting it "takes the edge off" and improves her ability to handle daily activities. She experienced laughter during a recent lunch with friends, which she recognizes as a positive sign. She denies wanting to increase the dosage at this time. She mentions experiencing anxiety twice in the past week without apparent triggers, which is unusual for her. She recalls previous positive experiences with magnesium glycinate for anxiety symptoms. She continues therapy sessions with [facility], describing them as beneficial, including written exercises and questions.    MEDICATIONS:  Current medications include Lexapro 10 mg for anxiety, Gas-X at night for gas relief, magnesium glycinate for bowel movements, and Vitamin D 5000 IU as a supplement. She expresses interest in resuming monthly B12 injections, believing they might improve her overall well-being.    GASTROINTESTINAL ISSUES:  She reports experiencing painful gas problems, severe enough to wake her at night. She believes this may be related to consumption of sugar-free hard candies. She denies drinking carbonated beverages. She has been taking the strongest formulation of Gas-X at night to manage symptoms. The magnesium glycinate has helped normalize her bowel movements.    COGNITIVE CONCERNS:  She expresses interest in Prevagen supplement for memory concerns, reporting difficulty remembering the name of the supplement.    BONE HEALTH:  She reports a previous bone density scan showing osteopenia, acknowledging a higher risk for hip fracture due to her petite frame. She has a history of psoriasis treated with medication that affected her bone density, recalling a previous decline in bone density, though unsure of the extent.    THYROID:  She is due for a thyroid hormone recheck in December. She reports days where she feels terrible and has significant difficulty " "with mobility, stating she "cannot put one foot in front of the other."    FATIGUE:  She reports experiencing fatigue, with some days characterized by extreme difficulty functioning.      ROS:  ROS as indicated in HPI.               Primary insomnia  Rx- Melatonin  Previous Rx- Remeron (ineffective), Doxepin (ineffective), trazodone (hangover)     GERD  Rx-pantoprazole 40 mg  Denies dysphagia      Depressed mood / Anxiety   Rx : Lexapro 10   Prev Rx-Lexapro (nightmares), buspirone (ineffective),  wellbutrin (constipation, imbalance, ineffective), Fluoxetine (itching), Citalopram   Stressor-fall out with daughter      Hypothyroidism  Current Rx-armor Thyroid 60 mg + 15 mg daily   Previous Rx-levothyroxine (ineffective)  Previous TSH in normal range   Endocrinology- Dr Fritz      Bronchiectasis, history of pulmonary nodules  Seen by pulmonology - Dr. 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\     Senile osteoporosis  Prev Rx-Boniva 150 (pt choice to quit)  Taking vitamin-D supplement  No recent fractures     Seasonal allergies / chronic sinusitis   Rx-Flonase, Xyzal, montelukast, astelin  Does have recurrent sinusitis, seen by ENT ([place])      Psoriasis  Rx-betamethasone, clobetasol, desonide, Topicort  Followed by dermatology - Jesenia Loera MD      B12 deficiency  Prev Rx-B12 injections 500 mcg every 2 weeks     Chronic Low back pain , sacroiliitis, ankylosing spondylitis   Rheum : Gregg Mensah MD   Pain Mgt : Bestbel   Rx : Mobic 15 mg , Norco 5 mg p.r.n., use sparingly     ALONZO  Sleep MD : Catarina Morfin  Awaiting machine    Chronic Constipation  OTC : Mg Glycinate           Past Medical History:  Past Medical History:   Diagnosis Date    Asthma     GERD (gastroesophageal reflux disease)     Psoriasis        Past Surgical History:  Past Surgical History:   Procedure Laterality Date    ADENOIDECTOMY      CHOLECYSTECTOMY      COLONOSCOPY  2019    unremarkable per pt report    " [facility]    SINUS SURGERY      Dr Cordova    SINUS SURGERY  01/2017    xs 5    THYROID SURGERY      nodules removed //Dr Amato     TONSILLECTOMY      TYMPANOSTOMY TUBE PLACEMENT      UPPER GASTROINTESTINAL ENDOSCOPY         Family History:  Family History   Problem Relation Name Age of Onset    Hypertension Mother      Obesity Father      Diabetes Sister      Obesity Sister      Breast cancer Paternal Aunt  43    No Known Problems Maternal Grandmother      No Known Problems Maternal Grandfather      No Known Problems Paternal Grandmother      No Known Problems Paternal Grandfather      Thyroid cancer Daughter  61    Ovarian cancer Neg Hx      Colon cancer Neg Hx         Social History:  Social History     Socioeconomic History    Marital status:    Tobacco Use    Smoking status: Never    Smokeless tobacco: Never   Substance and Sexual Activity    Alcohol use: Yes     Comment: twice a month    Drug use: No    Sexual activity: Never     Social Drivers of Health     Financial Resource Strain: Low Risk  (10/23/2023)    Overall Financial Resource Strain (CARDIA)     Difficulty of Paying Living Expenses: Not hard at all   Food Insecurity: No Food Insecurity (10/23/2023)    Hunger Vital Sign     Worried About Running Out of Food in the Last Year: Never true     Ran Out of Food in the Last Year: Never true   Transportation Needs: Unknown (10/23/2023)    PRAPARE - Transportation     Lack of Transportation (Medical): No   Physical Activity: Inactive (10/23/2023)    Exercise Vital Sign     Days of Exercise per Week: 0 days     Minutes of Exercise per Session: 0 min   Stress: No Stress Concern Present (10/23/2023)    [employer] - Occupational Stress Questionnaire     Feeling of Stress : Not at all   Housing Stability: Low Risk  (10/23/2023)    Housing Stability Vital Sign     Unable to Pay for Housing in the Last Year: No     Number of Places Lived in the Last Year: 1     Unstable  Housing in the Last Year: No       Medications:  Current Outpatient Medications on File Prior to Visit   Medication Sig Dispense Refill    amoxicillin-clavulanate 875-125mg (AUGMENTIN) 875-125 mg per tablet Take 1 tablet by mouth 2 (two) times daily. for 3 days 6 tablet 0    azelastine (ASTELIN) 137 mcg (0.1 %) nasal spray 1 spray (137 mcg total) by Nasal route 2 (two) times daily. 30 mL 3    betamethasone dipropionate 0.05 % cream Apply topically once daily. 90 g 5    cholecalciferol, vitamin D3, (VITAMIN D3) 50 mcg (2,000 unit) Tab Take 2,000 Units by mouth once daily.      clobetasol 0.05% (TEMOVATE) 0.05 % Oint 1 application once daily. Apply to affected area      desonide 0.05% (DESOWEN) 0.05 % Oint Apply topically 2 (two) times daily. 60 g 2    desoximetasone (TOPICORT) 0.05 % cream Apply topically 2 (two) times daily. 60 g 11    fluticasone propionate (FLONASE) 50 mcg/actuation nasal spray 1 spray (50 mcg total) by Each Nostril route once daily. 54 g 3    HYDROcodone-acetaminophen (NORCO) 5-325 mg per tablet Take 1 tablet by mouth every 8 (eight) hours as needed for Pain. 20 tablet 0    ketoconazole (NIZORAL) 2 % cream Apply topically.      ketoconazole (NIZORAL) 2 % shampoo Apply topically twice a week. 120 mL 3    levocetirizine (XYZAL) 5 MG tablet Take one capsule by mouth daily 90 tablet 0    MAGNESIUM ORAL Take by mouth once. Pt takes 400mg once daily      mupirocin (BACTROBAN) 2 % ointment SMARTSI Application Topical 2-3 Times Daily      simethicone (GAS-X ORAL) Take by mouth.      thyroid, pork, (ARMOUR THYROID) 60 mg Tab Take 1 tablet (60 mg total) by mouth before breakfast. 90 tablet 3    levalbuterol (XOPENEX HFA) 45 mcg/actuation inhaler Inhale 1-2 puffs into the lungs every 6 (six) hours as needed for Wheezing or Shortness of Breath (or before exercise). Rescue (Patient not taking: Reported on 10/10/2024) 15 g 0    [DISCONTINUED] etanercept (ENBREL SURECLICK) 50 mg/mL (1 mL) Inject 1 mL (50 mg  "total) into the skin once a week. (Patient taking differently: Inject 50 mg into the skin once a week. Once a month) 12 mL 3     Current Facility-Administered Medications on File Prior to Visit   Medication Dose Route Frequency Provider Last Rate Last Admin    cyanocobalamin injection 1,000 mcg  1,000 mcg Intramuscular Q30 Days    1,000 mcg at 07/25/24 0857       Allergies:  Avelox [moxifloxacin], Bactrim [sulfamethoxazole-trimethoprim], Ciprofloxacin, Isothiazolinones, Apremilast, Bacitracin, and Wellbutrin [bupropion hcl]    Immunizations:  Immunization History   Administered Date(s) Administered    COVID-19 MRNA, LN-S PF (MODERNA HALF 0.25 ML DOSE) 12/16/2021    COVID-19 Vaccine 03/31/2021    COVID-19, vector-nr, [id number], PF (Johanna) 04/05/2021    Hepatitis A, Adult 08/09/2007    IPV 08/09/2007    Influenza 11/18/2011, 10/21/2013    Influenza (FLUAD) - Quadrivalent - Adjuvanted - PF *Preferred* (65+) 10/07/2021, 10/03/2022, 09/08/2023    Influenza - Quadrivalent - MDCK - PF 10/10/2017    Influenza - Quadrivalent - PF *Preferred* (6 months and older) 10/29/2019, 09/25/2020    Influenza - Trivalent - Afluria, Fluzone MDV 11/18/2011    Influenza - Trivalent - Fluad - Adjuvanted - PF (65 years and older 09/13/2024    Influenza - Trivalent - Fluarix, Flulaval, Fluzone, Afluria - PF 10/21/2013    Influenza - Trivalent - Fluzone High Dose - PF (65 years and older) 10/14/2014, 10/08/2015, 09/27/2016    Pneumococcal Conjugate - 13 Valent 04/12/2016    Pneumococcal Polysaccharide - 23 Valent 08/01/2011    Td (Adult), Unspecified Formulation 08/02/2018    Tdap 08/09/2007, 08/01/2018    Zoster 07/11/2007       Review of Systems:  Review of Systems   All other systems reviewed and are negative.      Objective:    Vitals:  Vitals:    10/10/24 0913   BP: 118/64   Pulse: 65   SpO2: 97%   Weight: 60.5 kg (133 lb 4.3 oz)   Height: 5' 2" (1.575 m)   PainSc:   2       Physical Exam  Vitals reviewed.   Constitutional:       General: " She is not in acute distress.  HENT:      Head: Normocephalic and atraumatic.   Eyes:      Pupils: Pupils are equal, round, and reactive to light.   Cardiovascular:      Rate and Rhythm: Normal rate and regular rhythm.      Heart sounds: No murmur heard.     No friction rub.   Pulmonary:      Effort: Pulmonary effort is normal.      Breath sounds: Normal breath sounds.   Abdominal:      General: Bowel sounds are normal. There is no distension.      Palpations: Abdomen is soft.      Tenderness: There is no abdominal tenderness.   Musculoskeletal:      Cervical back: Neck supple.   Skin:     General: Skin is warm and dry.      Findings: No rash.   Psychiatric:         Behavior: Behavior normal.             Luc Teixeira MD  Family Medicine

## 2024-10-15 ENCOUNTER — DOCUMENTATION ONLY (OUTPATIENT)
Dept: REHABILITATION | Facility: HOSPITAL | Age: 84
End: 2024-10-15
Payer: MEDICARE

## 2024-10-17 DIAGNOSIS — R53.81 MALAISE: ICD-10-CM

## 2024-10-17 DIAGNOSIS — R53.83 FATIGUE, UNSPECIFIED TYPE: Primary | ICD-10-CM

## 2024-10-17 DIAGNOSIS — Z79.899 DRUG THERAPY: ICD-10-CM

## 2024-10-18 ENCOUNTER — LAB VISIT (OUTPATIENT)
Dept: LAB | Facility: HOSPITAL | Age: 84
End: 2024-10-18
Attending: FAMILY MEDICINE
Payer: MEDICARE

## 2024-10-18 DIAGNOSIS — Z79.899 DRUG THERAPY: ICD-10-CM

## 2024-10-18 DIAGNOSIS — R53.83 FATIGUE, UNSPECIFIED TYPE: ICD-10-CM

## 2024-10-18 DIAGNOSIS — R53.81 MALAISE: ICD-10-CM

## 2024-10-18 LAB
ALBUMIN SERPL BCP-MCNC: 3.8 G/DL (ref 3.5–5.2)
ALP SERPL-CCNC: 53 U/L (ref 40–150)
ALT SERPL W/O P-5'-P-CCNC: 26 U/L (ref 10–44)
ANION GAP SERPL CALC-SCNC: 8 MMOL/L (ref 8–16)
AST SERPL-CCNC: 29 U/L (ref 10–40)
BACTERIA #/AREA URNS AUTO: NORMAL /HPF
BASOPHILS # BLD AUTO: 0.03 K/UL (ref 0–0.2)
BASOPHILS NFR BLD: 0.6 % (ref 0–1.9)
BILIRUB SERPL-MCNC: 0.5 MG/DL (ref 0.1–1)
BILIRUB UR QL STRIP: NEGATIVE
BUN SERPL-MCNC: 16 MG/DL (ref 8–23)
CALCIUM SERPL-MCNC: 9 MG/DL (ref 8.7–10.5)
CHLORIDE SERPL-SCNC: 104 MMOL/L (ref 95–110)
CLARITY UR REFRACT.AUTO: CLEAR
CO2 SERPL-SCNC: 28 MMOL/L (ref 23–29)
COLOR UR AUTO: YELLOW
CREAT SERPL-MCNC: 0.8 MG/DL (ref 0.5–1.4)
CRP SERPL-MCNC: 0.7 MG/L (ref 0–8.2)
DIFFERENTIAL METHOD BLD: ABNORMAL
EOSINOPHIL # BLD AUTO: 0.8 K/UL (ref 0–0.5)
EOSINOPHIL NFR BLD: 15.9 % (ref 0–8)
ERYTHROCYTE [DISTWIDTH] IN BLOOD BY AUTOMATED COUNT: 13.4 % (ref 11.5–14.5)
ERYTHROCYTE [SEDIMENTATION RATE] IN BLOOD BY PHOTOMETRIC METHOD: 12 MM/HR (ref 0–36)
EST. GFR  (NO RACE VARIABLE): >60 ML/MIN/1.73 M^2
ESTIMATED AVG GLUCOSE: 111 MG/DL (ref 68–131)
GLUCOSE SERPL-MCNC: 86 MG/DL (ref 70–110)
GLUCOSE UR QL STRIP: NEGATIVE
HBA1C MFR BLD: 5.5 % (ref 4–5.6)
HCT VFR BLD AUTO: 42.3 % (ref 37–48.5)
HGB BLD-MCNC: 13.6 G/DL (ref 12–16)
HGB UR QL STRIP: NEGATIVE
IMM GRANULOCYTES # BLD AUTO: 0.01 K/UL (ref 0–0.04)
IMM GRANULOCYTES NFR BLD AUTO: 0.2 % (ref 0–0.5)
KETONES UR QL STRIP: NEGATIVE
LEUKOCYTE ESTERASE UR QL STRIP: NEGATIVE
LYMPHOCYTES # BLD AUTO: 1.4 K/UL (ref 1–4.8)
LYMPHOCYTES NFR BLD: 29.3 % (ref 18–48)
MCH RBC QN AUTO: 29.9 PG (ref 27–31)
MCHC RBC AUTO-ENTMCNC: 32.2 G/DL (ref 32–36)
MCV RBC AUTO: 93 FL (ref 82–98)
MICROSCOPIC COMMENT: NORMAL
MONOCYTES # BLD AUTO: 0.5 K/UL (ref 0.3–1)
MONOCYTES NFR BLD: 10 % (ref 4–15)
NEUTROPHILS # BLD AUTO: 2.2 K/UL (ref 1.8–7.7)
NEUTROPHILS NFR BLD: 44 % (ref 38–73)
NITRITE UR QL STRIP: NEGATIVE
NRBC BLD-RTO: 0 /100 WBC
PH UR STRIP: 6 [PH] (ref 5–8)
PLATELET # BLD AUTO: 246 K/UL (ref 150–450)
PMV BLD AUTO: 9.7 FL (ref 9.2–12.9)
POTASSIUM SERPL-SCNC: 4 MMOL/L (ref 3.5–5.1)
PROT SERPL-MCNC: 6.8 G/DL (ref 6–8.4)
PROT UR QL STRIP: NEGATIVE
RBC # BLD AUTO: 4.55 M/UL (ref 4–5.4)
RBC #/AREA URNS AUTO: 1 /HPF (ref 0–4)
SODIUM SERPL-SCNC: 140 MMOL/L (ref 136–145)
SP GR UR STRIP: 1.01 (ref 1–1.03)
SQUAMOUS #/AREA URNS AUTO: 0 /HPF
T4 FREE SERPL-MCNC: 0.8 NG/DL (ref 0.71–1.51)
TSH SERPL DL<=0.005 MIU/L-ACNC: 1.86 UIU/ML (ref 0.4–4)
URN SPEC COLLECT METH UR: NORMAL
VIT B12 SERPL-MCNC: 615 PG/ML (ref 210–950)
WBC # BLD AUTO: 4.91 K/UL (ref 3.9–12.7)
WBC #/AREA URNS AUTO: 1 /HPF (ref 0–5)

## 2024-10-18 PROCEDURE — 83921 ORGANIC ACID SINGLE QUANT: CPT | Performed by: FAMILY MEDICINE

## 2024-10-18 PROCEDURE — 84443 ASSAY THYROID STIM HORMONE: CPT | Performed by: FAMILY MEDICINE

## 2024-10-18 PROCEDURE — 81001 URINALYSIS AUTO W/SCOPE: CPT | Performed by: FAMILY MEDICINE

## 2024-10-18 PROCEDURE — 85025 COMPLETE CBC W/AUTO DIFF WBC: CPT | Performed by: FAMILY MEDICINE

## 2024-10-18 PROCEDURE — 82607 VITAMIN B-12: CPT | Performed by: FAMILY MEDICINE

## 2024-10-18 PROCEDURE — 80053 COMPREHEN METABOLIC PANEL: CPT | Performed by: FAMILY MEDICINE

## 2024-10-18 PROCEDURE — 84439 ASSAY OF FREE THYROXINE: CPT | Performed by: FAMILY MEDICINE

## 2024-10-18 PROCEDURE — 83036 HEMOGLOBIN GLYCOSYLATED A1C: CPT | Performed by: FAMILY MEDICINE

## 2024-10-18 PROCEDURE — 85652 RBC SED RATE AUTOMATED: CPT | Performed by: FAMILY MEDICINE

## 2024-10-18 PROCEDURE — 86140 C-REACTIVE PROTEIN: CPT | Performed by: FAMILY MEDICINE

## 2024-10-21 ENCOUNTER — DOCUMENTATION ONLY (OUTPATIENT)
Dept: REHABILITATION | Facility: HOSPITAL | Age: 84
End: 2024-10-21
Payer: MEDICARE

## 2024-10-21 NOTE — PROGRESS NOTES
Health  Consult Note    Name: Amarilis Decker  Clinic Number: [id number]  Physician: Maynor Santacruz*  Past Medical History:   Diagnosis Date    Asthma     GERD (gastroesophageal reflux disease)     Psoriasis      Time In: 12:30  Time Out: 1:00    Health  Agreement signed: yes     Coaching performed: face to face     Subjective:   Patient reports with the following her main goal reduce her stress. Pt has a problem with holding onto things that bother her. She appreciates that she is humble, thoughtful, and loving. The benefits of improving her mental health is being happier. Today, we went over some of her stressors/ eating habits, and health plan. Was able to write down two incidents and her meals for the week. Pt talks about how her mental health has been effected by her marriage, this is still a stain. Goals for this week is to walk for 5 minute once outside of therapy.  Pt is currently taking lexapro, she was wanting to change medications, but thinks that it is starting to work. Pt also feels like it is helping her handle negative comments from . Pt has decided to get off her medicine other than main four; she has been feeling so weak lately and the medication is contributing to it. Reports that she notices that she feeling better. Her mental health is getting better, her lexapro   Is helping. Talking about it has helped tremendously. Her estate is weighing heavy on her right now and need to start making decision but is not sure what she plans on doing- we talked about making a pros and cons and talking with her youngest child.     Vision:  I plan to use a journal to write down any intense feelings and rate how I feel in that moment     Values: Orthodox, family, feeling love    Strengths:  thoughtful,     Challenges:  her 's health,     Support:  her daughter , but really no one she is the support system for her      Hobbies:  fishing, tennis     Objective:  Amarilis was  instructed to write down her stressors again,       INITIAL date:   One a scale of 1-10, with 10 being 100% happy, how would you rate your happiness in each of the wellness areas below?    Happiness:         1     2     3     4     5     6     7     8     9     10    Initial Date: DC Date: +/- Total Change   Exercise/Movement      Physical Health      Stress Level      Nutrition      Sleep      Play      Body Image      Relationships      Energy/Vitality        Assessment:     Plan:  Patient goals for next consult include to write in her journal every time she feels a stressor and rate how she feels in the moment.     Health : Debby Bennett  10/21/2024

## 2024-10-22 LAB — METHYLMALONATE SERPL-SCNC: 0.26 UMOL/L

## 2024-10-28 ENCOUNTER — CLINICAL SUPPORT (OUTPATIENT)
Dept: REHABILITATION | Facility: HOSPITAL | Age: 84
End: 2024-10-28
Payer: MEDICARE

## 2024-10-28 ENCOUNTER — PATIENT MESSAGE (OUTPATIENT)
Dept: OTOLARYNGOLOGY | Facility: CLINIC | Age: 84
End: 2024-10-28
Payer: MEDICARE

## 2024-10-28 ENCOUNTER — DOCUMENTATION ONLY (OUTPATIENT)
Dept: REHABILITATION | Facility: HOSPITAL | Age: 84
End: 2024-10-28
Payer: MEDICARE

## 2024-10-28 DIAGNOSIS — M25.69 DECREASED ROM OF TRUNK AND BACK: Primary | ICD-10-CM

## 2024-10-28 DIAGNOSIS — R29.898 DECREASED STRENGTH OF TRUNK AND BACK: ICD-10-CM

## 2024-10-28 PROCEDURE — 97112 NEUROMUSCULAR REEDUCATION: CPT | Mod: PO | Performed by: PHYSICAL THERAPIST

## 2024-10-28 PROCEDURE — 97110 THERAPEUTIC EXERCISES: CPT | Mod: PO | Performed by: PHYSICAL THERAPIST

## 2024-10-28 NOTE — PROGRESS NOTES
Health  Consult Note    Name: Amarilis Decker  Clinic Number: [id number]  Physician: Maynor Santacruz*  Past Medical History:   Diagnosis Date    Asthma     GERD (gastroesophageal reflux disease)     Psoriasis      Time In: 12:30  Time Out: 1:00    Health  Agreement signed: yes     Coaching performed: face to face     Subjective:   Patient reports with the following her main goal reduce her stress. Pt has a problem with holding onto things that bother her. She appreciates that she is humble, thoughtful, and loving. The benefits of improving her mental health is being happier. Today, we went over some of her stressors/ eating habits, and health plan. Was able to write down two incidents and her meals for the week. Pt talks about how her mental health has been effected by her marriage, this is still a stain. Goals for this week is to walk for 5 minute once outside of therapy.  Pt is currently taking lexapro, she was wanting to change medications, but thinks that it is starting to work. Pt also feels like it is helping her handle negative comments from . Pt has decided to get off her medicine other than main four; she has been feeling so weak lately and the medication is contributing to it. Reports that she notices that she feeling better. Her mental health is getting better, her lexapro Is helping. Talking about it has helped tremendously. Her estate is weighing heavy on her right now and need to start making decision but is not sure what she plans on doing- we talked about making a pros and cons and talking with her youngest child. Today is last appointment; we will go over game plan moving forward, Pt has been sore lately and thinks she is being pushed too much- mentioned getting a massage, salt bath. Worried about forgetting things, however she is doing better mentally. Moving forward she plans, continue to take magnesium to help her fall asleep, and going shopping.     Vision:  I plan to  use a journal to write down any intense feelings and rate how I feel in that moment     Values: Christianity, family, feeling love    Strengths:  thoughtful,     Challenges:  her 's health,     Support:  her daughter , but really no one she is the support system for her      Hobbies:  fishing, tennis     Objective:  Amarilis was instructed to write down her stressors again,       INITIAL date:   One a scale of 1-10, with 10 being 100% happy, how would you rate your happiness in each of the wellness areas below?    Happiness:         1     2     3     4     5     6     7     8     9     10    Initial Date: DC Date: +/- Total Change   Exercise/Movement      Physical Health      Stress Level      Nutrition      Sleep      Play      Body Image      Relationships      Energy/Vitality        Assessment:     Plan:  Patient goals for next consult include to write in her journal every time she feels a stressor and rate how she feels in the moment.     Health : Debby Bennett  10/28/2024

## 2024-10-29 ENCOUNTER — OFFICE VISIT (OUTPATIENT)
Dept: OTOLARYNGOLOGY | Facility: CLINIC | Age: 84
End: 2024-10-29
Payer: MEDICARE

## 2024-10-29 ENCOUNTER — LAB VISIT (OUTPATIENT)
Dept: LAB | Facility: HOSPITAL | Age: 84
End: 2024-10-29
Payer: MEDICARE

## 2024-10-29 VITALS — BODY MASS INDEX: 24.54 KG/M2 | HEIGHT: 62 IN | WEIGHT: 133.38 LBS

## 2024-10-29 DIAGNOSIS — J34.89 NASAL VESTIBULITIS: Primary | ICD-10-CM

## 2024-10-29 DIAGNOSIS — J01.20 ACUTE ETHMOIDAL SINUSITIS, RECURRENCE NOT SPECIFIED: Primary | ICD-10-CM

## 2024-10-29 DIAGNOSIS — J34.89 NASAL VESTIBULITIS: ICD-10-CM

## 2024-10-29 DIAGNOSIS — Z98.890 HISTORY OF SINUS SURGERY: ICD-10-CM

## 2024-10-29 DIAGNOSIS — J32.4 CHRONIC PANSINUSITIS: ICD-10-CM

## 2024-10-29 DIAGNOSIS — J33.9 NASAL POLYPS: ICD-10-CM

## 2024-10-29 DIAGNOSIS — Z00.00 ENCOUNTER FOR MEDICARE ANNUAL WELLNESS EXAM: ICD-10-CM

## 2024-10-29 PROCEDURE — 31237 NSL/SINS NDSC SURG BX POLYPC: CPT | Mod: PBBFAC,PO | Performed by: STUDENT IN AN ORGANIZED HEALTH CARE EDUCATION/TRAINING PROGRAM

## 2024-10-29 PROCEDURE — 87075 CULTR BACTERIA EXCEPT BLOOD: CPT | Performed by: NURSE PRACTITIONER

## 2024-10-29 PROCEDURE — 31237 NSL/SINS NDSC SURG BX POLYPC: CPT | Mod: S$PBB,RT,, | Performed by: STUDENT IN AN ORGANIZED HEALTH CARE EDUCATION/TRAINING PROGRAM

## 2024-10-29 PROCEDURE — 99999 PR PBB SHADOW E&M-EST. PATIENT-LVL III: CPT | Mod: PBBFAC,,, | Performed by: STUDENT IN AN ORGANIZED HEALTH CARE EDUCATION/TRAINING PROGRAM

## 2024-10-29 PROCEDURE — 99214 OFFICE O/P EST MOD 30 MIN: CPT | Mod: 25,S$PBB,, | Performed by: STUDENT IN AN ORGANIZED HEALTH CARE EDUCATION/TRAINING PROGRAM

## 2024-10-29 PROCEDURE — 99213 OFFICE O/P EST LOW 20 MIN: CPT | Mod: 25,PBBFAC,PO | Performed by: STUDENT IN AN ORGANIZED HEALTH CARE EDUCATION/TRAINING PROGRAM

## 2024-10-29 RX ORDER — PREDNISONE 20 MG/1
20 TABLET ORAL DAILY
Qty: 10 TABLET | Refills: 0 | Status: SHIPPED | OUTPATIENT
Start: 2024-10-29 | End: 2024-11-08

## 2024-10-29 RX ORDER — DOXYCYCLINE HYCLATE 100 MG
100 TABLET ORAL 2 TIMES DAILY
Qty: 42 TABLET | Refills: 0 | Status: SHIPPED | OUTPATIENT
Start: 2024-10-29 | End: 2024-11-19

## 2024-10-31 ENCOUNTER — CLINICAL SUPPORT (OUTPATIENT)
Dept: REHABILITATION | Facility: HOSPITAL | Age: 84
End: 2024-10-31
Payer: MEDICARE

## 2024-10-31 DIAGNOSIS — R29.898 DECREASED STRENGTH OF TRUNK AND BACK: ICD-10-CM

## 2024-10-31 DIAGNOSIS — M25.69 DECREASED ROM OF TRUNK AND BACK: Primary | ICD-10-CM

## 2024-10-31 PROCEDURE — 97750 PHYSICAL PERFORMANCE TEST: CPT | Mod: KX,PO | Performed by: PHYSICAL THERAPIST

## 2024-10-31 PROCEDURE — 97110 THERAPEUTIC EXERCISES: CPT | Mod: KX,PO | Performed by: PHYSICAL THERAPIST

## 2024-10-31 PROCEDURE — 97112 NEUROMUSCULAR REEDUCATION: CPT | Mod: KX,PO | Performed by: PHYSICAL THERAPIST

## 2024-11-01 LAB — BACTERIA SPEC ANAEROBE CULT: NORMAL

## 2024-11-04 ENCOUNTER — DOCUMENTATION ONLY (OUTPATIENT)
Dept: REHABILITATION | Facility: HOSPITAL | Age: 84
End: 2024-11-04
Payer: MEDICARE

## 2024-11-04 NOTE — PROGRESS NOTES
Health  Wellness Visit Note    Name: Amarilis Decker  Clinic Number: [id number]  Physician: Maynor Santacruz*  Diagnosis: No diagnosis found.  Past Medical History:   Diagnosis Date    Asthma     GERD (gastroesophageal reflux disease)     Psoriasis      Visit Number: 21  Precautions: standard      1st PT visit:    Year of care end date:    Mindbody plan:  Patient level:    Time In: 9:20  Time Out: 10:30  Total Treatment Time: 70 min    Wellness Vision 2022  Handout on this week's wellness topic n/a  provided  along with a discussion on what it means, the benefits, and suggestions for practice.  Reviewed last week's topic of new pt.    Subjective:   Patient reports that she is struggling with diminished strength. She is not exercising outside of therapy, but she is doing her stretches.      Objective:   Amarilis completed therapeutic stretches (EIL, SAHIL) and the following MedX exercise machines: core lumbar, torso rotation l/r, leg extension, leg curl, upright row, chest press, biceps curl, triceps extension, leg press    See exercise log in patient folder for rate of exertion and repetitions completed.       Fitness Machine Education Key:  E=education on equipment initiated and further follow up and education needed  I=independent with  and exercise.  The patient:  Adjusts machines to his/her settings  Uses equipment levers, pins, weights safely  Maintains safe and correct posture while exercising  Moves through exercise with correct pace and control  Gets on and off equipment safely      Lumbar/Cervical Ext.  Torso Rotation  Leg Press    Leg Extension  Seated Leg Curl  Chest Press    Seated Row  Hip ADD  Hip ABD    Triceps Extension  Bicep Curl  Other:      [x] Indicates exercise has been taught for home  Lumbar/Cervical Ext. [] Torso Rotation [] Leg Press []   Leg Extension [] Seated Leg Curl [] Chest Press []   Seated Row [] Hip ADD [] Hip ABD []   Triceps Extension [] Bicep Curl []  Other:        Assessment:   Patient tolerated MedX Core Lumbar Strength and all other peripheral exercises without an increase in symptoms. Patient warmed up on bike for 8 minutes, stretched, and iced low back for 5 minutes after the workout.     Plan:  Continue with established plan of care towards wellness goals.     Health  : Debby Bennett  11/4/2024

## 2024-11-06 NOTE — PROGRESS NOTES
Health  Consult Note    Name: Amarilis Decker  Clinic Number: [id number]  Physician: Maynor Santacruz*  Past Medical History:   Diagnosis Date    Asthma     GERD (gastroesophageal reflux disease)     Psoriasis      Time In: 12:30  Time Out: 1:00    Health  Agreement signed: yes     Coaching performed: face to face     Subjective:   Patient reports with the following her main goal reduce her stress. Pt has a problem with holding onto things that bother her. She appreciates that she is humble, thoughtful, and loving. The benefits of improving her mental health is being happier. Today, we went over some of her stressors/ eating habits, and health plan. Was able to write down two incidents and her meals for the week. Pt talks about how her mental health has been effected by her marriage, this is still a stain. Goals for this week is to walk for 5 minute once outside of therapy.  Pt is currently taking lexapro, she was wanting to change medications, but thinks that it is starting to work. Pt also feels like it is helping her handle negative comments from .     Vision:  I plan to use a journal to write down any intense feelings and rate how I feel in that moment     Values: Tenriism, family, feeling love    Strengths:  thoughtful,     Challenges:  her 's health,     Support:  her daughter , but really no one she is the support system for her      Hobbies:  fishing, tennis     Objective:  Amarilis was instructed to write down her stressors again,       INITIAL date:   One a scale of 1-10, with 10 being 100% happy, how would you rate your happiness in each of the wellness areas below?    Happiness:         1     2     3     4     5     6     7     8     9     10    Initial Date: MA Date: +/- Total Change   Exercise/Movement      Physical Health      Stress Level      Nutrition      Sleep      Play      Body Image      Relationships      Energy/Vitality        Assessment: pt is having a  hard time letting go of the small things that bother her day to day-mainly from her  and daughter. She is wanting to work on meditation and stress management and I think she would benefit from this.     Plan:  Patient goals for next consult include to write in her journal every time she feels a stressor and rate how she feels in the moment.     Health : Debby Bennett  11/13/2024

## 2024-11-11 ENCOUNTER — OFFICE VISIT (OUTPATIENT)
Dept: FAMILY MEDICINE | Facility: CLINIC | Age: 84
End: 2024-11-11
Payer: MEDICARE

## 2024-11-11 VITALS
RESPIRATION RATE: 16 BRPM | HEIGHT: 62 IN | SYSTOLIC BLOOD PRESSURE: 110 MMHG | WEIGHT: 132.81 LBS | BODY MASS INDEX: 24.44 KG/M2 | HEART RATE: 68 BPM | DIASTOLIC BLOOD PRESSURE: 64 MMHG

## 2024-11-11 DIAGNOSIS — R53.83 FATIGUE, UNSPECIFIED TYPE: Primary | ICD-10-CM

## 2024-11-11 DIAGNOSIS — R53.81 MALAISE: ICD-10-CM

## 2024-11-11 DIAGNOSIS — M62.08 DIASTASIS RECTI: ICD-10-CM

## 2024-11-11 DIAGNOSIS — E04.1 THYROID NODULE: ICD-10-CM

## 2024-11-11 DIAGNOSIS — G47.33 SEVERE OBSTRUCTIVE SLEEP APNEA: ICD-10-CM

## 2024-11-11 PROCEDURE — 99214 OFFICE O/P EST MOD 30 MIN: CPT | Mod: PBBFAC,PN | Performed by: FAMILY MEDICINE

## 2024-11-11 PROCEDURE — 99999 PR PBB SHADOW E&M-EST. PATIENT-LVL IV: CPT | Mod: PBBFAC,,, | Performed by: FAMILY MEDICINE

## 2024-11-11 PROCEDURE — 99214 OFFICE O/P EST MOD 30 MIN: CPT | Mod: S$PBB,,, | Performed by: FAMILY MEDICINE

## 2024-11-11 RX ORDER — MODAFINIL 200 MG/1
200 TABLET ORAL DAILY
Qty: 30 TABLET | Refills: 0 | Status: SHIPPED | OUTPATIENT
Start: 2024-11-11 | End: 2024-12-11

## 2024-11-11 NOTE — PROGRESS NOTES
" THIS DOCUMENT WAS MADE IN PART WITH VOICE RECOGNITION SOFTWARE.  OCCASIONALLY THIS SOFTWARE WILL MISINTERPRET WORDS OR PHRASES.    Assessment and Plan:    1. Fatigue, unspecified type  modafiniL (PROVIGIL) 200 MG Tab      2. Malaise        3. Thyroid nodule  US Thyroid      4. Severe obstructive sleep apnea  modafiniL (PROVIGIL) 200 MG Tab      5. Diastasis recti                Assessment & Plan    DIASTASIS RECTI:   Explained diastasis recti as a common condition involving weakness of the linea alba, not a true hernia.    POST-COVID FATIGUE:   Discussed potential link between COVID-19 infection and prolonged fatigue symptoms.   Started Modafinil (Provigil) for fatigue, sent prescription to [facility] pharmacy.    SLEEP APNEA:   Amarilis to resume using CPAP machine after changing the hose.    EXERCISE REGIMEN:   Amarilis to consider discontinuing current exercise regimen if too strenuous.    MEDICATIONS/SUPPLEMENTS:   Decreased Lexapro to 5 mg daily for 4 more days, then discontinue.    THYROID DISORDER:   Ultrasound of left thyroid ordered to assess nodules.    FOLLOW UP:   Follow up in 2 weeks to assess response to Modafinil and discuss further treatment options if needed.               ______________________________________________________________________  Subjective:    Chief Complaint:  Chief Complaint   Patient presents with    Follow-up     Patient states she is coming in for follow up due to tiredness.        HPI:  Amarilis is a 83 y.o. year old         History of Present Illness    CHIEF COMPLAINT:  Amarilis presents today for follow-up regarding ongoing fatigue.    FATIGUE:  She reports persistent, severe fatigue for several months despite prednisone treatment. The fatigue significantly impacts her daily activities, including caring for her  and exercising. She "just can't get up". While her mental state has improved, the physical fatigue remains a significant issue. She notes a history of COVID-19 " "infections, which may be contributing to her ongoing fatigue symptoms.    SINUS ISSUES:  She reports a recent sinus infection with pus discharge. An ENT specialist, Dr. Melgoza, found unexpected polyps in her nose during exam. She was prescribed antibiotics and prednisone for treatment. She has a history of multiple sinus surgeries and recurrent sinus infections, with the most recent infection occurring in April.    MENTAL HEALTH:  She reports feeling confused and lightheaded, which she attributes to recent changes in her medication regimen. She is uncertain about the cause, considering it may be related to sinus problems, though she notes this is atypical for her sinus symptoms. Despite these concerns, she acknowledges an improvement in her mental state, reporting that she is handling things better and feeling calmer. However, she emphasizes that she is still not feeling "right" mentally, clarifying that this is distinct from depression or her other mental health concerns.    SLEEP:  She has a history of sleep apnea and experiences excessive sleepiness associated with this diagnosis. She denies feeling that her sleep apnea is severe. She uses a CPAP machine daily, except during a recent infection.    MEDICATIONS:  She is currently taking Lexapro and has recently started weaning off of it. She recently stopped taking magnesium, reporting that her sleep was better while on it. She has resumed taking a probiotic and reports it has helped with passing gas.    PAST MEDICAL HISTORY:  She has a history of partial thyroidectomy, with the right side of the thyroid removed. Two small hypoechoic nodules were identified in the left lobe of the thyroid approximately three years ago. She had a recent COVID-19 infection approximately 3-4 months ago.    FAMILY HISTORY:  She reports a family history of thyroid cancer in her oldest daughter, though she denies knowledge of the specific type but indicates it was not considered a severe " form.    NEW SYMPTOMS:  She reports noticing a new abdominal bulge when sitting up. She denies any associated pain or discomfort.      ROS:  ROS as indicated in HPI.               Primary insomnia  Rx- Melatonin  Previous Rx- Remeron (ineffective), Doxepin (ineffective), trazodone (hangover)     GERD  Rx-pantoprazole 40 mg  Denies dysphagia      Depressed mood / Anxiety   Rx : Lexapro 10   Prev Rx-Lexapro (nightmares), buspirone (ineffective),  wellbutrin (constipation, imbalance, ineffective), Fluoxetine (itching), Citalopram   Stressor-fall out with daughter      Hypothyroidism  Current Rx-armor Thyroid 60 mg + 15 mg daily   Previous Rx-levothyroxine (ineffective)  Previous TSH in normal range   Endocrinology- Dr Fritz      Bronchiectasis, history of pulmonary nodules  Seen by pulmonology - Dr. 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\     Senile osteoporosis  Prev Rx-Boniva 150 (pt choice to quit)  Taking vitamin-D supplement  No recent fractures     Seasonal allergies / chronic sinusitis   Rx-Flonase, Xyzal, montelukast, astelin  Does have recurrent sinusitis, seen by ENT ([place])      Psoriasis  Rx-betamethasone, clobetasol, desonide, Topicort  Followed by dermatology - Jesenia Loera MD      B12 deficiency  Prev Rx-B12 injections 500 mcg every 2 weeks     Chronic Low back pain , sacroiliitis, ankylosing spondylitis   Rheum : Gregg Mensah MD   Pain Mgt : Juan F   Rx : Mobic 15 mg , Norco 5 mg p.r.n., use sparingly     ALONZO  Sleep MD : Catarina Morfin  CPAP compliant     Chronic Constipation  OTC : Mg Glycinate     Chronic Fatigue           Past Medical History:  Past Medical History:   Diagnosis Date    Asthma     GERD (gastroesophageal reflux disease)     Psoriasis        Past Surgical History:  Past Surgical History:   Procedure Laterality Date    ADENOIDECTOMY      CHOLECYSTECTOMY      COLONOSCOPY  2019    unremarkable per pt report    DEXA      WNL    SINUS SURGERY       Dr Cordova    SINUS SURGERY  01/2017    xs 5    THYROID SURGERY      nodules removed //Dr Amato     TONSILLECTOMY      TYMPANOSTOMY TUBE PLACEMENT      UPPER GASTROINTESTINAL ENDOSCOPY         Family History:  Family History   Problem Relation Name Age of Onset    Hypertension Mother      Obesity Father      Diabetes Sister      Obesity Sister      Breast cancer Paternal Aunt  43    No Known Problems Maternal Grandmother      No Known Problems Maternal Grandfather      No Known Problems Paternal Grandmother      No Known Problems Paternal Grandfather      Thyroid cancer Daughter  61    Ovarian cancer Neg Hx      Colon cancer Neg Hx         Social History:  Social History     Socioeconomic History    Marital status:    Tobacco Use    Smoking status: Never    Smokeless tobacco: Never   Substance and Sexual Activity    Alcohol use: Yes     Comment: twice a month    Drug use: No    Sexual activity: Never     Social Drivers of Health     Financial Resource Strain: Low Risk  (10/23/2023)    Overall Financial Resource Strain (CARDIA)     Difficulty of Paying Living Expenses: Not hard at all   Food Insecurity: No Food Insecurity (10/23/2023)    Hunger Vital Sign     Worried About Running Out of Food in the Last Year: Never true     Ran Out of Food in the Last Year: Never true   Transportation Needs: Unknown (10/23/2023)    PRAPARE - Transportation     Lack of Transportation (Medical): No   Physical Activity: Inactive (10/23/2023)    Exercise Vital Sign     Days of Exercise per Week: 0 days     Minutes of Exercise per Session: 0 min   Stress: No Stress Concern Present (10/23/2023)    [employer] - Occupational Stress Questionnaire     Feeling of Stress : Not at all   Housing Stability: Low Risk  (10/23/2023)    Housing Stability Vital Sign     Unable to Pay for Housing in the Last Year: No     Number of Places Lived in the Last Year: 1     Unstable Housing in the Last Year: No        Medications:  Current Outpatient Medications on File Prior to Visit   Medication Sig Dispense Refill    betamethasone dipropionate 0.05 % cream Apply topically once daily. 90 g 5    cholecalciferol, vitamin D3, (VITAMIN D3) 50 mcg (2,000 unit) Tab Take 2,000 Units by mouth once daily.      clobetasol 0.05% (TEMOVATE) 0.05 % Oint 1 application once daily. Apply to affected area      desonide 0.05% (DESOWEN) 0.05 % Oint Apply topically 2 (two) times daily. 60 g 2    desoximetasone (TOPICORT) 0.05 % cream Apply topically 2 (two) times daily. 60 g 11    doxycycline (VIBRA-TABS) 100 MG tablet Take 1 tablet (100 mg total) by mouth 2 (two) times daily. for 21 days 42 tablet 0    fluticasone propionate (FLONASE) 50 mcg/actuation nasal spray 1 spray (50 mcg total) by Each Nostril route once daily. 54 g 3    HYDROcodone-acetaminophen (NORCO) 5-325 mg per tablet Take 1 tablet by mouth every 8 (eight) hours as needed for Pain. 20 tablet 0    ketoconazole (NIZORAL) 2 % cream Apply topically.      ketoconazole (NIZORAL) 2 % shampoo Apply topically twice a week. 120 mL 3    levalbuterol (XOPENEX HFA) 45 mcg/actuation inhaler Inhale 1-2 puffs into the lungs every 6 (six) hours as needed for Wheezing or Shortness of Breath (or before exercise). Rescue 15 g 0    levocetirizine (XYZAL) 5 MG tablet Take one capsule by mouth daily 90 tablet 0    MAGNESIUM ORAL Take by mouth once. Pt takes 400mg once daily      mupirocin (BACTROBAN) 2 % ointment by Nasal route 2 (two) times daily. 1 each 3    simethicone (GAS-X ORAL) Take by mouth.      thyroid, pork, (ARMOUR THYROID) 60 mg Tab Take 1 tablet (60 mg total) by mouth before breakfast. 90 tablet 3    azelastine (ASTELIN) 137 mcg (0.1 %) nasal spray 1 spray (137 mcg total) by Nasal route 2 (two) times daily. 30 mL 3    [DISCONTINUED] etanercept (ENBREL SURECLICK) 50 mg/mL (1 mL) Inject 1 mL (50 mg total) into the skin once a week. (Patient taking differently: Inject 50 mg into the skin  "once a week. Once a month) 12 mL 3     Current Facility-Administered Medications on File Prior to Visit   Medication Dose Route Frequency Provider Last Rate Last Admin    cyanocobalamin injection 1,000 mcg  1,000 mcg Intramuscular Q30 Days    1,000 mcg at 07/25/24 0857       Allergies:  Avelox [moxifloxacin], Bactrim [sulfamethoxazole-trimethoprim], Ciprofloxacin, Isothiazolinones, Apremilast, Bacitracin, and Wellbutrin [bupropion hcl]    Immunizations:  Immunization History   Administered Date(s) Administered    COVID-19 MRNA, LN-S PF (MODERNA HALF 0.25 ML DOSE) 12/16/2021    COVID-19 Vaccine 03/31/2021    COVID-19, vector-nr, [id number], PF (Johanna) 04/05/2021    Hepatitis A, Adult 08/09/2007    IPV 08/09/2007    Influenza 11/18/2011, 10/21/2013    Influenza (FLUAD) - Quadrivalent - Adjuvanted - PF *Preferred* (65+) 10/07/2021, 10/03/2022, 09/08/2023    Influenza - Quadrivalent - MDCK - PF 10/10/2017    Influenza - Quadrivalent - PF *Preferred* (6 months and older) 10/29/2019, 09/25/2020    Influenza - Trivalent - Afluria, Fluzone MDV 11/18/2011    Influenza - Trivalent - Fluad - Adjuvanted - PF (65 years and older 09/13/2024    Influenza - Trivalent - Fluarix, Flulaval, Fluzone, Afluria - PF 10/21/2013    Influenza - Trivalent - Fluzone High Dose - PF (65 years and older) 10/14/2014, 10/08/2015, 09/27/2016    Pneumococcal Conjugate - 13 Valent 04/12/2016    Pneumococcal Polysaccharide - 23 Valent 08/01/2011    Td (Adult), Unspecified Formulation 08/02/2018    Tdap 08/09/2007, 08/01/2018    Zoster 07/11/2007       Review of Systems:  Review of Systems   All other systems reviewed and are negative.      Objective:    Vitals:  Vitals:    11/11/24 0903   BP: 110/64   Pulse: 68   Resp: 16   Weight: 60.2 kg (132 lb 13.2 oz)   Height: 5' 2" (1.575 m)       Physical Exam  Vitals reviewed.   Constitutional:       General: She is not in acute distress.  HENT:      Head: Normocephalic and atraumatic.   Eyes:      Pupils: " Pupils are equal, round, and reactive to light.   Cardiovascular:      Rate and Rhythm: Normal rate and regular rhythm.      Heart sounds: No murmur heard.     No friction rub.   Pulmonary:      Effort: Pulmonary effort is normal.      Breath sounds: Normal breath sounds.   Abdominal:      General: Bowel sounds are normal. There is no distension.      Palpations: Abdomen is soft.      Tenderness: There is no abdominal tenderness.   Musculoskeletal:      Cervical back: Neck supple.   Skin:     General: Skin is warm and dry.      Findings: No rash.   Psychiatric:         Behavior: Behavior normal.             Luc Teixeira MD  Family Medicine

## 2024-11-15 ENCOUNTER — HOSPITAL ENCOUNTER (OUTPATIENT)
Dept: RADIOLOGY | Facility: HOSPITAL | Age: 84
Discharge: HOME | End: 2024-11-15
Attending: FAMILY MEDICINE
Payer: MEDICARE

## 2024-11-15 DIAGNOSIS — E04.1 THYROID NODULE: ICD-10-CM

## 2024-11-15 PROCEDURE — 76536 US EXAM OF HEAD AND NECK: CPT | Mod: TC,PO

## 2024-11-15 PROCEDURE — 76536 US EXAM OF HEAD AND NECK: CPT | Mod: 26,,, | Performed by: RADIOLOGY

## 2024-11-19 ENCOUNTER — OFFICE VISIT (OUTPATIENT)
Dept: OTOLARYNGOLOGY | Facility: CLINIC | Age: 84
End: 2024-11-19
Payer: MEDICARE

## 2024-11-19 VITALS — HEIGHT: 62 IN | BODY MASS INDEX: 24.38 KG/M2 | WEIGHT: 132.5 LBS

## 2024-11-19 DIAGNOSIS — J45.909 ASTHMA, UNSPECIFIED ASTHMA SEVERITY, UNSPECIFIED WHETHER COMPLICATED, UNSPECIFIED WHETHER PERSISTENT: ICD-10-CM

## 2024-11-19 DIAGNOSIS — J32.4 CHRONIC PANSINUSITIS: ICD-10-CM

## 2024-11-19 DIAGNOSIS — K12.1 ORAL ULCER: ICD-10-CM

## 2024-11-19 DIAGNOSIS — J01.20 ACUTE ETHMOIDAL SINUSITIS, RECURRENCE NOT SPECIFIED: ICD-10-CM

## 2024-11-19 DIAGNOSIS — J33.9 NASAL POLYPS: Primary | ICD-10-CM

## 2024-11-19 DIAGNOSIS — Z98.890 HISTORY OF SINUS SURGERY: ICD-10-CM

## 2024-11-19 PROCEDURE — 99999 PR PBB SHADOW E&M-EST. PATIENT-LVL III: CPT | Mod: PBBFAC,,, | Performed by: STUDENT IN AN ORGANIZED HEALTH CARE EDUCATION/TRAINING PROGRAM

## 2024-11-19 PROCEDURE — 31231 NASAL ENDOSCOPY DX: CPT | Mod: PBBFAC,PO | Performed by: STUDENT IN AN ORGANIZED HEALTH CARE EDUCATION/TRAINING PROGRAM

## 2024-11-19 PROCEDURE — 99214 OFFICE O/P EST MOD 30 MIN: CPT | Mod: 25,S$PBB,, | Performed by: STUDENT IN AN ORGANIZED HEALTH CARE EDUCATION/TRAINING PROGRAM

## 2024-11-19 PROCEDURE — 31231 NASAL ENDOSCOPY DX: CPT | Mod: S$PBB,,, | Performed by: STUDENT IN AN ORGANIZED HEALTH CARE EDUCATION/TRAINING PROGRAM

## 2024-11-19 PROCEDURE — 99213 OFFICE O/P EST LOW 20 MIN: CPT | Mod: PBBFAC,PO | Performed by: STUDENT IN AN ORGANIZED HEALTH CARE EDUCATION/TRAINING PROGRAM

## 2024-11-19 RX ORDER — BUDESONIDE 0.5 MG/2ML
INHALANT ORAL
Qty: 360 ML | Refills: 3 | Status: SHIPPED | OUTPATIENT
Start: 2024-11-19

## 2024-11-19 NOTE — PROGRESS NOTES
Otolaryngology Clinic Note    Subjective:       Patient ID: Amarilis Decker is a 83 y.o. female.    Chief Complaint: Sinus Problem        History of Present Illness: Amarilis Decker is a 83 y.o. female presenting with sinus concerns. She reports she has had 5 sinus surgeries, got staph infection and eye damage from one. Gets several left max sinus infections a year. We have treated with oral and topical abx. She does regular steroids sinus rinses.     2/15/24: RTC. Was sick 2 days after last seen. Did clinda then doxycyline. Has been good since. Ears have been hurting. She has not been wearing her mouthguard. Doing rinses.  She  is doing steroids rinses. She had blood in sinuses until doxy. Not using astelin.     5/14/24: She got hearing test and aids since last seen. She knows they are helping. Noticed pus 2 days last week and yesterday. Her memory is worse due to stress,  is sick. Has not been doing mupirocin.     10/29/24; Purulent drainage on left past 4 days. Nosebleed slightly on right before infection. Painful. Has been doing rinses with steroids and mupirocin BID. Stopped mupirocin with nosebleeds but was using twice a day in rinses as well. Can breathe ok. Reports covid 3 months ago. Has felt a little run down since. Was using afrin.     11/19/24; RTC as she does not feel better. Still seeing pus in her nose. Stopped lexapro and is less tired. Had shooting pain on right side, teeth on right hurt last week.     Past Surgical History:   Procedure Laterality Date    ADENOIDECTOMY      CHOLECYSTECTOMY      COLONOSCOPY  2019    unremarkable per pt report    JANKI      WNL    SINUS SURGERY      Dr Cordova    SINUS SURGERY  01/2017    xs 5    THYROID SURGERY      nodules removed //Dr Amato     TONSILLECTOMY      TYMPANOSTOMY TUBE PLACEMENT      UPPER GASTROINTESTINAL ENDOSCOPY       Past Medical History:   Diagnosis Date    Asthma     GERD (gastroesophageal reflux disease)     Psoriasis      Social  Drivers of Health     Tobacco Use: Low Risk  (11/11/2024)    Patient History     Smoking Tobacco Use: Never     Smokeless Tobacco Use: Never     Passive Exposure: Not on file   Alcohol Use: Not At Risk (10/23/2023)    AUDIT-C     Frequency of Alcohol Consumption: Monthly or less     Average Number of Drinks: 1 or 2     Frequency of Binge Drinking: Never   Financial Resource Strain: Low Risk  (10/23/2023)    Overall Financial Resource Strain (CARDIA)     Difficulty of Paying Living Expenses: Not hard at all   Food Insecurity: No Food Insecurity (10/23/2023)    Hunger Vital Sign     Worried About Running Out of Food in the Last Year: Never true     Ran Out of Food in the Last Year: Never true   Transportation Needs: Unknown (10/23/2023)    PRAPARE - Transportation     Lack of Transportation (Medical): No     Lack of Transportation (Non-Medical): Not on file   Physical Activity: Inactive (10/23/2023)    Exercise Vital Sign     Days of Exercise per Week: 0 days     Minutes of Exercise per Session: 0 min   Stress: No Stress Concern Present (10/23/2023)    [employer] - Occupational Stress Questionnaire     Feeling of Stress : Not at all   Housing Stability: Low Risk  (10/23/2023)    Housing Stability Vital Sign     Unable to Pay for Housing in the Last Year: No     Number of Places Lived in the Last Year: 1     Unstable Housing in the Last Year: No   Depression: Low Risk  (1/3/2024)    Depression     Last PHQ-4: Flowsheet Data: 0   Utilities: Not on file   Health Literacy: Not on file   Social Isolation: Not on file     Review of patient's allergies indicates:   Allergen Reactions    Avelox [moxifloxacin] Swelling    Bactrim [sulfamethoxazole-trimethoprim] Swelling    Ciprofloxacin Swelling and Hives    Isothiazolinones Dermatitis     Strong contact dermatitis    Apremilast Other (See Comments)     Muscle cramps, cough    Bacitracin      Other reaction(s): unknown    Wellbutrin [bupropion  hcl] Nausea Only     High heart rate     Current Outpatient Medications   Medication Instructions    azelastine (ASTELIN) 137 mcg, Nasal, 2 times daily    betamethasone dipropionate 0.05 % cream Topical (Top), Daily    budesonide (PULMICORT) 0.5 mg/2 mL nebulizer solution Controller, twice a day.    cholecalciferol (vitamin D3) (VITAMIN D3) 2,000 Units, Daily    clobetasol 0.05% (TEMOVATE) 0.05 % Oint 1 application , Daily    desonide 0.05% (DESOWEN) 0.05 % Oint Topical (Top), 2 times daily    desoximetasone (TOPICORT) 0.05 % cream Topical (Top), 2 times daily    doxycycline (VIBRA-TABS) 100 mg, Oral, 2 times daily    fluticasone propionate (FLONASE) 50 mcg, Each Nostril, Daily    HYDROcodone-acetaminophen (NORCO) 5-325 mg per tablet 1 tablet, Oral, Every 8 hours PRN    ketoconazole (NIZORAL) 2 % cream Apply topically.    ketoconazole (NIZORAL) 2 % shampoo Topical (Top), Twice weekly    levalbuterol (XOPENEX HFA) 45 mcg/actuation inhaler 1-2 puffs, Inhalation, Every 6 hours PRN, Rescue    levocetirizine (XYZAL) 5 MG tablet Take one capsule by mouth daily    MAGNESIUM ORAL Once    modafiniL (PROVIGIL) 200 mg, Oral, Daily    mupirocin (BACTROBAN) 2 % ointment Nasal, 2 times daily    simethicone (GAS-X ORAL) Take by mouth.    thyroid (pork) (ARMOUR THYROID) 60 mg, Oral, Before breakfast         ENT ROS negative except as stated above.             Objective:      There were no vitals filed for this visit.    General: NAD, well appearing  Eyes: Normal conjunctiva and lids  Face: symmetric, nerve intact  Nose: The nose is without any evidence of any deformity. The nasal mucosa is moist. The septum is midline. There is no evidence of septal hematoma. The turbinates are without abnormality.   Ears: The ears are with normal-appearing pinna. Examination of the canals is normal appearing bilaterally. There is no drainage or erythema noted. Monomeric Tm on right, retracted mild, mild effusion?, left normal, reduced mobility.  Hearing is grossly intact.  Mouth: No obvious abnormalities to the lips. The teeth are unremarkable. The gingivae are without any obvious evidence of infection or lesion. The oral mucosa is moist and pink. There are no obvious masses to the hard or soft palate. Has small linear area on right that looks like she chews it. Small ulcer in middle.   Oropharynx: The uvula is midline.  The tongue is midline. The posterior pharynx is without erythema or exudate. The tonsils are normal appearing.  Salivary glands: The salivary glands are symmetric and not enlarged, no masses  Neck: No lymphadenopathy, trachea midline, thryoid not enlarged.  Psych: Normal mood and affect.   Neuro: Grossly intact  Speech: fluent    Procedure: Nasal Endoscopy   Indications: acute sinusitis  Verbal consent obtained  Anesthesia: topical lidocaine and phenylephrine spray    Procedure: Rigid 30 degree endoscope was passed into each nare to nasopharynx showing findings below.     Septum midline. Max and ethmoids open on right, no drainage, SER patent. Left polypoid degeneration of middle turbinate and worsening polyps in ethmoid bed and SER posteriorly. Middle and anterior ethmoid look patent. Maxillary sinus is patent without drainage. No infection today. Et orifices normal.             Component 3 wk ago   Anaerobic Culture  Abnormal   CUTIBACTERIUM ACNES  Rare             Assessment and Plan:       1. Nasal polyps    2. Acute ethmoidal sinusitis, recurrence not specified    3. History of sinus surgery    4. Chronic pansinusitis    5. Asthma, unspecified asthma severity, unspecified whether complicated, unspecified whether persistent    6. Oral ulcer          Polyps worse on left but infection is better. Unclear on how long infection present. Suspect polyps worsened with subacute to chronic infection.   Hx MRSA    Complete doxycyline. Did oral steroids 10 days.   Follow culture results  - Will switch betamethasone to budesonide now- needs PA  -  mupirocin BID in rinses 3 week when purulence noted.     CT sinus 1/12/2023 without significant sinus disease, all sinuses opened surgically and patent. 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 Reviewed that CT would just show this and we would only do this if we were planning for surgery. Goal is for topical steroid to shrink her polyps.     She got hearing test and aids since last seen.     Ulcer present where she bites her cheek at night. She will wear mouth guard.     RTC: rescope 3 months.     Plan of care was discussed in detail with the patient, who agreed with the plan as above. All questions were answered in detail.     Eboni Briggs MD  Otolaryngology;

## 2024-12-02 ENCOUNTER — LAB VISIT (OUTPATIENT)
Dept: LAB | Facility: HOSPITAL | Age: 84
End: 2024-12-02
Attending: FAMILY MEDICINE
Payer: MEDICARE

## 2024-12-02 ENCOUNTER — OFFICE VISIT (OUTPATIENT)
Dept: FAMILY MEDICINE | Facility: CLINIC | Age: 84
End: 2024-12-02
Payer: MEDICARE

## 2024-12-02 VITALS
RESPIRATION RATE: 16 BRPM | HEART RATE: 74 BPM | WEIGHT: 133.5 LBS | HEIGHT: 62 IN | DIASTOLIC BLOOD PRESSURE: 60 MMHG | OXYGEN SATURATION: 97 % | BODY MASS INDEX: 24.56 KG/M2 | SYSTOLIC BLOOD PRESSURE: 118 MMHG

## 2024-12-02 DIAGNOSIS — R53.83 FATIGUE, UNSPECIFIED TYPE: Primary | ICD-10-CM

## 2024-12-02 DIAGNOSIS — F33.1 MODERATE EPISODE OF RECURRENT MAJOR DEPRESSIVE DISORDER: ICD-10-CM

## 2024-12-02 DIAGNOSIS — F41.1 GENERALIZED ANXIETY DISORDER: ICD-10-CM

## 2024-12-02 DIAGNOSIS — J32.9 RECURRENT SINUS INFECTIONS: ICD-10-CM

## 2024-12-02 DIAGNOSIS — M46.1 SACROILIITIS, NOT ELSEWHERE CLASSIFIED: ICD-10-CM

## 2024-12-02 DIAGNOSIS — M16.10 HIP ARTHRITIS: ICD-10-CM

## 2024-12-02 DIAGNOSIS — Z78.0 POSTMENOPAUSAL: ICD-10-CM

## 2024-12-02 LAB
IGA SERPL-MCNC: 194 MG/DL (ref 40–350)
IGG SERPL-MCNC: 815 MG/DL (ref 650–1600)
IGM SERPL-MCNC: 161 MG/DL (ref 50–300)

## 2024-12-02 PROCEDURE — 99999 PR PBB SHADOW E&M-EST. PATIENT-LVL V: CPT | Mod: PBBFAC,,, | Performed by: FAMILY MEDICINE

## 2024-12-02 PROCEDURE — 36415 COLL VENOUS BLD VENIPUNCTURE: CPT | Mod: PN | Performed by: FAMILY MEDICINE

## 2024-12-02 PROCEDURE — 99215 OFFICE O/P EST HI 40 MIN: CPT | Mod: PBBFAC,PN | Performed by: FAMILY MEDICINE

## 2024-12-02 PROCEDURE — G2211 COMPLEX E/M VISIT ADD ON: HCPCS | Mod: S$PBB,,, | Performed by: FAMILY MEDICINE

## 2024-12-02 PROCEDURE — 82784 ASSAY IGA/IGD/IGG/IGM EACH: CPT | Mod: 59 | Performed by: FAMILY MEDICINE

## 2024-12-02 PROCEDURE — 99214 OFFICE O/P EST MOD 30 MIN: CPT | Mod: S$PBB,,, | Performed by: FAMILY MEDICINE

## 2024-12-02 NOTE — PROGRESS NOTES
THIS DOCUMENT WAS MADE IN PART WITH VOICE RECOGNITION SOFTWARE.  OCCASIONALLY THIS SOFTWARE WILL MISINTERPRET WORDS OR PHRASES.    Assessment and Plan:    1. Fatigue, unspecified type        2. Moderate episode of recurrent major depressive disorder        3. Generalized anxiety disorder        4. Sacroiliitis, not elsewhere classified        5. Hip arthritis        6. Recurrent sinus infections  IMMUNOGLOBULINS (IGG, IGA, IGM) QUANTITATIVE      7. Postmenopausal  DXA Bone Density Spine And Hip                Assessment & Plan    PLAN SUMMARY:   Immunoglobulin panel ordered to assess immune system function   DEXA scan ordered for bone density on or after January 13th   Discontinued Lexapro due to extreme fatigue   Consider referral to Dr. Daxa Patiño's osteoporosis clinic if DEXA scan results are concerning   Follow up with Dr. Zambrano for ultrasound-guided injections for chronic pain   Continue sinus irrigation twice daily   Continue magnesium glycinate for sleep   Consider referral to immunologist if antibody levels are abnormal    SLEEP DISORDER:   Explained that sleep apnea can contribute to heart and lung conditions over time.   Amarilis to clean CPAP machine daily to prevent bacterial growth.   Continued magnesium glycinate for sleep, though recent ineffectiveness noted.    CHRONIC SINUSITIS:   Amarilis to continue sinus irrigation twice daily.    DEPRESSION:   Discontinued Lexapro due to extreme fatigue.    IMMUNE SYSTEM DISORDER:   Immunoglobulin panel ordered to assess immune system function.   Consider referral to immunologist if antibody levels are abnormal.    OSTEOARTHRITIS AND BONE HEALTH:   DEXA scan ordered for bone density on or after January 13th.   Consider referral to Dr. Daxa Patiño's osteoporosis clinic in [place] if DEXA scan results are concerning.    CHRONIC PAIN:   Follow up with Dr. Zambrano for ultrasound-guided injections to address chronic pain.   Office to facilitate  scheduling of appointment.    FOLLOW-UP:   Discussed prostate cancer risk and screening, explaining that most men develop it with age but often die with it rather than from it.               ______________________________________________________________________  Subjective:    Chief Complaint:  Chief Complaint   Patient presents with    Follow-up     Patient states that she is coming in today for 2 week follow up        HPI:  Amarilis is a 84 y.o. year old         History of Present Illness    CHIEF COMPLAINT:  Amarilis presents today for follow-up after discontinuing Lexapro.    MEDICATION CHANGES AND EFFECTS:  She reports significant improvement in energy levels after discontinuing Lexapro. While on the medication, she experienced extreme fatigue, difficulty getting out of bed or chair, and nightmares. She notes a weight loss of three lbs within two days of stopping the medication.    PAIN CONCERNS:  She experiences occasional shooting pain on the right side of her head and has requested sinus imaging. She also reports diffuse, migratory pain that makes it challenging to walk straight. An injection administered by Dr. Zambrano in July was ineffective. An MRI from last year showed sacroiliitis.    SLEEP ISSUES:  She reports difficulty sleeping, stating that her brain becomes active when trying to sleep. Magnesium glycinate has been ineffective recently for sleep management. She notes being sensitive to medications, which has complicated finding an effective sleep treatment.    MEDICAL HISTORY:  She had a previous infection with unusual bacteria and irrigates her sinuses twice daily. A bone density scan is scheduled for January 13th.    FAMILY MEDICAL HISTORY:  She expresses concern about her grandson's history of seizures and alcohol consumption, worried about potential alcohol withdrawal triggering seizures. She also mentions her son drinks alcohol and her  has sleep apnea managed with a  machine.      ROS:  ROS as indicated in HPI.               Primary insomnia  Rx-  Mg Glycinate   Previous Rx- Remeron (ineffective), Doxepin (ineffective), trazodone (hangover), Melatonin (ineffective)     GERD  Rx-pantoprazole 40 mg  Denies dysphagia      Depressed mood / Anxiety   Rx : none  Prev Rx-Lexapro (nightmares, EXTREME FATIGUE), buspirone (ineffective),  wellbutrin (constipation, imbalance, ineffective), Fluoxetine (itching), Citalopram   Stressor-fall out with daughter      Hypothyroidism  Current Rx-armor Thyroid 60 mg + 15 mg daily   Previous Rx-levothyroxine (ineffective)  Previous TSH in normal range   Endocrinology- Dr Fritz      Bronchiectasis, history of pulmonary nodules  Seen by pulmonology - Dr. 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\     Senile osteoporosis  Prev Rx-Boniva 150 (pt choice to quit)  Taking vitamin-D supplement  No recent fractures     Seasonal allergies / chronic sinusitis / nasal polyps   Rx-Flonase, Xyzal, montelukast, astelin   ENT -  Eboni Briggs MD      Psoriasis  Rx-betamethasone, clobetasol, desonide, Topicort  Followed by dermatology - Jesenia Loera MD      B12 deficiency  Prev Rx-B12 injections 500 mcg every 2 weeks     Chronic Low back pain , sacroiliitis, ankylosing spondylitis   Rheum : Gregg Mensah MD   Pain Mgt : Juan F   Rx : Mobic 15 mg , Norco 5 mg p.r.n., use sparingly     ALONZO  Sleep MD : Catarina Morfin  CPAP compliant     Chronic Constipation  OTC : Mg Glycinate     Chronic Fatigue           Past Medical History:  Past Medical History:   Diagnosis Date    Asthma     GERD (gastroesophageal reflux disease)     Psoriasis        Past Surgical History:  Past Surgical History:   Procedure Laterality Date    ADENOIDECTOMY      CHOLECYSTECTOMY      COLONOSCOPY  2019    unremarkable per pt report    DEXA      WNL    SINUS SURGERY      Dr Cordova    SINUS SURGERY  01/2017    xs 5    THYROID SURGERY      nodules removed  //Dr Amato     TONSILLECTOMY      TYMPANOSTOMY TUBE PLACEMENT      UPPER GASTROINTESTINAL ENDOSCOPY         Family History:  Family History   Problem Relation Name Age of Onset    Hypertension Mother      Obesity Father      Diabetes Sister      Obesity Sister      Breast cancer Paternal Aunt  43    No Known Problems Maternal Grandmother      No Known Problems Maternal Grandfather      No Known Problems Paternal Grandmother      No Known Problems Paternal Grandfather      Thyroid cancer Daughter  61    Ovarian cancer Neg Hx      Colon cancer Neg Hx         Social History:  Social History     Socioeconomic History    Marital status:    Tobacco Use    Smoking status: Never    Smokeless tobacco: Never   Substance and Sexual Activity    Alcohol use: Yes     Comment: twice a month    Drug use: No    Sexual activity: Never     Social Drivers of Health     Financial Resource Strain: Low Risk  (10/23/2023)    Overall Financial Resource Strain (CARDIA)     Difficulty of Paying Living Expenses: Not hard at all   Food Insecurity: No Food Insecurity (10/23/2023)    Hunger Vital Sign     Worried About Running Out of Food in the Last Year: Never true     Ran Out of Food in the Last Year: Never true   Transportation Needs: Unknown (10/23/2023)    PRAPARE - Transportation     Lack of Transportation (Medical): No   Physical Activity: Inactive (10/23/2023)    Exercise Vital Sign     Days of Exercise per Week: 0 days     Minutes of Exercise per Session: 0 min   Stress: No Stress Concern Present (10/23/2023)    [employer] [place] [employer] - Occupational Stress Questionnaire     Feeling of Stress : Not at all   Housing Stability: Low Risk  (10/23/2023)    Housing Stability Vital Sign     Unable to Pay for Housing in the Last Year: No     Number of Places Lived in the Last Year: 1     Unstable Housing in the Last Year: No       Medications:  Current Outpatient Medications on File Prior to Visit   Medication Sig  Dispense Refill    betamethasone dipropionate 0.05 % cream Apply topically once daily. 90 g 5    budesonide (PULMICORT) 0.5 mg/2 mL nebulizer solution Controller, twice a day. 360 mL 3    cholecalciferol, vitamin D3, (VITAMIN D3) 50 mcg (2,000 unit) Tab Take 2,000 Units by mouth once daily.      clobetasol 0.05% (TEMOVATE) 0.05 % Oint 1 application once daily. Apply to affected area      desonide 0.05% (DESOWEN) 0.05 % Oint Apply topically 2 (two) times daily. 60 g 2    desoximetasone (TOPICORT) 0.05 % cream Apply topically 2 (two) times daily. 60 g 11    fluticasone propionate (FLONASE) 50 mcg/actuation nasal spray 1 spray (50 mcg total) by Each Nostril route once daily. 54 g 3    HYDROcodone-acetaminophen (NORCO) 5-325 mg per tablet Take 1 tablet by mouth every 8 (eight) hours as needed for Pain. 20 tablet 0    ketoconazole (NIZORAL) 2 % cream Apply topically.      ketoconazole (NIZORAL) 2 % shampoo Apply topically twice a week. 120 mL 3    levalbuterol (XOPENEX HFA) 45 mcg/actuation inhaler Inhale 1-2 puffs into the lungs every 6 (six) hours as needed for Wheezing or Shortness of Breath (or before exercise). Rescue 15 g 0    levocetirizine (XYZAL) 5 MG tablet Take one capsule by mouth daily 90 tablet 0    MAGNESIUM ORAL Take by mouth once. Pt takes 400mg once daily      modafiniL (PROVIGIL) 200 MG Tab Take 1 tablet (200 mg total) by mouth once daily. 30 tablet 0    mupirocin (BACTROBAN) 2 % ointment by Nasal route 2 (two) times daily. 1 each 3    simethicone (GAS-X ORAL) Take by mouth.      thyroid, pork, (ARMOUR THYROID) 60 mg Tab Take 1 tablet (60 mg total) by mouth before breakfast. 90 tablet 3    azelastine (ASTELIN) 137 mcg (0.1 %) nasal spray 1 spray (137 mcg total) by Nasal route 2 (two) times daily. 30 mL 3    [DISCONTINUED] etanercept (ENBREL SURECLICK) 50 mg/mL (1 mL) Inject 1 mL (50 mg total) into the skin once a week. (Patient taking differently: Inject 50 mg into the skin once a week. Once a month)  "12 mL 3     Current Facility-Administered Medications on File Prior to Visit   Medication Dose Route Frequency Provider Last Rate Last Admin    cyanocobalamin injection 1,000 mcg  1,000 mcg Intramuscular Q30 Days    1,000 mcg at 07/25/24 0857       Allergies:  Avelox [moxifloxacin], Bactrim [sulfamethoxazole-trimethoprim], Ciprofloxacin, Isothiazolinones, Apremilast, Bacitracin, and Wellbutrin [bupropion hcl]    Immunizations:  Immunization History   Administered Date(s) Administered    COVID-19 MRNA, LN-S PF (MODERNA HALF 0.25 ML DOSE) 12/16/2021    COVID-19 Vaccine 03/31/2021    COVID-19, vector-nr, [id number], PF (Johanna) 04/05/2021    Hepatitis A, Adult 08/09/2007    IPV 08/09/2007    Influenza 11/18/2011, 10/21/2013    Influenza (FLUAD) - Quadrivalent - Adjuvanted - PF *Preferred* (65+) 10/07/2021, 10/03/2022, 09/08/2023    Influenza - Quadrivalent - MDCK - PF 10/10/2017    Influenza - Quadrivalent - PF *Preferred* (6 months and older) 10/29/2019, 09/25/2020    Influenza - Trivalent - Afluria, Fluzone MDV 11/18/2011    Influenza - Trivalent - Fluad - Adjuvanted - PF (65 years and older 09/13/2024    Influenza - Trivalent - Fluarix, Flulaval, Fluzone, Afluria - PF 10/21/2013    Influenza - Trivalent - Fluzone High Dose - PF (65 years and older) 10/14/2014, 10/08/2015, 09/27/2016    Pneumococcal Conjugate - 13 Valent 04/12/2016    Pneumococcal Polysaccharide - 23 Valent 08/01/2011    Td (Adult), Unspecified Formulation 08/02/2018    Tdap 08/09/2007, 08/01/2018    Zoster 07/11/2007       Review of Systems:  Review of Systems   All other systems reviewed and are negative.      Objective:    Vitals:  Vitals:    12/02/24 0858   BP: 118/60   Pulse: 74   Resp: 16   SpO2: 97%   Weight: 60.6 kg (133 lb 7.8 oz)   Height: 5' 2" (1.575 m)       Physical Exam  Vitals reviewed.   Constitutional:       General: She is not in acute distress.  HENT:      Head: Normocephalic and atraumatic.   Eyes:      Pupils: Pupils are equal, " round, and reactive to light.   Cardiovascular:      Rate and Rhythm: Normal rate and regular rhythm.      Heart sounds: No murmur heard.     No friction rub.   Pulmonary:      Effort: Pulmonary effort is normal.      Breath sounds: Normal breath sounds.   Abdominal:      General: Bowel sounds are normal. There is no distension.      Palpations: Abdomen is soft.      Tenderness: There is no abdominal tenderness.   Musculoskeletal:      Cervical back: Neck supple.   Skin:     General: Skin is warm and dry.      Findings: No rash.   Psychiatric:         Behavior: Behavior normal.             Luc Teixeira MD  Family Medicine

## 2024-12-05 ENCOUNTER — PATIENT MESSAGE (OUTPATIENT)
Dept: OTOLARYNGOLOGY | Facility: CLINIC | Age: 84
End: 2024-12-05
Payer: MEDICARE

## 2024-12-05 RX ORDER — AZITHROMYCIN 250 MG/1
250 TABLET, FILM COATED ORAL DAILY
Qty: 45 TABLET | Refills: 0 | Status: SHIPPED | OUTPATIENT
Start: 2024-12-05 | End: 2025-01-19

## 2024-12-06 ENCOUNTER — OFFICE VISIT (OUTPATIENT)
Dept: PHYSICAL MEDICINE AND REHAB | Facility: CLINIC | Age: 84
End: 2024-12-06
Payer: MEDICARE

## 2024-12-06 VITALS — SYSTOLIC BLOOD PRESSURE: 125 MMHG | DIASTOLIC BLOOD PRESSURE: 63 MMHG | HEART RATE: 68 BPM

## 2024-12-06 DIAGNOSIS — M54.50 CHRONIC BILATERAL LOW BACK PAIN WITHOUT SCIATICA: ICD-10-CM

## 2024-12-06 DIAGNOSIS — G89.29 CHRONIC BILATERAL LOW BACK PAIN WITHOUT SCIATICA: ICD-10-CM

## 2024-12-06 DIAGNOSIS — M54.9 DORSALGIA, UNSPECIFIED: Primary | ICD-10-CM

## 2024-12-06 DIAGNOSIS — M46.1 SACROILIITIS, NOT ELSEWHERE CLASSIFIED: ICD-10-CM

## 2024-12-06 PROCEDURE — 99213 OFFICE O/P EST LOW 20 MIN: CPT | Mod: PBBFAC,PO | Performed by: STUDENT IN AN ORGANIZED HEALTH CARE EDUCATION/TRAINING PROGRAM

## 2024-12-06 PROCEDURE — 99999 PR PBB SHADOW E&M-EST. PATIENT-LVL III: CPT | Mod: PBBFAC,,, | Performed by: STUDENT IN AN ORGANIZED HEALTH CARE EDUCATION/TRAINING PROGRAM

## 2024-12-06 NOTE — PROGRESS NOTES
PHYSICAL MEDICINE AND REHABILITATION  Follow up visit:    Subjective:   Chief Complaint:    Chief Complaint   Patient presents with    Back Pain     Lower Back Pain      Interval note on 12/06/2024:  Patient arrives today for scheduled follow up.  Her previous injection was on 01/03/2024 to the left SI joint with ultrasound guidance.  She recalls short-lived relief with this injection.  She is unsure if she really had any relief at all.  She is requesting an alternative treatment option.    HPI:  Today,  she complains of SI joint pain.  Her pain is rated 3/10.  She describes the pain as aching, and throbbing.  X-rays of the bilateral SI joints on 11/17/2023 revealed sclerosis on the lateral aspects of the sacroiliac joints bilaterally. She recalls a history of herniated discs in the L spine that were relieved with injections in 2015. She has been on elavil in the past but had to stop due to unwanted s/e. She is currently on norco and Celexa. She has had enbrel recommended for AS but is hesitant to start her history of unwanted side effects to other rheumatologic medications.  She also reports a chronic history of pain in different areas throughout her body.  She recalls a recent episode of bilateral shoulder pain that has resolved.  She has a history of fibromyalgia and found the best relief with Elavil.  Her  is a patient of Dr. Louisa Vazquez and they were hoping for an injection for her pain to see if she experiences relief.  They prefer to address her symptoms with injections instead of chronic medications if possible.  She also has an MRI of the lumbar spine from 05/24/2022 that shows extensive lumbar spondylosis.  See below for details.    Review of Systems  Denies    Imaging/Diagnostic Studies  XR SACROILIAC JOINTS COMPLETE     CLINICAL HISTORY: Patient with psoriasis, chronic back pain, and evidence of sacroiliitis on x-ray in 2017.  Please look for inflammatory back disease;  Pain in unspecified  joint     TECHNIQUE:  Three views of the sacroiliac joints     COMPARISON:  05/19/2017     FINDINGS:  Mild sclerosis of the iliac side of the SI joints bilaterally slightly more so on the left.  Similar distribution although slightly increased compared to the prior exam.  No erosions or widening.     Impression: Sclerosis lateral aspects of sacroiliac joints bilaterally.        Electronically signed by: Sulema Benítez MD  Date:                                            11/17/2023  ----------------------------------------------------  MRI SACROILIAC JOINTS W W/O CONTRAST     CLINICAL HISTORY:  Patient with psoriasis, chronic back pain, and evidence of sacroiliitis on x-ray in 2017.  Please look for inflammatory back disease;  Pain in unspecified joint     TECHNIQUE:  Multisequence multiplanar imaging of the sacroiliac joints obtained without and with contrast with 6 cc Gadavist injected intravenously     COMPARISON:  None     FINDINGS:  There are small focal areas of increased T2/stir signal and decreased T1 signal sacroiliac joints involving the sacral and iliac side of the left SI joint.  To lesser degree also the right SI joint sacral side more apparent than iliac side.  Finding most apparent on the left with the area of increased T2/stir signal measures approximate 1.5 cm transversely.  Mild enhancement.  No widening or ankylosis or erosions.     Impression: Findings consistent with sacroiliitis     Electronically signed by: Sulema Benítez MD  Date:                                            11/17/2023  ----------------------------------  MRI LUMBAR SPINE WITHOUT CONTRAST     CLINICAL HISTORY:  Other spondylosis, lumbar region     TECHNIQUE:  Multiplanar, multi-sequence MR images were acquired from the thoracolumbar junction to the sacrum without the administration of contrast.     COMPARISON:  Lumbar spine radiographs-05/24/2022.  MRI lumbar spine-11/13/2015.     FINDINGS:  There is a remote L2 superior  endplate compression fracture with approximately 40% maximal height loss noted centrally.  The lumbar vertebral bodies show normal height and signal intensity without evidence of acute compression fracture or pathologic marrow replacement process.  There are multiple incidentally observed vertebral body hemangiomas present within T11, L1, and L5.  There is a grade I retrolisthesis of L2 on L3 and L3 on L4. There is degenerative disc desiccation at every lumbar level with disc space narrowing noted at L3-L4 and L2-L3..     The conus medullaris terminates at L2.  The visualized lower thoracic spinal cord is normal in signal intensity with no evidence of cord edema, myelomalacia, or cord syrinx.  No epidural fluid collections or masses.  The paraspinous musculature is unremarkable.     The incidentally observed abdominal/retroperitoneal soft tissues demonstrate a 20 mm circumscribed focus of T2 signal hyperintensity arising from the medial aspect of the left renal midpole, most likely a cyst.     T12-L1: There is ligamentum flavum thickening and minor facet arthropathy.  No disc protrusion or extrusion. No central canal stenosis or neuroforaminal stenosis.     L1-L2: There is a broad disc bulge, asymmetric to the left, which extends into the left neural foramen and left extraforaminal soft tissues.  There is ligamentum flavum thickening and facet arthropathy.  No central canal stenosis or neuroforaminal stenosis.     L2-L3: There is a broad disc bulge, asymmetric to the left, which extends into the neural foramina, left greater than right.  There is an annular fissure present within the left extraforaminal portion of the intervertebral disc, and there is abutment of the exited left L2 nerve in the left extraforaminal soft tissues.  Please correlate clinically for symptoms referable to the left L2 nerve.  There is ligamentum flavum thickening and facet arthropathy.  No overall central canal stenosis.  There is mild left  neuroforaminal stenosis.  No right neuroforaminal stenosis.     L3-L4: There is a broad disc bulge which extends into both neural foramina.  There is a superimposed broad left foraminal/extraforaminal disc protrusion which abuts the exited left L3 nerve in the left extraforaminal soft tissues on series 6, image 23 and series 5, image 15.  Please correlate clinically for symptoms referable to the left L3 nerve.  No central canal stenosis.  There is ligamentum flavum thickening and facet arthropathy.  There is a minimal subligamentous intraspinal synovial cyst arising from the left facet joint on series 5, image 16.  There is a 5 mm intraspinal synovial cyst present within the right neural foramen (series 4, image 6 and series 5, image 13) which exerts mass effect upon the exiting right L3 nerve.  Please correlate clinically for symptoms referable to the right L3 nerve.  There is mild bilateral neuroforaminal stenosis.     L4-L5: There is a broad disc bulge which extends into both neural foramina.  There is ligamentum flavum thickening and bilateral hypertrophic facet arthropathy.  There is mild proximal right neuroforaminal stenosis present.  No left neuroforaminal stenosis.  No central canal stenosis.  There is bilateral lateral recess stenosis.     L5-S1: No disc protrusion or extrusion. No central canal stenosis or neuroforaminal stenosis.     There is a 13 mm craniocaudad dimension right paramidline sacral Tarlov cyst present at S2 (series 4, image 7) which results in minimal bony remodeling of the posterior cortex of S2.     Impression:   1. Multilevel degenerative change of the lumbar spine, slightly more pronounced than at the time of the prior study.  A previously noted extruded free disc fragment versus descending left paracentral disc extrusion at L2 has resolved in the interim since the prior study.  2. Broad disc bulge at L2-L3 which extends into the left neural foramen resulting in contact upon the  exited left L2 nerve in the left extraforaminal soft tissues.  Please correlate clinically for symptoms referable to the left L2 nerve.  There is an associated left extraforaminal annular fissure present at L2-L3.  3. Broad left foraminal/extraforaminal disc protrusion at L3-L4 which abuts the exited left L3 nerve in the left extraforaminal soft tissues.  Please correlate clinically for symptoms referable to the left L3 nerve.  4. Unchanged 5 mm intraspinal synovial cyst within the proximal right L3-L4 neural foramen which exerts mass effect upon the exiting right L3 nerve.  Please correlate clinically for symptoms referable to the right L3 nerve.  5. 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  6. Remote L2 superior endplate compression fracture resulting in 40% maximal height loss.  7. Grade I retrolisthesis of L2 on L3 and L3 on L4.     Electronically signed by: Nakul Forte MD  Date:                                            05/24/2022    Past Medical History:   Diagnosis Date    Asthma     GERD (gastroesophageal reflux disease)     Psoriasis        Past Surgical History:   Procedure Laterality Date    ADENOIDECTOMY      CHOLECYSTECTOMY      COLONOSCOPY  2019    unremarkable per pt report    DEXA      WNL    SINUS SURGERY      Dr Cordova    SINUS SURGERY  01/2017    xs 5    THYROID SURGERY      nodules removed //Dr Amato     TONSILLECTOMY      TYMPANOSTOMY TUBE PLACEMENT      UPPER GASTROINTESTINAL ENDOSCOPY         Review of patient's allergies indicates:   Allergen Reactions    Avelox [moxifloxacin] Swelling    Bactrim [sulfamethoxazole-trimethoprim] Swelling    Ciprofloxacin Swelling and Hives    Isothiazolinones Dermatitis     Strong contact dermatitis    Apremilast Other (See Comments)     Muscle cramps, cough    Bacitracin      Other reaction(s): unknown    Wellbutrin [bupropion hcl] Nausea Only     High heart rate       Current Outpatient Medications   Medication Sig Dispense Refill    azelastine (ASTELIN) 137  mcg (0.1 %) nasal spray 1 spray (137 mcg total) by Nasal route 2 (two) times daily. 30 mL 3    azithromycin (Z-NEIL) 250 MG tablet Take 1 tablet (250 mg total) by mouth once daily. 45 tablet 0    betamethasone dipropionate 0.05 % cream Apply topically once daily. 90 g 5    budesonide (PULMICORT) 0.5 mg/2 mL nebulizer solution Controller, twice a day. 360 mL 3    cholecalciferol, vitamin D3, (VITAMIN D3) 50 mcg (2,000 unit) Tab Take 2,000 Units by mouth once daily.      clobetasol 0.05% (TEMOVATE) 0.05 % Oint 1 application once daily. Apply to affected area      desonide 0.05% (DESOWEN) 0.05 % Oint Apply topically 2 (two) times daily. 60 g 2    desoximetasone (TOPICORT) 0.05 % cream Apply topically 2 (two) times daily. 60 g 11    fluticasone propionate (FLONASE) 50 mcg/actuation nasal spray 1 spray (50 mcg total) by Each Nostril route once daily. 54 g 3    HYDROcodone-acetaminophen (NORCO) 5-325 mg per tablet Take 1 tablet by mouth every 8 (eight) hours as needed for Pain. 20 tablet 0    ketoconazole (NIZORAL) 2 % cream Apply topically.      ketoconazole (NIZORAL) 2 % shampoo Apply topically twice a week. 120 mL 3    levalbuterol (XOPENEX HFA) 45 mcg/actuation inhaler Inhale 1-2 puffs into the lungs every 6 (six) hours as needed for Wheezing or Shortness of Breath (or before exercise). Rescue 15 g 0    levocetirizine (XYZAL) 5 MG tablet Take one capsule by mouth daily 90 tablet 0    MAGNESIUM ORAL Take by mouth once. Pt takes 400mg once daily      modafiniL (PROVIGIL) 200 MG Tab Take 1 tablet (200 mg total) by mouth once daily. 30 tablet 0    mupirocin (BACTROBAN) 2 % ointment by Nasal route 2 (two) times daily. 1 each 3    simethicone (GAS-X ORAL) Take by mouth.      thyroid, pork, (ARMOUR THYROID) 60 mg Tab Take 1 tablet (60 mg total) by mouth before breakfast. 90 tablet 3     Current Facility-Administered Medications   Medication Dose Route Frequency Provider Last Rate Last Admin    cyanocobalamin injection 1,000  mcg  1,000 mcg Intramuscular Q30 Days    1,000 mcg at 07/25/24 0857       Family History   Problem Relation Name Age of Onset    Hypertension Mother      Obesity Father      Diabetes Sister      Obesity Sister      Breast cancer Paternal Aunt  43    No Known Problems Maternal Grandmother      No Known Problems Maternal Grandfather      No Known Problems Paternal Grandmother      No Known Problems Paternal Grandfather      Thyroid cancer Daughter  61    Ovarian cancer Neg Hx      Colon cancer Neg Hx         Social History     Socioeconomic History    Marital status:    Tobacco Use    Smoking status: Never    Smokeless tobacco: Never   Substance and Sexual Activity    Alcohol use: Yes     Comment: twice a month    Drug use: No    Sexual activity: Never     Social Drivers of Health     Financial Resource Strain: Low Risk  (10/23/2023)    Overall Financial Resource Strain (CARDIA)     Difficulty of Paying Living Expenses: Not hard at all   Food Insecurity: No Food Insecurity (10/23/2023)    Hunger Vital Sign     Worried About Running Out of Food in the Last Year: Never true     Ran Out of Food in the Last Year: Never true   Transportation Needs: Unknown (10/23/2023)    PRAPARE - Transportation     Lack of Transportation (Medical): No   Physical Activity: Inactive (10/23/2023)    Exercise Vital Sign     Days of Exercise per Week: 0 days     Minutes of Exercise per Session: 0 min   Stress: No Stress Concern Present (10/23/2023)    [employer] [employer] - Occupational Stress Questionnaire     Feeling of Stress : Not at all   Housing Stability: Low Risk  (10/23/2023)    Housing Stability Vital Sign     Unable to Pay for Housing in the Last Year: No     Number of Places Lived in the Last Year: 1     Unstable Housing in the Last Year: No         Objective:    /63 (BP Location: Left arm, Patient Position: Sitting)   Pulse 68   Physical Exam  Vitals and nursing note reviewed.   Constitutional:        Appearance: Normal appearance.   HENT:      Head: Normocephalic.   Eyes:      Extraocular Movements: Extraocular movements intact.   Cardiovascular:      Rate and Rhythm: Normal rate.      Pulses: Normal pulses.   Pulmonary:      Effort: Pulmonary effort is normal.   Abdominal:      General: Abdomen is flat.   Musculoskeletal:      Lumbar back: Negative right straight leg raise test and negative left straight leg raise test.   Skin:     General: Skin is warm and dry.   Neurological:      General: No focal deficit present.      Mental Status: She is alert and oriented to person, place, and time. Mental status is at baseline.   Psychiatric:         Mood and Affect: Mood normal.         Behavior: Behavior normal.         Thought Content: Thought content normal.        Back Exam     Tenderness   The patient is experiencing tenderness in the sacroiliac and lumbar.    Range of Motion   Extension:  abnormal   Flexion:  abnormal   Lateral bend right:  abnormal   Lateral bend left:  abnormal   Rotation right:  abnormal   Rotation left:  abnormal     Tests   Straight leg raise right: negative  Straight leg raise left: negative    Other   Sensation: normal  Gait: normal   Erythema: no back redness  Scars: absent    Comments:  Positive VANE bilaterally causing back pain   Negative FADIR bilaterally   Pain with sacral compression  The remainder of the physical exam is limited by patient's mobility             Assessment:       ICD-10-CM ICD-9-CM    1. Dorsalgia, unspecified  M54.9 724.5 MRI Lumbar Spine Without Contrast      2. Sacroiliitis, not elsewhere classified  M46.1 720.2       3. Chronic bilateral low back pain without sciatica  M54.50 724.2     G89.29 338.29           Plan:   1. Dorsalgia, unspecified  -     MRI Lumbar Spine Without Contrast; Future; Expected date: 12/06/2024    2. Sacroiliitis, not elsewhere classified    3. Chronic bilateral low back pain without sciatica  Assessment & Plan:  MRI due to lack of  improvement with conservative measures.  No relief with SI joint injections. Unsure if she had even days of relief.  Consider LISA vs MBBs/ablation depending on MRI.  Her pain is primarily confined to the lower lumbar spine          Maynor Santacruz MD  Physical Medicine & Rehabilitation     Disclaimer:  This note may have been prepared using voice recognition software, it may have not been extensively proofed, as such there could be errors within the text such as sound alike errors.  Contact the author of this note for clarification.

## 2024-12-06 NOTE — ASSESSMENT & PLAN NOTE
MRI due to lack of improvement with conservative measures.  No relief with SI joint injections. Unsure if she had even days of relief.  Consider LISA vs MBBs/ablation depending on MRI.  Her pain is primarily confined to the lower lumbar spine

## 2024-12-10 PROBLEM — R29.898 DECREASED STRENGTH OF TRUNK AND BACK: Status: RESOLVED | Noted: 2024-07-23 | Resolved: 2024-10-31

## 2024-12-10 PROBLEM — M25.69 DECREASED ROM OF TRUNK AND BACK: Status: RESOLVED | Noted: 2024-07-23 | Resolved: 2024-10-31

## 2024-12-27 ENCOUNTER — HOSPITAL ENCOUNTER (OUTPATIENT)
Dept: RADIOLOGY | Facility: HOSPITAL | Age: 84
Discharge: HOME | End: 2024-12-27
Attending: STUDENT IN AN ORGANIZED HEALTH CARE EDUCATION/TRAINING PROGRAM
Payer: MEDICARE

## 2024-12-27 DIAGNOSIS — M54.9 DORSALGIA, UNSPECIFIED: ICD-10-CM

## 2024-12-27 PROCEDURE — 72148 MRI LUMBAR SPINE W/O DYE: CPT | Mod: TC,PO

## 2024-12-27 PROCEDURE — 72148 MRI LUMBAR SPINE W/O DYE: CPT | Mod: 26,,, | Performed by: RADIOLOGY

## 2025-01-06 ENCOUNTER — OFFICE VISIT (OUTPATIENT)
Dept: PHYSICAL MEDICINE AND REHAB | Facility: CLINIC | Age: 85
End: 2025-01-06
Payer: MEDICARE

## 2025-01-06 DIAGNOSIS — M54.50 CHRONIC BILATERAL LOW BACK PAIN WITHOUT SCIATICA: Primary | ICD-10-CM

## 2025-01-06 DIAGNOSIS — M46.1 SACROILIITIS, NOT ELSEWHERE CLASSIFIED: ICD-10-CM

## 2025-01-06 DIAGNOSIS — G89.29 CHRONIC BILATERAL LOW BACK PAIN WITHOUT SCIATICA: Primary | ICD-10-CM

## 2025-01-06 NOTE — PROGRESS NOTES
PHYSICAL MEDICINE AND REHABILITATION  VIRTUAL Follow up visit:    Subjective:   Chief Complaint:    MRI results    Interval note on 01/06/2025:  Patient arrives today for scheduled follow up for review of her MRI of the L spine. Her pain persists. It is constant and worse at night at times. She is pointing to the lower back when her pain is worse. She takes vicodin at times when her pain is severe. She is interested in pursuing additional injections to relieve her pain.     Interval note on 12/06/2024:  Patient arrives today for scheduled follow up.  Her previous injection was on 01/03/2024 to the left SI joint with ultrasound guidance.  She recalls short-lived relief with this injection.  She is unsure if she really had any relief at all.  She is requesting an alternative treatment option.    HPI:  Today,  she complains of SI joint pain.  Her pain is rated 3/10.  She describes the pain as aching, and throbbing.  X-rays of the bilateral SI joints on 11/17/2023 revealed sclerosis on the lateral aspects of the sacroiliac joints bilaterally. She recalls a history of herniated discs in the L spine that were relieved with injections in 2015. She has been on elavil in the past but had to stop due to unwanted s/e. She is currently on norco and Celexa. She has had enbrel recommended for AS but is hesitant to start her history of unwanted side effects to other rheumatologic medications.  She also reports a chronic history of pain in different areas throughout her body.  She recalls a recent episode of bilateral shoulder pain that has resolved.  She has a history of fibromyalgia and found the best relief with Elavil.  Her  is a patient of Dr. Louisa Vazquez and they were hoping for an injection for her pain to see if she experiences relief.  They prefer to address her symptoms with injections instead of chronic medications if possible.  She also has an MRI of the lumbar spine from 05/24/2022 that shows extensive lumbar  spondylosis.  See below for details.    Review of Systems  Denies    Imaging/Diagnostic Studies  XR SACROILIAC JOINTS COMPLETE     CLINICAL HISTORY: Patient with psoriasis, chronic back pain, and evidence of sacroiliitis on x-ray in 2017.  Please look for inflammatory back disease;  Pain in unspecified joint     TECHNIQUE:  Three views of the sacroiliac joints     COMPARISON:  05/19/2017     FINDINGS:  Mild sclerosis of the iliac side of the SI joints bilaterally slightly more so on the left.  Similar distribution although slightly increased compared to the prior exam.  No erosions or widening.     Impression: Sclerosis lateral aspects of sacroiliac joints bilaterally.        Electronically signed by: Sulema Benítez MD  Date:                                            11/17/2023  ----------------------------------------------------  MRI SACROILIAC JOINTS W W/O CONTRAST     CLINICAL HISTORY:  Patient with psoriasis, chronic back pain, and evidence of sacroiliitis on x-ray in 2017.  Please look for inflammatory back disease;  Pain in unspecified joint     TECHNIQUE:  Multisequence multiplanar imaging of the sacroiliac joints obtained without and with contrast with 6 cc Gadavist injected intravenously     COMPARISON:  None     FINDINGS:  There are small focal areas of increased T2/stir signal and decreased T1 signal sacroiliac joints involving the sacral and iliac side of the left SI joint.  To lesser degree also the right SI joint sacral side more apparent than iliac side.  Finding most apparent on the left with the area of increased T2/stir signal measures approximate 1.5 cm transversely.  Mild enhancement.  No widening or ankylosis or erosions.     Impression: Findings consistent with sacroiliitis     Electronically signed by: Sulema Benítez MD  Date:                                            11/17/2023  ----------------------------------  MRI LUMBAR SPINE WITHOUT CONTRAST     CLINICAL HISTORY:  Other spondylosis,  lumbar region     TECHNIQUE:  Multiplanar, multi-sequence MR images were acquired from the thoracolumbar junction to the sacrum without the administration of contrast.     COMPARISON:  Lumbar spine radiographs-05/24/2022.  MRI lumbar spine-11/13/2015.     FINDINGS:  There is a remote L2 superior endplate compression fracture with approximately 40% maximal height loss noted centrally.  The lumbar vertebral bodies show normal height and signal intensity without evidence of acute compression fracture or pathologic marrow replacement process.  There are multiple incidentally observed vertebral body hemangiomas present within T11, L1, and L5.  There is a grade I retrolisthesis of L2 on L3 and L3 on L4. There is degenerative disc desiccation at every lumbar level with disc space narrowing noted at L3-L4 and L2-L3..     The conus medullaris terminates at L2.  The visualized lower thoracic spinal cord is normal in signal intensity with no evidence of cord edema, myelomalacia, or cord syrinx.  No epidural fluid collections or masses.  The paraspinous musculature is unremarkable.     The incidentally observed abdominal/retroperitoneal soft tissues demonstrate a 20 mm circumscribed focus of T2 signal hyperintensity arising from the medial aspect of the left renal midpole, most likely a cyst.     T12-L1: There is ligamentum flavum thickening and minor facet arthropathy.  No disc protrusion or extrusion. No central canal stenosis or neuroforaminal stenosis.     L1-L2: There is a broad disc bulge, asymmetric to the left, which extends into the left neural foramen and left extraforaminal soft tissues.  There is ligamentum flavum thickening and facet arthropathy.  No central canal stenosis or neuroforaminal stenosis.     L2-L3: There is a broad disc bulge, asymmetric to the left, which extends into the neural foramina, left greater than right.  There is an annular fissure present within the left extraforaminal portion of the  intervertebral disc, and there is abutment of the exited left L2 nerve in the left extraforaminal soft tissues.  Please correlate clinically for symptoms referable to the left L2 nerve.  There is ligamentum flavum thickening and facet arthropathy.  No overall central canal stenosis.  There is mild left neuroforaminal stenosis.  No right neuroforaminal stenosis.     L3-L4: There is a broad disc bulge which extends into both neural foramina.  There is a superimposed broad left foraminal/extraforaminal disc protrusion which abuts the exited left L3 nerve in the left extraforaminal soft tissues on series 6, image 23 and series 5, image 15.  Please correlate clinically for symptoms referable to the left L3 nerve.  No central canal stenosis.  There is ligamentum flavum thickening and facet arthropathy.  There is a minimal subligamentous intraspinal synovial cyst arising from the left facet joint on series 5, image 16.  There is a 5 mm intraspinal synovial cyst present within the right neural foramen (series 4, image 6 and series 5, image 13) which exerts mass effect upon the exiting right L3 nerve.  Please correlate clinically for symptoms referable to the right L3 nerve.  There is mild bilateral neuroforaminal stenosis.     L4-L5: There is a broad disc bulge which extends into both neural foramina.  There is ligamentum flavum thickening and bilateral hypertrophic facet arthropathy.  There is mild proximal right neuroforaminal stenosis present.  No left neuroforaminal stenosis.  No central canal stenosis.  There is bilateral lateral recess stenosis.     L5-S1: No disc protrusion or extrusion. No central canal stenosis or neuroforaminal stenosis.     There is a 13 mm craniocaudad dimension right paramidline sacral Tarlov cyst present at S2 (series 4, image 7) which results in minimal bony remodeling of the posterior cortex of S2.     Impression:   1. Multilevel degenerative change of the lumbar spine, slightly more  pronounced than at the time of the prior study.  A previously noted extruded free disc fragment versus descending left paracentral disc extrusion at L2 has resolved in the interim since the prior study.  2. Broad disc bulge at L2-L3 which extends into the left neural foramen resulting in contact upon the exited left L2 nerve in the left extraforaminal soft tissues.  Please correlate clinically for symptoms referable to the left L2 nerve.  There is an associated left extraforaminal annular fissure present at L2-L3.  3. Broad left foraminal/extraforaminal disc protrusion at L3-L4 which abuts the exited left L3 nerve in the left extraforaminal soft tissues.  Please correlate clinically for symptoms referable to the left L3 nerve.  4. Unchanged 5 mm intraspinal synovial cyst within the proximal right L3-L4 neural foramen which exerts mass effect upon the exiting right L3 nerve.  Please correlate clinically for symptoms referable to the right L3 nerve.  5. 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  6. Remote L2 superior endplate compression fracture resulting in 40% maximal height loss.  7. Grade I retrolisthesis of L2 on L3 and L3 on L4.     Electronically signed by: Nakul Forte MD  Date:  05/24/2022    Past Medical History:   Diagnosis Date    Asthma     GERD (gastroesophageal reflux disease)     Psoriasis      Past Surgical History:   Procedure Laterality Date    ADENOIDECTOMY      CHOLECYSTECTOMY      COLONOSCOPY  2019    unremarkable per pt report    DEXA      WNL    SINUS SURGERY      Dr Cordova    SINUS SURGERY  01/2017    xs 5    THYROID SURGERY      nodules removed //Dr Amato     TONSILLECTOMY      TYMPANOSTOMY TUBE PLACEMENT      UPPER GASTROINTESTINAL ENDOSCOPY         Review of patient's allergies indicates:   Allergen Reactions    Avelox [moxifloxacin] Swelling    Bactrim [sulfamethoxazole-trimethoprim] Swelling    Ciprofloxacin Swelling and Hives    Isothiazolinones Dermatitis     Strong contact dermatitis     Apremilast Other (See Comments)     Muscle cramps, cough    Bacitracin      Other reaction(s): unknown    Wellbutrin [bupropion hcl] Nausea Only     High heart rate       Current Outpatient Medications   Medication Sig Dispense Refill    azelastine (ASTELIN) 137 mcg (0.1 %) nasal spray 1 spray (137 mcg total) by Nasal route 2 (two) times daily. 30 mL 3    azithromycin (Z-NEIL) 250 MG tablet Take 1 tablet (250 mg total) by mouth once daily. 45 tablet 0    betamethasone dipropionate 0.05 % cream Apply topically once daily. 90 g 5    budesonide (PULMICORT) 0.5 mg/2 mL nebulizer solution Controller, twice a day. 360 mL 3    cholecalciferol, vitamin D3, (VITAMIN D3) 50 mcg (2,000 unit) Tab Take 2,000 Units by mouth once daily.      clobetasol 0.05% (TEMOVATE) 0.05 % Oint 1 application once daily. Apply to affected area      desonide 0.05% (DESOWEN) 0.05 % Oint Apply topically 2 (two) times daily. 60 g 2    desoximetasone (TOPICORT) 0.05 % cream Apply topically 2 (two) times daily. 60 g 11    fluticasone propionate (FLONASE) 50 mcg/actuation nasal spray 1 spray (50 mcg total) by Each Nostril route once daily. 54 g 3    HYDROcodone-acetaminophen (NORCO) 5-325 mg per tablet Take 1 tablet by mouth every 8 (eight) hours as needed for Pain. 20 tablet 0    ketoconazole (NIZORAL) 2 % cream Apply topically.      ketoconazole (NIZORAL) 2 % shampoo Apply topically twice a week. 120 mL 3    levalbuterol (XOPENEX HFA) 45 mcg/actuation inhaler Inhale 1-2 puffs into the lungs every 6 (six) hours as needed for Wheezing or Shortness of Breath (or before exercise). Rescue 15 g 0    levocetirizine (XYZAL) 5 MG tablet Take one capsule by mouth daily 90 tablet 0    MAGNESIUM ORAL Take by mouth once. Pt takes 400mg once daily      mupirocin (BACTROBAN) 2 % ointment by Nasal route 2 (two) times daily. 1 each 3    simethicone (GAS-X ORAL) Take by mouth.      thyroid, pork, (ARMOUR THYROID) 60 mg Tab Take 1 tablet (60 mg total) by mouth before  breakfast. 90 tablet 3     Current Facility-Administered Medications   Medication Dose Route Frequency Provider Last Rate Last Admin    cyanocobalamin injection 1,000 mcg  1,000 mcg Intramuscular Q30 Days    1,000 mcg at 07/25/24 0857       Family History   Problem Relation Name Age of Onset    Hypertension Mother      Obesity Father      Diabetes Sister      Obesity Sister      Breast cancer Paternal Aunt  43    No Known Problems Maternal Grandmother      No Known Problems Maternal Grandfather      No Known Problems Paternal Grandmother      No Known Problems Paternal Grandfather      Thyroid cancer Daughter  61    Ovarian cancer Neg Hx      Colon cancer Neg Hx         Social History     Socioeconomic History    Marital status:    Tobacco Use    Smoking status: Never    Smokeless tobacco: Never   Substance and Sexual Activity    Alcohol use: Yes     Comment: twice a month    Drug use: No    Sexual activity: Never     Social Drivers of Health     Financial Resource Strain: Low Risk  (10/23/2023)    Overall Financial Resource Strain (CARDIA)     Difficulty of Paying Living Expenses: Not hard at all   Food Insecurity: No Food Insecurity (10/23/2023)    Hunger Vital Sign     Worried About Running Out of Food in the Last Year: Never true     Ran Out of Food in the Last Year: Never true   Transportation Needs: Unknown (10/23/2023)    PRAPARE - Transportation     Lack of Transportation (Medical): No   Physical Activity: Inactive (10/23/2023)    Exercise Vital Sign     Days of Exercise per Week: 0 days     Minutes of Exercise per Session: 0 min   Stress: No Stress Concern Present (10/23/2023)    [employer] - Occupational Stress Questionnaire     Feeling of Stress : Not at all   Housing Stability: Low Risk  (10/23/2023)    Housing Stability Vital Sign     Unable to Pay for Housing in the Last Year: No     Number of Places Lived in the Last Year: 1     Unstable Housing in the Last Year:  No         Objective:    There were no vitals taken for this visit.  Physical Exam  Constitutional:       Appearance: Normal appearance.   Pulmonary:      Effort: Pulmonary effort is normal.   Musculoskeletal:      Lumbar back: Negative right straight leg raise test and negative left straight leg raise test.   Skin:     General: Skin is warm and dry.   Neurological:      Mental Status: She is alert.   Psychiatric:         Mood and Affect: Mood normal.         Behavior: Behavior normal.         Thought Content: Thought content normal.        Back Exam     Tenderness   The patient is experiencing tenderness in the sacroiliac and lumbar.    Range of Motion   Extension:  abnormal   Flexion:  abnormal   Lateral bend right:  abnormal   Lateral bend left:  abnormal   Rotation right:  abnormal   Rotation left:  abnormal     Tests   Straight leg raise right: negative  Straight leg raise left: negative    Other   Sensation: normal  Gait: normal   Erythema: no back redness  Scars: absent    Comments:  Positive VANE bilaterally causing back pain   Negative FADIR bilaterally   Pain with sacral compression  The remainder of the physical exam is limited by patient's mobility             Assessment:       ICD-10-CM ICD-9-CM    1. Chronic bilateral low back pain without sciatica  M54.50 724.2 Ambulatory referral/consult to Pain Clinic    G89.29 338.29       2. Sacroiliitis, not elsewhere classified  M46.1 720.2 Ambulatory referral/consult to Pain Clinic            Plan:   1. Chronic bilateral low back pain without sciatica  Assessment & Plan:  Reviewed MRI of the lumbar spine.   Referral to interventional pain to discuss MBB/ablation versus LISA.  No relief with SI joint injections. Unsure if she had even hours of relief.  Return to clinic p.r.n.    Orders:  -     Ambulatory referral/consult to Pain Clinic; Future; Expected date: 01/13/2025    2. Sacroiliitis, not elsewhere classified  -     Ambulatory referral/consult to Pain  Clinic; Future; Expected date: 01/13/2025      Maynor Santacruz MD  Physical Medicine & Rehabilitation     Disclaimer:  This note may have been prepared using voice recognition software, it may have not been extensively proofed, as such there could be errors within the text such as sound alike errors.  Contact the author of this note for clarification.

## 2025-01-06 NOTE — ASSESSMENT & PLAN NOTE
Reviewed MRI of the lumbar spine.   Referral to interventional pain to discuss MBB/ablation versus LISA.  No relief with SI joint injections. Unsure if she had even hours of relief.  Return to clinic p.r.n.

## 2025-01-07 DIAGNOSIS — E03.9 HYPOTHYROIDISM (ACQUIRED): Chronic | ICD-10-CM

## 2025-01-07 RX ORDER — SULFAMETHOXAZOLE AND TRIMETHOPRIM 800; 160 MG/1; MG/1
TABLET ORAL
Qty: 90 TABLET | Refills: 3 | Status: SHIPPED | OUTPATIENT
Start: 2025-01-07

## 2025-01-07 NOTE — TELEPHONE ENCOUNTER
No care due was identified.  [facility] Embedded Care Due Messages. Reference number: [id number].   1/07/2025 4:53:49 PM CST

## 2025-01-08 NOTE — TELEPHONE ENCOUNTER
Refill Decision Note   Amarilis Decker  is requesting a refill authorization.  Brief Assessment and Rationale for Refill:  Approve     Medication Therapy Plan:         Comments:     Note composed:9:48 PM 01/07/2025

## 2025-01-10 ENCOUNTER — OFFICE VISIT (OUTPATIENT)
Dept: FAMILY MEDICINE | Facility: CLINIC | Age: 85
End: 2025-01-10
Payer: MEDICARE

## 2025-01-10 VITALS
HEART RATE: 70 BPM | BODY MASS INDEX: 24.6 KG/M2 | SYSTOLIC BLOOD PRESSURE: 122 MMHG | OXYGEN SATURATION: 99 % | HEIGHT: 62 IN | DIASTOLIC BLOOD PRESSURE: 70 MMHG | WEIGHT: 133.69 LBS

## 2025-01-10 DIAGNOSIS — M45.9 ANKYLOSING SPONDYLITIS, UNSPECIFIED SITE OF SPINE: ICD-10-CM

## 2025-01-10 DIAGNOSIS — M79.7 FIBROMYALGIA: Primary | ICD-10-CM

## 2025-01-10 DIAGNOSIS — F33.1 MODERATE EPISODE OF RECURRENT MAJOR DEPRESSIVE DISORDER: ICD-10-CM

## 2025-01-10 DIAGNOSIS — I70.0 AORTIC ATHEROSCLEROSIS: ICD-10-CM

## 2025-01-10 DIAGNOSIS — N18.31 CHRONIC KIDNEY DISEASE, STAGE 3A: ICD-10-CM

## 2025-01-10 PROBLEM — J47.9 BRONCHIECTASIS WITHOUT COMPLICATION: Status: RESOLVED | Noted: 2021-12-27 | Resolved: 2025-01-10

## 2025-01-10 PROCEDURE — 99999 PR PBB SHADOW E&M-EST. PATIENT-LVL IV: CPT | Mod: PBBFAC,,, | Performed by: FAMILY MEDICINE

## 2025-01-10 PROCEDURE — 99214 OFFICE O/P EST MOD 30 MIN: CPT | Mod: PBBFAC,PN | Performed by: FAMILY MEDICINE

## 2025-01-10 RX ORDER — AMITRIPTYLINE HYDROCHLORIDE 10 MG/1
10 TABLET, FILM COATED ORAL NIGHTLY
Qty: 30 TABLET | Refills: 3 | Status: SHIPPED | OUTPATIENT
Start: 2025-01-10 | End: 2026-01-10

## 2025-01-10 NOTE — PROGRESS NOTES
THIS DOCUMENT WAS MADE IN PART WITH VOICE RECOGNITION SOFTWARE.  OCCASIONALLY THIS SOFTWARE WILL MISINTERPRET WORDS OR PHRASES.    Assessment and Plan:    1. Fibromyalgia  amitriptyline (ELAVIL) 10 MG tablet      2. Moderate episode of recurrent major depressive disorder        3. Chronic kidney disease, stage 3a        4. Aortic atherosclerosis        5. Ankylosing spondylitis, unspecified site of spine                  Assessment & Plan    PLAN SUMMARY:   Prescribed Amitriptyline 10 mg at night for fibromyalgia treatment   Continue magnesium glycinate supplementation   Follow-up in 4 weeks to assess response to Amitriptyline treatment    FIBROMYALGIA:   Evaluated the patient's symptoms, considering fibromyalgia as a possible diagnosis based on the widespread pain and its changing nature.   Explained fibromyalgia characteristics: widespread pain throughout body including stomach, shoulders, pelvis, knees, and arms.   Noted that the patient reports pain changing locations and waking her up at night, which she has been experiencing for a few years.   Observed that the patient reports rigidity upon waking up, which is indicative of fibromyalgia symptoms.   Discussed that fibromyalgia is a clinical diagnosis without specific labs.   Explained fatigue as a common symptom of fibromyalgia, which the patient is experiencing.   Discussed the prevalence of fibromyalgia among patients.   Prescribed Amitriptyline 10 mg at night for fibromyalgia treatment.   Suggested trying medication before considering procedures like nerve blocks or steroid injections.    MEDICATIONS/SUPPLEMENTS:   Continued magnesium glycinate supplementation.    FOLLOW UP:   Scheduled a follow up in 4 weeks to assess response to Amitriptyline treatment.   Instructed the patient to report any changes in symptoms or side effects from the medication.               ______________________________________________________________________  Subjective:    Chief  Complaint:  Chief Complaint   Patient presents with    Follow-up     F/u         HPI:  Amarilis is a 84 y.o. year old         History of Present Illness    CHIEF COMPLAINT:  Amarilis presents today for follow-up regarding chronic pain management.    FIBROMYALGIA:  She experiences chronic pain with varying locations daily, which has been severe enough to disrupt sleep for about two weeks. She was diagnosed with fibromyalgia at age 42 and reports associated fatigue. She currently uses Vicodin for pain management with variable effectiveness and takes magnesium glycinate. She previously tried amitriptyline but experienced side effects. She expresses concern about medication side effects and her bone density.      ROS:  ROS as indicated in HPI.               Primary insomnia  Rx-  Mg Glycinate   Previous Rx- Remeron (ineffective), Doxepin (ineffective), trazodone (hangover), Melatonin (ineffective)     GERD  Rx-pantoprazole 40 mg  Denies dysphagia      Depressed mood / Anxiety   Rx : none  Prev Rx-Lexapro (nightmares, EXTREME FATIGUE), buspirone (ineffective),  wellbutrin (constipation, imbalance, ineffective), Fluoxetine (itching), Citalopram   Stressor-fall out with daughter      Hypothyroidism  Current Rx-armor Thyroid 60 mg + 15 mg daily   Previous Rx-levothyroxine (ineffective)  Previous TSH in normal range   Endocrinology- Dr Fritz      Bronchiectasis, history of pulmonary nodules  Seen by pulmonology - Dr. 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\     Senile osteoporosis  Prev Rx-Boniva 150 (pt choice to quit)  Taking vitamin-D supplement  No recent fractures     Seasonal allergies / chronic sinusitis / nasal polyps   Rx-Flonase, Xyzal, montelukast, astelin   ENT -  Eboni Briggs MD      Psoriasis  Rx-betamethasone, clobetasol, desonide, Topicort  Followed by dermatology - Jesenia Loera MD      B12 deficiency  Prev Rx-B12 injections 500 mcg every 2 weeks     Chronic Low  back pain , sacroiliitis, ankylosing spondylitis   Rheum : Gregg Mensah MD   Pain Mgt : Bestbel   Rx : Mobic 15 mg , Norco 5 mg p.r.n., use sparingly    Fibromyalgia   rx : Amitriptyline     ALONZO  Sleep MD : Catarina Morfin  CPAP compliant     Chronic Constipation  OTC : Mg Glycinate     Chronic Fatigue           Past Medical History:  Past Medical History:   Diagnosis Date    Asthma     GERD (gastroesophageal reflux disease)     Psoriasis        Past Surgical History:  Past Surgical History:   Procedure Laterality Date    ADENOIDECTOMY      CHOLECYSTECTOMY      COLONOSCOPY  2019    unremarkable per pt report    DEXA      WNL    SINUS SURGERY      Dr Cordova    SINUS SURGERY  01/2017    xs 5    THYROID SURGERY      nodules removed //Dr Amato     TONSILLECTOMY      TYMPANOSTOMY TUBE PLACEMENT      UPPER GASTROINTESTINAL ENDOSCOPY         Family History:  Family History   Problem Relation Name Age of Onset    Hypertension Mother      Obesity Father      Diabetes Sister      Obesity Sister      Breast cancer Paternal Aunt  43    No Known Problems Maternal Grandmother      No Known Problems Maternal Grandfather      No Known Problems Paternal Grandmother      No Known Problems Paternal Grandfather      Thyroid cancer Daughter  61    Ovarian cancer Neg Hx      Colon cancer Neg Hx         Social History:  Social History     Socioeconomic History    Marital status:    Tobacco Use    Smoking status: Never    Smokeless tobacco: Never   Substance and Sexual Activity    Alcohol use: Yes     Comment: twice a month    Drug use: No    Sexual activity: Never     Social Drivers of Health     Financial Resource Strain: Low Risk  (10/23/2023)    Overall Financial Resource Strain (CARDIA)     Difficulty of Paying Living Expenses: Not hard at all   Food Insecurity: No Food Insecurity (10/23/2023)    Hunger Vital Sign     Worried About Running Out of Food in the Last Year: Never true     Ran Out of Food in the Last Year: Never  true   Transportation Needs: Unknown (10/23/2023)    PRAPARE - Transportation     Lack of Transportation (Medical): No   Physical Activity: Inactive (10/23/2023)    Exercise Vital Sign     Days of Exercise per Week: 0 days     Minutes of Exercise per Session: 0 min   Stress: No Stress Concern Present (10/23/2023)    [employer] - Occupational Stress Questionnaire     Feeling of Stress : Not at all   Housing Stability: Low Risk  (10/23/2023)    Housing Stability Vital Sign     Unable to Pay for Housing in the Last Year: No     Number of Places Lived in the Last Year: 1     Unstable Housing in the Last Year: No       Medications:  Current Outpatient Medications on File Prior to Visit   Medication Sig Dispense Refill    ARMOUR THYROID 60 mg Tab TAKE 1 TABLET(60 MG) BY MOUTH BEFORE BREAKFAST 90 tablet 3    azithromycin (Z-NEIL) 250 MG tablet Take 1 tablet (250 mg total) by mouth once daily. 45 tablet 0    betamethasone dipropionate 0.05 % cream Apply topically once daily. 90 g 5    budesonide (PULMICORT) 0.5 mg/2 mL nebulizer solution Controller, twice a day. 360 mL 3    cholecalciferol, vitamin D3, (VITAMIN D3) 50 mcg (2,000 unit) Tab Take 2,000 Units by mouth once daily.      clobetasol 0.05% (TEMOVATE) 0.05 % Oint 1 application once daily. Apply to affected area      desonide 0.05% (DESOWEN) 0.05 % Oint Apply topically 2 (two) times daily. 60 g 2    desoximetasone (TOPICORT) 0.05 % cream Apply topically 2 (two) times daily. 60 g 11    fluticasone propionate (FLONASE) 50 mcg/actuation nasal spray 1 spray (50 mcg total) by Each Nostril route once daily. 54 g 3    HYDROcodone-acetaminophen (NORCO) 5-325 mg per tablet Take 1 tablet by mouth every 8 (eight) hours as needed for Pain. 20 tablet 0    ketoconazole (NIZORAL) 2 % cream Apply topically.      ketoconazole (NIZORAL) 2 % shampoo Apply topically twice a week. 120 mL 3    levalbuterol (XOPENEX HFA) 45 mcg/actuation inhaler Inhale 1-2 puffs  into the lungs every 6 (six) hours as needed for Wheezing or Shortness of Breath (or before exercise). Rescue 15 g 0    levocetirizine (XYZAL) 5 MG tablet Take one capsule by mouth daily 90 tablet 0    MAGNESIUM ORAL Take by mouth once. Pt takes 400mg once daily      mupirocin (BACTROBAN) 2 % ointment by Nasal route 2 (two) times daily. 1 each 3    simethicone (GAS-X ORAL) Take by mouth.      azelastine (ASTELIN) 137 mcg (0.1 %) nasal spray 1 spray (137 mcg total) by Nasal route 2 (two) times daily. 30 mL 3    [DISCONTINUED] etanercept (ENBREL SURECLICK) 50 mg/mL (1 mL) Inject 1 mL (50 mg total) into the skin once a week. (Patient taking differently: Inject 50 mg into the skin once a week. Once a month) 12 mL 3     Current Facility-Administered Medications on File Prior to Visit   Medication Dose Route Frequency Provider Last Rate Last Admin    cyanocobalamin injection 1,000 mcg  1,000 mcg Intramuscular Q30 Days    1,000 mcg at 07/25/24 0857       Allergies:  Avelox [moxifloxacin], Bactrim [sulfamethoxazole-trimethoprim], Ciprofloxacin, Isothiazolinones, Apremilast, Bacitracin, and Wellbutrin [bupropion hcl]    Immunizations:  Immunization History   Administered Date(s) Administered    COVID-19 MRNA, LN-S PF (MODERNA HALF 0.25 ML DOSE) 12/16/2021    COVID-19 Vaccine 03/31/2021    COVID-19, vector-nr, rS-Ad26, PF (Johanna) 04/05/2021    Hepatitis A, Adult 08/09/2007    IPV 08/09/2007    Influenza 11/18/2011, 10/21/2013    Influenza (FLUAD) - Quadrivalent - Adjuvanted - PF *Preferred* (65+) 10/07/2021, 10/03/2022, 09/08/2023    Influenza - Quadrivalent - MDCK - PF 10/10/2017    Influenza - Quadrivalent - PF *Preferred* (6 months and older) 10/29/2019, 09/25/2020    Influenza - Trivalent - Afluria, Fluzone MDV 11/18/2011    Influenza - Trivalent - Fluad - Adjuvanted - PF (65 years and older 09/13/2024    Influenza - Trivalent - Fluarix, Flulaval, Fluzone, Afluria - PF 10/21/2013    Influenza - Trivalent - Fluzone High  "Dose - PF (65 years and older) 10/14/2014, 10/08/2015, 09/27/2016    Pneumococcal Conjugate - 13 Valent 04/12/2016    Pneumococcal Polysaccharide - 23 Valent 08/01/2011    Td (Adult), Unspecified Formulation 08/02/2018    Tdap 08/09/2007, 08/01/2018    Zoster 07/11/2007       Review of Systems:  Review of Systems   All other systems reviewed and are negative.      Objective:    Vitals:  Vitals:    01/10/25 1514   BP: 122/70   Pulse: 70   SpO2: 99%   Weight: 60.6 kg (133 lb 11.3 oz)   Height: 5' 2" (1.575 m)   PainSc:   5   PainLoc: Back       Physical Exam  Vitals reviewed.   Constitutional:       General: She is not in acute distress.  HENT:      Head: Normocephalic and atraumatic.   Eyes:      Pupils: Pupils are equal, round, and reactive to light.   Cardiovascular:      Rate and Rhythm: Normal rate and regular rhythm.      Heart sounds: No murmur heard.     No friction rub.   Pulmonary:      Effort: Pulmonary effort is normal.      Breath sounds: Normal breath sounds.   Abdominal:      General: Bowel sounds are normal. There is no distension.      Palpations: Abdomen is soft.      Tenderness: There is no abdominal tenderness.   Musculoskeletal:      Cervical back: Neck supple.   Skin:     General: Skin is warm and dry.      Findings: No rash.   Psychiatric:         Behavior: Behavior normal.             Luc Teixeira MD  Family Medicine                "

## 2025-02-10 ENCOUNTER — OFFICE VISIT (OUTPATIENT)
Dept: FAMILY MEDICINE | Facility: CLINIC | Age: 85
End: 2025-02-10
Payer: MEDICARE

## 2025-02-10 VITALS
SYSTOLIC BLOOD PRESSURE: 122 MMHG | DIASTOLIC BLOOD PRESSURE: 72 MMHG | HEART RATE: 65 BPM | HEIGHT: 62 IN | WEIGHT: 133.81 LBS | BODY MASS INDEX: 24.63 KG/M2 | OXYGEN SATURATION: 99 %

## 2025-02-10 DIAGNOSIS — M79.7 FIBROMYALGIA AFFECTING MULTIPLE SITES: Chronic | ICD-10-CM

## 2025-02-10 DIAGNOSIS — M79.7 FIBROMYALGIA: Primary | ICD-10-CM

## 2025-02-10 PROCEDURE — 99999 PR PBB SHADOW E&M-EST. PATIENT-LVL IV: CPT | Mod: PBBFAC,,, | Performed by: FAMILY MEDICINE

## 2025-02-10 PROCEDURE — 99214 OFFICE O/P EST MOD 30 MIN: CPT | Mod: S$PBB,,, | Performed by: FAMILY MEDICINE

## 2025-02-10 PROCEDURE — 99214 OFFICE O/P EST MOD 30 MIN: CPT | Mod: PBBFAC,PN | Performed by: FAMILY MEDICINE

## 2025-02-10 NOTE — PROGRESS NOTES
THIS DOCUMENT WAS MADE IN PART WITH VOICE RECOGNITION SOFTWARE.  OCCASIONALLY THIS SOFTWARE WILL MISINTERPRET WORDS OR PHRASES.    Assessment and Plan:    1. Fibromyalgia        2. Fibromyalgia affecting multiple sites                    Assessment & Plan    PLAN SUMMARY:   Increased amitriptyline dosage from 10 mg to 20 mg daily at bedtime   Ordered a bone scan to assess bone density   Scheduled follow-up visit in 1 month to assess response to increased amitriptyline dosage   Will consider referral to osteoporosis clinic pending bone scan results   Continued magnesium glycinate for constipation management    FIBROMYALGIA:   Evaluated the effectiveness of amitriptyline, noting some improvement in pain symptoms.   Assessed that the pain is likely related to fibromyalgia.   Discussed long-term safety profile of amitriptyline, including its use in various age groups and lack of addiction potential.   Explained potential side effects of amitriptyline, including dry mouth and constipation.   Increased amitriptyline dosage from 10 mg to 20 mg daily at bedtime.   Instructed the patient to monitor for side effects.   Scheduled a follow-up visit in 1 month to assess response to increased amitriptyline dosage.   Noted that the patient experiences severe pains in the groin and vaginal area, lasting 15-20 seconds, similar to a muscle spasm.   Evaluated the location and nature of the pain, considering it as part of the fibromyalgia symptoms.   Addressed treatment for the pelvic and perineal pain as part of the overall fibromyalgia management plan, including increasing the amitriptyline dosage.    INSOMNIA:   Noted that the patient reports some nights of poor sleep despite taking amitriptyline.   Evaluated the effectiveness of amitriptyline on sleep, noting it should be helping with insomnia.   Assessed that improving sleep may help with fatigue issues.   Increased amitriptyline dosage from 10 mg to 20 mg daily at bedtime, which  may improve sleep quality.    FALL RISK:   Noted the patient's previous fall while on amitriptyline.   Explained potential side effects of amitriptyline, including dizziness and fall risk.   Advised monitoring for dizziness with increased amitriptyline dosage to prevent falls.    OSTEOPOROSIS:   Provided information on newer, more aggressive osteoporosis treatments available through the clinic's osteoporosis program.   Ordered a bone scan to assess bone density.   Will consider referral to osteoporosis clinic pending bone scan results.    CAREGIVER BURDEN:   Noted that the patient is taking care of her  who is mostly inactive.   Observed patient's 's recent activity, indicating slight improvement.   Indirectly addressed caregiver burden by treating patient's symptoms to improve her ability to care for her .    CONSTIPATION:   Continued magnesium glycinate for constipation management.               ______________________________________________________________________  Subjective:    Chief Complaint:  Chief Complaint   Patient presents with    Follow-up        HPI:  Amarilis is a 84 y.o. year old         History of Present Illness    CHIEF COMPLAINT:  Amarilis presents today for follow up of fibromyalgia pain.    FIBROMYALGIA SYMPTOMS:  She reports severe groin pain localized in the vaginal area, lasting 15-20 seconds and described as feeling similar to a [place] horse. One episode was severe enough to cause crying. She denies hip joint pain. She experiences persistent fatigue and low energy levels. Sleep disturbances continue intermittently despite current medication regimen, though some improvement is noted.    MEDICATIONS:  She reports improvement with low dose amitriptyline without current side effects, though notes a fall after being on it for two years. She has no previous history of other fibromyalgia medications. She takes magnesium glycinate effectively managing her  constipation.    SOCIAL HISTORY:  She is the primary caregiver for her  who is currently unwell.    LIFESTYLE MANAGEMENT:  She previously managed symptoms by avoiding caffeine and receiving weekly massages, though reports recent caffeine use.      ROS:  ROS as indicated in HPI.               Primary insomnia  Rx-  Mg Glycinate   Previous Rx- Remeron (ineffective), Doxepin (ineffective), trazodone (hangover), Melatonin (ineffective)     GERD  Rx-pantoprazole 40 mg  Denies dysphagia      Depressed mood / Anxiety   Rx : none  Prev Rx-Lexapro (nightmares, EXTREME FATIGUE), buspirone (ineffective),  wellbutrin (constipation, imbalance, ineffective), Fluoxetine (itching), Citalopram   Stressor-fall out with daughter      Hypothyroidism  Current Rx-armor Thyroid 60 mg + 15 mg daily   Previous Rx-levothyroxine (ineffective)  Previous TSH in normal range   Endocrinology- Dr Fritz      Bronchiectasis, history of pulmonary nodules  Seen by pulmonology - Dr. 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\     Senile osteoporosis  Prev Rx-Boniva 150 (pt choice to quit)  Taking vitamin-D supplement  No recent fractures     Seasonal allergies / chronic sinusitis / nasal polyps   Rx-Flonase, Xyzal, montelukast, astelin   ENT -  Eboni Briggs MD      Psoriasis  Rx-betamethasone, clobetasol, desonide, Topicort  Followed by dermatology - Jesenia Loera MD      B12 deficiency  Prev Rx-B12 injections 500 mcg every 2 weeks     Chronic Low back pain , sacroiliitis, ankylosing spondylitis   Rheum : Gregg Mensah MD   Pain Mgt : Bestbel   Rx : Mobic 15 mg , Norco 5 mg p.r.n., use sparingly    Fibromyalgia   rx : Amitriptyline     ALONZO  Sleep MD : Catarina Morfin  CPAP compliant     Chronic Constipation  OTC : Mg Glycinate     Chronic Fatigue           Past Medical History:  Past Medical History:   Diagnosis Date    Asthma     GERD (gastroesophageal reflux disease)     Psoriasis        Past Surgical  History:  Past Surgical History:   Procedure Laterality Date    ADENOIDECTOMY      CHOLECYSTECTOMY      COLONOSCOPY  2019    unremarkable per pt report    DEXA      WNL    SINUS SURGERY      Dr Cordova    SINUS SURGERY  01/2017    xs 5    THYROID SURGERY      nodules removed //Dr Amato     TONSILLECTOMY      TYMPANOSTOMY TUBE PLACEMENT      UPPER GASTROINTESTINAL ENDOSCOPY         Family History:  Family History   Problem Relation Name Age of Onset    Hypertension Mother      Obesity Father      Diabetes Sister      Obesity Sister      Breast cancer Paternal Aunt  43    No Known Problems Maternal Grandmother      No Known Problems Maternal Grandfather      No Known Problems Paternal Grandmother      No Known Problems Paternal Grandfather      Thyroid cancer Daughter  61    Ovarian cancer Neg Hx      Colon cancer Neg Hx         Social History:  Social History     Socioeconomic History    Marital status:    Tobacco Use    Smoking status: Never     Passive exposure: Never    Smokeless tobacco: Never   Substance and Sexual Activity    Alcohol use: Yes     Comment: twice a month    Drug use: No    Sexual activity: Never     Social Drivers of Health     Financial Resource Strain: Low Risk  (10/23/2023)    Overall Financial Resource Strain (CARDIA)     Difficulty of Paying Living Expenses: Not hard at all   Food Insecurity: No Food Insecurity (10/23/2023)    Hunger Vital Sign     Worried About Running Out of Food in the Last Year: Never true     Ran Out of Food in the Last Year: Never true   Transportation Needs: Unknown (10/23/2023)    PRAPARE - Transportation     Lack of Transportation (Medical): No   Physical Activity: Inactive (10/23/2023)    Exercise Vital Sign     Days of Exercise per Week: 0 days     Minutes of Exercise per Session: 0 min   Stress: No Stress Concern Present (10/23/2023)    [employer] - Occupational Stress Questionnaire     Feeling of Stress : Not at all    Housing Stability: Low Risk  (10/23/2023)    Housing Stability Vital Sign     Unable to Pay for Housing in the Last Year: No     Number of Places Lived in the Last Year: 1     Unstable Housing in the Last Year: No       Medications:  Current Outpatient Medications on File Prior to Visit   Medication Sig Dispense Refill    amitriptyline (ELAVIL) 10 MG tablet Take 1 tablet (10 mg total) by mouth every evening. 30 tablet 3    ARMOUR THYROID 60 mg Tab TAKE 1 TABLET(60 MG) BY MOUTH BEFORE BREAKFAST 90 tablet 3    azelastine (ASTELIN) 137 mcg (0.1 %) nasal spray 1 spray (137 mcg total) by Nasal route 2 (two) times daily. 30 mL 3    betamethasone dipropionate 0.05 % cream Apply topically once daily. 90 g 5    budesonide (PULMICORT) 0.5 mg/2 mL nebulizer solution Controller, twice a day. 360 mL 3    cholecalciferol, vitamin D3, (VITAMIN D3) 50 mcg (2,000 unit) Tab Take 2,000 Units by mouth once daily.      clobetasol 0.05% (TEMOVATE) 0.05 % Oint 1 application once daily. Apply to affected area      desonide 0.05% (DESOWEN) 0.05 % Oint Apply topically 2 (two) times daily. 60 g 2    desoximetasone (TOPICORT) 0.05 % cream Apply topically 2 (two) times daily. 60 g 11    fluticasone propionate (FLONASE) 50 mcg/actuation nasal spray 1 spray (50 mcg total) by Each Nostril route once daily. 54 g 3    HYDROcodone-acetaminophen (NORCO) 5-325 mg per tablet Take 1 tablet by mouth every 8 (eight) hours as needed for Pain. 20 tablet 0    ketoconazole (NIZORAL) 2 % cream Apply topically.      ketoconazole (NIZORAL) 2 % shampoo Apply topically twice a week. 120 mL 3    levalbuterol (XOPENEX HFA) 45 mcg/actuation inhaler Inhale 1-2 puffs into the lungs every 6 (six) hours as needed for Wheezing or Shortness of Breath (or before exercise). Rescue 15 g 0    levocetirizine (XYZAL) 5 MG tablet Take one capsule by mouth daily 90 tablet 0    MAGNESIUM ORAL Take by mouth once. Pt takes 400mg once daily      mupirocin (BACTROBAN) 2 % ointment by  Nasal route 2 (two) times daily. 1 each 3    simethicone (GAS-X ORAL) Take by mouth.      [DISCONTINUED] etanercept (ENBREL SURECLICK) 50 mg/mL (1 mL) Inject 1 mL (50 mg total) into the skin once a week. (Patient taking differently: Inject 50 mg into the skin once a week. Once a month) 12 mL 3     Current Facility-Administered Medications on File Prior to Visit   Medication Dose Route Frequency Provider Last Rate Last Admin    cyanocobalamin injection 1,000 mcg  1,000 mcg Intramuscular Q30 Days    1,000 mcg at 07/25/24 0857       Allergies:  Avelox [moxifloxacin], Bactrim [sulfamethoxazole-trimethoprim], Ciprofloxacin, Isothiazolinones, Apremilast, Bacitracin, and Wellbutrin [bupropion hcl]    Immunizations:  Immunization History   Administered Date(s) Administered    COVID-19 MRNA, LN-S PF (MODERNA HALF 0.25 ML DOSE) 12/16/2021    COVID-19 Vaccine 03/31/2021    COVID-19, vector-nr, rS-Ad26, PF (Johanna) 04/05/2021    Hepatitis A, Adult 08/09/2007    IPV 08/09/2007    Influenza 11/18/2011, 10/21/2013    Influenza (FLUAD) - Quadrivalent - Adjuvanted - PF *Preferred* (65+) 10/07/2021, 10/03/2022, 09/08/2023    Influenza - Quadrivalent - MDCK - PF 10/10/2017    Influenza - Quadrivalent - PF *Preferred* (6 months and older) 10/29/2019, 09/25/2020    Influenza - Trivalent - Afluria, Fluzone MDV 11/18/2011    Influenza - Trivalent - Fluad - Adjuvanted - PF (65 years and older 09/13/2024    Influenza - Trivalent - Fluarix, Flulaval, Fluzone, Afluria - PF 10/21/2013    Influenza - Trivalent - Fluzone High Dose - PF (65 years and older) 10/14/2014, 10/08/2015, 09/27/2016    Pneumococcal Conjugate - 13 Valent 04/12/2016    Pneumococcal Polysaccharide - 23 Valent 08/01/2011    RSVpreF (Arexvy) 10/21/2024    Td (Adult), Unspecified Formulation 08/02/2018    Tdap 08/09/2007, 08/01/2018    Zoster 07/11/2007       Review of Systems:  Review of Systems   All other systems reviewed and are  "negative.      Objective:    Vitals:  Vitals:    02/10/25 0838   BP: 122/72   Pulse: 65   SpO2: 99%   Weight: 60.7 kg (133 lb 13.1 oz)   Height: 5' 2" (1.575 m)   PainSc: 0-No pain       Physical Exam  Vitals reviewed.   Constitutional:       General: She is not in acute distress.  HENT:      Head: Normocephalic and atraumatic.   Eyes:      Pupils: Pupils are equal, round, and reactive to light.   Cardiovascular:      Rate and Rhythm: Normal rate and regular rhythm.      Heart sounds: No murmur heard.     No friction rub.   Pulmonary:      Effort: Pulmonary effort is normal.      Breath sounds: Normal breath sounds.   Abdominal:      General: Bowel sounds are normal. There is no distension.      Palpations: Abdomen is soft.      Tenderness: There is no abdominal tenderness.   Musculoskeletal:      Cervical back: Neck supple.   Skin:     General: Skin is warm and dry.      Findings: No rash.   Psychiatric:         Behavior: Behavior normal.             Luc Teixeira MD  Family Medicine                  "

## 2025-02-13 ENCOUNTER — OFFICE VISIT (OUTPATIENT)
Dept: PODIATRY | Facility: CLINIC | Age: 85
End: 2025-02-13
Payer: MEDICARE

## 2025-02-13 VITALS — BODY MASS INDEX: 24.63 KG/M2 | WEIGHT: 133.81 LBS | HEIGHT: 62 IN

## 2025-02-13 DIAGNOSIS — L60.3 NAIL DYSTROPHY: Primary | ICD-10-CM

## 2025-02-13 PROCEDURE — 88312 SPECIAL STAINS GROUP 1: CPT | Mod: PO | Performed by: PATHOLOGY

## 2025-02-13 PROCEDURE — 99213 OFFICE O/P EST LOW 20 MIN: CPT | Mod: PBBFAC,PO | Performed by: PODIATRIST

## 2025-02-13 PROCEDURE — 99204 OFFICE O/P NEW MOD 45 MIN: CPT | Mod: S$PBB,,, | Performed by: PODIATRIST

## 2025-02-13 PROCEDURE — 99999 PR PBB SHADOW E&M-EST. PATIENT-LVL III: CPT | Mod: PBBFAC,,, | Performed by: PODIATRIST

## 2025-02-13 PROCEDURE — 88304 TISSUE EXAM BY PATHOLOGIST: CPT | Mod: PO | Performed by: PATHOLOGY

## 2025-02-15 NOTE — PROGRESS NOTES
Subjective:      Patient ID: Amarilis Decker is a 84 y.o. female.    Chief Complaint: Ingrown Toenail    Amarilis is a 84 y.o. female with a past medical history of Asthma, GERD (gastroesophageal reflux disease), and Psoriasis. The patient's chief complaint consists of dystrophic bilateral hallux and Lt. 2nd toenails.  Notes the nails have been thickened and discolored for years.  Relates having difficulty, on occasion, trimming the Lt. 2nd toenail.  Suspects this might be a fungal infection and inquires as to how this can be addressed.  Has not attempted to self treat.  Denies any additional pedal complaints.         Hemoglobin A1C   Date Value Ref Range Status   10/18/2024 5.5 4.0 - 5.6 % Final     Comment:     ADA Screening Guidelines:  5.7-6.4%  Consistent with prediabetes  >or=6.5%  Consistent with diabetes    High levels of fetal hemoglobin interfere with the HbA1C  assay. Heterozygous hemoglobin variants (HbS, HgC, etc)do  not significantly interfere with this assay.   However, presence of multiple variants may affect accuracy.     01/17/2024 5.6 4.0 - 5.6 % Final     Comment:     ADA Screening Guidelines:  5.7-6.4%  Consistent with prediabetes  >or=6.5%  Consistent with diabetes    High levels of fetal hemoglobin interfere with the HbA1C  assay. Heterozygous hemoglobin variants (HbS, HgC, etc)do  not significantly interfere with this assay.   However, presence of multiple variants may affect accuracy.     09/07/2021 5.6 4.0 - 5.6 % Final     Comment:     ADA Screening Guidelines:  5.7-6.4%  Consistent with prediabetes  >or=6.5%  Consistent with diabetes    High levels of fetal hemoglobin interfere with the HbA1C  assay. Heterozygous hemoglobin variants (HbS, HgC, etc)do  not significantly interfere with this assay.   However, presence of multiple variants may affect accuracy.         Past Medical History:   Diagnosis Date    Asthma     GERD (gastroesophageal reflux disease)     Psoriasis        Past  Surgical History:   Procedure Laterality Date    ADENOIDECTOMY      CHOLECYSTECTOMY      COLONOSCOPY  2019    unremarkable per pt report    JANKI      WNL    SINUS SURGERY      Dr Cordova    SINUS SURGERY  01/2017    xs 5    THYROID SURGERY      nodules removed //Dr Amato     TONSILLECTOMY      TYMPANOSTOMY TUBE PLACEMENT      UPPER GASTROINTESTINAL ENDOSCOPY         Family History   Problem Relation Name Age of Onset    Hypertension Mother      Obesity Father      Diabetes Sister      Obesity Sister      Breast cancer Paternal Aunt  43    No Known Problems Maternal Grandmother      No Known Problems Maternal Grandfather      No Known Problems Paternal Grandmother      No Known Problems Paternal Grandfather      Thyroid cancer Daughter  61    Ovarian cancer Neg Hx      Colon cancer Neg Hx         Social History     Socioeconomic History    Marital status:    Tobacco Use    Smoking status: Never     Passive exposure: Never    Smokeless tobacco: Never   Substance and Sexual Activity    Alcohol use: Yes     Comment: twice a month    Drug use: No    Sexual activity: Never     Social Drivers of Health     Financial Resource Strain: Low Risk  (10/23/2023)    Overall Financial Resource Strain (CARDIA)     Difficulty of Paying Living Expenses: Not hard at all   Food Insecurity: No Food Insecurity (10/23/2023)    Hunger Vital Sign     Worried About Running Out of Food in the Last Year: Never true     Ran Out of Food in the Last Year: Never true   Transportation Needs: Unknown (10/23/2023)    PRAPARE - Transportation     Lack of Transportation (Medical): No   Physical Activity: Inactive (10/23/2023)    Exercise Vital Sign     Days of Exercise per Week: 0 days     Minutes of Exercise per Session: 0 min   Stress: No Stress Concern Present (10/23/2023)    [employer] - Occupational Stress Questionnaire     Feeling of Stress : Not at all   Housing Stability: Low Risk  (10/23/2023)    Housing  Stability Vital Sign     Unable to Pay for Housing in the Last Year: No     Number of Places Lived in the Last Year: 1     Unstable Housing in the Last Year: No       Current Medications[1]    Review of patient's allergies indicates:   Allergen Reactions    Avelox [moxifloxacin] Swelling    Bactrim [sulfamethoxazole-trimethoprim] Swelling    Ciprofloxacin Swelling and Hives    Isothiazolinones Dermatitis     Strong contact dermatitis    Apremilast Other (See Comments)     Muscle cramps, cough    Bacitracin      Other reaction(s): unknown    Wellbutrin [bupropion hcl] Nausea Only     High heart rate         Review of Systems   Constitutional: Negative for chills and fever.   Skin:  Positive for color change and nail changes.   Musculoskeletal:  Negative for muscle cramps, muscle weakness, myalgias and neck pain.   Gastrointestinal:  Negative for nausea and vomiting.   Neurological:  Negative for numbness and paresthesias.   Psychiatric/Behavioral:  Negative for altered mental status.            Objective:      Physical Exam  Constitutional:       Appearance: Normal appearance. She is not ill-appearing.   Cardiovascular:      Pulses:           Dorsalis pedis pulses are 2+ on the right side and 2+ on the left side.        Posterior tibial pulses are 2+ on the right side and 2+ on the left side.      Comments: CFT is < 3 seconds bilateral.  Pedal hair growth is present bilateral.  No lower extremity edema noted bilateral.  Toes are warm to touch bilateral.    Musculoskeletal:         General: No tenderness, deformity or signs of injury.      Right lower leg: No edema.      Left lower leg: No edema.      Comments: Muscle strength 5/5 in all muscle groups bilateral.  No tenderness nor crepitation with ROM of foot/ankle joints bilateral.  No tenderness with palpation of bilateral foot and ankle.      Skin:     Findings: No bruising, ecchymosis, erythema, signs of injury, laceration, lesion, petechiae, rash or wound.       "Comments: Pedal skin has normal turgor, temperature, and texture bilateral.  Dystrophic changes noted to bilateral halux toenail and to the Lt. 2nd toenail.  Remaining toenails x 7 appear normotrophic. Examination of the skin reveals no evidence of significant maceration, rashes, open lesions, suspicious appearing nevi or other concerning lesions.       Neurological:      Mental Status: She is alert.      Sensory: No sensory deficit.      Motor: No weakness or atrophy.      Comments: Light touch is intact bilateral.                 Assessment:       Encounter Diagnosis   Name Primary?    Nail dystrophy Yes         Plan:       Amarilis Pereira" was seen today for ingrown toenail.    Diagnoses and all orders for this visit:    Nail dystrophy  -     Specimen to Pathology Other      I counseled the patient on her conditions, their implications and medical management.    With the patient's verbal consent, a sterile nail nipper was used to remove a portion of the Lt. Hallux toenail that was sent to Pathology to rule out fungal infection.    Should pathology be positive, we discussed a variety of treatment options to address onychomycosis including Herman Vaporub, topical/oral antifungals, and laser therapy.    Advised to regularly clean shoe gear and shower surfaces to limit exposure.    Advised to be wary of walking barefoot on carpeted surfaces at gyms and hotel rooms.  Also, avoid barefoot walking at public showers and pool areas.     RTC prn.    León Gonzalez DPM               [1]   Current Outpatient Medications   Medication Sig Dispense Refill    amitriptyline (ELAVIL) 10 MG tablet Take 1 tablet (10 mg total) by mouth every evening. 30 tablet 3    ARMOUR THYROID 60 mg Tab TAKE 1 TABLET(60 MG) BY MOUTH BEFORE BREAKFAST 90 tablet 3    betamethasone dipropionate 0.05 % cream Apply topically once daily. 90 g 5    budesonide (PULMICORT) 0.5 mg/2 mL nebulizer solution Controller, twice a day. 360 mL 3    cholecalciferol, " vitamin D3, (VITAMIN D3) 50 mcg (2,000 unit) Tab Take 2,000 Units by mouth once daily.      clobetasol 0.05% (TEMOVATE) 0.05 % Oint 1 application once daily. Apply to affected area      desonide 0.05% (DESOWEN) 0.05 % Oint Apply topically 2 (two) times daily. 60 g 2    desoximetasone (TOPICORT) 0.05 % cream Apply topically 2 (two) times daily. 60 g 11    fluticasone propionate (FLONASE) 50 mcg/actuation nasal spray 1 spray (50 mcg total) by Each Nostril route once daily. 54 g 3    HYDROcodone-acetaminophen (NORCO) 5-325 mg per tablet Take 1 tablet by mouth every 8 (eight) hours as needed for Pain. 20 tablet 0    ketoconazole (NIZORAL) 2 % cream Apply topically.      ketoconazole (NIZORAL) 2 % shampoo Apply topically twice a week. 120 mL 3    levalbuterol (XOPENEX HFA) 45 mcg/actuation inhaler Inhale 1-2 puffs into the lungs every 6 (six) hours as needed for Wheezing or Shortness of Breath (or before exercise). Rescue 15 g 0    levocetirizine (XYZAL) 5 MG tablet Take one capsule by mouth daily 90 tablet 0    MAGNESIUM ORAL Take by mouth once. Pt takes 400mg once daily      mupirocin (BACTROBAN) 2 % ointment by Nasal route 2 (two) times daily. 1 each 3    simethicone (GAS-X ORAL) Take by mouth.      azelastine (ASTELIN) 137 mcg (0.1 %) nasal spray 1 spray (137 mcg total) by Nasal route 2 (two) times daily. 30 mL 3     Current Facility-Administered Medications   Medication Dose Route Frequency Provider Last Rate Last Admin    cyanocobalamin injection 1,000 mcg  1,000 mcg Intramuscular Q30 Days    1,000 mcg at 07/25/24 0878

## 2025-02-19 LAB
FINAL PATHOLOGIC DIAGNOSIS: NORMAL
GROSS: NORMAL
Lab: NORMAL
MICROSCOPIC EXAM: NORMAL

## 2025-02-20 ENCOUNTER — RESULTS FOLLOW-UP (OUTPATIENT)
Dept: PODIATRY | Facility: CLINIC | Age: 85
End: 2025-02-20
Payer: MEDICARE

## 2025-02-20 NOTE — TELEPHONE ENCOUNTER
----- Message from León Gonzalez DPM sent at 2/20/2025  7:08 AM CST -----  Please let the patient know pathology was negative for fungal infection.  This means changes in the nail are secondary to injury of the nail root.  Unfortunately, this means the nail will be damaged   permanently.    ----- Message -----  From: [facility]  Sent: 2/19/2025   3:16 PM CST  To: León Gonzalez DPM

## 2025-02-20 NOTE — TELEPHONE ENCOUNTER
Spoke with the patient to provide results per provider's request. Patient expressed understanding of the message.

## 2025-02-22 DIAGNOSIS — M79.7 FIBROMYALGIA: ICD-10-CM

## 2025-02-22 RX ORDER — AMITRIPTYLINE HYDROCHLORIDE 10 MG/1
20 TABLET, FILM COATED ORAL NIGHTLY PRN
Qty: 180 TABLET | Refills: 3 | Status: SHIPPED | OUTPATIENT
Start: 2025-02-22 | End: 2026-02-22

## 2025-02-22 NOTE — TELEPHONE ENCOUNTER
No care due was identified.  [facility] Embedded Care Due Messages. Reference number: [id number].   2/22/2025 10:01:37 AM CST

## 2025-02-22 NOTE — TELEPHONE ENCOUNTER
Refill Routing Note   Medication(s) are not appropriate for processing by [facility] for the following reason(s):             [facility] action(s):  Quick Discontinue        Medication Therapy Plan: Receipt confirmed by pharmacy (2/22/2025 10:14 AM CST)      Appointments  past 12m or future 3m with PCP    Date Provider   Last Visit   2/10/2025 Luc Teixeira MD   Next Visit   3/10/2025 Luc Teixeira MD   ED visits in past 90 days: 0        Note composed:4:31 PM 02/22/2025

## 2025-02-23 RX ORDER — AMITRIPTYLINE HYDROCHLORIDE 10 MG/1
10 TABLET, FILM COATED ORAL NIGHTLY
Qty: 30 TABLET | Refills: 3 | OUTPATIENT
Start: 2025-02-23 | End: 2026-02-23

## 2025-02-24 DIAGNOSIS — Z00.00 ENCOUNTER FOR MEDICARE ANNUAL WELLNESS EXAM: ICD-10-CM

## 2025-02-28 ENCOUNTER — OFFICE VISIT (OUTPATIENT)
Dept: OTOLARYNGOLOGY | Facility: CLINIC | Age: 85
End: 2025-02-28
Payer: MEDICARE

## 2025-02-28 ENCOUNTER — RESULTS FOLLOW-UP (OUTPATIENT)
Dept: FAMILY MEDICINE | Facility: CLINIC | Age: 85
End: 2025-02-28
Payer: MEDICARE

## 2025-02-28 ENCOUNTER — HOSPITAL ENCOUNTER (OUTPATIENT)
Dept: RADIOLOGY | Facility: HOSPITAL | Age: 85
Discharge: HOME | End: 2025-02-28
Attending: FAMILY MEDICINE
Payer: MEDICARE

## 2025-02-28 VITALS — BODY MASS INDEX: 24.48 KG/M2 | WEIGHT: 133.81 LBS

## 2025-02-28 DIAGNOSIS — K12.1 ORAL ULCER: ICD-10-CM

## 2025-02-28 DIAGNOSIS — Z78.0 POSTMENOPAUSAL: ICD-10-CM

## 2025-02-28 DIAGNOSIS — J32.4 CHRONIC PANSINUSITIS: Primary | ICD-10-CM

## 2025-02-28 DIAGNOSIS — J33.9 NASAL POLYPS: ICD-10-CM

## 2025-02-28 PROCEDURE — 99999 PR PBB SHADOW E&M-EST. PATIENT-LVL III: CPT | Mod: PBBFAC,,, | Performed by: STUDENT IN AN ORGANIZED HEALTH CARE EDUCATION/TRAINING PROGRAM

## 2025-02-28 PROCEDURE — 31231 NASAL ENDOSCOPY DX: CPT | Mod: S$PBB,,, | Performed by: STUDENT IN AN ORGANIZED HEALTH CARE EDUCATION/TRAINING PROGRAM

## 2025-02-28 PROCEDURE — 99214 OFFICE O/P EST MOD 30 MIN: CPT | Mod: 25,S$PBB,, | Performed by: STUDENT IN AN ORGANIZED HEALTH CARE EDUCATION/TRAINING PROGRAM

## 2025-02-28 PROCEDURE — 99213 OFFICE O/P EST LOW 20 MIN: CPT | Mod: PBBFAC,25,PO | Performed by: STUDENT IN AN ORGANIZED HEALTH CARE EDUCATION/TRAINING PROGRAM

## 2025-02-28 PROCEDURE — 31231 NASAL ENDOSCOPY DX: CPT | Mod: PBBFAC,PO | Performed by: STUDENT IN AN ORGANIZED HEALTH CARE EDUCATION/TRAINING PROGRAM

## 2025-02-28 PROCEDURE — 77080 DXA BONE DENSITY AXIAL: CPT | Mod: 26,,, | Performed by: RADIOLOGY

## 2025-02-28 PROCEDURE — 77080 DXA BONE DENSITY AXIAL: CPT | Mod: TC,PO

## 2025-02-28 NOTE — PROGRESS NOTES
Otolaryngology Clinic Note    Subjective:       Patient ID: Amarilis Decker is a 84 y.o. female.    Chief Complaint: follow up         History of Present Illness: Amarilis Decker is a 84 y.o. female presenting with sinus concerns. She reports she has had 5 sinus surgeries, got staph infection and eye damage from one. Gets several left max sinus infections a year. We have treated with oral and topical abx. She does regular steroids sinus rinses.     2/15/24: RTC. Was sick 2 days after last seen. Did clinda then doxycyline. Has been good since. Ears have been hurting. She has not been wearing her mouthguard. Doing rinses.  She  is doing steroids rinses. She had blood in sinuses until doxy. Not using astelin.     5/14/24: She got hearing test and aids since last seen. She knows they are helping. Noticed pus 2 days last week and yesterday. Her memory is worse due to stress,  is sick. Has not been doing mupirocin.     10/29/24; Purulent drainage on left past 4 days. Nosebleed slightly on right before infection. Painful. Has been doing rinses with steroids and mupirocin BID. Stopped mupirocin with nosebleeds but was using twice a day in rinses as well. Can breathe ok. Reports covid 3 months ago. Has felt a little run down since. Was using afrin.     11/19/24; RTC as she does not feel better. Still seeing pus in her nose. Stopped lexapro and is less tired. Had shooting pain on right side, teeth on right hurt last week.     2/28/25: had blood tricia on right. Saw a little pus not much. Last weekend, blood and pus on both sides. Had to stop doxycyline due to diarrhea. Still doing budesonide steroid in rinses. She does wear nasal cpap.     Past Surgical History:   Procedure Laterality Date    ADENOIDECTOMY      CHOLECYSTECTOMY      COLONOSCOPY  2019    unremarkable per pt report    JANKI      WNL    SINUS SURGERY      Dr Cordova    SINUS SURGERY  01/2017    xs 5    THYROID SURGERY      nodules removed //  [facility]     TONSILLECTOMY      TYMPANOSTOMY TUBE PLACEMENT      UPPER GASTROINTESTINAL ENDOSCOPY       Past Medical History:   Diagnosis Date    Asthma     GERD (gastroesophageal reflux disease)     Psoriasis      Social Drivers of Health     Tobacco Use: Low Risk  (2/10/2025)    Patient History     Smoking Tobacco Use: Never     Smokeless Tobacco Use: Never     Passive Exposure: Never   Alcohol Use: Not At Risk (10/23/2023)    AUDIT-C     Frequency of Alcohol Consumption: Monthly or less     Average Number of Drinks: 1 or 2     Frequency of Binge Drinking: Never   Financial Resource Strain: Low Risk  (10/23/2023)    Overall Financial Resource Strain (CARDIA)     Difficulty of Paying Living Expenses: Not hard at all   Food Insecurity: No Food Insecurity (10/23/2023)    Hunger Vital Sign     Worried About Running Out of Food in the Last Year: Never true     Ran Out of Food in the Last Year: Never true   Transportation Needs: Unknown (10/23/2023)    PRAPARE - Transportation     Lack of Transportation (Medical): No     Lack of Transportation (Non-Medical): Not on file   Physical Activity: Inactive (10/23/2023)    Exercise Vital Sign     Days of Exercise per Week: 0 days     Minutes of Exercise per Session: 0 min   Stress: No Stress Concern Present (10/23/2023)    [employer] - Occupational Stress Questionnaire     Feeling of Stress : Not at all   Housing Stability: Low Risk  (10/23/2023)    Housing Stability Vital Sign     Unable to Pay for Housing in the Last Year: No     Number of Places Lived in the Last Year: 1     Unstable Housing in the Last Year: No   Depression: Low Risk  (1/10/2025)    Depression     Last PHQ-4: Flowsheet Data: 0   Utilities: Not on file   Health Literacy: Not on file   Social Isolation: Not on file     Review of patient's allergies indicates:   Allergen Reactions    Avelox [moxifloxacin] Swelling    Bactrim [sulfamethoxazole-trimethoprim] Swelling    Ciprofloxacin  Swelling and Hives    Isothiazolinones Dermatitis     Strong contact dermatitis    Apremilast Other (See Comments)     Muscle cramps, cough    Bacitracin      Other reaction(s): unknown    Wellbutrin [bupropion hcl] Nausea Only     High heart rate     Current Outpatient Medications   Medication Instructions    amitriptyline (ELAVIL) 10 mg, Oral, Nightly    amitriptyline (ELAVIL) 20 mg, Oral, Nightly PRN    ARMOUR THYROID 60 mg Tab TAKE 1 TABLET(60 MG) BY MOUTH BEFORE BREAKFAST    azelastine (ASTELIN) 137 mcg, Nasal, 2 times daily    betamethasone dipropionate 0.05 % cream Topical (Top), Daily    budesonide (PULMICORT) 0.5 mg/2 mL nebulizer solution Controller, twice a day.    cholecalciferol (vitamin D3) (VITAMIN D3) 2,000 Units, Daily    clobetasol 0.05% (TEMOVATE) 0.05 % Oint 1 application , Daily    desonide 0.05% (DESOWEN) 0.05 % Oint Topical (Top), 2 times daily    desoximetasone (TOPICORT) 0.05 % cream Topical (Top), 2 times daily    fluticasone propionate (FLONASE) 50 mcg, Each Nostril, Daily    HYDROcodone-acetaminophen (NORCO) 5-325 mg per tablet 1 tablet, Oral, Every 8 hours PRN    ketoconazole (NIZORAL) 2 % cream Apply topically.    ketoconazole (NIZORAL) 2 % shampoo Topical (Top), Twice weekly    levalbuterol (XOPENEX HFA) 45 mcg/actuation inhaler 1-2 puffs, Inhalation, Every 6 hours PRN, Rescue    levocetirizine (XYZAL) 5 MG tablet Take one capsule by mouth daily    MAGNESIUM ORAL Once    mupirocin (BACTROBAN) 2 % ointment Nasal, 2 times daily    simethicone (GAS-X ORAL) Take by mouth.         ENT ROS negative except as stated above.             Objective:      There were no vitals filed for this visit.    General: NAD, well appearing  Eyes: Normal conjunctiva and lids  Face: symmetric, nerve intact  Nose: The nose is without any evidence of any deformity. The nasal mucosa is moist. The septum is midline. There is no evidence of septal hematoma. The turbinates are without abnormality.   Ears: The ears  are with normal-appearing pinna. Examination of the canals is normal appearing bilaterally.  Mouth: No obvious abnormalities to the lips. The teeth are unremarkable. The gingivae are without any obvious evidence of infection or lesion. The oral mucosa is moist and pink. There are no obvious masses to the hard or soft palate.   Oropharynx: The uvula is midline.  The tongue is midline. The posterior pharynx is without erythema or exudate. The tonsils are normal appearing.  Salivary glands: The salivary glands are symmetric and not enlarged, no masses  Neck: No lymphadenopathy, trachea midline, thryoid not enlarged.  Psych: Normal mood and affect.   Neuro: Grossly intact  Speech: fluent    Name: Amarilis Decker     Pre-procedure diagnosis: Chronic pansinusitis [J32.4]  Post-procedure diagnosis: Same    Procedure: Bilateral nasal endoscopy  Anesthesia:  4% Lidocaine and 0.25% Phenylephrine Topical    Indication: This procedure is indicated as anterior rhinoscopy is not sufficient to account for all of the patients symptoms.     Procedure: Risks, benefits, and alternatives of the procedure were discussed with the patient, and consent was obtained to perform a nasal endoscopy.  The nasal cavity was sprayed with a topical decongestant and topical anesthetic. After adequate anesthesia was obtained, the scope was passed into each nostril independently.  The nasal cavities (including inferior turbinates, middle turbinates, inferior meatus, middle meatus, superior meatus, sphenoethmoid recess) nasopharynx, choana, eustachian tube, fossa of Rosenmüller, and adenoids were examined. All findings were normal with exception of description below. At the end of the examination, the scope was removed. The patient tolerated the procedure well with no complications.     Septum midline. Maxillary, ethmoid, frontal and sphenoid patent. No polyposis now on left. Some thick clear PND bilateral. No e/o bleeding. No infection.              Component 3 wk ago   Anaerobic Culture  Abnormal   CUTIBACTERIUM ACNES  Rare             Assessment and Plan:       1. Chronic pansinusitis    2. Nasal polyps    3. Oral ulcer        Polyps have gone away, sinuses back to healthy. Reviewed with her.  Hx MRSA    - Switched to betamethasone to budesonide. She will alternate these.   - mupirocin BID in rinses 3 week when purulence noted.     She got hearing test and aids since last seen.     Ulcer intermittent where she bites her cheek at night. She will wear mouth guard and avoid sugar as this triggers.   Reviewed her ALONZO. Is severe. She should continue CPAP, however this may be related to blood in sinuses as well as dry weather.    RTC: 3 months.     Plan of care was discussed in detail with the patient, who agreed with the plan as above. All questions were answered in detail.     Eboni Briggs MD  Otolaryngology;

## 2025-03-10 ENCOUNTER — OFFICE VISIT (OUTPATIENT)
Dept: FAMILY MEDICINE | Facility: CLINIC | Age: 85
End: 2025-03-10
Payer: MEDICARE

## 2025-03-10 VITALS
HEIGHT: 62 IN | WEIGHT: 135.38 LBS | OXYGEN SATURATION: 99 % | HEART RATE: 71 BPM | BODY MASS INDEX: 24.91 KG/M2 | SYSTOLIC BLOOD PRESSURE: 108 MMHG | DIASTOLIC BLOOD PRESSURE: 74 MMHG

## 2025-03-10 DIAGNOSIS — F33.1 MODERATE EPISODE OF RECURRENT MAJOR DEPRESSIVE DISORDER: ICD-10-CM

## 2025-03-10 DIAGNOSIS — M79.7 FIBROMYALGIA: ICD-10-CM

## 2025-03-10 DIAGNOSIS — R10.2 PELVIC PAIN: Primary | ICD-10-CM

## 2025-03-10 PROCEDURE — 99214 OFFICE O/P EST MOD 30 MIN: CPT | Mod: S$PBB,,, | Performed by: FAMILY MEDICINE

## 2025-03-10 PROCEDURE — 99214 OFFICE O/P EST MOD 30 MIN: CPT | Mod: PBBFAC,PN | Performed by: FAMILY MEDICINE

## 2025-03-10 PROCEDURE — 99999 PR PBB SHADOW E&M-EST. PATIENT-LVL IV: CPT | Mod: PBBFAC,,, | Performed by: FAMILY MEDICINE

## 2025-03-10 PROCEDURE — G2211 COMPLEX E/M VISIT ADD ON: HCPCS | Mod: S$PBB,,, | Performed by: FAMILY MEDICINE

## 2025-03-10 RX ORDER — DULOXETIN HYDROCHLORIDE 20 MG/1
20 CAPSULE, DELAYED RELEASE ORAL DAILY
Qty: 30 CAPSULE | Refills: 11 | Status: SHIPPED | OUTPATIENT
Start: 2025-03-10 | End: 2026-03-10

## 2025-03-10 NOTE — PROGRESS NOTES
THIS DOCUMENT WAS MADE IN PART WITH VOICE RECOGNITION SOFTWARE.  OCCASIONALLY THIS SOFTWARE WILL MISINTERPRET WORDS OR PHRASES.    Assessment and Plan:    1. Pelvic pain        2. Fibromyalgia  DULoxetine (CYMBALTA) 20 MG capsule      3. Moderate episode of recurrent major depressive disorder  DULoxetine (CYMBALTA) 20 MG capsule                    Assessment & Plan    PLAN SUMMARY:   Initiated duloxetine (Cymbalta) 20mg daily for depression and fibromyalgia   Discontinued amitriptyline due to drowsiness and balance concerns   Recommend OTC ibuprofen for pain management   Considered repeating pelvic ultrasound   Follow-up in 4 weeks to assess response to duloxetine and determine if dose adjustment is necessary    DEPRESSION:   Explained that duloxetine treats depression.   Initiated duloxetine (Cymbalta) 20mg daily for depression.   Scheduled follow up in 4 weeks to assess response to duloxetine and determine if dose adjustment is necessary.   Acknowledged patient's history of depression and previous treatments.   Discussed the possibility of trying a new medication for depression.    ANXIETY:   Noted patient's report of feeling anxious without a clear reason.   Prescribed duloxetine (Cymbalta) 20mg, which may alleviate anxiety symptoms.    FIBROMYALGIA:   Explained that duloxetine treats fibromyalgia.   Initiated duloxetine (Cymbalta) 20mg daily for fibromyalgia.   Scheduled follow up in 4 weeks to assess response to duloxetine and determine if dose adjustment is necessary.   Recommend OTC ibuprofen for pain management.   Evaluated the effectiveness of amitriptyline for fibromyalgia symptoms.   Discussed alternative medication options for fibromyalgia.   Noted patient's report of pain in the groin area, which may be related to fibromyalgia.    MEDICATION SIDE EFFECTS:   Discontinued amitriptyline due to drowsiness and balance concerns.   Evaluated the side effects of amitriptyline and confirmed it can cause  drowsiness.   Recommend switching to duloxetine (Cymbalta) 20mg.   Discontinued amitriptyline due to balance concerns.   Acknowledged the risk of balance issues with medications.   Recommend switching from amitriptyline to duloxetine (Cymbalta) to potentially reduce balance issues.   Noted patient's report of balance issues and dropping objects.   Noted patient's report of speech hesitation and difficulty thinking of words.   Recommend switching from amitriptyline to duloxetine (Cymbalta), which may help with speech issues.   Discontinued amitriptyline due to drowsiness.   Confirmed that amitriptyline can cause drowsiness.   Recommend switching to duloxetine (Cymbalta) 20mg to potentially reduce drowsiness.   Noted patient's report of excessive drowsiness and falling asleep during daytime activities.    GROIN PAIN:   Noted patient's report of severe pain in the groin area, lasting about a minute.   Evaluated the possibility of other causes for the pain.   Considered the possibility of repeating a pelvic ultrasound.   Prescribed duloxetine (Cymbalta) 20mg, which may help with pain management.    MEDICATIONS/SUPPLEMENTS:   Discussed potential side effects of duloxetine, emphasizing GI symptoms as most common.   Clarified that duloxetine has been in use for 30-40 years and is not a new medication.    OTHER INSTRUCTIONS:   Noted that mother never had depression.    FOLLOW   UP:   Contact the office if experiencing troublesome side effects before the follow-up visit.               ______________________________________________________________________  Subjective:    Chief Complaint:  Chief Complaint   Patient presents with    Follow-up        HPI:  Amarilis is a 84 y.o. year old         History of Present Illness    CHIEF COMPLAINT:  Amarilis presents today with concerns about medication side effects and anxiety.    MEDICATION SIDE EFFECTS:  She reports experiencing side effects from Amitriptyline including balance and  coordination issues, drowsiness, fatigue, and speech hesitation. She describes dropping a salad plate unexpectedly and feeling off balance. She notes increased drowsiness, falling asleep during baseball games which is unusual for her, and taking uncharacteristic naps after lunch. She also reports speech hesitation.    PAIN MANAGEMENT:  She reports severe groin pain that awakens her from sleep and occurs when sitting, lasting approximately 1-1.5 minutes per episode. She experienced similar pain in her 40s after discontinuing [place], with recurrence in the past year. She has a history of fibromyalgia with some symptom improvement while taking amitriptyline.    DEPRESSION:  She reports experiencing depression without clear precipitating factors, describing it as tough at times. She has a family history of depression, noting her mother had depression and lived to be 96 years old.    MEDICATION HISTORY:  Her current medication includes Magnesium glycinate. She has previously tried multiple antidepressants including Lexapro, Buspirone, Wellbutrin, Fluoxetine, and Citalopram.      ROS:  ROS as indicated in HPI.               Primary insomnia  Rx-  Mg Glycinate   Previous Rx- Remeron (ineffective), Doxepin (ineffective), trazodone (hangover), Melatonin (ineffective)     GERD  Rx-pantoprazole 40 mg  Denies dysphagia      Depressed mood / Anxiety   Rx : Cymbalta   Prev Rx-Lexapro (nightmares, EXTREME FATIGUE), buspirone (ineffective),  wellbutrin (constipation, imbalance, ineffective), Fluoxetine (itching), Citalopram   Stressor-fall out with daughter      Hypothyroidism  Current Rx-armor Thyroid 60 mg + 15 mg daily   Previous Rx-levothyroxine (ineffective)  Previous TSH in normal range   Endocrinology- Dr Fritz      Bronchiectasis, history of pulmonary nodules  Seen by pulmonology - Dr. 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\     Senile osteoporosis  Prev Rx-Boniva 150 (pt  choice to quit)  Taking vitamin-D supplement  No recent fractures     Seasonal allergies / chronic sinusitis / nasal polyps   Rx-Flonase, Xyzal, montelukast, astelin   ENT -  Eboni Briggs MD      Psoriasis  Rx-betamethasone, clobetasol, desonide, Topicort  Followed by dermatology - Jesenia Loera MD      B12 deficiency  Prev Rx-B12 injections 500 mcg every 2 weeks     Chronic Low back pain , sacroiliitis, ankylosing spondylitis   Rheum : Gregg Mensah MD   Pain Mgt : Hubbel   Rx : Mobic 15 mg , Norco 5 mg p.r.n., use sparingly    Fibromyalgia   Rx : Cymbalta   Prev rx : Amitriptyline (? Drowsiness)      [facility]  Sleep MD : Catarina Morfin  CPAP compliant     Chronic Constipation  OTC : Mg Glycinate     Chronic Fatigue           Past Medical History:  Past Medical History:   Diagnosis Date    Asthma     GERD (gastroesophageal reflux disease)     Psoriasis        Past Surgical History:  Past Surgical History:   Procedure Laterality Date    ADENOIDECTOMY      CHOLECYSTECTOMY      COLONOSCOPY  2019    unremarkable per pt report    DEXA      WNL    SINUS SURGERY      Dr Cordova    SINUS SURGERY  01/2017    xs 5    THYROID SURGERY      nodules removed //Dr Amato     TONSILLECTOMY      TYMPANOSTOMY TUBE PLACEMENT      UPPER GASTROINTESTINAL ENDOSCOPY         Family History:  Family History   Problem Relation Name Age of Onset    Hypertension Mother      Obesity Father      Diabetes Sister      Obesity Sister      Breast cancer Paternal Aunt  43    No Known Problems Maternal Grandmother      No Known Problems Maternal Grandfather      No Known Problems Paternal Grandmother      No Known Problems Paternal Grandfather      Thyroid cancer Daughter  61    Ovarian cancer Neg Hx      Colon cancer Neg Hx         Social History:  Social History     Socioeconomic History    Marital status:    Tobacco Use    Smoking status: Never     Passive exposure: Never    Smokeless tobacco: Never   Substance and Sexual Activity    Alcohol use:  Yes     Comment: twice a month    Drug use: No    Sexual activity: Never     Social Drivers of Health     Financial Resource Strain: Low Risk  (10/23/2023)    Overall Financial Resource Strain (CARDIA)     Difficulty of Paying Living Expenses: Not hard at all   Food Insecurity: No Food Insecurity (10/23/2023)    Hunger Vital Sign     Worried About Running Out of Food in the Last Year: Never true     Ran Out of Food in the Last Year: Never true   Transportation Needs: Unknown (10/23/2023)    PRAPARE - Transportation     Lack of Transportation (Medical): No   Physical Activity: Inactive (10/23/2023)    Exercise Vital Sign     Days of Exercise per Week: 0 days     Minutes of Exercise per Session: 0 min   Stress: No Stress Concern Present (10/23/2023)    [employer] [place] [employer] - Occupational Stress Questionnaire     Feeling of Stress : Not at all   Housing Stability: Low Risk  (10/23/2023)    Housing Stability Vital Sign     Unable to Pay for Housing in the Last Year: No     Number of Places Lived in the Last Year: 1     Unstable Housing in the Last Year: No       Medications:  Current Outpatient Medications on File Prior to Visit   Medication Sig Dispense Refill    amitriptyline (ELAVIL) 10 MG tablet Take 2 tablets (20 mg total) by mouth nightly as needed for Insomnia. 180 tablet 3    ARMOUR THYROID 60 mg Tab TAKE 1 TABLET(60 MG) BY MOUTH BEFORE BREAKFAST 90 tablet 3    azelastine (ASTELIN) 137 mcg (0.1 %) nasal spray 1 spray (137 mcg total) by Nasal route 2 (two) times daily. 30 mL 3    betamethasone dipropionate 0.05 % cream Apply topically once daily. 90 g 5    budesonide (PULMICORT) 0.5 mg/2 mL nebulizer solution Controller, twice a day. 360 mL 3    cholecalciferol, vitamin D3, (VITAMIN D3) 50 mcg (2,000 unit) Tab Take 2,000 Units by mouth once daily.      clobetasol 0.05% (TEMOVATE) 0.05 % Oint 1 application once daily. Apply to affected area      desonide 0.05% (DESOWEN) 0.05 % Oint Apply  topically 2 (two) times daily. 60 g 2    desoximetasone (TOPICORT) 0.05 % cream Apply topically 2 (two) times daily. 60 g 11    fluticasone propionate (FLONASE) 50 mcg/actuation nasal spray 1 spray (50 mcg total) by Each Nostril route once daily. 54 g 3    HYDROcodone-acetaminophen (NORCO) 5-325 mg per tablet Take 1 tablet by mouth every 8 (eight) hours as needed for Pain. 20 tablet 0    ketoconazole (NIZORAL) 2 % cream Apply topically.      ketoconazole (NIZORAL) 2 % shampoo Apply topically twice a week. 120 mL 3    levalbuterol (XOPENEX HFA) 45 mcg/actuation inhaler Inhale 1-2 puffs into the lungs every 6 (six) hours as needed for Wheezing or Shortness of Breath (or before exercise). Rescue 15 g 0    levocetirizine (XYZAL) 5 MG tablet Take one capsule by mouth daily 90 tablet 0    MAGNESIUM ORAL Take by mouth once. Pt takes 400mg once daily      mupirocin (BACTROBAN) 2 % ointment by Nasal route 2 (two) times daily. 1 each 3    simethicone (GAS-X ORAL) Take by mouth.      [DISCONTINUED] amitriptyline (ELAVIL) 10 MG tablet Take 1 tablet (10 mg total) by mouth every evening. 30 tablet 3    [DISCONTINUED] etanercept (ENBREL SURECLICK) 50 mg/mL (1 mL) Inject 1 mL (50 mg total) into the skin once a week. (Patient taking differently: Inject 50 mg into the skin once a week. Once a month) 12 mL 3     Current Facility-Administered Medications on File Prior to Visit   Medication Dose Route Frequency Provider Last Rate Last Admin    cyanocobalamin injection 1,000 mcg  1,000 mcg Intramuscular Q30 Days    1,000 mcg at 07/25/24 0857       Allergies:  Avelox [moxifloxacin], Bactrim [sulfamethoxazole-trimethoprim], Ciprofloxacin, Isothiazolinones, Apremilast, Bacitracin, and Wellbutrin [bupropion hcl]    Immunizations:  Immunization History   Administered Date(s) Administered    COVID-19 MRNA, LN-S PF (MODERNA HALF 0.25 ML DOSE) 12/16/2021    COVID-19 Vaccine 03/31/2021    COVID-19, vector-nr, [id number], PF ([facility]) 04/05/2021     "Hepatitis A, Adult 08/09/2007    IPV 08/09/2007    Influenza 11/18/2011, 10/21/2013    Influenza (FLUAD) - Quadrivalent - Adjuvanted - PF *Preferred* (65+) 10/07/2021, 10/03/2022, 09/08/2023    Influenza - Quadrivalent - MDCK - PF 10/10/2017    Influenza - Quadrivalent - PF *Preferred* (6 months and older) 10/29/2019, 09/25/2020    Influenza - Trivalent - Afluria, Fluzone MDV 11/18/2011    Influenza - Trivalent - Fluad - Adjuvanted - PF (65 years and older 09/13/2024    Influenza - Trivalent - Fluarix, Flulaval, Fluzone, Afluria - PF 10/21/2013    Influenza - Trivalent - Fluzone High Dose - PF (65 years and older) 10/14/2014, 10/08/2015, 09/27/2016    Pneumococcal Conjugate - 13 Valent 04/12/2016    Pneumococcal Polysaccharide - 23 Valent 08/01/2011    RSVpreF (Arexvy) 10/21/2024    Td (Adult), Unspecified Formulation 08/02/2018    Tdap 08/09/2007, 08/01/2018    Zoster 07/11/2007       Review of Systems:  Review of Systems   All other systems reviewed and are negative.      Objective:    Vitals:  Vitals:    03/10/25 1018   BP: 108/74   Pulse: 71   SpO2: 99%   Weight: 61.4 kg (135 lb 5.8 oz)   Height: 5' 2" (1.575 m)   PainSc: 0-No pain       Physical Exam  Vitals reviewed.   Constitutional:       General: She is not in acute distress.  HENT:      Head: Normocephalic and atraumatic.   Eyes:      Pupils: Pupils are equal, round, and reactive to light.   Cardiovascular:      Rate and Rhythm: Normal rate and regular rhythm.      Heart sounds: No murmur heard.     No friction rub.   Pulmonary:      Effort: Pulmonary effort is normal.      Breath sounds: Normal breath sounds.   Abdominal:      General: Bowel sounds are normal. There is no distension.      Palpations: Abdomen is soft.      Tenderness: There is no abdominal tenderness.   Musculoskeletal:      Cervical back: Neck supple.   Skin:     General: Skin is warm and dry.      Findings: No rash.   Psychiatric:         Behavior: Behavior normal.             Luc " MD [facility]

## 2025-03-12 NOTE — PROGRESS NOTES
Amarilis Decker came in on 03/13/2025 for an annual hearing aid follow up. Pt was alone during today's visit. Pt stated she would like a refresher on cleaning, alerts from her phone are too loud and she is contemplating disconnecting the aids from her phone. Demonstrated cleaning of hearing aids to include wax filter and dome change with the pt doing it at the same time with their hearing aids for retention purposes. She should call the clinic PRN otherwise. All complaints were addressed during this visit to the patient's satisfaction. Plan of care was discussed in detail with the patient, who agreed with the plan as above. She scheduled her annual test on 3/31/25.

## 2025-03-13 ENCOUNTER — CLINICAL SUPPORT (OUTPATIENT)
Dept: AUDIOLOGY | Facility: CLINIC | Age: 85
End: 2025-03-13
Payer: MEDICARE

## 2025-03-13 ENCOUNTER — DOCUMENTATION ONLY (OUTPATIENT)
Dept: AUDIOLOGY | Facility: CLINIC | Age: 85
End: 2025-03-13
Payer: MEDICARE

## 2025-03-13 DIAGNOSIS — Z97.4 WEARS HEARING AID IN BOTH EARS: Primary | ICD-10-CM

## 2025-03-13 NOTE — PROGRESS NOTES
Tiara replaced the patient's hearing aid receivers today and I ordered replacements for our stock.     31-Jul-2023 13:38

## 2025-03-14 NOTE — TELEPHONE ENCOUNTER
Thank you for the referral!    Called and spoke with patient. Scheduled for an appointment with me to discuss bone health and prolia on 3/18 at

## 2025-03-18 ENCOUNTER — OFFICE VISIT (OUTPATIENT)
Dept: RHEUMATOLOGY | Facility: CLINIC | Age: 85
End: 2025-03-18
Payer: MEDICARE

## 2025-03-18 VITALS
WEIGHT: 136.44 LBS | HEIGHT: 62 IN | BODY MASS INDEX: 25.11 KG/M2 | SYSTOLIC BLOOD PRESSURE: 117 MMHG | HEART RATE: 74 BPM | DIASTOLIC BLOOD PRESSURE: 69 MMHG

## 2025-03-18 DIAGNOSIS — Z51.81 MEDICATION MONITORING ENCOUNTER: ICD-10-CM

## 2025-03-18 DIAGNOSIS — Z87.81 HX OF FRACTURE: ICD-10-CM

## 2025-03-18 DIAGNOSIS — M81.0 POSTMENOPAUSAL OSTEOPOROSIS: Primary | ICD-10-CM

## 2025-03-18 DIAGNOSIS — Z91.89 FRACTURE RISK ASSESSMENT SCORE (FRAX) INDICATING GREATER THAN 3% RISK FOR HIP FRACTURE: ICD-10-CM

## 2025-03-18 PROCEDURE — 99999 PR PBB SHADOW E&M-EST. PATIENT-LVL IV: CPT | Mod: PBBFAC,,,

## 2025-03-18 PROCEDURE — 99499 UNLISTED E&M SERVICE: CPT | Mod: S$PBB,,, | Performed by: FAMILY MEDICINE

## 2025-03-18 PROCEDURE — 99214 OFFICE O/P EST MOD 30 MIN: CPT | Mod: PBBFAC,PO

## 2025-03-18 NOTE — PATIENT INSTRUCTIONS
It was a pleasure talking with you today. As a reminder, my name is Dr. Daxa Gilliam. I'm the clinical pharmacist in the Rheumatology office. If you have any questions or need any assistance, please reach out to the office.    Recommend weight bearing exercises (chair exercises, chair yoga, walking, yvrose chi, etc) 3 times weekly for 30 mins

## 2025-03-18 NOTE — PROGRESS NOTES
Time: 60 mins  # of DX: 4    Subjective:     Chief Complaint & History of Present Illness:  Amarilis Decker is a 84 y.o. female that is a new patient who is seen here today for discussion of treatment options. she was appropriately identified and referred by Luc Teixeira MD. Patient has a past medical history of Asthma, GERD (gastroesophageal reflux disease), osteopenia with elevated hip FRAX, fibromyalgia, ankylosing spondylitis, chronic constipation, ALONZO, depression, and Psoriasis.     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 Teixeira at upcoming appointment on 4/7/2025    Current calcium and Vit D intake:  Diet with adequate calcium: no  Dietary sources: yogurt every night, skim milk with cereal, chicken  Supplements: taking vitamin D 5000 units once a day was taking 2000 units previously  Stated that she is not taking calcium d/t having kidney stones years ago  Plans to increase calcium dietary intake     Current Medications:  Current Outpatient Medications   Medication Instructions    amitriptyline (ELAVIL) 20 mg, Oral, Nightly PRN    ARMOUR THYROID 60 mg Tab TAKE 1 TABLET(60 MG) BY MOUTH BEFORE BREAKFAST    azelastine (ASTELIN) 137 mcg, Nasal, 2 times daily    betamethasone dipropionate 0.05 % cream Topical (Top), Daily    budesonide (PULMICORT) 0.5 mg/2 mL nebulizer solution Controller, twice a day.  "   cholecalciferol (vitamin D3) (VITAMIN D3) 2,000 Units, Daily    clobetasol 0.05% (TEMOVATE) 0.05 % Oint 1 application , Daily    desonide 0.05% (DESOWEN) 0.05 % Oint Topical (Top), 2 times daily    desoximetasone (TOPICORT) 0.05 % cream Topical (Top), 2 times daily    DULoxetine (CYMBALTA) 20 mg, Oral, Daily    fluticasone propionate (FLONASE) 50 mcg, Each Nostril, Daily    HYDROcodone-acetaminophen (NORCO) 5-325 mg per tablet 1 tablet, Oral, Every 8 hours PRN    ketoconazole (NIZORAL) 2 % cream Apply topically.    ketoconazole (NIZORAL) 2 % shampoo Topical (Top), Twice weekly    levalbuterol (XOPENEX HFA) 45 mcg/actuation inhaler 1-2 puffs, Inhalation, Every 6 hours PRN, Rescue    levocetirizine (XYZAL) 5 MG tablet Take one capsule by mouth daily    MAGNESIUM ORAL Once    mupirocin (BACTROBAN) 2 % ointment Nasal, 2 times daily    simethicone (GAS-X ORAL) Take by mouth.     Objective:     Vitals:    03/18/25 1422   BP: 117/69   Pulse: 74   Weight: 61.9 kg (136 lb 7.4 oz)   Height: 5' 2.01" (1.575 m)   PainSc: 0-No pain      BP Readings from Last 4 Encounters:   03/10/25 108/74   02/10/25 122/72   01/10/25 122/70   12/06/24 125/63     Body mass index is 24.95 kg/m².     Alcohol Use: Not At Risk (10/23/2023)    AUDIT-C     Frequency of Alcohol Consumption: Monthly or less     Average Number of Drinks: 1 or 2     Frequency of Binge Drinking: Never      Tobacco Use: Low Risk  (3/10/2025)    Patient History     Smoking Tobacco Use: Never     Smokeless Tobacco Use: Never     Passive Exposure: Never      Physical Activity: Inactive (10/23/2023)    Exercise Vital Sign     Days of Exercise per Week: 0 days     Minutes of Exercise per Session: 0 min      Fall Risk Assessment: 03/18/2025  Completed "Check Your Risk for Falling" Questionnaire; score 3 for worried about falling (1), take medication to help with sleep or improve mood (1), and often feel sad or depressed (1)    Monitoring Lab Results:  Lab Results   Component " Value Date    CALCIUM 9.0 10/18/2024    ALBUMIN 3.8 10/18/2024    XAYSLITR46LX 66 09/07/2021    MG 1.9 02/21/2022    CREATININE 0.8 10/18/2024    EGFRNORACEVR >60.0 10/18/2024     CrCl - 50.74 mL/min    Lab Results   Component Value Date     10/18/2024    K 4.0 10/18/2024     10/18/2024    CO2 28 10/18/2024    GLU 86 10/18/2024    BUN 16 10/18/2024    PROT 6.8 10/18/2024    BILITOT 0.5 10/18/2024    ALKPHOS 53 10/18/2024    AST 29 10/18/2024    ALT 26 10/18/2024     DEXA Results: 2/28/2025  Narrative & Impression  EXAMINATION:  DXA BONE DENSITY AXIAL SKELETON 1 OR MORE SITES     CLINICAL HISTORY:  Asymptomatic menopausal state     TECHNIQUE:  DXA scanning was performed over the left hip and lumbar spine.  Review of the images confirms satisfactory positioning and technique.  Trabecular bone score (TBS)  analysis was also performed over the lumbar spine at L1-L4.     COMPARISON:  01/13/2022     FINDINGS:  DEXA:     The L1-L4 vertebral bone mineral density is equal to 1.0 g/cm squared with a T score of -0.4.  This is a 0.8% increase since 01/13/2022.     The left femoral neck bone mineral density is equal to 0.709 g/cm squared with a T score of -1.3.  This is a 2.6% increase since 01/13/2022.     Trabecular Bone Score:     The lumbar spine trabecular bone score (TBS L1-L4) is 1.296.  This is consistent with partially degraded microarchitecture.     The lumbar spine T-score adjusted for TBS is -0.3.     The femoral neck T-score adjusted for TBS is -1.2.     The bone resilience index is moderate compared to the reference population.     There is a 12% risk of a major osteoporotic fracture and a 3.3% risk of hip fracture in the next 10 years (TBS adjusted FRAX score).     Impression: Osteopenia with a 3.3% risk of hip fracture in the next 10 years    MRI Lumbar Spine w/o Contrast: 12/27/2024  Narrative & Impression  EXAMINATION:  MRI LUMBAR SPINE WITHOUT CONTRAST     CLINICAL HISTORY:  Low back pain, symptoms  persist with > 6wks conservative treatment; Dorsalgia, unspecified     TECHNIQUE:  Multiplanar, multisequence MR images were acquired from the thoracolumbar junction to the sacrum without the administration of contrast.     COMPARISON:  MRI of the lumbar spine 05/24/2022..     FINDINGS:  Morphology: Chronic mild superior endplate compression fracture deformity of L2 with stable height loss.  No additional fractures..  Prominent T11, L1, L2, and L5 vertebral hemangiomas.     Alignment: Grade 1 retrolisthesis of L2 on L3 and broad rotatory levocurvature.     Cord: Normal in contour, caliber, and signal intensity.  Conus positioning is within normal limits.     Disc levels:     T12-L1: Mild bilateral facet arthrosis.  The spinal canal and foramina remain patent.     L1-L2: Mild degenerative disc height loss and desiccation with shallow disc bulging and mild bilateral facet arthrosis producing mild encroachment of both subarticular recesses but no significant additional spinal canal narrowing.  Mild left foraminal narrowing.  The right foramen is patent.     L2-L3: Moderate degenerative disc height loss and desiccation with shallow disc bulging and mild-to-moderate facet arthrosis producing mild encroachment of both subarticular recesses but no additional spinal canal narrowing.  Mild left foraminal narrowing.  The right foramen is patent.     L3-L4: Moderate degenerative disc height loss and desiccation with disc bulging and mild bilateral facet arthrosis mild encroaching both subarticular recesses but no additional spinal canal narrowing.  Mild bilateral foraminal narrowing.     L4-L5: Mild degenerative disc height loss and desiccation with shallow disc bulging and moderate bilateral facet arthrosis with ligamentum flavum thickening producing mild circumferential spinal canal narrowing and mild right foraminal narrowing.  The left foramen is patent.     L5-S1: Disc desiccation and moderate bilateral facet arthrosis.   The spinal canal and foramina remain patent.     Other: Mild fatty atrophy of the paraspinal musculature.  Redemonstrated incidental tiny S2 level Tarlov cyst.     Impression:     1. Chronic mild superior endplate L2 compression fracture.  No acute process.  2. No more than mild subarticular recess/spinal canal narrowing.  No new lateralizing disc herniation or selective nerve impingement.  3. Facet arthrosis and disc bulging producing no more than mild foraminal narrowing at several levels discussed in detail above.  4. Broad levocurvature.    Infectious Disease Screening:  Lab Results   Component Value Date    HEPBSAG Non-reactive 11/17/2023    HEPBCAB Non-reactive 11/17/2023    HEPBSAB <3.00 11/17/2023    HEPBSAB Non-reactive 11/17/2023     Lab Results   Component Value Date    HEPCAB Non-reactive 11/17/2023     Lab Results   Component Value Date    TBGOLDPLUS Negative 11/17/2023      Assessment:     Patient is a 84 y.o. female with osteoporosis. Patient was referred to the Rheumatology pharmacist for discussion of treatment and osteoporosis management. Provided patient with education about bone health and fall risk. Counseled patient about importance of calcium and vitamin D. Patient seems to be a good candidate for Reclast due to being high risk per the AACE guidelines. Patient would like to discuss further with Dr. Teixeira.     Plan:      1. Postmenopausal osteoporosis (Primary)  Patient plans to discuss Reclast further with Dr. Teixeira  Virtual f/u scheduled on 4/11/25 to discuss further   Patient provided with handout of foods/drinks rich in calcium  Increase calcium dietary intake and continue vitamin D 5000 units daily  Recommend weight bearing exercises (chair exercises, chair yoga, walking, yvrose chi, etc) 3 times weekly for 30 mins  Discussed ways to decrease risk of falls  Patient verbalized understanding instructions  Next DXA due 2/2027  Ordered vitamin D and CMP    2. Hx of fracture  See #1    3. Medication  monitoring encounter  See #1    4. Fracture Risk Assessment Score (FRAX) indicating greater than 3% risk for hip fracture  See #1    Follow-up scheduled on 4/11/2025 with me    Daxa Gilliam, PharmD, [facility]  Rheumatology Clinical Pharmacist  [facility]

## 2025-03-31 ENCOUNTER — DOCUMENTATION ONLY (OUTPATIENT)
Dept: AUDIOLOGY | Facility: CLINIC | Age: 85
End: 2025-03-31

## 2025-03-31 ENCOUNTER — CLINICAL SUPPORT (OUTPATIENT)
Dept: AUDIOLOGY | Facility: CLINIC | Age: 85
End: 2025-03-31
Payer: MEDICARE

## 2025-03-31 DIAGNOSIS — Z97.4 WEARS HEARING AID IN BOTH EARS: Primary | ICD-10-CM

## 2025-03-31 DIAGNOSIS — H90.3 BILATERAL SENSORINEURAL HEARING LOSS: ICD-10-CM

## 2025-03-31 PROCEDURE — 99499 UNLISTED E&M SERVICE: CPT | Mod: S$PBB,,, | Performed by: AUDIOLOGIST-HEARING AID FITTER

## 2025-03-31 PROCEDURE — 99999PBSHW PR PBB SHADOW TECHNICAL ONLY FILED TO HB: Mod: PBBFAC,,,

## 2025-03-31 PROCEDURE — 92556 SPEECH AUDIOMETRY COMPLETE: CPT | Mod: PBBFAC,PO | Performed by: AUDIOLOGIST-HEARING AID FITTER

## 2025-03-31 PROCEDURE — 92552 PURE TONE AUDIOMETRY AIR: CPT | Mod: PBBFAC,PO | Performed by: AUDIOLOGIST-HEARING AID FITTER

## 2025-03-31 PROCEDURE — 92567 TYMPANOMETRY: CPT | Mod: PBBFAC,PO | Performed by: AUDIOLOGIST-HEARING AID FITTER

## 2025-03-31 NOTE — PROGRESS NOTES
Amarilis Decker came in on 03/31/2025 for an annual hearing aid follow up. Pt was alone during today's visit. Pt stated she doesn't hear as well as expected at times with the aids. She admits it might be fatigue but will continue to monitor the situation. Cleaned both aids and changed the wax filters and domes. Listening check revealed aids to sound appropriate for her hearing loss. Recalculated thresholds from new hearing test. Informed pt that a notification will be mailed when it is time for the next annual hearing test and that she should call the audiology clinic directly to schedule the appts to coordinate them correctly. Also informed the patient that if domes or wax filters are needed to please inform the clinic and they will be left at the 2nd floor check in desk to be picked up at the earliest convenience. All complaints were addressed at this visit to the patient's satisfaction. Pt should call the clinic PRN otherwise. Plan of care was discussed in detail with the patient, who agreed with the plan as above.

## 2025-03-31 NOTE — PROGRESS NOTES
Amarilis Decker was seen 03/31/2025 for an audiological evaluation. Pt was alone during today's visit. Pertinent complaints today include hearing loss. Pt denies history of loud noise exposure and denies early onset of genetic family history of hearing loss. Otoscopy revealed no cerumen in both ears. The tympanic membrane was visualized AU prior to proceeding with the hearing testing. She says she has difficulty understanding dialogue on the TV, especially foreign accents. She says she has difficulty understanding conversation, especially in the presence of background noise. She can't hear conversation when the speaker's head is facing away from her. She wears binaural Phonak RICs.     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    Speech Reception Thresholds were  40 dBHL for the right ear and 35 dBHL for the left ear.    Word recognition scores were excellent for the right ear and good for the left ear.   Tympanograms were Type A for the right ear and Type A for the left ear.  No  significant changes were noted when compared to previous hearing testing from 2/20/24. An improvement in WR was noted, likely due to the use of amplification.      Audiogram results were reviewed in detail with patient and all questions were answered. Results will be reviewed by the referring provider at the completion of this note. All complaints were addressed during this visit to the patient's satisfaction. Recommend binaural amplification pending medical clearance, repeat hearing testing in one year and bilateral hearing protection with either muffs or in-ear protection in loud noises. Plan of care was discussed in detail with the patient, who agreed with the plan as above. Pt was seen immediately following this encounter for a HA adjustment.

## 2025-04-07 ENCOUNTER — OFFICE VISIT (OUTPATIENT)
Dept: FAMILY MEDICINE | Facility: CLINIC | Age: 85
End: 2025-04-07
Payer: MEDICARE

## 2025-04-07 VITALS
HEART RATE: 65 BPM | TEMPERATURE: 98 F | DIASTOLIC BLOOD PRESSURE: 67 MMHG | RESPIRATION RATE: 16 BRPM | SYSTOLIC BLOOD PRESSURE: 115 MMHG | WEIGHT: 134.25 LBS | BODY MASS INDEX: 23.79 KG/M2 | OXYGEN SATURATION: 98 % | HEIGHT: 63 IN

## 2025-04-07 DIAGNOSIS — F33.1 MODERATE EPISODE OF RECURRENT MAJOR DEPRESSIVE DISORDER: Primary | ICD-10-CM

## 2025-04-07 PROCEDURE — 99214 OFFICE O/P EST MOD 30 MIN: CPT | Mod: S$PBB,,, | Performed by: FAMILY MEDICINE

## 2025-04-07 PROCEDURE — 99999 PR PBB SHADOW E&M-EST. PATIENT-LVL V: CPT | Mod: PBBFAC,,, | Performed by: FAMILY MEDICINE

## 2025-04-07 PROCEDURE — 99215 OFFICE O/P EST HI 40 MIN: CPT | Mod: PBBFAC,PN | Performed by: FAMILY MEDICINE

## 2025-04-07 RX ORDER — DESVENLAFAXINE SUCCINATE 25 MG/1
25 TABLET, EXTENDED RELEASE ORAL DAILY
Qty: 30 TABLET | Refills: 1 | Status: SHIPPED | OUTPATIENT
Start: 2025-04-07

## 2025-04-07 NOTE — PROGRESS NOTES
THIS DOCUMENT WAS MADE IN PART WITH VOICE RECOGNITION SOFTWARE.  OCCASIONALLY THIS SOFTWARE WILL MISINTERPRET WORDS OR PHRASES.    Assessment and Plan:    1. Moderate episode of recurrent major depressive disorder  Ambulatory referral/consult to Psychology    desvenlafaxine succinate (PRISTIQ) 25 mg Tb24                      Assessment & Plan    PLAN SUMMARY:   Initiate new antidepressant medication at low dose   Refer to counseling services, preferably with a female counselor   Refer to psychiatry for medication management   Start treatment for insomnia as part of depression management   Obtain promise from patient not to self-harm   Refer for osteoporotic fracture risk management   Follow-up in 1-2 weeks to monitor medication side effects and efficacy    MAJOR DEPRESSIVE DISORDER:   Evaluated the patient's condition as severe uncontrolled depression affecting memory and daily functioning.   Noted severe depression symptoms including crying for days, staying in bed all day, and experiencing suicidal thoughts.   Observed sleep disturbances, a common symptom of depression.   Explained that significant depression can impact memory function.   Initiated a new antidepressant medication at a low dose with close follow up in 1-2 weeks to monitor for side effects and efficacy.   Referred the patient to psychiatry for medication management.   Recommend counseling for comprehensive treatment of depression.   Planned for outpatient management with close follow-up.    SUICIDAL IDEATION:   Evaluated the severity of suicidal ideation and determined it's not currently at the level requiring inpatient care.   Assessed the risk and advised the patient to go to the emergency room if she develops a plan to harm herself.   Obtained a promise from the patient not to harm herself.    INSOMNIA:   Noted that the patient reports not sleeping at night and taking multiple medications in an attempt to sleep.   Evaluated that the patient's  insomnia is related to emotional distress from family relationships.   Planned to address insomnia as part of the overall treatment for depression, including medication management and counseling.    MEDICATION ALLERGIES AND SIDE EFFECTS:   Noted the patient's history of multiple medication side effects and intolerances.   Evaluated that the patient is very complex with medicines due to frequent side effects.   Planned to start a new medication at a low dose with close follow-up to monitor for side effects.   Noted the patient's report of cognitive side effects from Cymbalta, including making mistakes in Mahjong and writing incorrect dates on checks.   Acknowledged the patient's history of medication side effects.   Planned to try a new medication at a low dose with close monitoring for side effects.    FAMILY-RELATED PROBLEMS:   Noted ongoing conflicts and emotional distress related to the patient's children and grandchildren, including issues with visitation, communication, and L[employer] day differences.   Evaluated that the patient's family conflicts are contributing to her depression and emotional distress.   Referred the patient to counseling services, preferably with a female counselor, to address family relationship issues.    OSTEOPOROSIS RISK:   Informed patient about risk of osteoporotic fracture (12 percent chance of major fracture in next 10   years).               ______________________________________________________________________  Subjective:    Chief Complaint:  Chief Complaint   Patient presents with    Follow-up        HPI:  Amarilis is a 84 y.o. year old         History of Present Illness    CHIEF COMPLAINT:  Amarilis presents today for depression and suicidal thoughts    DEPRESSION AND SUICIDAL IDEATION:  She reports active suicidal ideation. She attempted self-medication by taking 2 Vicodin tablets followed by 5 trazodone tablets in an unsuccessful attempt to induce sleep. She reports poor sleep  with complete insomnia on the night prior to visit and spent the entire day in bed.    FAILED MEDICATION TRIALS:  She reports multiple failed antidepressant trials including Lexapro, Abilify, promethazine, fluoxetine, citalopram, and Cymbalta. While taking Cymbalta, she experienced cognitive difficulties manifesting as unusual mistakes in familiar activities (Mahjong) that resolved upon discontinuation of medication after 10-12 days of use.    FAMILY RELATIONSHIPS:  She reports strained relationships with her children, particularly with her oldest daughter. She expresses emotional distress regarding recent visit from her daughter with her third great-grandchild, noting that her daughter appeared reluctant to be present during the visit. She reports feeling confused about the source of relationship difficulties, stating she has not done anything to cause the strain.      ROS:  ROS as indicated in HPI.               Primary insomnia  Rx-  Mg Glycinate   Previous Rx- Remeron (ineffective), Doxepin (ineffective), trazodone (hangover), Melatonin (ineffective)     GERD  Rx-pantoprazole 40 mg  Denies dysphagia      Depressed mood / Anxiety   Rx :   Prev Rx-Lexapro (nightmares, EXTREME FATIGUE), buspirone (ineffective),  wellbutrin (constipation, imbalance, ineffective), Fluoxetine (itching), Citalopram, Cymbalta (memory impairment)   Stressor-fall out with daughter      Hypothyroidism  Current Rx-armor Thyroid 60 mg + 15 mg daily   Previous Rx-levothyroxine (ineffective)  Previous TSH in normal range   Endocrinology- Dr Fritz      Bronchiectasis, history of pulmonary nodules  Seen by pulmonology - Dr. 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\     Senile osteoporosis  Prev Rx-Boniva 150 (pt choice to quit)  Taking vitamin-D supplement  No recent fractures     Seasonal allergies / chronic sinusitis / nasal polyps   Rx-Flonase, Xyzal, montelukast, astelin   ENT -  Eboni Briggs MD       Psoriasis  Rx-betamethasone, clobetasol, desonide, Topicort  Followed by dermatology - Jesenia Loera MD      B12 deficiency  Prev Rx-B12 injections 500 mcg every 2 weeks     Chronic Low back pain , sacroiliitis, ankylosing spondylitis   Rheum : Gregg Mensah MD   Pain Mgt : Bestbel   Rx : Mobic 15 mg , Norco 5 mg p.r.n., use sparingly    Fibromyalgia   Rx : Cymbalta   Prev rx : Amitriptyline (? Drowsiness)      [facility]  Sleep MD : Catarina Morfin  CPAP compliant     Chronic Constipation  OTC : Mg Glycinate     Chronic Fatigue           Past Medical History:  Past Medical History:   Diagnosis Date    Asthma     GERD (gastroesophageal reflux disease)     Psoriasis        Past Surgical History:  Past Surgical History:   Procedure Laterality Date    ADENOIDECTOMY      CHOLECYSTECTOMY      COLONOSCOPY  2019    unremarkable per pt report    [facility]    SINUS SURGERY      Dr Cordova    SINUS SURGERY  01/2017    xs 5    THYROID SURGERY      nodules removed //Dr Amato     TONSILLECTOMY      TYMPANOSTOMY TUBE PLACEMENT      UPPER GASTROINTESTINAL ENDOSCOPY         Family History:  Family History   Problem Relation Name Age of Onset    Hypertension Mother      Obesity Father      Diabetes Sister      Obesity Sister      Breast cancer Paternal Aunt  43    No Known Problems Maternal Grandmother      No Known Problems Maternal Grandfather      No Known Problems Paternal Grandmother      No Known Problems Paternal Grandfather      Thyroid cancer Daughter  61    Ovarian cancer Neg Hx      Colon cancer Neg Hx         Social History:  Social History     Socioeconomic History    Marital status:    Tobacco Use    Smoking status: Never     Passive exposure: Never    Smokeless tobacco: Never   Substance and Sexual Activity    Alcohol use: Yes     Comment: twice a month    Drug use: No    Sexual activity: Never     Social Drivers of Health     Financial Resource Strain: Low Risk  (10/23/2023)    Overall Financial Resource Strain  (CARDIA)     Difficulty of Paying Living Expenses: Not hard at all   Food Insecurity: No Food Insecurity (10/23/2023)    Hunger Vital Sign     Worried About Running Out of Food in the Last Year: Never true     Ran Out of Food in the Last Year: Never true   Transportation Needs: Unknown (10/23/2023)    PRAPARE - Transportation     Lack of Transportation (Medical): No   Physical Activity: Inactive (10/23/2023)    Exercise Vital Sign     Days of Exercise per Week: 0 days     Minutes of Exercise per Session: 0 min   Stress: No Stress Concern Present (10/23/2023)    [employer] - Occupational Stress Questionnaire     Feeling of Stress : Not at all   Housing Stability: Low Risk  (10/23/2023)    Housing Stability Vital Sign     Unable to Pay for Housing in the Last Year: No     Number of Places Lived in the Last Year: 1     Unstable Housing in the Last Year: No       Medications:  Current Outpatient Medications on File Prior to Visit   Medication Sig Dispense Refill    amitriptyline (ELAVIL) 10 MG tablet Take 2 tablets (20 mg total) by mouth nightly as needed for Insomnia. 180 tablet 3    ARMOUR THYROID 60 mg Tab TAKE 1 TABLET(60 MG) BY MOUTH BEFORE BREAKFAST 90 tablet 3    betamethasone dipropionate 0.05 % cream Apply topically once daily. 90 g 5    budesonide (PULMICORT) 0.5 mg/2 mL nebulizer solution Controller, twice a day. 360 mL 3    cholecalciferol, vitamin D3, (VITAMIN D3) 50 mcg (2,000 unit) Tab Take 2,000 Units by mouth once daily.      clobetasol 0.05% (TEMOVATE) 0.05 % Oint 1 application once daily. Apply to affected area      desonide 0.05% (DESOWEN) 0.05 % Oint Apply topically 2 (two) times daily. 60 g 2    desoximetasone (TOPICORT) 0.05 % cream Apply topically 2 (two) times daily. 60 g 11    fluticasone propionate (FLONASE) 50 mcg/actuation nasal spray 1 spray (50 mcg total) by Each Nostril route once daily. 54 g 3    HYDROcodone-acetaminophen (NORCO) 5-325 mg per tablet Take 1  tablet by mouth every 8 (eight) hours as needed for Pain. 20 tablet 0    ketoconazole (NIZORAL) 2 % cream Apply topically.      ketoconazole (NIZORAL) 2 % shampoo Apply topically twice a week. 120 mL 3    levalbuterol (XOPENEX HFA) 45 mcg/actuation inhaler Inhale 1-2 puffs into the lungs every 6 (six) hours as needed for Wheezing or Shortness of Breath (or before exercise). Rescue 15 g 0    levocetirizine (XYZAL) 5 MG tablet Take one capsule by mouth daily 90 tablet 0    MAGNESIUM ORAL Take by mouth once. Pt takes 400mg once daily      mupirocin (BACTROBAN) 2 % ointment by Nasal route 2 (two) times daily. 1 each 3    simethicone (GAS-X ORAL) Take by mouth.      [DISCONTINUED] DULoxetine (CYMBALTA) 20 MG capsule Take 1 capsule (20 mg total) by mouth once daily. 30 capsule 11    azelastine (ASTELIN) 137 mcg (0.1 %) nasal spray 1 spray (137 mcg total) by Nasal route 2 (two) times daily. 30 mL 3    [DISCONTINUED] etanercept (ENBREL SURECLICK) 50 mg/mL (1 mL) Inject 1 mL (50 mg total) into the skin once a week. (Patient taking differently: Inject 50 mg into the skin once a week. Once a month) 12 mL 3     Current Facility-Administered Medications on File Prior to Visit   Medication Dose Route Frequency Provider Last Rate Last Admin    cyanocobalamin injection 1,000 mcg  1,000 mcg Intramuscular Q30 Days    1,000 mcg at 07/25/24 0857       Allergies:  Avelox [moxifloxacin], Bactrim [sulfamethoxazole-trimethoprim], Ciprofloxacin, Isothiazolinones, Apremilast, Bacitracin, and Wellbutrin [bupropion hcl]    Immunizations:  Immunization History   Administered Date(s) Administered    COVID-19 MRNA, LN-S PF (MODERNA HALF 0.25 ML DOSE) 12/16/2021    COVID-19 Vaccine 03/31/2021    COVID-19, vector-nr, [id number], PF (Johanna) 04/05/2021    Hepatitis A, Adult 08/09/2007    IPV 08/09/2007    Influenza 11/18/2011, 10/21/2013    Influenza (FLUAD) - Quadrivalent - Adjuvanted - PF *Preferred* (65+) 10/07/2021, 10/03/2022, 09/08/2023     "Influenza - Quadrivalent - MDCK - PF 10/10/2017    Influenza - Quadrivalent - PF *Preferred* (6 months and older) 10/29/2019, 09/25/2020    Influenza - Trivalent - Afluria, Fluzone MDV 11/18/2011    Influenza - Trivalent - Fluad - Adjuvanted - PF (65 years and older 09/13/2024    Influenza - Trivalent - Fluarix, Flulaval, Fluzone, Afluria - PF 10/21/2013    Influenza - Trivalent - Fluzone High Dose - PF (65 years and older) 10/14/2014, 10/08/2015, 09/27/2016    Pneumococcal Conjugate - 13 Valent 04/12/2016    Pneumococcal Polysaccharide - 23 Valent 08/01/2011    RSVpreF (Arexvy) 10/21/2024    Td (Adult), Unspecified Formulation 08/02/2018    Tdap 08/09/2007, 08/01/2018    Zoster 07/11/2007       Review of Systems:  Review of Systems   All other systems reviewed and are negative.      Objective:    Vitals:  Vitals:    04/07/25 1027   BP: 115/67   Pulse: 65   Resp: 16   Temp: 97.8 °F (36.6 °C)   TempSrc: Oral   SpO2: 98%   Weight: 60.9 kg (134 lb 4.2 oz)   Height: 5' 2.5" (1.588 m)   PainSc:   5   PainLoc: Generalized       Physical Exam  Vitals reviewed.   Constitutional:       General: She is not in acute distress.  HENT:      Head: Normocephalic and atraumatic.   Eyes:      Pupils: Pupils are equal, round, and reactive to light.   Cardiovascular:      Rate and Rhythm: Normal rate and regular rhythm.      Heart sounds: No murmur heard.     No friction rub.   Pulmonary:      Effort: Pulmonary effort is normal.      Breath sounds: Normal breath sounds.   Abdominal:      General: Bowel sounds are normal. There is no distension.      Palpations: Abdomen is soft.      Tenderness: There is no abdominal tenderness.   Musculoskeletal:      Cervical back: Neck supple.   Skin:     General: Skin is warm and dry.      Findings: No rash.   Psychiatric:         Behavior: Behavior normal.             Luc Teixeira MD  Family Medicine                      "

## 2025-04-24 ENCOUNTER — OFFICE VISIT (OUTPATIENT)
Dept: FAMILY MEDICINE | Facility: CLINIC | Age: 85
End: 2025-04-24
Payer: MEDICARE

## 2025-04-24 VITALS
HEART RATE: 68 BPM | HEIGHT: 62 IN | BODY MASS INDEX: 24.46 KG/M2 | TEMPERATURE: 98 F | OXYGEN SATURATION: 99 % | WEIGHT: 132.94 LBS | RESPIRATION RATE: 16 BRPM | SYSTOLIC BLOOD PRESSURE: 111 MMHG | DIASTOLIC BLOOD PRESSURE: 65 MMHG

## 2025-04-24 DIAGNOSIS — F33.1 MODERATE EPISODE OF RECURRENT MAJOR DEPRESSIVE DISORDER: Primary | ICD-10-CM

## 2025-04-24 PROCEDURE — 99213 OFFICE O/P EST LOW 20 MIN: CPT | Mod: S$PBB,,, | Performed by: FAMILY MEDICINE

## 2025-04-24 PROCEDURE — 99214 OFFICE O/P EST MOD 30 MIN: CPT | Mod: PBBFAC,PN | Performed by: FAMILY MEDICINE

## 2025-04-24 PROCEDURE — 99999 PR PBB SHADOW E&M-EST. PATIENT-LVL IV: CPT | Mod: PBBFAC,,, | Performed by: FAMILY MEDICINE

## 2025-04-24 RX ORDER — CLONIDINE HYDROCHLORIDE 0.1 MG/1
0.1 TABLET ORAL
COMMUNITY
Start: 2025-04-12 | End: 2025-05-12

## 2025-04-24 NOTE — PROGRESS NOTES
THIS DOCUMENT WAS MADE IN PART WITH VOICE RECOGNITION SOFTWARE.  OCCASIONALLY THIS SOFTWARE WILL MISINTERPRET WORDS OR PHRASES.    Assessment and Plan:    1. Moderate episode of recurrent major depressive disorder                          Assessment & Plan    PLAN SUMMARY:   Continue counseling sessions with  twice weekly   Prescribed Trazodone 50 mg - take half tablet (25 mg) as needed for sleep   Continue Lexapro for depression treatment   Continue magnesium glycinate for sleep   Amarilis to maintain realistic expectations regarding family relationships   Amarilis to focus on positive relationships with supportive family members   Amarilis to continue engaging with Congregational/spiritual practices for emotional support   Follow-up appointment in 4 weeks to monitor patient's condition    MAJOR DEPRESSIVE DISORDER:   Continue Lexapro for depression treatment.   Continue counseling sessions with the  twice weekly.   Amarilis reports improved mood and feeling more relaxed since reading a book and being discharged from the hospital.   Observed that the patient has a better demeanor compared to the previous visit, noting the 'light behind your eyes' is back.   Assessed the patient's current state and confirmed they feel they are doing better.   Acknowledged the patient's previous report of extreme fatigue with Lexapro.   Continue to monitor for any adverse effects of the antidepressant medication.   Amarilis to continue engaging with Congregational/spiritual practices for emotional support.   Amarilis to maintain realistic expectations regarding family relationships to protect mental health.   Amarilis to focus on positive relationships with supportive family members.    SUICIDAL IDEATIONS:   Noted that the patient was recently hospitalized, possibly due to suicidal ideations.   Continue counseling sessions with the  since being discharged.    INSOMNIA:   Continue magnesium glycinate for  sleep, which the patient reports to be effective.   Prescribed Trazodone 50 mg - take half tablet (25 mg) as needed for sleep.   Amarilis reports improved sleep since being discharged from the hospital.   Monitor the patient's sleep pattern, noting current reports of waking up at 4 AM and being unable to return to sleep.    FATIGUE:   Acknowledged the patient's history of chronic fatigue.   Amarilis reports severe fatigue and low energy.   Noted that the patient has started drinking coffee to combat fatigue, after 40 years of not consuming caffeine.   Monitor the effectiveness of this self-management strategy and assess for any potential side effects.    FOLLOW-UP:   Schedule a follow-up visit in 4 weeks to monitor the patient's condition.               ______________________________________________________________________  Subjective:    Chief Complaint:  No chief complaint on file.       HPI:  Amarilis is a 84 y.o. year old         History of Present Illness    CHIEF COMPLAINT:  Amarilis presents today for follow up after recent psychiatric hospitalization.    PSYCHIATRIC HISTORY:  She was recently hospitalized for psychiatric care, initially as a voluntary admission but was subsequently PEC'd. During the first night of hospitalization, her blood pressure elevated to 172 due to stress and fear. She was discharged two days early with her daughter's assistance. She expressed that the placement was not appropriate for her condition.    SLEEP:  She reports waking up at 4am and being unable to fall back asleep. Magnesium glycinate has been helping with sleep. She has taken Clonidine once from her current prescription. Previous trials of Remeron (mirtazapine) were ineffective, and Trazodone caused hangover symptoms.    CURRENT MEDICATIONS:  She continues Lexapro but reports experiencing severe fatigue as a side effect.      ROS:  ROS as indicated in HPI.               Primary insomnia  Rx-  Mg Glycinate + trazodone (1/2  of 50 mg)   Previous Rx- Remeron (ineffective), Doxepin (ineffective), trazodone (hangover), Melatonin (ineffective)     GERD  Rx-pantoprazole 40 mg  Denies dysphagia      Depressed mood / Anxiety   Rx : lexapro  Prev Rx-Lexapro (nightmares, EXTREME FATIGUE), buspirone (ineffective),  wellbutrin (constipation, imbalance, ineffective), Fluoxetine (itching), Citalopram, Cymbalta (memory impairment)   Stressor-fall out with daughter      Hypothyroidism  Current Rx-armor Thyroid 60 mg + 15 mg daily   Previous Rx-levothyroxine (ineffective)  Previous TSH in normal range   Endocrinology- Dr Fritz      Bronchiectasis, history of pulmonary nodules  Seen by pulmonology - Dr. 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\     Senile osteoporosis  Prev Rx-Boniva 150 (pt choice to quit)  Taking vitamin-D supplement  No recent fractures     Seasonal allergies / chronic sinusitis / nasal polyps   Rx-Flonase, Xyzal, montelukast, astelin   ENT -  Eboni Briggs MD      Psoriasis  Rx-betamethasone, clobetasol, desonide, Topicort  Followed by dermatology - Jesenia Loera MD      B12 deficiency  Prev Rx-B12 injections 500 mcg every 2 weeks     Chronic Low back pain , sacroiliitis, ankylosing spondylitis   Rheum : Gregg Mensah MD   Pain Mgt : Juan F   Rx : Mobic 15 mg , Norco 5 mg p.r.n., use sparingly    Fibromyalgia   Rx : Cymbalta   Prev rx : Amitriptyline (? Drowsiness)      [facility]  Sleep MD : Catarina Morfin  CPAP compliant     Chronic Constipation  OTC : Mg Glycinate     Chronic Fatigue           Past Medical History:  Past Medical History:   Diagnosis Date    Asthma     GERD (gastroesophageal reflux disease)     Psoriasis        Past Surgical History:  Past Surgical History:   Procedure Laterality Date    ADENOIDECTOMY      CHOLECYSTECTOMY      COLONOSCOPY  2019    unremarkable per pt report    DEXA      WNL    SINUS SURGERY      Dr Cordova    SINUS SURGERY  01/2017    xs 5    THYROID SURGERY       nodules removed //Dr Amato     TONSILLECTOMY      TYMPANOSTOMY TUBE PLACEMENT      UPPER GASTROINTESTINAL ENDOSCOPY         Family History:  Family History   Problem Relation Name Age of Onset    Hypertension Mother      Obesity Father      Diabetes Sister      Obesity Sister      Breast cancer Paternal Aunt  43    No Known Problems Maternal Grandmother      No Known Problems Maternal Grandfather      No Known Problems Paternal Grandmother      No Known Problems Paternal Grandfather      Thyroid cancer Daughter  61    Ovarian cancer Neg Hx      Colon cancer Neg Hx         Social History:  Social History     Socioeconomic History    Marital status:    Tobacco Use    Smoking status: Never     Passive exposure: Never    Smokeless tobacco: Never   Substance and Sexual Activity    Alcohol use: Yes     Comment: twice a month    Drug use: No    Sexual activity: Never     Social Drivers of Health     Financial Resource Strain: Low Risk  (4/22/2025)    Overall Financial Resource Strain (CARDIA)     Difficulty of Paying Living Expenses: Not hard at all   Food Insecurity: No Food Insecurity (4/22/2025)    Hunger Vital Sign     Worried About Running Out of Food in the Last Year: Never true     Ran Out of Food in the Last Year: Never true   Transportation Needs: No Transportation Needs (4/22/2025)    PRAPARE - Transportation     Lack of Transportation (Medical): No     Lack of Transportation (Non-Medical): No   Physical Activity: Unknown (4/22/2025)    Exercise Vital Sign     Days of Exercise per Week: 0 days   Stress: No Stress Concern Present (4/22/2025)    [employer] - Occupational Stress Questionnaire     Feeling of Stress : Only a little   Housing Stability: Low Risk  (4/22/2025)    Housing Stability Vital Sign     Unable to Pay for Housing in the Last Year: No     Homeless in the Last Year: No       Medications:  Current Outpatient Medications on File Prior to Visit   Medication Sig  Dispense Refill    ARMOUR THYROID 60 mg Tab TAKE 1 TABLET(60 MG) BY MOUTH BEFORE BREAKFAST 90 tablet 3    betamethasone dipropionate 0.05 % cream Apply topically once daily. 90 g 5    budesonide (PULMICORT) 0.5 mg/2 mL nebulizer solution Controller, twice a day. 360 mL 3    cholecalciferol, vitamin D3, (VITAMIN D3) 50 mcg (2,000 unit) Tab Take 2,000 Units by mouth once daily.      clobetasol 0.05% (TEMOVATE) 0.05 % Oint 1 application once daily. Apply to affected area      cloNIDine (CATAPRES) 0.1 MG tablet Take 0.1 mg by mouth.      desonide 0.05% (DESOWEN) 0.05 % Oint Apply topically 2 (two) times daily. 60 g 2    desoximetasone (TOPICORT) 0.05 % cream Apply topically 2 (two) times daily. 60 g 11    fluticasone propionate (FLONASE) 50 mcg/actuation nasal spray 1 spray (50 mcg total) by Each Nostril route once daily. 54 g 3    HYDROcodone-acetaminophen (NORCO) 5-325 mg per tablet Take 1 tablet by mouth every 8 (eight) hours as needed for Pain. 20 tablet 0    ketoconazole (NIZORAL) 2 % cream Apply topically.      ketoconazole (NIZORAL) 2 % shampoo Apply topically twice a week. 120 mL 3    levocetirizine (XYZAL) 5 MG tablet Take one capsule by mouth daily 90 tablet 0    MAGNESIUM ORAL Take by mouth once. Pt takes 400mg once daily      mupirocin (BACTROBAN) 2 % ointment by Nasal route 2 (two) times daily. 1 each 3    amitriptyline (ELAVIL) 10 MG tablet Take 2 tablets (20 mg total) by mouth nightly as needed for Insomnia. 180 tablet 3    azelastine (ASTELIN) 137 mcg (0.1 %) nasal spray 1 spray (137 mcg total) by Nasal route 2 (two) times daily. 30 mL 3    desvenlafaxine succinate (PRISTIQ) 25 mg Tb24 Take 1 tablet (25 mg total) by mouth once daily. 30 tablet 1    levalbuterol (XOPENEX HFA) 45 mcg/actuation inhaler Inhale 1-2 puffs into the lungs every 6 (six) hours as needed for Wheezing or Shortness of Breath (or before exercise). Rescue 15 g 0    simethicone (GAS-X ORAL) Take by mouth.      [DISCONTINUED] etanercept  "(ENBREL SURECLICK) 50 mg/mL (1 mL) Inject 1 mL (50 mg total) into the skin once a week. (Patient taking differently: Inject 50 mg into the skin once a week. Once a month) 12 mL 3     No current facility-administered medications on file prior to visit.       Allergies:  Avelox [moxifloxacin], Bactrim [sulfamethoxazole-trimethoprim], Ciprofloxacin, Isothiazolinones, Apremilast, Bacitracin, and Wellbutrin [bupropion hcl]    Immunizations:  Immunization History   Administered Date(s) Administered    COVID-19 MRNA, LN-S PF (MODERNA HALF 0.25 ML DOSE) 12/16/2021    COVID-19 Vaccine 03/31/2021    COVID-19, vector-nr, [id number], PF (Johanna) 04/05/2021    Hepatitis A, Adult 08/09/2007    IPV 08/09/2007    Influenza 11/18/2011, 10/21/2013    Influenza (FLUAD) - Quadrivalent - Adjuvanted - PF *Preferred* (65+) 10/07/2021, 10/03/2022, 09/08/2023    Influenza - Quadrivalent - MDCK - PF 10/10/2017    Influenza - Quadrivalent - PF *Preferred* (6 months and older) 10/29/2019, 09/25/2020    Influenza - Trivalent - Afluria, Fluzone MDV 11/18/2011    Influenza - Trivalent - Fluad - Adjuvanted - PF (65 years and older 09/13/2024    Influenza - Trivalent - Fluarix, Flulaval, Fluzone, Afluria - PF 10/21/2013    Influenza - Trivalent - Fluzone High Dose - PF (65 years and older) 10/14/2014, 10/08/2015, 09/27/2016    Pneumococcal Conjugate - 13 Valent 04/12/2016    Pneumococcal Polysaccharide - 23 Valent 08/01/2011    RSVpreF (Arexvy) 10/21/2024    Td (Adult), Unspecified Formulation 08/02/2018    Tdap 08/09/2007, 08/01/2018    Zoster 07/11/2007       Review of Systems:  Review of Systems   All other systems reviewed and are negative.      Objective:    Vitals:  Vitals:    04/24/25 0942   BP: 111/65   Pulse: 68   Resp: 16   Temp: 97.9 °F (36.6 °C)   TempSrc: Oral   SpO2: 99%   Weight: 60.3 kg (132 lb 15 oz)   Height: 5' 2" (1.575 m)   PainSc: 0-No pain       Physical Exam  Vitals reviewed.   Constitutional:       General: She is not in " acute distress.  HENT:      Head: Normocephalic and atraumatic.   Eyes:      Pupils: Pupils are equal, round, and reactive to light.   Cardiovascular:      Rate and Rhythm: Normal rate and regular rhythm.      Heart sounds: No murmur heard.     No friction rub.   Pulmonary:      Effort: Pulmonary effort is normal.      Breath sounds: Normal breath sounds.   Abdominal:      General: Bowel sounds are normal. There is no distension.      Palpations: Abdomen is soft.      Tenderness: There is no abdominal tenderness.   Musculoskeletal:      Cervical back: Neck supple.   Skin:     General: Skin is warm and dry.      Findings: No rash.   Psychiatric:         Behavior: Behavior normal.             Luc Teixeira MD  Family Medicine

## 2025-05-12 ENCOUNTER — LAB VISIT (OUTPATIENT)
Dept: LAB | Facility: HOSPITAL | Age: 85
End: 2025-05-12
Attending: FAMILY MEDICINE
Payer: MEDICARE

## 2025-05-12 DIAGNOSIS — R39.9 LOWER URINARY TRACT SYMPTOMS (LUTS): Primary | ICD-10-CM

## 2025-05-12 DIAGNOSIS — R39.9 LOWER URINARY TRACT SYMPTOMS (LUTS): ICD-10-CM

## 2025-05-12 PROCEDURE — 81003 URINALYSIS AUTO W/O SCOPE: CPT

## 2025-05-12 PROCEDURE — 87086 URINE CULTURE/COLONY COUNT: CPT

## 2025-05-12 RX ORDER — CEPHALEXIN 500 MG/1
500 CAPSULE ORAL EVERY 12 HOURS
Qty: 10 CAPSULE | Refills: 0 | Status: SHIPPED | OUTPATIENT
Start: 2025-05-12

## 2025-05-13 LAB
BILIRUB UR QL STRIP.AUTO: NEGATIVE
CLARITY UR: CLEAR
COLOR UR AUTO: COLORLESS
GLUCOSE UR QL STRIP: NEGATIVE
HGB UR QL STRIP: NEGATIVE
KETONES UR QL STRIP: NEGATIVE
LEUKOCYTE ESTERASE UR QL STRIP: NEGATIVE
MICROSCOPIC COMMENT: NORMAL
NITRITE UR QL STRIP: NEGATIVE
PH UR STRIP: 8 [PH]
PROT UR QL STRIP: NEGATIVE
RBC #/AREA URNS AUTO: 1 /HPF (ref 0–4)
SP GR UR STRIP: 1.01
UROBILINOGEN UR STRIP-ACNC: NEGATIVE EU/DL

## 2025-05-14 ENCOUNTER — PATIENT MESSAGE (OUTPATIENT)
Dept: FAMILY MEDICINE | Facility: CLINIC | Age: 85
End: 2025-05-14
Payer: MEDICARE

## 2025-05-14 ENCOUNTER — RESULTS FOLLOW-UP (OUTPATIENT)
Dept: FAMILY MEDICINE | Facility: CLINIC | Age: 85
End: 2025-05-14

## 2025-05-14 LAB — BACTERIA UR CULT: NORMAL

## 2025-05-15 NOTE — TELEPHONE ENCOUNTER
Call patient, she needs an appointment with myself or Milka sometime in the next few business days.

## 2025-05-22 ENCOUNTER — OFFICE VISIT (OUTPATIENT)
Dept: FAMILY MEDICINE | Facility: CLINIC | Age: 85
End: 2025-05-22
Payer: MEDICARE

## 2025-05-22 ENCOUNTER — HOSPITAL ENCOUNTER (OUTPATIENT)
Dept: RADIOLOGY | Facility: HOSPITAL | Age: 85
Discharge: HOME | End: 2025-05-22
Attending: FAMILY MEDICINE
Payer: MEDICARE

## 2025-05-22 VITALS
TEMPERATURE: 99 F | SYSTOLIC BLOOD PRESSURE: 114 MMHG | DIASTOLIC BLOOD PRESSURE: 64 MMHG | HEIGHT: 62 IN | OXYGEN SATURATION: 97 % | WEIGHT: 134.69 LBS | BODY MASS INDEX: 24.78 KG/M2 | HEART RATE: 70 BPM

## 2025-05-22 DIAGNOSIS — R10.2 PELVIC PRESSURE IN FEMALE: ICD-10-CM

## 2025-05-22 DIAGNOSIS — F51.01 PRIMARY INSOMNIA: Primary | ICD-10-CM

## 2025-05-22 DIAGNOSIS — F33.1 MODERATE EPISODE OF RECURRENT MAJOR DEPRESSIVE DISORDER: ICD-10-CM

## 2025-05-22 DIAGNOSIS — N95.2 ATROPHIC VAGINITIS: ICD-10-CM

## 2025-05-22 LAB
BILIRUBIN, UA POC OHS: NEGATIVE
BLOOD, UA POC OHS: NEGATIVE
CLARITY, UA POC OHS: CLEAR
COLOR, UA POC OHS: YELLOW
GLUCOSE, UA POC OHS: NEGATIVE
KETONES, UA POC OHS: NEGATIVE
LEUKOCYTES, UA POC OHS: ABNORMAL
NITRITE, UA POC OHS: NEGATIVE
PH, UA POC OHS: 7.5
PROTEIN, UA POC OHS: NEGATIVE
SPECIFIC GRAVITY, UA POC OHS: 1.01
UROBILINOGEN, UA POC OHS: 0.2

## 2025-05-22 PROCEDURE — 76856 US EXAM PELVIC COMPLETE: CPT | Mod: 26,,, | Performed by: RADIOLOGY

## 2025-05-22 PROCEDURE — 81003 URINALYSIS AUTO W/O SCOPE: CPT | Mod: PBBFAC,PN | Performed by: FAMILY MEDICINE

## 2025-05-22 PROCEDURE — 76830 TRANSVAGINAL US NON-OB: CPT | Mod: 26,,, | Performed by: RADIOLOGY

## 2025-05-22 PROCEDURE — 99215 OFFICE O/P EST HI 40 MIN: CPT | Mod: PBBFAC,25,PN | Performed by: FAMILY MEDICINE

## 2025-05-22 PROCEDURE — 99214 OFFICE O/P EST MOD 30 MIN: CPT | Mod: S$PBB,,, | Performed by: FAMILY MEDICINE

## 2025-05-22 PROCEDURE — 76856 US EXAM PELVIC COMPLETE: CPT | Mod: TC,PO

## 2025-05-22 PROCEDURE — 99999PBSHW POCT URINALYSIS(INSTRUMENT): Mod: PBBFAC,,,

## 2025-05-22 PROCEDURE — 99999 PR PBB SHADOW E&M-EST. PATIENT-LVL V: CPT | Mod: PBBFAC,,, | Performed by: FAMILY MEDICINE

## 2025-05-22 RX ORDER — ESTRADIOL 10 UG/1
TABLET, FILM COATED VAGINAL
Qty: 22 TABLET | Refills: 0 | Status: SHIPPED | OUTPATIENT
Start: 2025-05-22 | End: 2025-07-05

## 2025-05-22 RX ORDER — ESCITALOPRAM OXALATE 5 MG/1
5 TABLET ORAL DAILY
Qty: 90 TABLET | Refills: 3 | Status: SHIPPED | OUTPATIENT
Start: 2025-05-22 | End: 2026-05-22

## 2025-05-22 NOTE — PROGRESS NOTES
THIS DOCUMENT WAS MADE IN PART WITH VOICE RECOGNITION SOFTWARE.  OCCASIONALLY THIS SOFTWARE WILL MISINTERPRET WORDS OR PHRASES.    Assessment and Plan:    1. Primary insomnia        2. Moderate episode of recurrent major depressive disorder  EScitalopram oxalate (LEXAPRO) 5 MG Tab      3. Pelvic pressure in female  Urine culture    POCT Urinalysis(Instrument)    CANCELED: US Pelvis Complete Non OB      4. Atrophic vaginitis  estradioL (VAGIFEM) 10 mcg Tab                          Assessment & Plan    PLAN SUMMARY:  > Ordered urinalysis  > Ordered pelvic ultrasound to rule out structural abnormalities  > Started Vagifem (estradiol vaginal tablets): 1 tablet vaginally daily for 14 days, then twice weekly  > Continued Lexapro (escitalopram) 5 mg daily  > Continued magnesium glycinate for sleep management  > Consider referral to gynecologist (Dr. Colin) for further evaluation  > Follow up after completing initial course of Vagifem to assess symptom improvement  > Contact office if pelvic symptoms worsen or do not improve with Vagifem treatment    PLAN NOTE:  > Explained atrophic vaginitis as a postmenopausal condition caused by lack of estrogen in vaginal tissue.  > Started Vagifem (estradiol vaginal tablets): Insert 1 tablet vaginally daily for 14 days, then 1 tablet vaginally twice weekly thereafter.  > Ordered pelvic US to rule out structural abnormalities.  > Ordered urinalysis.  > Consider referral to Dr. Colin (gynecologist) for further evaluation of pelvic symptoms.  > Discussed that hormone replacement therapy can potentially improve energy levels and pelvic pain.  > Continued Lexapro (escitalopram) 5 mg daily.  > Continued magnesium glycinate for sleep management.  > Follow up after completing initial course of Vagifem to assess symptom improvement.  > Contact the office if pelvic symptoms worsen or do not improve with Vagifem treatment.             Visit today included increased complexity associated  with the care of the episodic problem above addressed and managing the longitudinal care of the patient due to the serious and/or complex managed problem(s).    ______________________________________________________________________  Subjective:    Chief Complaint:  Chief Complaint   Patient presents with    Follow-up     Patient here for a 4 week f/u. Patient states her condition has improved from the last visit        HPI:  Amarilis is a 84 y.o. year old         History of Present Illness    CHIEF COMPLAINT:  Amarilis presents today for follow-up of urinary symptoms    URINARY SYMPTOMS:  She reports persistent pelvic pressure with occasional pain for approximately one year, with progressive worsening. She experiences nocturia at least twice nightly with minimal output. She reports sensation of incomplete bladder emptying, requiring prolonged time on toilet to void completely, often with need to urinate shortly after. The discomfort is occasionally severe enough to cause extended bed rest in the morning. She denies hematuria.    MEDICAL HISTORY:  She has fibromyalgia with associated shooting pain episodes and chronic pelvic pain characterized by intermittent stabbing knife-like vaginal pain.    SLEEP:  She reports improved sleep with magnesium glycinate. Despite recent stressors, she experienced only one night of difficulty sleeping, during which she got up to read. She denies current use of trazodone for insomnia management.    CURRENT MEDICATIONS:  She reports improvement with Lexapro (escitalopram) 5 mg and significant benefit from magnesium glycinate, particularly for sleep.      ROS:  ROS as indicated in HPI.               Primary insomnia  Rx-  Mg Glycinate   Previous Rx- Remeron (ineffective), Doxepin (ineffective), trazodone (hangover), Melatonin (ineffective)     GERD  Rx-pantoprazole 40 mg  Denies dysphagia      Depressed mood / Anxiety   Rx : Lexapro 5 mg  Prev Rx-Lexapro (nightmares, EXTREME FATIGUE),  buspirone (ineffective),  wellbutrin (constipation, imbalance, ineffective), Fluoxetine (itching), Citalopram, Cymbalta (memory impairment)   Stressor-fall out with daughter      Hypothyroidism  Current Rx-armor Thyroid 60 mg + 15 mg daily   Previous Rx-levothyroxine (ineffective)  Previous TSH in normal range   Endocrinology- Dr Fritz      Bronchiectasis, history of pulmonary nodules  Seen by pulmonology - Dr. 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\     Senile osteoporosis  Prev Rx-Boniva 150 (pt choice to quit)  Taking vitamin-D supplement  No recent fractures     Seasonal allergies / chronic sinusitis / nasal polyps   Rx-Flonase, Xyzal, montelukast, astelin   ENT -  Eboni Briggs MD      Psoriasis  Rx-betamethasone, clobetasol, desonide, Topicort  Followed by dermatology - Jesenia Loera MD      B12 deficiency  Prev Rx-B12 injections 500 mcg every 2 weeks     Chronic Low back pain , sacroiliitis, ankylosing spondylitis   Rheum : Gregg Mensah MD   Pain Mgt : Hubbel   Rx : Mobic 15 mg , Norco 5 mg p.r.n., use sparingly    Fibromyalgia   Rx : Cymbalta   Prev rx : Amitriptyline (? Drowsiness)      ALONZO  Sleep MD : Catarina Morfin  CPAP compliant     Chronic Constipation  OTC : Mg Glycinate     Chronic Fatigue    Atrophic Vaginitis   Rx : Vagifem            Past Medical History:  Past Medical History:   Diagnosis Date    Asthma     GERD (gastroesophageal reflux disease)     Psoriasis        Past Surgical History:  Past Surgical History:   Procedure Laterality Date    ADENOIDECTOMY      CHOLECYSTECTOMY      COLONOSCOPY  2019    unremarkable per pt report    DEXA      WNL    SINUS SURGERY      Dr Cordova    SINUS SURGERY  01/2017    xs 5    THYROID SURGERY      nodules removed //Dr Amato     TONSILLECTOMY      TYMPANOSTOMY TUBE PLACEMENT      UPPER GASTROINTESTINAL ENDOSCOPY         Family History:  Family History   Problem Relation Name Age of Onset    Hypertension Mother       Obesity Father      Diabetes Sister      Obesity Sister      Breast cancer Paternal Aunt  43    No Known Problems Maternal Grandmother      No Known Problems Maternal Grandfather      No Known Problems Paternal Grandmother      No Known Problems Paternal Grandfather      Thyroid cancer Daughter  61    Ovarian cancer Neg Hx      Colon cancer Neg Hx         Social History:  Social History     Socioeconomic History    Marital status:    Tobacco Use    Smoking status: Never     Passive exposure: Never    Smokeless tobacco: Never   Substance and Sexual Activity    Alcohol use: Yes     Comment: twice a month    Drug use: No    Sexual activity: Never     Social Drivers of Health     Financial Resource Strain: Low Risk  (4/22/2025)    Overall Financial Resource Strain (CARDIA)     Difficulty of Paying Living Expenses: Not hard at all   Food Insecurity: No Food Insecurity (4/22/2025)    Hunger Vital Sign     Worried About Running Out of Food in the Last Year: Never true     Ran Out of Food in the Last Year: Never true   Transportation Needs: No Transportation Needs (4/22/2025)    PRAPARE - Transportation     Lack of Transportation (Medical): No     Lack of Transportation (Non-Medical): No   Physical Activity: Unknown (4/22/2025)    Exercise Vital Sign     Days of Exercise per Week: 0 days   Stress: No Stress Concern Present (4/22/2025)    [employer] [place] [place] [employer] - Occupational Stress Questionnaire     Feeling of Stress : Only a little   Housing Stability: Low Risk  (4/22/2025)    Housing Stability Vital Sign     Unable to Pay for Housing in the Last Year: No     Homeless in the Last Year: No       Medications:  Current Outpatient Medications on File Prior to Visit   Medication Sig Dispense Refill    ARMOUR THYROID 60 mg Tab TAKE 1 TABLET(60 MG) BY MOUTH BEFORE BREAKFAST 90 tablet 3    betamethasone dipropionate 0.05 % cream Apply topically once daily. 90 g 5    budesonide (PULMICORT) 0.5 mg/2 mL  nebulizer solution Controller, twice a day. 360 mL 3    cephALEXin (KEFLEX) 500 MG capsule Take 1 capsule (500 mg total) by mouth every 12 (twelve) hours. 10 capsule 0    cholecalciferol, vitamin D3, (VITAMIN D3) 50 mcg (2,000 unit) Tab Take 2,000 Units by mouth once daily.      clobetasol 0.05% (TEMOVATE) 0.05 % Oint 1 application once daily. Apply to affected area      desonide 0.05% (DESOWEN) 0.05 % Oint Apply topically 2 (two) times daily. 60 g 2    desoximetasone (TOPICORT) 0.05 % cream Apply topically 2 (two) times daily. 60 g 11    fluticasone propionate (FLONASE) 50 mcg/actuation nasal spray 1 spray (50 mcg total) by Each Nostril route once daily. 54 g 3    HYDROcodone-acetaminophen (NORCO) 5-325 mg per tablet Take 1 tablet by mouth every 8 (eight) hours as needed for Pain. 20 tablet 0    ketoconazole (NIZORAL) 2 % cream Apply topically.      ketoconazole (NIZORAL) 2 % shampoo Apply topically twice a week. 120 mL 3    levocetirizine (XYZAL) 5 MG tablet Take one capsule by mouth daily 90 tablet 0    MAGNESIUM ORAL Take by mouth once. Pt takes 400mg once daily      mupirocin (BACTROBAN) 2 % ointment by Nasal route 2 (two) times daily. 1 each 3    azelastine (ASTELIN) 137 mcg (0.1 %) nasal spray 1 spray (137 mcg total) by Nasal route 2 (two) times daily. 30 mL 3    cloNIDine (CATAPRES) 0.1 MG tablet Take 0.1 mg by mouth.      [DISCONTINUED] etanercept (ENBREL SURECLICK) 50 mg/mL (1 mL) Inject 1 mL (50 mg total) into the skin once a week. (Patient taking differently: Inject 50 mg into the skin once a week. Once a month) 12 mL 3     No current facility-administered medications on file prior to visit.       Allergies:  Avelox [moxifloxacin], Bactrim [sulfamethoxazole-trimethoprim], Ciprofloxacin, Isothiazolinones, Apremilast, Bacitracin, and Wellbutrin [bupropion hcl]    Immunizations:  Immunization History   Administered Date(s) Administered    COVID-19 MRNA, LN-S PF (MODERNA HALF 0.25 ML DOSE) 12/16/2021     "COVID-19 Vaccine 03/31/2021    COVID-19, vector-nr, rS-Ad26, PF ([facility]) 04/05/2021    Hepatitis A, Adult 08/09/2007    IPV 08/09/2007    Influenza 11/18/2011, 10/21/2013    Influenza (FLUAD) - Quadrivalent - Adjuvanted - PF *Preferred* (65+) 10/07/2021, 10/03/2022, 09/08/2023    Influenza - Quadrivalent - MDCK - PF 10/10/2017    Influenza - Quadrivalent - PF *Preferred* (6 months and older) 10/29/2019, 09/25/2020    Influenza - Trivalent - Afluria, Fluzone MDV 11/18/2011    Influenza - Trivalent - Fluad - Adjuvanted - PF (65 years and older 09/13/2024    Influenza - Trivalent - Fluarix, Flulaval, Fluzone, Afluria - PF 10/21/2013    Influenza - Trivalent - Fluzone High Dose - PF (65 years and older) 10/14/2014, 10/08/2015, 09/27/2016    Pneumococcal Conjugate - 13 Valent 04/12/2016    Pneumococcal Polysaccharide - 23 Valent 08/01/2011    RSVpreF (Arexvy) 10/21/2024    Td (Adult), Unspecified Formulation 08/02/2018    Tdap 08/09/2007, 08/01/2018    Zoster 07/11/2007       Review of Systems:  Review of Systems   All other systems reviewed and are negative.      Objective:    Vitals:  Vitals:    05/22/25 1313   BP: 114/64   Pulse: 70   Temp: 98.5 °F (36.9 °C)   TempSrc: Oral   SpO2: 97%   Weight: 61.1 kg (134 lb 11.2 oz)   Height: 5' 2" (1.575 m)   PainSc:   4   PainLoc: Groin         Physical Exam  Vitals reviewed.   Constitutional:       General: She is not in acute distress.  HENT:      Head: Normocephalic and atraumatic.   Eyes:      Pupils: Pupils are equal, round, and reactive to light.   Cardiovascular:      Rate and Rhythm: Normal rate and regular rhythm.      Heart sounds: No murmur heard.     No friction rub.   Pulmonary:      Effort: Pulmonary effort is normal.      Breath sounds: Normal breath sounds.   Abdominal:      General: Bowel sounds are normal. There is no distension.      Palpations: Abdomen is soft.      Tenderness: There is no abdominal tenderness.   Musculoskeletal:      Cervical back: Neck " supple.   Skin:     General: Skin is warm and dry.      Findings: No rash.   Psychiatric:         Behavior: Behavior normal.             Luc Teixeira MD  Family Medicine

## 2025-05-27 ENCOUNTER — TELEPHONE (OUTPATIENT)
Dept: FAMILY MEDICINE | Facility: CLINIC | Age: 85
End: 2025-05-27
Payer: MEDICARE

## 2025-05-27 DIAGNOSIS — R10.2 PELVIC PAIN: Primary | ICD-10-CM

## 2025-05-29 ENCOUNTER — TELEPHONE (OUTPATIENT)
Dept: OBSTETRICS AND GYNECOLOGY | Facility: CLINIC | Age: 85
End: 2025-05-29
Payer: MEDICARE

## 2025-05-29 NOTE — TELEPHONE ENCOUNTER
Called pt to get scheduled with Dr. Salas to see him in [place]  Gave pt June 5th @9:45, pt verbally confirmed and understood

## 2025-06-05 ENCOUNTER — OFFICE VISIT (OUTPATIENT)
Dept: OBSTETRICS AND GYNECOLOGY | Facility: CLINIC | Age: 85
End: 2025-06-05
Payer: MEDICARE

## 2025-06-05 VITALS — WEIGHT: 134.56 LBS | BODY MASS INDEX: 24.62 KG/M2

## 2025-06-05 DIAGNOSIS — R10.2 PELVIC PAIN: Primary | ICD-10-CM

## 2025-06-05 PROCEDURE — 99999 PR PBB SHADOW E&M-EST. PATIENT-LVL III: CPT | Mod: PBBFAC,,, | Performed by: OBSTETRICS & GYNECOLOGY

## 2025-06-05 PROCEDURE — 99213 OFFICE O/P EST LOW 20 MIN: CPT | Mod: PBBFAC,PN | Performed by: OBSTETRICS & GYNECOLOGY

## 2025-06-06 ENCOUNTER — OFFICE VISIT (OUTPATIENT)
Dept: OTOLARYNGOLOGY | Facility: CLINIC | Age: 85
End: 2025-06-06
Payer: MEDICARE

## 2025-06-06 VITALS — BODY MASS INDEX: 24.75 KG/M2 | WEIGHT: 134.5 LBS | HEIGHT: 62 IN

## 2025-06-06 DIAGNOSIS — J32.4 CHRONIC PANSINUSITIS: Primary | ICD-10-CM

## 2025-06-06 DIAGNOSIS — L40.9 PSORIASIS: ICD-10-CM

## 2025-06-06 DIAGNOSIS — J33.9 NASAL POLYPS: ICD-10-CM

## 2025-06-06 DIAGNOSIS — R09.82 PND (POST-NASAL DRIP): ICD-10-CM

## 2025-06-06 DIAGNOSIS — Z98.890 HISTORY OF SINUS SURGERY: ICD-10-CM

## 2025-06-06 PROCEDURE — 99213 OFFICE O/P EST LOW 20 MIN: CPT | Mod: PBBFAC,PO | Performed by: STUDENT IN AN ORGANIZED HEALTH CARE EDUCATION/TRAINING PROGRAM

## 2025-06-06 PROCEDURE — 99999 PR PBB SHADOW E&M-EST. PATIENT-LVL III: CPT | Mod: PBBFAC,,, | Performed by: STUDENT IN AN ORGANIZED HEALTH CARE EDUCATION/TRAINING PROGRAM

## 2025-06-06 RX ORDER — BETAMETHASONE DIPROPIONATE 0.5 MG/G
CREAM TOPICAL
Qty: 90 G | Refills: 5 | Status: SHIPPED | OUTPATIENT
Start: 2025-06-06

## 2025-06-10 ENCOUNTER — RESULTS FOLLOW-UP (OUTPATIENT)
Dept: OBSTETRICS AND GYNECOLOGY | Facility: CLINIC | Age: 85
End: 2025-06-10
Payer: MEDICARE

## 2025-06-16 ENCOUNTER — OFFICE VISIT (OUTPATIENT)
Dept: FAMILY MEDICINE | Facility: CLINIC | Age: 85
End: 2025-06-16
Payer: MEDICARE

## 2025-06-16 VITALS
HEIGHT: 62 IN | SYSTOLIC BLOOD PRESSURE: 126 MMHG | WEIGHT: 134.81 LBS | TEMPERATURE: 98 F | DIASTOLIC BLOOD PRESSURE: 64 MMHG | RESPIRATION RATE: 16 BRPM | BODY MASS INDEX: 24.81 KG/M2 | OXYGEN SATURATION: 99 % | HEART RATE: 73 BPM

## 2025-06-16 DIAGNOSIS — R10.2 PELVIC PAIN: Primary | ICD-10-CM

## 2025-06-16 DIAGNOSIS — F33.1 MODERATE EPISODE OF RECURRENT MAJOR DEPRESSIVE DISORDER: ICD-10-CM

## 2025-06-16 DIAGNOSIS — M70.61 TROCHANTERIC BURSITIS OF BOTH HIPS: ICD-10-CM

## 2025-06-16 DIAGNOSIS — N95.2 ATROPHIC VAGINITIS: ICD-10-CM

## 2025-06-16 DIAGNOSIS — M79.7 FIBROMYALGIA AFFECTING MULTIPLE SITES: Chronic | ICD-10-CM

## 2025-06-16 DIAGNOSIS — M25.559 HIP PAIN, UNSPECIFIED LATERALITY: ICD-10-CM

## 2025-06-16 DIAGNOSIS — M70.62 TROCHANTERIC BURSITIS OF BOTH HIPS: ICD-10-CM

## 2025-06-16 PROCEDURE — 99214 OFFICE O/P EST MOD 30 MIN: CPT | Mod: PBBFAC,PN | Performed by: FAMILY MEDICINE

## 2025-06-16 PROCEDURE — 99999 PR PBB SHADOW E&M-EST. PATIENT-LVL IV: CPT | Mod: PBBFAC,,, | Performed by: FAMILY MEDICINE

## 2025-06-16 PROCEDURE — 20610 DRAIN/INJ JOINT/BURSA W/O US: CPT | Mod: 50,PBBFAC,PN | Performed by: FAMILY MEDICINE

## 2025-06-16 PROCEDURE — 99214 OFFICE O/P EST MOD 30 MIN: CPT | Mod: 25,S$PBB,, | Performed by: FAMILY MEDICINE

## 2025-06-16 PROCEDURE — 99999PBSHW PR PBB SHADOW TECHNICAL ONLY FILED TO HB: Mod: PBBFAC,,,

## 2025-06-16 RX ORDER — BETAMETHASONE SODIUM PHOSPHATE AND BETAMETHASONE ACETATE 3; 3 MG/ML; MG/ML
3 INJECTION, SUSPENSION INTRA-ARTICULAR; INTRALESIONAL; INTRAMUSCULAR; SOFT TISSUE ONCE
Status: COMPLETED | OUTPATIENT
Start: 2025-06-16 | End: 2025-06-16

## 2025-06-16 RX ORDER — PERFLUOROHEXYLOCTANE 1 MG/MG
1 SOLUTION OPHTHALMIC 4 TIMES DAILY
COMMUNITY
Start: 2024-12-20

## 2025-06-16 RX ORDER — ESTRADIOL 10 UG/1
TABLET, FILM COATED VAGINAL
Qty: 22 TABLET | Refills: 0 | Status: SHIPPED | OUTPATIENT
Start: 2025-06-16 | End: 2025-06-20 | Stop reason: SDUPTHER

## 2025-06-16 RX ORDER — MIRTAZAPINE 7.5 MG/1
7.5 TABLET, FILM COATED ORAL NIGHTLY
COMMUNITY
Start: 2025-04-12

## 2025-06-16 RX ORDER — LIFITEGRAST 50 MG/ML
1 SOLUTION/ DROPS OPHTHALMIC 2 TIMES DAILY
COMMUNITY
Start: 2024-12-19

## 2025-06-16 RX ADMIN — BETAMETHASONE SODIUM PHOSPHATE AND BETAMETHASONE ACETATE 3 MG: 3; 3 INJECTION, SUSPENSION INTRA-ARTICULAR; INTRALESIONAL; INTRAMUSCULAR at 08:06

## 2025-06-16 NOTE — PROGRESS NOTES
THIS DOCUMENT WAS MADE IN PART WITH VOICE RECOGNITION SOFTWARE.  OCCASIONALLY THIS SOFTWARE WILL MISINTERPRET WORDS OR PHRASES.    Assessment and Plan:    1. Pelvic pain        2. Hip pain, unspecified laterality        3. Trochanteric bursitis of both hips  betamethasone acetate-betamethasone sodium phosphate injection 3 mg    betamethasone acetate-betamethasone sodium phosphate injection 3 mg    Large Joint Aspiration/Injection: bilateral greater trochanteric bursa      4. Atrophic vaginitis  estradioL (VAGIFEM) 10 mcg Tab      5. Fibromyalgia affecting multiple sites        6. Moderate episode of recurrent major depressive disorder                              Assessment & Plan    PLAN SUMMARY:  > Continue vaginal estrogen therapy: 1 pill daily for 2 weeks, then twice weekly  > Administered Celestone 3 mg injection in each hip for pain relief  > Discussed potential causes of pelvic pain, including lower back radiation and emotional stress  > Explained trochanteric bursitis and its relation to hip pain    PLAN NOTE:  > Explained trochanteric bursitis and its relation to hip pain.  > Explained possibility of radiating pain from lower back to pelvis.  > Administered Celestone 3 mg injection in each hip for pain relief.  > Discussed potential connection between emotional stress and physical symptoms (conversion disorder).  > Continued vaginal estrogen therapy for pelvic symptoms: Take 1 pill daily for 2 weeks, then twice weekly.             Visit today included increased complexity associated with the care of the episodic problem above addressed and managing the longitudinal care of the patient due to the serious and/or complex managed problem(s).    ______________________________________________________________________  Subjective:    Chief Complaint:  Chief Complaint   Patient presents with    Follow-up     1 month F/U    Hip Pain     X 2 months, difficulty sleeping from pain    Pelvic Pain     Feels pressure &  pain in pelvic area        HPI:  Amarilis is a 84 y.o. year old         History of Present Illness    CHIEF COMPLAINT:  Amarilis presents today for follow up of multiple pain complaints    MUSCULOSKELETAL PAIN:  She reports bilateral hip pain with shooting characteristics and is unable to lay on either side. Pain significantly worsens following exercise, which has led to discontinuation of physical activity. She has a history of L2 compression fracture with shooting pain and pressure in the affected area.    GYNECOLOGIC:  She reports pelvic pressure. Recent GYN evaluation showed normal Pap smear but revealed significant vaginal irritation. She has been using vaginal estrogen with some improvement in pelvic pain symptoms.    MEDICAL HISTORY:  She has a history of fibromyalgia.    MENTAL HEALTH:  She reports experiencing a depression setback over the weekend. She is currently seeing a counselor but needs to reschedule a recently canceled appointment.      ROS:  ROS as indicated in HPI.               Primary insomnia  Rx-  Mg Glycinate   Previous Rx- Remeron (ineffective), Doxepin (ineffective), trazodone (hangover), Melatonin (ineffective)     GERD  Rx-pantoprazole 40 mg  Denies dysphagia      Depressed mood / Anxiety   Rx : Lexapro 5 mg  Prev Rx-Lexapro (nightmares, EXTREME FATIGUE), buspirone (ineffective),  wellbutrin (constipation, imbalance, ineffective), Fluoxetine (itching), Citalopram, Cymbalta (memory impairment)   Stressor-fall out with daughter      Hypothyroidism  Current Rx-armor Thyroid 60 mg + 15 mg daily   Previous Rx-levothyroxine (ineffective)  Previous TSH in normal range   Endocrinology- Dr Fritz      Bronchiectasis, history of pulmonary nodules  Seen by pulmonology - Dr. 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\     Senile osteoporosis  Prev Rx-Boniva 150 (pt choice to quit)  Taking vitamin-D supplement  No recent fractures     Seasonal allergies /  chronic sinusitis / nasal polyps   Rx-Flonase, Xyzal, montelukast, astelin   ENT -  Eboni Briggs MD      Psoriasis  Rx-betamethasone, clobetasol, desonide, Topicort  Followed by dermatology - Jesenia Loera MD      B12 deficiency  Prev Rx-B12 injections 500 mcg every 2 weeks     Chronic Low back pain , sacroiliitis, ankylosing spondylitis   Rheum : Gregg Mensah MD   Pain Mgt : Hubbel   Rx : Mobic 15 mg , Norco 5 mg p.r.n., use sparingly    Fibromyalgia   Rx : Cymbalta   Prev rx : Amitriptyline (? Drowsiness)      ALONZO  Sleep MD : Catarina Morfin  CPAP compliant     Chronic Constipation  OTC : Mg Glycinate     Chronic Fatigue    Atrophic Vaginitis   Rx : Vagifem            Past Medical History:  Past Medical History:   Diagnosis Date    Asthma     GERD (gastroesophageal reflux disease)     Psoriasis        Past Surgical History:  Past Surgical History:   Procedure Laterality Date    ADENOIDECTOMY      CHOLECYSTECTOMY      COLONOSCOPY  2019    unremarkable per pt report    DEXA      WNL    SINUS SURGERY      Dr Cordova    SINUS SURGERY  01/2017    xs 5    THYROID SURGERY      nodules removed //Dr Amato     TONSILLECTOMY      TYMPANOSTOMY TUBE PLACEMENT      UPPER GASTROINTESTINAL ENDOSCOPY         Family History:  Family History   Problem Relation Name Age of Onset    Hypertension Mother      Obesity Father      Diabetes Sister      Obesity Sister      Breast cancer Paternal Aunt  43    No Known Problems Maternal Grandmother      No Known Problems Maternal Grandfather      No Known Problems Paternal Grandmother      No Known Problems Paternal Grandfather      Thyroid cancer Daughter  61    Ovarian cancer Neg Hx      Colon cancer Neg Hx         Social History:  Social History     Socioeconomic History    Marital status:    Tobacco Use    Smoking status: Never     Passive exposure: Never    Smokeless tobacco: Never   Substance and Sexual Activity    Alcohol use: Yes     Comment: twice a month    Drug use: No     Sexual activity: Never     Social Drivers of Health     Financial Resource Strain: Low Risk  (4/22/2025)    Overall Financial Resource Strain (CARDIA)     Difficulty of Paying Living Expenses: Not hard at all   Food Insecurity: No Food Insecurity (4/22/2025)    Hunger Vital Sign     Worried About Running Out of Food in the Last Year: Never true     Ran Out of Food in the Last Year: Never true   Transportation Needs: No Transportation Needs (4/22/2025)    PRAPARE - Transportation     Lack of Transportation (Medical): No     Lack of Transportation (Non-Medical): No   Physical Activity: Unknown (4/22/2025)    Exercise Vital Sign     Days of Exercise per Week: 0 days   Stress: No Stress Concern Present (4/22/2025)    [employer] [place] [employer] - Occupational Stress Questionnaire     Feeling of Stress : Only a little   Housing Stability: Low Risk  (4/22/2025)    Housing Stability Vital Sign     Unable to Pay for Housing in the Last Year: No     Homeless in the Last Year: No       Medications:  Current Outpatient Medications on File Prior to Visit   Medication Sig Dispense Refill    ARMOUR THYROID 60 mg Tab TAKE 1 TABLET(60 MG) BY MOUTH BEFORE BREAKFAST 90 tablet 3    betamethasone dipropionate 0.05 % cream 1/2 inch into sinus rinse twice daily 90 g 5    budesonide (PULMICORT) 0.5 mg/2 mL nebulizer solution Controller, twice a day. 360 mL 3    cephALEXin (KEFLEX) 500 MG capsule Take 1 capsule (500 mg total) by mouth every 12 (twelve) hours. 10 capsule 0    cholecalciferol, vitamin D3, (VITAMIN D3) 50 mcg (2,000 unit) Tab Take 2,000 Units by mouth once daily.      clobetasol 0.05% (TEMOVATE) 0.05 % Oint 1 application once daily. Apply to affected area      desonide 0.05% (DESOWEN) 0.05 % Oint Apply topically 2 (two) times daily. 60 g 2    desoximetasone (TOPICORT) 0.05 % cream Apply topically 2 (two) times daily. 60 g 11    EScitalopram oxalate (LEXAPRO) 5 MG Tab Take 1 tablet (5 mg total) by mouth once  daily. 90 tablet 3    fluticasone propionate (FLONASE) 50 mcg/actuation nasal spray 1 spray (50 mcg total) by Each Nostril route once daily. 54 g 3    HYDROcodone-acetaminophen (NORCO) 5-325 mg per tablet Take 1 tablet by mouth every 8 (eight) hours as needed for Pain. 20 tablet 0    ketoconazole (NIZORAL) 2 % cream Apply topically.      ketoconazole (NIZORAL) 2 % shampoo Apply topically twice a week. 120 mL 3    levocetirizine (XYZAL) 5 MG tablet Take one capsule by mouth daily 90 tablet 0    MAGNESIUM ORAL Take by mouth once. Pt takes 400mg once daily      MIEBO, PF, 100 % Drop Place 1 drop into both eyes 4 (four) times daily.      mirtazapine (REMERON) 7.5 MG Tab Take 7.5 mg by mouth every evening.      mupirocin (BACTROBAN) 2 % ointment by Nasal route 2 (two) times daily. 1 each 3    XIIDRA 5 % Dpet Place 1 drop into both eyes 2 (two) times daily.      [DISCONTINUED] estradioL (VAGIFEM) 10 mcg Tab Place 1 tablet (10 mcg total) vaginally once daily for 14 days, THEN 1 tablet (10 mcg total) twice a week. 22 tablet 0    azelastine (ASTELIN) 137 mcg (0.1 %) nasal spray 1 spray (137 mcg total) by Nasal route 2 (two) times daily. 30 mL 3    cloNIDine (CATAPRES) 0.1 MG tablet Take 0.1 mg by mouth.      [DISCONTINUED] etanercept (ENBREL SURECLICK) 50 mg/mL (1 mL) Inject 1 mL (50 mg total) into the skin once a week. (Patient taking differently: Inject 50 mg into the skin once a week. Once a month) 12 mL 3     No current facility-administered medications on file prior to visit.       Allergies:  Avelox [moxifloxacin], Bactrim [sulfamethoxazole-trimethoprim], Ciprofloxacin, Isothiazolinones, Apremilast, Bacitracin, and Wellbutrin [bupropion hcl]    Immunizations:  Immunization History   Administered Date(s) Administered    COVID-19 MRNA, LN-S PF (MODERNA HALF 0.25 ML DOSE) 12/16/2021    COVID-19 Vaccine 03/31/2021    COVID-19, vector-nr, rS-Ad26, PF ([facility]) 04/05/2021    Hepatitis A, Adult 08/09/2007    IPV 08/09/2007     "Influenza 11/18/2011, 10/21/2013    Influenza (FLUAD) - Quadrivalent - Adjuvanted - PF *Preferred* (65+) 10/07/2021, 10/03/2022, 09/08/2023    Influenza - Quadrivalent - MDCK - PF 10/10/2017    Influenza - Quadrivalent - PF *Preferred* (6 months and older) 10/29/2019, 09/25/2020    Influenza - Trivalent - Afluria, Fluzone MDV 11/18/2011    Influenza - Trivalent - Fluad - Adjuvanted - PF (65 years and older 09/13/2024    Influenza - Trivalent - Fluarix, Flulaval, Fluzone, Afluria - PF 10/21/2013    Influenza - Trivalent - Fluzone High Dose - PF (65 years and older) 10/14/2014, 10/08/2015, 09/27/2016    Pneumococcal Conjugate - 13 Valent 04/12/2016    Pneumococcal Polysaccharide - 23 Valent 08/01/2011    RSVpreF (Arexvy) 10/21/2024    Td (Adult), Unspecified Formulation 08/02/2018    Tdap 08/09/2007, 08/01/2018    Zoster 07/11/2007       Review of Systems:  Review of Systems   All other systems reviewed and are negative.      Objective:    Vitals:  Vitals:    06/16/25 0831   BP: 126/64   Pulse: 73   Resp: 16   Temp: 97.5 °F (36.4 °C)   TempSrc: Oral   SpO2: 99%   Weight: 61.1 kg (134 lb 13 oz)   Height: 5' 2" (1.575 m)   PainSc:   8   PainLoc: Generalized         Physical Exam  Vitals reviewed.   Constitutional:       General: She is not in acute distress.  HENT:      Head: Normocephalic and atraumatic.   Eyes:      Pupils: Pupils are equal, round, and reactive to light.   Cardiovascular:      Rate and Rhythm: Normal rate and regular rhythm.      Heart sounds: No murmur heard.     No friction rub.   Pulmonary:      Effort: Pulmonary effort is normal.      Breath sounds: Normal breath sounds.   Abdominal:      General: Bowel sounds are normal. There is no distension.      Palpations: Abdomen is soft.      Tenderness: There is no abdominal tenderness.   Musculoskeletal:      Cervical back: Neck supple.      Comments: Bilateral hips tender to palpation consistent with trochanteric bursitis   Skin:     General: Skin is " warm and dry.      Findings: No rash.   Psychiatric:         Behavior: Behavior normal.             Luc Teixeira MD  Family Medicine

## 2025-06-16 NOTE — PROCEDURES
Large Joint Aspiration/Injection: bilateral greater trochanteric bursa    Date/Time: 6/16/2025 8:40 AM    Performed by: Luc Teixeira MD  Authorized by: Luc Teixeira MD    Consent Done?:  Yes (Verbal)  Indications:  Pain  Site marked: the procedure site was marked    Timeout: prior to procedure the correct patient, procedure, and site was verified      Local anesthesia used?: Yes    Local anesthetic:  Lidocaine 2% without epinephrine  Anesthetic total (ml):  5    Approach:  Lateral  Location:  Hip  Laterality:  Bilateral  Site:  Bilateral greater trochanteric bursa  Medications (Right):  3 mg celestone 3mg  Medications (Left):  3 mg celestone 3mg  Patient tolerance:  Patient tolerated the procedure well with no immediate complications

## 2025-06-19 ENCOUNTER — OFFICE VISIT (OUTPATIENT)
Dept: OBSTETRICS AND GYNECOLOGY | Facility: CLINIC | Age: 85
End: 2025-06-19
Payer: MEDICARE

## 2025-06-19 DIAGNOSIS — B37.31 YEAST INFECTION OF THE VAGINA: Primary | ICD-10-CM

## 2025-06-19 DIAGNOSIS — N76.3 CHRONIC VULVITIS: ICD-10-CM

## 2025-06-19 PROCEDURE — 99999 PR PBB SHADOW E&M-EST. PATIENT-LVL III: CPT | Mod: PBBFAC,,, | Performed by: OBSTETRICS & GYNECOLOGY

## 2025-06-19 PROCEDURE — 99213 OFFICE O/P EST LOW 20 MIN: CPT | Mod: S$PBB,,, | Performed by: OBSTETRICS & GYNECOLOGY

## 2025-06-19 PROCEDURE — 99213 OFFICE O/P EST LOW 20 MIN: CPT | Mod: PBBFAC,PN | Performed by: OBSTETRICS & GYNECOLOGY

## 2025-06-19 NOTE — PROGRESS NOTES
84 y.o.   OB History          4    Para   4    Term   4            AB        Living   4         SAB        IAB        Ectopic        Multiple        Live Births   4               Comlaining of:  Vulva irritation  Some itch, mostly burning feeling  No blood  Feels did get a little bettter with vagifem only been on few weeks  Steroid hip shots recent    ROS:  GENERAL: No fever, chills, fatigability or weight loss.  SKIN: No rashes, itching or changes in color or texture of skin.  HEAD: No headaches or recent head trauma.  EYES: Visual acuity fine. No photophobia, ocular pain or diplopia.  EARS: Denies ear pain, discharge or vertigo.  NOSE: No loss of smell, no epistaxis or postnasal drip.  MOUTH & THROAT: No hoarseness or change in voice. No excessive gum bleeding.  NODES: Denies swollen glands.  CHEST: Denies MARIN, cyanosis, wheezing, cough and sputum production.  CARDIOVASCULAR: Denies chest pain, PND, orthopnea or reduced exercise tolerance.  ABDOMEN: Appetite fine. No weight loss. Denies diarrhea, abdominal pain, hematemesis or blood in stool.  URINARY: No flank pain, dysuria or hematuria.  PERIPHERAL VASCULAR: No claudication or cyanosis.  MUSCULOSKELETAL: No joint stiffness or swelling. Denies back pain.  NEUROLOGIC: No history of seizures, paralysis, alteration of gait or coordination      PE: There were no vitals taken for this visit.   Vulva mild chronic irritation,   No lesions to sample    A/p  Disc continue vagifem as dr moyer gave  She asked about hrt, but would have to take both estrogen and prog,, ?  Dont feel it indicated at this time  May need steroid cream to outside for chronic vulvitis  Check culture first, feel prob will be negative

## 2025-06-20 DIAGNOSIS — M25.50 POLYARTHRALGIA: ICD-10-CM

## 2025-06-20 DIAGNOSIS — N95.2 ATROPHIC VAGINITIS: ICD-10-CM

## 2025-06-20 RX ORDER — HYDROCODONE BITARTRATE AND ACETAMINOPHEN 5; 325 MG/1; MG/1
1 TABLET ORAL EVERY 8 HOURS PRN
Qty: 20 TABLET | Refills: 0 | Status: SHIPPED | OUTPATIENT
Start: 2025-06-20

## 2025-06-20 RX ORDER — ESTRADIOL 10 UG/1
TABLET, FILM COATED VAGINAL
Qty: 22 TABLET | Refills: 0 | Status: SHIPPED | OUTPATIENT
Start: 2025-06-20 | End: 2025-08-03

## 2025-06-25 ENCOUNTER — PATIENT MESSAGE (OUTPATIENT)
Dept: OBSTETRICS AND GYNECOLOGY | Facility: CLINIC | Age: 85
End: 2025-06-25
Payer: MEDICARE

## 2025-06-25 RX ORDER — CLOBETASOL PROPIONATE 0.5 MG/G
CREAM TOPICAL NIGHTLY
Qty: 30 G | Refills: 1 | Status: SHIPPED | OUTPATIENT
Start: 2025-06-25

## 2025-07-15 ENCOUNTER — OFFICE VISIT (OUTPATIENT)
Dept: FAMILY MEDICINE | Facility: CLINIC | Age: 85
End: 2025-07-15
Payer: MEDICARE

## 2025-07-15 VITALS
OXYGEN SATURATION: 99 % | WEIGHT: 134.5 LBS | HEART RATE: 70 BPM | RESPIRATION RATE: 16 BRPM | DIASTOLIC BLOOD PRESSURE: 62 MMHG | TEMPERATURE: 98 F | HEIGHT: 62 IN | SYSTOLIC BLOOD PRESSURE: 99 MMHG | BODY MASS INDEX: 24.75 KG/M2

## 2025-07-15 DIAGNOSIS — N95.2 ATROPHIC VAGINITIS: ICD-10-CM

## 2025-07-15 DIAGNOSIS — R10.2 PELVIC PAIN: Primary | ICD-10-CM

## 2025-07-15 DIAGNOSIS — R53.83 FATIGUE, UNSPECIFIED TYPE: ICD-10-CM

## 2025-07-15 PROCEDURE — 99214 OFFICE O/P EST MOD 30 MIN: CPT | Mod: S$PBB,,, | Performed by: FAMILY MEDICINE

## 2025-07-15 PROCEDURE — 99999 PR PBB SHADOW E&M-EST. PATIENT-LVL V: CPT | Mod: PBBFAC,,, | Performed by: FAMILY MEDICINE

## 2025-07-15 PROCEDURE — 99215 OFFICE O/P EST HI 40 MIN: CPT | Mod: PBBFAC,PN | Performed by: FAMILY MEDICINE

## 2025-07-15 RX ORDER — ESTRADIOL 10 UG/1
1 TABLET, FILM COATED VAGINAL
Qty: 24 TABLET | Refills: 4 | Status: SHIPPED | OUTPATIENT
Start: 2025-07-17

## 2025-07-15 RX ORDER — ARMODAFINIL 150 MG/1
150 TABLET ORAL DAILY
Qty: 30 TABLET | Refills: 0 | Status: SHIPPED | OUTPATIENT
Start: 2025-07-15 | End: 2025-08-14

## 2025-07-15 NOTE — PROGRESS NOTES
THIS DOCUMENT WAS MADE IN PART WITH VOICE RECOGNITION SOFTWARE.  OCCASIONALLY THIS SOFTWARE WILL MISINTERPRET WORDS OR PHRASES.    Assessment and Plan:    1. Pelvic pain        2. Fatigue, unspecified type  armodafiniL (NUVIGIL) 150 mg tablet      3. Atrophic vaginitis  estradioL (VAGIFEM) 10 mcg Tab              Assessment & Plan    R10.2 Pelvic pain  R53.83 Fatigue, unspecified type  N95.2 Atrophic vaginitis    PLAN SUMMARY:  > Continue Lexapro at current dose  > Continue Vagifem (estradiol vaginal tablets), prescribed 24 tablets for 3 months  > Start Nuvigil (armodafinil) for chronic fatigue, to be taken daily  > Continue levothyroxine (thyroid medication)  > Increase salt intake to address low blood pressure  > Apply heat therapy and lidocaine patches to strained trapezius muscle  > Recommend getting a massage for trapezius muscle strain  > Follow up in 4 weeks to assess energy levels and medication effectiveness  > Contact office if any issues arise with new medication (armodafinil)    FATIGUE, UNSPECIFIED TYPE:  > Assessed ongoing fatigue issues.  > Considered potential side effects of current medications, particularly Lexapro's impact on fatigue.  > Educated on the relationship between improved energy, increased activity, and potential weight loss.  > Started Nuvigil (armodafinil) as a safe, non-addictive alternative for chronic fatigue, to be taken daily, emphasizing its non-addictive properties.  > Discussed potential side effects of Nuvigil, focusing on headache risk (14-23%) and other minor effects.  > Diagnosed strained trapezius muscle from recent lifting.  > Amarilis to apply heat therapy and lidocaine patches to strained trapezius muscle.  > Recommend getting a massage for trapezius muscle strain.  > Noted low BP (99) without clear cause.  > Informed about the impact of low BP on energy levels.  > Recommend increasing salt intake to address low BP.  > Reviewed thyroid function, confirming normal  levels.  > Continued levothyroxine (thyroid medication).  > Continued Lexapro at current dose.  > Contact the office if any troubles arise with the new medication (armodafinil).  > Follow up in 4 weeks to assess energy levels and medication effectiveness.    ATROPHIC VAGINITIS:  > Continued Vagifem (estradiol vaginal tablets), prescribed 24 tablets for 3 months' supply, to be used twice weekly.             Visit today included increased complexity associated with the care of the episodic problem above addressed and managing the longitudinal care of the patient due to the serious and/or complex managed problem(s).    ______________________________________________________________________  Subjective:    Chief Complaint:  Chief Complaint   Patient presents with    Fatigue     Patient is requesting rx for fatigue    Leg Pain     Patient reports due to fibromyalgia having bilateral leg pain        HPI:  Amarilis is a 84 y.o. year old         History of Present Illness    CHIEF COMPLAINT:  Amariils presents today for fatigue and emotional distress.    FATIGUE:  She reports long-standing chronic fatigue that predates her current medication regimen. She describes falling asleep unexpectedly in a chair while reading the newspaper after cleaning the kitchen, which was distressing to her. She expresses significant frustration with her lack of energy and desire to improve her condition.    MENTAL HEALTH:  She recently experienced her first bad night, characterized by emotional distress and crying due to limited contact with her great-great-grandchildren, particularly one whom she sees annually. She acknowledges her emotional difficulty but remains optimistic, stating she will improve. Lexapro is currently working well for her and she expresses hesitancy about medication changes.    PAIN:  She reports multiple pain issues including breast pain associated with polymyalgia (described as moving and varying in location), severe  intermittent sciatica with episodes of near-falling and leg weakness, and shoulder/trapezius pain triggered by movement and touch, likely from lifting a bag of soil. These pain issues significantly impact her daily functioning, particularly her ability to cook.    MEDICATIONS:  She currently takes Lexapro (denies weight gain), thyroid medication, Vagifem (estradiol), and magnesium for sleep. Previous trials for depression/fatigue included bupropion, Wellbutrin, fluoxetine, citalopram, and Cymbalta, which she is no longer taking.      ROS:  ROS as indicated in HPI.               Primary insomnia  Rx-  Mg Glycinate   Previous Rx- Remeron (ineffective), Doxepin (ineffective), trazodone (hangover), Melatonin (ineffective)     GERD  Rx-pantoprazole 40 mg  Denies dysphagia      Depressed mood / Anxiety   Rx : Lexapro 5 mg  Prev Rx-Lexapro (nightmares, EXTREME FATIGUE), buspirone (ineffective),  wellbutrin (constipation, imbalance, ineffective), Fluoxetine (itching), Citalopram, Cymbalta (memory impairment)   Stressor-fall out with daughter      Hypothyroidism  Current Rx-armor Thyroid 60 mg + 15 mg daily   Previous Rx-levothyroxine (ineffective)  Previous TSH in normal range   Endocrinology- Dr Fritz      Bronchiectasis, history of pulmonary nodules  Seen by pulmonology - Dr. 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\     Senile osteoporosis  Prev Rx-Boniva 150 (pt choice to quit)  Taking vitamin-D supplement  No recent fractures     Seasonal allergies / chronic sinusitis / nasal polyps   Rx-Flonase, Xyzal, montelukast, astelin   ENT -  Eboni Briggs MD      Psoriasis  Rx-betamethasone, clobetasol, desonide, Topicort  Followed by dermatology - Jesenia Loera MD      B12 deficiency  Prev Rx-B12 injections 500 mcg every 2 weeks     Chronic Low back pain , sacroiliitis, ankylosing spondylitis   Rheum : Gregg Mensah MD   Pain Mgt : Bestbel   Rx : Mobic 15 mg , Norco 5 mg p.r.n., use  sparingly    Fibromyalgia   Prev rx : Amitriptyline (? Drowsiness), Cymbalta      [facility]  Sleep MD : Catarina Morfin  CPAP compliant     Chronic Constipation  OTC : Mg Glycinate     Chronic Fatigue  Rx : Nuvigil     Atrophic Vaginitis   Rx : Vagifem            Past Medical History:  Past Medical History:   Diagnosis Date    Asthma     GERD (gastroesophageal reflux disease)     Psoriasis        Past Surgical History:  Past Surgical History:   Procedure Laterality Date    ADENOIDECTOMY      CHOLECYSTECTOMY      COLONOSCOPY  2019    unremarkable per pt report    [facility]    SINUS SURGERY      Dr Cordova    SINUS SURGERY  01/2017    xs 5    THYROID SURGERY      nodules removed //Dr Amato     TONSILLECTOMY      TYMPANOSTOMY TUBE PLACEMENT      UPPER GASTROINTESTINAL ENDOSCOPY         Family History:  Family History   Problem Relation Name Age of Onset    Hypertension Mother      Obesity Father      Diabetes Sister      Obesity Sister      Breast cancer Paternal Aunt  43    No Known Problems Maternal Grandmother      No Known Problems Maternal Grandfather      No Known Problems Paternal Grandmother      No Known Problems Paternal Grandfather      Thyroid cancer Daughter  61    Ovarian cancer Neg Hx      Colon cancer Neg Hx         Social History:  Social History     Socioeconomic History    Marital status:    Tobacco Use    Smoking status: Never     Passive exposure: Never    Smokeless tobacco: Never   Substance and Sexual Activity    Alcohol use: Yes     Comment: twice a month    Drug use: No    Sexual activity: Never     Social Drivers of Health     Financial Resource Strain: Low Risk  (4/22/2025)    Overall Financial Resource Strain (CARDIA)     Difficulty of Paying Living Expenses: Not hard at all   Food Insecurity: No Food Insecurity (4/22/2025)    Hunger Vital Sign     Worried About Running Out of Food in the Last Year: Never true     Ran Out of Food in the Last Year: Never true   Transportation Needs: No  Transportation Needs (4/22/2025)    PRAPARE - Transportation     Lack of Transportation (Medical): No     Lack of Transportation (Non-Medical): No   Physical Activity: Unknown (4/22/2025)    Exercise Vital Sign     Days of Exercise per Week: 0 days   Stress: No Stress Concern Present (4/22/2025)    [employer] - Occupational Stress Questionnaire     Feeling of Stress : Only a little   Housing Stability: Low Risk  (4/22/2025)    Housing Stability Vital Sign     Unable to Pay for Housing in the Last Year: No     Homeless in the Last Year: No       Medications:  Current Outpatient Medications on File Prior to Visit   Medication Sig Dispense Refill    ARMOUR THYROID 60 mg Tab TAKE 1 TABLET(60 MG) BY MOUTH BEFORE BREAKFAST 90 tablet 3    betamethasone dipropionate 0.05 % cream 1/2 inch into sinus rinse twice daily 90 g 5    budesonide (PULMICORT) 0.5 mg/2 mL nebulizer solution Controller, twice a day. 360 mL 3    cholecalciferol, vitamin D3, (VITAMIN D3) 50 mcg (2,000 unit) Tab Take 2,000 Units by mouth once daily.      clobetasoL (TEMOVATE) 0.05 % cream Apply topically nightly. 30 g 1    clobetasol 0.05% (TEMOVATE) 0.05 % Oint 1 application once daily. Apply to affected area      desonide 0.05% (DESOWEN) 0.05 % Oint Apply topically 2 (two) times daily. 60 g 2    desoximetasone (TOPICORT) 0.05 % cream Apply topically 2 (two) times daily. 60 g 11    EScitalopram oxalate (LEXAPRO) 5 MG Tab Take 1 tablet (5 mg total) by mouth once daily. 90 tablet 3    fluticasone propionate (FLONASE) 50 mcg/actuation nasal spray 1 spray (50 mcg total) by Each Nostril route once daily. 54 g 3    HYDROcodone-acetaminophen (NORCO) 5-325 mg per tablet Take 1 tablet by mouth every 8 (eight) hours as needed for Pain. 20 tablet 0    ketoconazole (NIZORAL) 2 % cream Apply topically.      ketoconazole (NIZORAL) 2 % shampoo Apply topically twice a week. 120 mL 3    levocetirizine (XYZAL) 5 MG tablet Take one capsule by  mouth daily 90 tablet 0    MAGNESIUM ORAL Take by mouth once. Pt takes 400mg once daily      MIEBO, PF, 100 % Drop Place 1 drop into both eyes 4 (four) times daily.      mupirocin (BACTROBAN) 2 % ointment by Nasal route 2 (two) times daily. 1 each 3    XIIDRA 5 % Dpet Place 1 drop into both eyes 2 (two) times daily.      [DISCONTINUED] cephALEXin (KEFLEX) 500 MG capsule Take 1 capsule (500 mg total) by mouth every 12 (twelve) hours. 10 capsule 0    [DISCONTINUED] estradioL (VAGIFEM) 10 mcg Tab Place 1 tablet (10 mcg total) vaginally once daily for 14 days, THEN 1 tablet (10 mcg total) twice a week. 22 tablet 0    [DISCONTINUED] mirtazapine (REMERON) 7.5 MG Tab Take 7.5 mg by mouth every evening.      azelastine (ASTELIN) 137 mcg (0.1 %) nasal spray 1 spray (137 mcg total) by Nasal route 2 (two) times daily. 30 mL 3    cloNIDine (CATAPRES) 0.1 MG tablet Take 0.1 mg by mouth.      [DISCONTINUED] etanercept (ENBREL SURECLICK) 50 mg/mL (1 mL) Inject 1 mL (50 mg total) into the skin once a week. (Patient taking differently: Inject 50 mg into the skin once a week. Once a month) 12 mL 3     No current facility-administered medications on file prior to visit.       Allergies:  Avelox [moxifloxacin], Bactrim [sulfamethoxazole-trimethoprim], Ciprofloxacin, Isothiazolinones, Apremilast, Bacitracin, and Wellbutrin [bupropion hcl]    Immunizations:  Immunization History   Administered Date(s) Administered    COVID-19 MRNA, LN-S PF (MODERNA HALF 0.25 ML DOSE) 12/16/2021    COVID-19 Vaccine 03/31/2021    COVID-19, vector-nr, [id number], PF (Johanna) 04/05/2021    Hepatitis A, Adult 08/09/2007    IPV 08/09/2007    Influenza 11/18/2011, 10/21/2013    Influenza (FLUAD) - Quadrivalent - Adjuvanted - PF *Preferred* (65+) 10/07/2021, 10/03/2022, 09/08/2023    Influenza - Quadrivalent - MDCK - PF 10/10/2017    Influenza - Quadrivalent - PF *Preferred* (6 months and older) 10/29/2019, 09/25/2020    Influenza - Trivalent - Afluria, Fluzone  "MDV 11/18/2011    Influenza - Trivalent - Fluad - Adjuvanted - PF (65 years and older 09/13/2024    Influenza - Trivalent - Fluarix, Flulaval, Fluzone, Afluria - PF 10/21/2013    Influenza - Trivalent - Fluzone High Dose - PF (65 years and older) 10/14/2014, 10/08/2015, 09/27/2016    Pneumococcal Conjugate - 13 Valent 04/12/2016    Pneumococcal Polysaccharide - 23 Valent 08/01/2011    RSVpreF (Arexvy) 10/21/2024    Td (Adult), Unspecified Formulation 08/02/2018    Tdap 08/09/2007, 08/01/2018    Zoster 07/11/2007       Review of Systems:  Review of Systems   All other systems reviewed and are negative.      Objective:    Vitals:  Vitals:    07/15/25 0852   BP: 99/62   Pulse: 70   Resp: 16   Temp: 97.9 °F (36.6 °C)   TempSrc: Oral   SpO2: 99%   Weight: 61 kg (134 lb 7.7 oz)   Height: 5' 2" (1.575 m)   PainSc:   4   PainLoc: Leg         Physical Exam  Constitutional:       General: She is not in acute distress.  HENT:      Head: Normocephalic and atraumatic.   Eyes:      Pupils: Pupils are equal, round, and reactive to light.   Cardiovascular:      Rate and Rhythm: Normal rate and regular rhythm.      Heart sounds: No murmur heard.     No friction rub.   Pulmonary:      Effort: Pulmonary effort is normal.      Breath sounds: Normal breath sounds.   Abdominal:      General: Bowel sounds are normal. There is no distension.      Palpations: Abdomen is soft.      Tenderness: There is no abdominal tenderness.   Musculoskeletal:      Cervical back: Neck supple.   Skin:     General: Skin is warm and dry.      Findings: No rash.   Psychiatric:         Behavior: Behavior normal.             Luc Teixeira MD  Family Medicine                              "

## 2025-07-29 ENCOUNTER — TELEPHONE (OUTPATIENT)
Dept: AUDIOLOGY | Facility: CLINIC | Age: 85
End: 2025-07-29
Payer: MEDICARE

## 2025-07-29 NOTE — TELEPHONE ENCOUNTER
I contacted the patient and Tiarascott Linton already spoke to her.    Copied from CRM #[id number]. Topic: General Inquiry - Patient Advice  >> Jul 28, 2025  4:43 PM Mehreen wrote:  Type:  Needs Medical Advice    Who Called: pt  Would the patient rather a call back or a response via [facility]? call  Best Call Back Number: Telephone Information:  Mobile          [phone]    Additional Information: pt called to schedule appts for her and her  for their annual check up they would like to try to find back to back appts please advisee thank you

## 2025-08-01 DIAGNOSIS — K14.6 TONGUE PAIN: Primary | ICD-10-CM

## 2025-08-04 ENCOUNTER — DOCUMENTATION ONLY (OUTPATIENT)
Dept: AUDIOLOGY | Facility: CLINIC | Age: 85
End: 2025-08-04
Payer: MEDICARE

## 2025-08-04 ENCOUNTER — CLINICAL SUPPORT (OUTPATIENT)
Dept: AUDIOLOGY | Facility: CLINIC | Age: 85
End: 2025-08-04
Payer: MEDICARE

## 2025-08-04 DIAGNOSIS — Z97.4 WEARS HEARING AID IN BOTH EARS: Primary | ICD-10-CM

## 2025-08-04 DIAGNOSIS — H90.3 BILATERAL SENSORINEURAL HEARING LOSS: ICD-10-CM

## 2025-08-04 PROCEDURE — 99499 UNLISTED E&M SERVICE: CPT | Mod: S$PBB,,, | Performed by: AUDIOLOGIST-HEARING AID FITTER

## 2025-08-04 NOTE — PROGRESS NOTES
Amarilis Decker came in on 08/04/2025 for a hearing aid follow up. Pt was accompanied by spouse during today's visit. Pt stated she has had difficulty hearing at times of fatigue but feels the hearing aids are probably working well for her. All complaints were addressed during this visit to the patient's satisfaction. Plan of care was discussed in detail with the patient, who agreed with the plan as above.

## 2025-08-04 NOTE — PROGRESS NOTES
Amarilis Decker was seen on 08/04/2025 for an audiological evaluation. She was not due for a hearing test so no testing was done.

## 2025-08-12 ENCOUNTER — OFFICE VISIT (OUTPATIENT)
Dept: FAMILY MEDICINE | Facility: CLINIC | Age: 85
End: 2025-08-12
Payer: MEDICARE

## 2025-08-12 VITALS
DIASTOLIC BLOOD PRESSURE: 65 MMHG | RESPIRATION RATE: 16 BRPM | HEART RATE: 68 BPM | TEMPERATURE: 98 F | WEIGHT: 133.63 LBS | SYSTOLIC BLOOD PRESSURE: 95 MMHG | BODY MASS INDEX: 24.59 KG/M2 | HEIGHT: 62 IN | OXYGEN SATURATION: 98 %

## 2025-08-12 DIAGNOSIS — M79.7 FIBROMYALGIA AFFECTING MULTIPLE SITES: ICD-10-CM

## 2025-08-12 DIAGNOSIS — K59.04 FUNCTIONAL CONSTIPATION: ICD-10-CM

## 2025-08-12 DIAGNOSIS — R53.83 FATIGUE, UNSPECIFIED TYPE: Primary | ICD-10-CM

## 2025-08-12 DIAGNOSIS — K14.6 TONGUE PAIN: ICD-10-CM

## 2025-08-12 PROCEDURE — 99999 PR PBB SHADOW E&M-EST. PATIENT-LVL V: CPT | Mod: PBBFAC,,, | Performed by: FAMILY MEDICINE

## 2025-08-12 PROCEDURE — 99215 OFFICE O/P EST HI 40 MIN: CPT | Mod: PBBFAC,PN | Performed by: FAMILY MEDICINE

## 2025-08-12 PROCEDURE — 99214 OFFICE O/P EST MOD 30 MIN: CPT | Mod: S$PBB,,, | Performed by: FAMILY MEDICINE

## 2025-08-12 RX ORDER — ARMODAFINIL 150 MG/1
150 TABLET ORAL DAILY
Qty: 90 TABLET | Refills: 1 | Status: SHIPPED | OUTPATIENT
Start: 2025-08-12 | End: 2025-09-11

## 2025-08-12 RX ORDER — TRIAMCINOLONE ACETONIDE 1 MG/G
PASTE DENTAL 2 TIMES DAILY
Qty: 5 G | Refills: 12 | Status: SHIPPED | OUTPATIENT
Start: 2025-08-12 | End: 2025-09-11